# Patient Record
Sex: MALE | Race: WHITE | NOT HISPANIC OR LATINO | Employment: OTHER | URBAN - METROPOLITAN AREA
[De-identification: names, ages, dates, MRNs, and addresses within clinical notes are randomized per-mention and may not be internally consistent; named-entity substitution may affect disease eponyms.]

---

## 2017-10-05 ENCOUNTER — TELEPHONE (OUTPATIENT)
Dept: INTERNAL MEDICINE | Facility: CLINIC | Age: 82
End: 2017-10-05

## 2017-10-05 DIAGNOSIS — Z00.00 ENCOUNTER FOR PREVENTIVE HEALTH EXAMINATION: Primary | ICD-10-CM

## 2017-10-05 NOTE — TELEPHONE ENCOUNTER
----- Message from Selma Humphries sent at 10/5/2017  2:23 PM CDT -----  Doctor appointment and lab have been scheduled.  Please link lab orders to the lab appointment.  Date of doctor appointment:  1/3/18  Physical or EP:  Physical  Date of lab appointment:  12/27/17  Comments:

## 2017-10-09 ENCOUNTER — OFFICE VISIT (OUTPATIENT)
Dept: OPTOMETRY | Facility: CLINIC | Age: 82
End: 2017-10-09
Payer: MEDICARE

## 2017-10-09 DIAGNOSIS — H25.13 NUCLEAR SCLEROSIS, BILATERAL: Primary | ICD-10-CM

## 2017-10-09 DIAGNOSIS — H52.4 PRESBYOPIA: ICD-10-CM

## 2017-10-09 DIAGNOSIS — Z13.5 GLAUCOMA SCREENING: ICD-10-CM

## 2017-10-09 PROCEDURE — 92015 DETERMINE REFRACTIVE STATE: CPT | Mod: S$GLB,,, | Performed by: OPTOMETRIST

## 2017-10-09 PROCEDURE — 92004 COMPRE OPH EXAM NEW PT 1/>: CPT | Mod: S$GLB,,, | Performed by: OPTOMETRIST

## 2017-10-09 PROCEDURE — 99999 PR PBB SHADOW E&M-EST. PATIENT-LVL II: CPT | Mod: PBBFAC,,, | Performed by: OPTOMETRIST

## 2017-10-09 NOTE — PROGRESS NOTES
HPI     DLS: 9/19/2012  Pt states take longer to focus. Need bright lights to read.   Denies f/f    No gtts     Hx CAT OU --OS getting worse    Last edited by Matthew Rodney, OD on 10/9/2017  9:47 AM. (History)        ROS     Negative for: Constitutional, Gastrointestinal, Neurological, Skin,   Genitourinary, Musculoskeletal, HENT, Endocrine, Cardiovascular, Eyes,   Respiratory, Psychiatric, Allergic/Imm, Heme/Lymph    Last edited by Matthew Rodney, OD on 10/9/2017  8:27 AM. (History)        Assessment /Plan     For exam results, see Encounter Report.    Nuclear sclerosis, bilateral    Glaucoma screening    Presbyopia      Reduced VA 2 to Cat OS>OD.  Dr Dao ALVARENGA reports his son had some problems after cat surgery, so he is leery.  Long discussion about procedure/risks/benefits.  Discussed he is legally blind OS now, and not legal to drive, but if he wishes to wait on surgery it will not harm his eyes    PLAN:    rtc 1 yr, or will call sooner if wishes cat yenifer Lozano

## 2017-12-15 ENCOUNTER — TELEPHONE (OUTPATIENT)
Dept: INTERNAL MEDICINE | Facility: CLINIC | Age: 82
End: 2017-12-15

## 2017-12-15 NOTE — TELEPHONE ENCOUNTER
----- Message from Cari Gama sent at 12/15/2017  2:58 PM CST -----  Pt has lab work scheduled for 12/27 and need orders attached please

## 2018-01-03 ENCOUNTER — OFFICE VISIT (OUTPATIENT)
Dept: INTERNAL MEDICINE | Facility: CLINIC | Age: 83
End: 2018-01-03
Payer: MEDICARE

## 2018-01-03 ENCOUNTER — LAB VISIT (OUTPATIENT)
Dept: LAB | Facility: HOSPITAL | Age: 83
End: 2018-01-03
Attending: INTERNAL MEDICINE
Payer: MEDICARE

## 2018-01-03 VITALS
WEIGHT: 122.81 LBS | SYSTOLIC BLOOD PRESSURE: 130 MMHG | DIASTOLIC BLOOD PRESSURE: 82 MMHG | HEART RATE: 71 BPM | HEIGHT: 62 IN | TEMPERATURE: 98 F | RESPIRATION RATE: 16 BRPM | BODY MASS INDEX: 22.6 KG/M2

## 2018-01-03 DIAGNOSIS — G25.0 ESSENTIAL TREMOR: ICD-10-CM

## 2018-01-03 DIAGNOSIS — Z12.5 PROSTATE CANCER SCREENING: ICD-10-CM

## 2018-01-03 DIAGNOSIS — Z00.00 ENCOUNTER FOR PREVENTIVE HEALTH EXAMINATION: ICD-10-CM

## 2018-01-03 DIAGNOSIS — E78.5 DYSLIPIDEMIA: ICD-10-CM

## 2018-01-03 DIAGNOSIS — Z00.00 ENCOUNTER FOR PREVENTIVE HEALTH EXAMINATION: Primary | ICD-10-CM

## 2018-01-03 LAB
ALBUMIN SERPL BCP-MCNC: 3.9 G/DL
ALP SERPL-CCNC: 61 U/L
ALT SERPL W/O P-5'-P-CCNC: 15 U/L
ANION GAP SERPL CALC-SCNC: 11 MMOL/L
AST SERPL-CCNC: 28 U/L
BASOPHILS # BLD AUTO: 0.05 K/UL
BASOPHILS NFR BLD: 0.9 %
BILIRUB SERPL-MCNC: 0.8 MG/DL
BUN SERPL-MCNC: 13 MG/DL
CALCIUM SERPL-MCNC: 9.5 MG/DL
CHLORIDE SERPL-SCNC: 105 MMOL/L
CHOLEST SERPL-MCNC: 209 MG/DL
CHOLEST/HDLC SERPL: 3 {RATIO}
CO2 SERPL-SCNC: 27 MMOL/L
COMPLEXED PSA SERPL-MCNC: 2 NG/ML
CREAT SERPL-MCNC: 1 MG/DL
DIFFERENTIAL METHOD: ABNORMAL
EOSINOPHIL # BLD AUTO: 0.2 K/UL
EOSINOPHIL NFR BLD: 3.2 %
ERYTHROCYTE [DISTWIDTH] IN BLOOD BY AUTOMATED COUNT: 12 %
EST. GFR  (AFRICAN AMERICAN): >60 ML/MIN/1.73 M^2
EST. GFR  (NON AFRICAN AMERICAN): >60 ML/MIN/1.73 M^2
GLUCOSE SERPL-MCNC: 84 MG/DL
HCT VFR BLD AUTO: 43.4 %
HDLC SERPL-MCNC: 69 MG/DL
HDLC SERPL: 33 %
HGB BLD-MCNC: 14.6 G/DL
IMM GRANULOCYTES # BLD AUTO: 0.01 K/UL
IMM GRANULOCYTES NFR BLD AUTO: 0.2 %
LDLC SERPL CALC-MCNC: 118 MG/DL
LYMPHOCYTES # BLD AUTO: 1.2 K/UL
LYMPHOCYTES NFR BLD: 23 %
MCH RBC QN AUTO: 31.7 PG
MCHC RBC AUTO-ENTMCNC: 33.6 G/DL
MCV RBC AUTO: 94 FL
MONOCYTES # BLD AUTO: 0.4 K/UL
MONOCYTES NFR BLD: 8 %
NEUTROPHILS # BLD AUTO: 3.5 K/UL
NEUTROPHILS NFR BLD: 64.7 %
NONHDLC SERPL-MCNC: 140 MG/DL
NRBC BLD-RTO: 0 /100 WBC
PLATELET # BLD AUTO: 188 K/UL
PMV BLD AUTO: 9.1 FL
POTASSIUM SERPL-SCNC: 4 MMOL/L
PROT SERPL-MCNC: 7.3 G/DL
RBC # BLD AUTO: 4.6 M/UL
SODIUM SERPL-SCNC: 143 MMOL/L
TRIGL SERPL-MCNC: 110 MG/DL
TSH SERPL DL<=0.005 MIU/L-ACNC: 1.65 UIU/ML
WBC # BLD AUTO: 5.38 K/UL

## 2018-01-03 PROCEDURE — 90670 PCV13 VACCINE IM: CPT | Mod: S$GLB,,, | Performed by: INTERNAL MEDICINE

## 2018-01-03 PROCEDURE — 80053 COMPREHEN METABOLIC PANEL: CPT

## 2018-01-03 PROCEDURE — G0009 ADMIN PNEUMOCOCCAL VACCINE: HCPCS | Mod: S$GLB,,, | Performed by: INTERNAL MEDICINE

## 2018-01-03 PROCEDURE — 84443 ASSAY THYROID STIM HORMONE: CPT

## 2018-01-03 PROCEDURE — 85025 COMPLETE CBC W/AUTO DIFF WBC: CPT

## 2018-01-03 PROCEDURE — 99999 PR PBB SHADOW E&M-EST. PATIENT-LVL III: CPT | Mod: PBBFAC,,, | Performed by: INTERNAL MEDICINE

## 2018-01-03 PROCEDURE — 84153 ASSAY OF PSA TOTAL: CPT

## 2018-01-03 PROCEDURE — 99499 UNLISTED E&M SERVICE: CPT | Mod: S$GLB,,, | Performed by: INTERNAL MEDICINE

## 2018-01-03 PROCEDURE — 36415 COLL VENOUS BLD VENIPUNCTURE: CPT | Mod: PO

## 2018-01-03 PROCEDURE — 80061 LIPID PANEL: CPT

## 2018-01-03 PROCEDURE — 99397 PER PM REEVAL EST PAT 65+ YR: CPT | Mod: S$GLB,,, | Performed by: INTERNAL MEDICINE

## 2018-01-03 NOTE — PROGRESS NOTES
History of present illness:  85-year-old male in today for general health assessment.  Next    Current medications-none on a scheduled basis.    Review of systems:  General: no fever, chills, generalized body aches. No unexpected weight loss.  Eyes:  No visual disturbances.  HEENT:  No hoarseness, dysphagia, ear pain.  Respiratory:  No cough, no shortness of breath.  Cardiovascular: no chest pain, palpitations, cough, exertional limb pain. No edema.  GI: no nausea, vomiting.  No abdominal pain. No change in bowel habits.  No melena, no hematochezia.  : no dysuria. No change in the color or character of the urine. No urinary frequency.  Musculoskeletal: no joint pain or swelling.  Neurologic:  No focal neurological complaints.  No headaches.  The patient's tremor symptomatology is stable.  Skin:  No rashes or other concerns.  Psych:  No emotional issues\    Past medical history:  Essential tremor.  Mild dyslipidemia.    Past surgical history, family medical history, social histories are all noted reviewed in the electronic medical record history sections    Health screenings:  Eye exam October 2017  23 Valent pneumococcal vaccine last year.  He has not yet had had Prevnar 13.  Abdominal aortic aneurysm screening December 2016.    Physical examination:  GENERAL:  Alert, appropriately groomed, no acute distress.  VS: Blood pressure taken manually by this examiner is 130/82.  EYES: sclerae white ,nonicteric. PERRL.  HEENT:  Normocephalic. Ear canals and tympanic membranes normal. Mouth and pharynx normal. No thyromegaly. Trachea midline and freely mobile.  LUNGS:  Clear to ascultation and normal to percussion.  CARDIOVASCULAR:  Normal heart sounds.  No significant murmur. Carotids full bilaterally without bruit.  Pedal pulses intact .  No abdominal bruit.  No peripheral extremity edema.  GI: the abdomen is soft, no distension. No masses , tenderness, organomegaly.  Rectal examination normal.  : scrotum, testicles  and penis normal. Prostate without nodules or asymmetry.  LYMPHATIC:  No axillary, inguinal , cervical adenopathy.  MUSCULOSKELETAL:  Range of motion, stability and strength of the right and left upper and lower extremities normal. No swollen or tender joints  NEUROLOGIC:  DTR's normal. No gross motor or sensory deficits apparent, gait normal.  SKIN:  No rashes.   MS:  Alert, oriented , affect and mood all appropriate    Impression:  Generally healthy 85-year-old male with no significant underlying medical conditions living a healthy lifestyle.  Mild essential tremor stable.  Has had minimal dyslipidemia and past.    Plan:  Update health maintenance laboratory data to include CBC, chemistry profile, lipid profile, TSH.  Review of family history of prostate cancer we'll update PSA.  Prevnar 13 immunization update.  Return to clinic one year for annual health assessment.

## 2018-02-16 ENCOUNTER — PES CALL (OUTPATIENT)
Dept: ADMINISTRATIVE | Facility: CLINIC | Age: 83
End: 2018-02-16

## 2018-09-06 ENCOUNTER — TELEPHONE (OUTPATIENT)
Dept: INTERNAL MEDICINE | Facility: CLINIC | Age: 83
End: 2018-09-06

## 2018-09-06 DIAGNOSIS — E78.5 DYSLIPIDEMIA: Primary | ICD-10-CM

## 2018-09-06 NOTE — TELEPHONE ENCOUNTER
----- Message from Yulissa Riley sent at 9/6/2018  9:44 AM CDT -----  Doctor appointment and lab have been scheduled.  Please link lab orders to the lab appointment.  Date of doctor appointment:  1/10  Physical or EP:  EPP  Date of lab appointment:  10/7  Comments:

## 2018-09-18 ENCOUNTER — PES CALL (OUTPATIENT)
Dept: ADMINISTRATIVE | Facility: CLINIC | Age: 83
End: 2018-09-18

## 2018-10-10 ENCOUNTER — OFFICE VISIT (OUTPATIENT)
Dept: OPTOMETRY | Facility: CLINIC | Age: 83
End: 2018-10-10
Payer: COMMERCIAL

## 2018-10-10 DIAGNOSIS — Z13.5 GLAUCOMA SCREENING: ICD-10-CM

## 2018-10-10 DIAGNOSIS — H52.4 PRESBYOPIA: ICD-10-CM

## 2018-10-10 DIAGNOSIS — H25.13 NUCLEAR SCLEROSIS, BILATERAL: Primary | ICD-10-CM

## 2018-10-10 PROCEDURE — 92015 DETERMINE REFRACTIVE STATE: CPT | Mod: S$GLB,,, | Performed by: OPTOMETRIST

## 2018-10-10 PROCEDURE — 92014 COMPRE OPH EXAM EST PT 1/>: CPT | Mod: S$GLB,,, | Performed by: OPTOMETRIST

## 2018-10-10 PROCEDURE — 99999 PR PBB SHADOW E&M-EST. PATIENT-LVL II: CPT | Mod: PBBFAC,,, | Performed by: OPTOMETRIST

## 2018-10-10 NOTE — PROGRESS NOTES
HPI     DLS: 10/9/17  Pt  States he doesn't see a change in VA, but Pt would like a full Rx with   pupil dist. and all so he can order another pair of glasses so he can have   a back up. Does not want cat surgery  No f/f   No gtts     Last edited by Matthew Rodney, OD on 10/10/2018 11:32 AM. (History)        ROS     Negative for: Constitutional, Gastrointestinal, Neurological, Skin,   Genitourinary, Musculoskeletal, HENT, Endocrine, Cardiovascular, Eyes,   Respiratory, Psychiatric, Allergic/Imm, Heme/Lymph    Last edited by Matthew Rodney, OD on 10/10/2018 11:32 AM. (History)        Assessment /Plan     For exam results, see Encounter Report.    Nuclear sclerosis, bilateral    Glaucoma screening    Presbyopia      Reduced VA 2 to Cat OS>OD.  Dr Dao ALVARENGA reports his son had some problems after cat surgery, so he is leery.  Wishes new Rx.  Realizes will not pass next drivers test    PLAN:    rtc 1 yr, or will call sooner if wishes cat yenifer w Dr Lozano

## 2018-10-11 ENCOUNTER — PES CALL (OUTPATIENT)
Dept: ADMINISTRATIVE | Facility: CLINIC | Age: 83
End: 2018-10-11

## 2019-01-07 ENCOUNTER — LAB VISIT (OUTPATIENT)
Dept: LAB | Facility: HOSPITAL | Age: 84
End: 2019-01-07
Attending: INTERNAL MEDICINE
Payer: MEDICARE

## 2019-01-07 DIAGNOSIS — E78.5 DYSLIPIDEMIA: ICD-10-CM

## 2019-01-07 LAB
ALBUMIN SERPL BCP-MCNC: 3.8 G/DL
ALP SERPL-CCNC: 65 U/L
ALT SERPL W/O P-5'-P-CCNC: 16 U/L
ANION GAP SERPL CALC-SCNC: 8 MMOL/L
AST SERPL-CCNC: 25 U/L
BASOPHILS # BLD AUTO: 0.04 K/UL
BASOPHILS NFR BLD: 0.8 %
BILIRUB SERPL-MCNC: 0.7 MG/DL
BUN SERPL-MCNC: 15 MG/DL
CALCIUM SERPL-MCNC: 9.5 MG/DL
CHLORIDE SERPL-SCNC: 107 MMOL/L
CHOLEST SERPL-MCNC: 217 MG/DL
CHOLEST/HDLC SERPL: 3.6 {RATIO}
CO2 SERPL-SCNC: 27 MMOL/L
CREAT SERPL-MCNC: 1.1 MG/DL
DIFFERENTIAL METHOD: ABNORMAL
EOSINOPHIL # BLD AUTO: 0.2 K/UL
EOSINOPHIL NFR BLD: 3.9 %
ERYTHROCYTE [DISTWIDTH] IN BLOOD BY AUTOMATED COUNT: 12.5 %
EST. GFR  (AFRICAN AMERICAN): >60 ML/MIN/1.73 M^2
EST. GFR  (NON AFRICAN AMERICAN): >60 ML/MIN/1.73 M^2
GLUCOSE SERPL-MCNC: 104 MG/DL
HCT VFR BLD AUTO: 44 %
HDLC SERPL-MCNC: 61 MG/DL
HDLC SERPL: 28.1 %
HGB BLD-MCNC: 14.3 G/DL
IMM GRANULOCYTES # BLD AUTO: 0.02 K/UL
IMM GRANULOCYTES NFR BLD AUTO: 0.4 %
LDLC SERPL CALC-MCNC: 130.6 MG/DL
LYMPHOCYTES # BLD AUTO: 1.2 K/UL
LYMPHOCYTES NFR BLD: 23.5 %
MCH RBC QN AUTO: 31.3 PG
MCHC RBC AUTO-ENTMCNC: 32.5 G/DL
MCV RBC AUTO: 96 FL
MONOCYTES # BLD AUTO: 0.4 K/UL
MONOCYTES NFR BLD: 8.3 %
NEUTROPHILS # BLD AUTO: 3.3 K/UL
NEUTROPHILS NFR BLD: 63.1 %
NONHDLC SERPL-MCNC: 156 MG/DL
NRBC BLD-RTO: 0 /100 WBC
PLATELET # BLD AUTO: 175 K/UL
PMV BLD AUTO: 9.1 FL
POTASSIUM SERPL-SCNC: 4 MMOL/L
PROT SERPL-MCNC: 7 G/DL
RBC # BLD AUTO: 4.57 M/UL
SODIUM SERPL-SCNC: 142 MMOL/L
TRIGL SERPL-MCNC: 127 MG/DL
TSH SERPL DL<=0.005 MIU/L-ACNC: 2 UIU/ML
WBC # BLD AUTO: 5.19 K/UL

## 2019-01-07 PROCEDURE — 36415 COLL VENOUS BLD VENIPUNCTURE: CPT | Mod: HCNC,PO

## 2019-01-07 PROCEDURE — 85025 COMPLETE CBC W/AUTO DIFF WBC: CPT | Mod: HCNC

## 2019-01-07 PROCEDURE — 80061 LIPID PANEL: CPT | Mod: HCNC

## 2019-01-07 PROCEDURE — 80053 COMPREHEN METABOLIC PANEL: CPT | Mod: HCNC

## 2019-01-07 PROCEDURE — 84443 ASSAY THYROID STIM HORMONE: CPT | Mod: HCNC

## 2019-01-10 ENCOUNTER — OFFICE VISIT (OUTPATIENT)
Dept: INTERNAL MEDICINE | Facility: CLINIC | Age: 84
End: 2019-01-10
Payer: MEDICARE

## 2019-01-10 VITALS
BODY MASS INDEX: 21.02 KG/M2 | SYSTOLIC BLOOD PRESSURE: 104 MMHG | TEMPERATURE: 98 F | WEIGHT: 118.63 LBS | HEART RATE: 76 BPM | RESPIRATION RATE: 14 BRPM | DIASTOLIC BLOOD PRESSURE: 66 MMHG | HEIGHT: 63 IN

## 2019-01-10 DIAGNOSIS — Z00.00 ENCOUNTER FOR PREVENTIVE HEALTH EXAMINATION: Primary | ICD-10-CM

## 2019-01-10 DIAGNOSIS — G25.0 ESSENTIAL TREMOR: ICD-10-CM

## 2019-01-10 DIAGNOSIS — R82.90 ABNORMAL URINALYSIS: ICD-10-CM

## 2019-01-10 PROCEDURE — 99999 PR PBB SHADOW E&M-EST. PATIENT-LVL III: ICD-10-PCS | Mod: PBBFAC,HCNC,, | Performed by: INTERNAL MEDICINE

## 2019-01-10 PROCEDURE — 99397 PR PREVENTIVE VISIT,EST,65 & OVER: ICD-10-PCS | Mod: S$GLB,,, | Performed by: INTERNAL MEDICINE

## 2019-01-10 PROCEDURE — 99999 PR PBB SHADOW E&M-EST. PATIENT-LVL III: CPT | Mod: PBBFAC,HCNC,, | Performed by: INTERNAL MEDICINE

## 2019-01-10 PROCEDURE — 99397 PER PM REEVAL EST PAT 65+ YR: CPT | Mod: S$GLB,,, | Performed by: INTERNAL MEDICINE

## 2019-01-10 NOTE — PROGRESS NOTES
History of present illness:  86-year-old gentleman in today for general health assessment.    Current medications:  None.    Review of systems:  General: no fever, chills, generalized body aches. No unexpected weight loss.  Eyes:  No visual disturbances.  HEENT:  No hoarseness, dysphagia, ear pain.  Respiratory:  No cough, no shortness of breath.  Cardiovascular: no chest pain, palpitations, cough, exertional limb pain. No edema.  GI: no nausea, vomiting.  No abdominal pain. No change in bowel habits.  No melena, no hematochezia.  : no dysuria. No change in the color or character of the urine. No urinary frequency.  Musculoskeletal: no joint pain or swelling. Dupuytren's contracture left 10 with left 4th trigger finger stable  Neurologic:  No focal neurological complaints.  No headaches.  Known history of essential tremor has not worsened.  Skin:  No rashes or other concerns.  Psych:  No emotional issues    Past medical history:  Essential tremor    Past surgical history, family medical history and social history is are all noted and reviewed in the electronic medical record history sections.    Health screenings:  He has had both pneumococcal vaccines.  Declines influenza vaccine.  Eye exam October 2018    Physical examination:  GENERAL:  Alert, appropriately groomed, no acute distress.  VS:  Blood pressure taken manually is 124/66  EYES: sclerae white ,nonicteric. PERRL.  HEENT:  Normocephalic. Ear canals and tympanic membranes normal. Mouth and pharynx normal. No thyromegaly. Trachea midline and freely mobile.  LUNGS:  Clear to ascultation and normal to percussion.  CARDIOVASCULAR:  Normal heart sounds.  No significant murmur. Carotids full bilaterally without bruit.  Pedal pulses intact .  No abdominal bruit.  No peripheral extremity edema.  GI: the abdomen is soft, no distension. No masses , tenderness, organomegaly.    : scrotum, testicles and penis normal.  Declines DORYS  LYMPHATIC:  No axillary, inguinal  , cervical adenopathy.  MUSCULOSKELETAL:  Range of motion, stability and strength of the right and left upper and lower extremities normal. No swollen or tender joints.  Dupuytren's contracture left hand with triggering of left 4 th finger  NEUROLOGIC:  DTR's normal. No gross motor or sensory deficits apparent, gait normal.  SKIN:  No rashes.   MS:  Alert, oriented , affect and mood all appropriate    Data:  Lab data noted reviewed from January 7, 2019-all reasonable.  Urine does shows 2+ occult blood.  No RBCs.    Impression:  Generally healthy 86-year-old male with no significant underlying medical conditions.  Essential tremor stable.  Noted occult blood in urine.    Plan:  Repeat urinalysis in about 6 weeks.  Recommended influenza vaccine but he declines.

## 2019-02-25 ENCOUNTER — LAB VISIT (OUTPATIENT)
Dept: LAB | Facility: HOSPITAL | Age: 84
End: 2019-02-25
Attending: INTERNAL MEDICINE
Payer: MEDICARE

## 2019-02-25 DIAGNOSIS — R82.90 ABNORMAL URINALYSIS: ICD-10-CM

## 2019-02-25 LAB
BILIRUB UR QL STRIP: NEGATIVE
CLARITY UR REFRACT.AUTO: CLEAR
COLOR UR AUTO: YELLOW
GLUCOSE UR QL STRIP: NEGATIVE
HGB UR QL STRIP: ABNORMAL
KETONES UR QL STRIP: ABNORMAL
LEUKOCYTE ESTERASE UR QL STRIP: NEGATIVE
MICROSCOPIC COMMENT: NORMAL
NITRITE UR QL STRIP: NEGATIVE
PH UR STRIP: 5 [PH] (ref 5–8)
PROT UR QL STRIP: NEGATIVE
RBC #/AREA URNS AUTO: 3 /HPF (ref 0–4)
SP GR UR STRIP: 1.01 (ref 1–1.03)
URN SPEC COLLECT METH UR: ABNORMAL

## 2019-02-25 PROCEDURE — 81001 URINALYSIS AUTO W/SCOPE: CPT | Mod: HCNC

## 2019-06-26 ENCOUNTER — PES CALL (OUTPATIENT)
Dept: ADMINISTRATIVE | Facility: CLINIC | Age: 84
End: 2019-06-26

## 2019-09-24 ENCOUNTER — TELEPHONE (OUTPATIENT)
Dept: INTERNAL MEDICINE | Facility: CLINIC | Age: 84
End: 2019-09-24

## 2019-09-24 DIAGNOSIS — Z12.5 PROSTATE CANCER SCREENING: Primary | ICD-10-CM

## 2019-09-24 DIAGNOSIS — E78.5 DYSLIPIDEMIA: ICD-10-CM

## 2019-09-24 NOTE — TELEPHONE ENCOUNTER
----- Message from Raissa Hernandez sent at 9/24/2019 10:52 AM CDT -----  Contact: wife/Brisa/[phone]  Pt wife called in regard to scheduling an epp appointment. I tried to schedule but its not giving me any time slots.      Please advise

## 2019-10-10 ENCOUNTER — PATIENT OUTREACH (OUTPATIENT)
Dept: ADMINISTRATIVE | Facility: OTHER | Age: 84
End: 2019-10-10

## 2019-10-10 ENCOUNTER — TELEPHONE (OUTPATIENT)
Dept: OPTOMETRY | Facility: CLINIC | Age: 84
End: 2019-10-10

## 2019-10-14 ENCOUNTER — OFFICE VISIT (OUTPATIENT)
Dept: OPTOMETRY | Facility: CLINIC | Age: 84
End: 2019-10-14
Payer: COMMERCIAL

## 2019-10-14 DIAGNOSIS — Z13.5 GLAUCOMA SCREENING: ICD-10-CM

## 2019-10-14 DIAGNOSIS — H25.13 NUCLEAR SCLEROSIS, BILATERAL: Primary | ICD-10-CM

## 2019-10-14 DIAGNOSIS — H52.4 PRESBYOPIA: ICD-10-CM

## 2019-10-14 PROCEDURE — 92014 COMPRE OPH EXAM EST PT 1/>: CPT | Mod: S$GLB,,, | Performed by: OPTOMETRIST

## 2019-10-14 PROCEDURE — 92015 PR REFRACTION: ICD-10-PCS | Mod: S$GLB,,, | Performed by: OPTOMETRIST

## 2019-10-14 PROCEDURE — 99999 PR PBB SHADOW E&M-EST. PATIENT-LVL II: ICD-10-PCS | Mod: PBBFAC,,, | Performed by: OPTOMETRIST

## 2019-10-14 PROCEDURE — 92015 DETERMINE REFRACTIVE STATE: CPT | Mod: S$GLB,,, | Performed by: OPTOMETRIST

## 2019-10-14 PROCEDURE — 99999 PR PBB SHADOW E&M-EST. PATIENT-LVL II: CPT | Mod: PBBFAC,,, | Performed by: OPTOMETRIST

## 2019-10-14 PROCEDURE — 92014 PR EYE EXAM, EST PATIENT,COMPREHESV: ICD-10-PCS | Mod: S$GLB,,, | Performed by: OPTOMETRIST

## 2019-10-14 NOTE — PROGRESS NOTES
HPI     DLS; 10/10/18  Pt states no VA problems. Hx Cats OU  No f/f  No gtts      Last edited by Matthew Rodney, OD on 10/14/2019 10:59 AM. (History)        ROS     Negative for: Constitutional, Gastrointestinal, Neurological, Skin,   Genitourinary, Musculoskeletal, HENT, Endocrine, Cardiovascular, Eyes,   Respiratory, Psychiatric, Allergic/Imm, Heme/Lymph    Last edited by Matthew Rodney, OD on 10/14/2019 10:59 AM. (History)        Assessment /Plan     For exam results, see Encounter Report.    Nuclear sclerosis, bilateral    Glaucoma screening    Presbyopia        Reduced VA 2 to Cat OS>OD.  Dr Dao ALVARENGA reports his son had some problems after cat surgery, so he is leery.  Wishes new Rx.  Realizes may not pass next drivers test    PLAN:    rtc 1 yr, or will call sooner if wishes cat eval w Dr Lozano

## 2020-01-03 ENCOUNTER — LAB VISIT (OUTPATIENT)
Dept: LAB | Facility: HOSPITAL | Age: 85
End: 2020-01-03
Attending: INTERNAL MEDICINE
Payer: MEDICARE

## 2020-01-03 DIAGNOSIS — Z12.5 PROSTATE CANCER SCREENING: ICD-10-CM

## 2020-01-03 DIAGNOSIS — E78.5 DYSLIPIDEMIA: ICD-10-CM

## 2020-01-03 LAB
ALBUMIN SERPL BCP-MCNC: 3.7 G/DL (ref 3.5–5.2)
ALP SERPL-CCNC: 53 U/L (ref 55–135)
ALT SERPL W/O P-5'-P-CCNC: 19 U/L (ref 10–44)
ANION GAP SERPL CALC-SCNC: 9 MMOL/L (ref 8–16)
AST SERPL-CCNC: 24 U/L (ref 10–40)
BASOPHILS # BLD AUTO: 0.07 K/UL (ref 0–0.2)
BASOPHILS NFR BLD: 1.4 % (ref 0–1.9)
BILIRUB SERPL-MCNC: 0.4 MG/DL (ref 0.1–1)
BUN SERPL-MCNC: 12 MG/DL (ref 8–23)
CALCIUM SERPL-MCNC: 9.2 MG/DL (ref 8.7–10.5)
CHLORIDE SERPL-SCNC: 108 MMOL/L (ref 95–110)
CHOLEST SERPL-MCNC: 181 MG/DL (ref 120–199)
CHOLEST/HDLC SERPL: 3 {RATIO} (ref 2–5)
CO2 SERPL-SCNC: 27 MMOL/L (ref 23–29)
COMPLEXED PSA SERPL-MCNC: 1.8 NG/ML (ref 0–4)
CREAT SERPL-MCNC: 1 MG/DL (ref 0.5–1.4)
DIFFERENTIAL METHOD: ABNORMAL
EOSINOPHIL # BLD AUTO: 0.2 K/UL (ref 0–0.5)
EOSINOPHIL NFR BLD: 3.1 % (ref 0–8)
ERYTHROCYTE [DISTWIDTH] IN BLOOD BY AUTOMATED COUNT: 12.9 % (ref 11.5–14.5)
EST. GFR  (AFRICAN AMERICAN): >60 ML/MIN/1.73 M^2
EST. GFR  (NON AFRICAN AMERICAN): >60 ML/MIN/1.73 M^2
GLUCOSE SERPL-MCNC: 94 MG/DL (ref 70–110)
HCT VFR BLD AUTO: 44.7 % (ref 40–54)
HDLC SERPL-MCNC: 60 MG/DL (ref 40–75)
HDLC SERPL: 33.1 % (ref 20–50)
HGB BLD-MCNC: 14 G/DL (ref 14–18)
IMM GRANULOCYTES # BLD AUTO: 0.01 K/UL (ref 0–0.04)
IMM GRANULOCYTES NFR BLD AUTO: 0.2 % (ref 0–0.5)
LDLC SERPL CALC-MCNC: 103.8 MG/DL (ref 63–159)
LYMPHOCYTES # BLD AUTO: 1.2 K/UL (ref 1–4.8)
LYMPHOCYTES NFR BLD: 22.8 % (ref 18–48)
MCH RBC QN AUTO: 30.3 PG (ref 27–31)
MCHC RBC AUTO-ENTMCNC: 31.3 G/DL (ref 32–36)
MCV RBC AUTO: 97 FL (ref 82–98)
MONOCYTES # BLD AUTO: 0.5 K/UL (ref 0.3–1)
MONOCYTES NFR BLD: 8.9 % (ref 4–15)
NEUTROPHILS # BLD AUTO: 3.2 K/UL (ref 1.8–7.7)
NEUTROPHILS NFR BLD: 63.6 % (ref 38–73)
NONHDLC SERPL-MCNC: 121 MG/DL
NRBC BLD-RTO: 0 /100 WBC
PLATELET # BLD AUTO: 180 K/UL (ref 150–350)
PMV BLD AUTO: 9.1 FL (ref 9.2–12.9)
POTASSIUM SERPL-SCNC: 4.7 MMOL/L (ref 3.5–5.1)
PROT SERPL-MCNC: 6.5 G/DL (ref 6–8.4)
RBC # BLD AUTO: 4.62 M/UL (ref 4.6–6.2)
SODIUM SERPL-SCNC: 144 MMOL/L (ref 136–145)
TRIGL SERPL-MCNC: 86 MG/DL (ref 30–150)
TSH SERPL DL<=0.005 MIU/L-ACNC: 2.13 UIU/ML (ref 0.4–4)
WBC # BLD AUTO: 5.08 K/UL (ref 3.9–12.7)

## 2020-01-03 PROCEDURE — 36415 COLL VENOUS BLD VENIPUNCTURE: CPT | Mod: HCNC,PO

## 2020-01-03 PROCEDURE — 80061 LIPID PANEL: CPT | Mod: HCNC

## 2020-01-03 PROCEDURE — 84153 ASSAY OF PSA TOTAL: CPT | Mod: HCNC

## 2020-01-03 PROCEDURE — 80053 COMPREHEN METABOLIC PANEL: CPT | Mod: HCNC

## 2020-01-03 PROCEDURE — 85025 COMPLETE CBC W/AUTO DIFF WBC: CPT | Mod: HCNC

## 2020-01-03 PROCEDURE — 84443 ASSAY THYROID STIM HORMONE: CPT | Mod: HCNC

## 2020-01-14 ENCOUNTER — OFFICE VISIT (OUTPATIENT)
Dept: INTERNAL MEDICINE | Facility: CLINIC | Age: 85
End: 2020-01-14
Payer: MEDICARE

## 2020-01-14 DIAGNOSIS — G25.0 ESSENTIAL TREMOR: ICD-10-CM

## 2020-01-14 DIAGNOSIS — Z00.00 ENCOUNTER FOR PREVENTIVE HEALTH EXAMINATION: Primary | ICD-10-CM

## 2020-01-14 DIAGNOSIS — H61.92 LESION OF EXTERNAL EAR, LEFT: ICD-10-CM

## 2020-01-14 PROCEDURE — 99999 PR PBB SHADOW E&M-EST. PATIENT-LVL III: CPT | Mod: PBBFAC,HCNC,, | Performed by: INTERNAL MEDICINE

## 2020-01-14 PROCEDURE — 99999 PR PBB SHADOW E&M-EST. PATIENT-LVL III: ICD-10-PCS | Mod: PBBFAC,HCNC,, | Performed by: INTERNAL MEDICINE

## 2020-01-14 PROCEDURE — 99397 PER PM REEVAL EST PAT 65+ YR: CPT | Mod: HCNC,S$GLB,, | Performed by: INTERNAL MEDICINE

## 2020-01-14 PROCEDURE — 99397 PR PREVENTIVE VISIT,EST,65 & OVER: ICD-10-PCS | Mod: HCNC,S$GLB,, | Performed by: INTERNAL MEDICINE

## 2020-01-14 RX ORDER — MUPIROCIN 20 MG/G
OINTMENT TOPICAL 3 TIMES DAILY
Qty: 15 G | Refills: 2 | Status: SHIPPED | OUTPATIENT
Start: 2020-01-14 | End: 2021-01-14 | Stop reason: SDUPTHER

## 2020-01-16 VITALS
HEART RATE: 71 BPM | SYSTOLIC BLOOD PRESSURE: 146 MMHG | RESPIRATION RATE: 14 BRPM | BODY MASS INDEX: 21.48 KG/M2 | DIASTOLIC BLOOD PRESSURE: 66 MMHG | WEIGHT: 121.25 LBS | HEIGHT: 63 IN | TEMPERATURE: 99 F

## 2020-01-16 NOTE — PROGRESS NOTES
History of present illness:  87-year-old gentleman in today for general health assessment.    Current medications:  No scheduled prescription medication.    Review of systems:  General: no fever, chills, generalized body aches. No unexpected weight loss.  Eyes:  No visual disturbances.  HEENT:  No hoarseness, dysphagia, ear pain.  Respiratory:  No cough, no shortness of breath.  Cardiovascular: no chest pain, palpitations, cough, exertional limb pain. No edema.  GI: no nausea, vomiting.  No abdominal pain. No change in bowel habits.  No melena, no hematochezia.  : no dysuria. No change in the color or character of the urine. No urinary frequency.  Musculoskeletal: no joint pain or swelling.  Neurologic:  No focal neurological complaints.  No headaches.  Skin:  In recent months he has had a persistent irritation over the tragus of the left ear.  Psych:  No emotional issues      Past medical history, past surgical history, family medical history i and social history all noted and reviewed in the electronic medical record history sections.    Health screenings:  He has had both pneumococcal vaccines.  Declines influenza vaccine.    Physical examination:  GENERAL:  Alert, appropriately groomed, no acute distress.  VS:  Blood pressure taken manually by this examiner 146/66.  EYES: sclerae white ,nonicteric. PERRL.  HEENT:  Normocephalic. Ear canals and tympanic membranes normal. Mouth and pharynx normal. No thyromegaly. Trachea midline and freely mobile.  LUNGS:  Clear to ascultation and normal to percussion.  CARDIOVASCULAR:  Normal heart sounds.  No significant murmur. Carotids full bilaterally without bruit.  Pedal pulses intact .  No abdominal bruit.  No peripheral extremity edema.  GI: the abdomen is soft, no distension. No masses , tenderness, organomegaly.   : scrotum, testicles and penis normal.   LYMPHATIC:  No axillary, inguinal , cervical adenopathy.  MUSCULOSKELETAL:   Dupuytrens contracture left hand.   Otherwise a generally full range of motion stability and strength of the right and left upper extremities.  NEUROLOGIC:  DTR's normal. No gross motor or sensory deficits apparent, gait normal.  SKIN:  No rashes.  There is a nonspecific minimally excoriated area on the tragus of the left external ear.  MS:  Alert, oriented , affect and mood all appropriate    Data:  Health maintenance laboratory data all noted reviewed from January 3, 2020.  All reasonable.      Impression:  A generally healthy 87-year-old gentleman.  Essential tremor stable.  Persistent skin lesion on the tragus of the left ear.  History of mildly elevated blood pressure readings in the office setting only.    Plan:  Referral to Dermatology regarding the left ear lesion.  Return 1 year health assessment.  to clinic

## 2020-09-03 ENCOUNTER — TELEPHONE (OUTPATIENT)
Dept: INTERNAL MEDICINE | Facility: CLINIC | Age: 85
End: 2020-09-03

## 2020-09-03 DIAGNOSIS — E78.5 DYSLIPIDEMIA: ICD-10-CM

## 2020-09-03 DIAGNOSIS — Z12.5 PROSTATE CANCER SCREENING: Primary | ICD-10-CM

## 2020-09-03 NOTE — TELEPHONE ENCOUNTER
----- Message from Ashlyn Clay sent at 9/3/2020  9:55 AM CDT -----  Contact: Pt spouse Brisa [phone]--  Patient Requesting Order     Order Needed:--Annual labs--     Communication Preference:--Brisa--Wife--[phone]--     Additional Information:Please place annual labs in sytem for pt annual labs on 1/07/21 and please link to appointment at 9:00 am.

## 2020-10-13 ENCOUNTER — PATIENT OUTREACH (OUTPATIENT)
Dept: ADMINISTRATIVE | Facility: OTHER | Age: 85
End: 2020-10-13

## 2020-10-14 ENCOUNTER — OFFICE VISIT (OUTPATIENT)
Dept: OPTOMETRY | Facility: CLINIC | Age: 85
End: 2020-10-14
Payer: COMMERCIAL

## 2020-10-14 DIAGNOSIS — H52.4 PRESBYOPIA: ICD-10-CM

## 2020-10-14 DIAGNOSIS — H25.13 NUCLEAR SCLEROSIS, BILATERAL: Primary | ICD-10-CM

## 2020-10-14 DIAGNOSIS — Z13.5 GLAUCOMA SCREENING: ICD-10-CM

## 2020-10-14 PROCEDURE — 92014 PR EYE EXAM, EST PATIENT,COMPREHESV: ICD-10-PCS | Mod: S$GLB,,, | Performed by: OPTOMETRIST

## 2020-10-14 PROCEDURE — 92015 PR REFRACTION: ICD-10-PCS | Mod: S$GLB,,, | Performed by: OPTOMETRIST

## 2020-10-14 PROCEDURE — 99999 PR PBB SHADOW E&M-EST. PATIENT-LVL II: CPT | Mod: PBBFAC,,, | Performed by: OPTOMETRIST

## 2020-10-14 PROCEDURE — 99999 PR PBB SHADOW E&M-EST. PATIENT-LVL II: ICD-10-PCS | Mod: PBBFAC,,, | Performed by: OPTOMETRIST

## 2020-10-14 PROCEDURE — 92014 COMPRE OPH EXAM EST PT 1/>: CPT | Mod: S$GLB,,, | Performed by: OPTOMETRIST

## 2020-10-14 PROCEDURE — 92015 DETERMINE REFRACTIVE STATE: CPT | Mod: S$GLB,,, | Performed by: OPTOMETRIST

## 2020-10-14 NOTE — PROGRESS NOTES
HPI     DLS; 10/14/2019    Patient doesn't have any vision changes but possible may need a new   glasses rx. Pt c/o he having more crust in the eye then normal.   No flashes   No floaters   No pain   No headaches   OTC meds     Last edited by Boo Hurt MA on 10/14/2020  9:49 AM. (History)        ROS     Negative for: Constitutional, Gastrointestinal, Neurological, Skin,   Genitourinary, Musculoskeletal, HENT, Endocrine, Cardiovascular, Eyes,   Respiratory, Psychiatric, Allergic/Imm, Heme/Lymph    Last edited by Matthew Rodney, OD on 10/14/2020 10:54 AM. (History)        Assessment /Plan     For exam results, see Encounter Report.    Nuclear sclerosis, bilateral    Glaucoma screening    Presbyopia      Reduced VA 2 to Cat OS>OD.  Dr Dao ALVARENGA reports his son had some problems after cat surgery, so he is leery.  Long discussion that due to cats new spex will NOT help his vision.  Realizes may not pass next drivers test    PLAN:    rtc 1 yr, or will call sooner if wishes cat yenifer Lozano

## 2021-01-07 ENCOUNTER — LAB VISIT (OUTPATIENT)
Dept: LAB | Facility: HOSPITAL | Age: 86
End: 2021-01-07
Attending: INTERNAL MEDICINE
Payer: MEDICARE

## 2021-01-07 DIAGNOSIS — Z12.5 PROSTATE CANCER SCREENING: ICD-10-CM

## 2021-01-07 DIAGNOSIS — E78.5 DYSLIPIDEMIA: ICD-10-CM

## 2021-01-07 LAB
ALBUMIN SERPL BCP-MCNC: 4 G/DL (ref 3.5–5.2)
ALP SERPL-CCNC: 55 U/L (ref 55–135)
ALT SERPL W/O P-5'-P-CCNC: 16 U/L (ref 10–44)
ANION GAP SERPL CALC-SCNC: 9 MMOL/L (ref 8–16)
AST SERPL-CCNC: 23 U/L (ref 10–40)
BASOPHILS # BLD AUTO: 0.04 K/UL (ref 0–0.2)
BASOPHILS NFR BLD: 0.7 % (ref 0–1.9)
BILIRUB SERPL-MCNC: 0.7 MG/DL (ref 0.1–1)
BUN SERPL-MCNC: 18 MG/DL (ref 8–23)
CALCIUM SERPL-MCNC: 9.2 MG/DL (ref 8.7–10.5)
CHLORIDE SERPL-SCNC: 106 MMOL/L (ref 95–110)
CHOLEST SERPL-MCNC: 190 MG/DL (ref 120–199)
CHOLEST/HDLC SERPL: 2.8 {RATIO} (ref 2–5)
CO2 SERPL-SCNC: 28 MMOL/L (ref 23–29)
COMPLEXED PSA SERPL-MCNC: 2.1 NG/ML (ref 0–4)
CREAT SERPL-MCNC: 1.1 MG/DL (ref 0.5–1.4)
DIFFERENTIAL METHOD: ABNORMAL
EOSINOPHIL # BLD AUTO: 0.2 K/UL (ref 0–0.5)
EOSINOPHIL NFR BLD: 3.2 % (ref 0–8)
ERYTHROCYTE [DISTWIDTH] IN BLOOD BY AUTOMATED COUNT: 12.3 % (ref 11.5–14.5)
EST. GFR  (AFRICAN AMERICAN): >60 ML/MIN/1.73 M^2
EST. GFR  (NON AFRICAN AMERICAN): 59.6 ML/MIN/1.73 M^2
GLUCOSE SERPL-MCNC: 103 MG/DL (ref 70–110)
HCT VFR BLD AUTO: 45.1 % (ref 40–54)
HDLC SERPL-MCNC: 67 MG/DL (ref 40–75)
HDLC SERPL: 35.3 % (ref 20–50)
HGB BLD-MCNC: 14.3 G/DL (ref 14–18)
IMM GRANULOCYTES # BLD AUTO: 0.02 K/UL (ref 0–0.04)
IMM GRANULOCYTES NFR BLD AUTO: 0.3 % (ref 0–0.5)
LDLC SERPL CALC-MCNC: 98.6 MG/DL (ref 63–159)
LYMPHOCYTES # BLD AUTO: 1.2 K/UL (ref 1–4.8)
LYMPHOCYTES NFR BLD: 19.8 % (ref 18–48)
MCH RBC QN AUTO: 31.1 PG (ref 27–31)
MCHC RBC AUTO-ENTMCNC: 31.7 G/DL (ref 32–36)
MCV RBC AUTO: 98 FL (ref 82–98)
MONOCYTES # BLD AUTO: 0.5 K/UL (ref 0.3–1)
MONOCYTES NFR BLD: 7.8 % (ref 4–15)
NEUTROPHILS # BLD AUTO: 4.1 K/UL (ref 1.8–7.7)
NEUTROPHILS NFR BLD: 68.2 % (ref 38–73)
NONHDLC SERPL-MCNC: 123 MG/DL
NRBC BLD-RTO: 0 /100 WBC
PLATELET # BLD AUTO: 196 K/UL (ref 150–350)
PMV BLD AUTO: 9.1 FL (ref 9.2–12.9)
POTASSIUM SERPL-SCNC: 4.5 MMOL/L (ref 3.5–5.1)
PROT SERPL-MCNC: 7.1 G/DL (ref 6–8.4)
RBC # BLD AUTO: 4.6 M/UL (ref 4.6–6.2)
SODIUM SERPL-SCNC: 143 MMOL/L (ref 136–145)
TRIGL SERPL-MCNC: 122 MG/DL (ref 30–150)
TSH SERPL DL<=0.005 MIU/L-ACNC: 1.82 UIU/ML (ref 0.4–4)
WBC # BLD AUTO: 6.02 K/UL (ref 3.9–12.7)

## 2021-01-07 PROCEDURE — 84153 ASSAY OF PSA TOTAL: CPT | Mod: HCNC

## 2021-01-07 PROCEDURE — 84443 ASSAY THYROID STIM HORMONE: CPT | Mod: HCNC

## 2021-01-07 PROCEDURE — 36415 COLL VENOUS BLD VENIPUNCTURE: CPT | Mod: HCNC,PO

## 2021-01-07 PROCEDURE — 80061 LIPID PANEL: CPT | Mod: HCNC

## 2021-01-07 PROCEDURE — 85025 COMPLETE CBC W/AUTO DIFF WBC: CPT | Mod: HCNC

## 2021-01-07 PROCEDURE — 80053 COMPREHEN METABOLIC PANEL: CPT | Mod: HCNC

## 2021-01-14 ENCOUNTER — OFFICE VISIT (OUTPATIENT)
Dept: INTERNAL MEDICINE | Facility: CLINIC | Age: 86
End: 2021-01-14
Payer: MEDICARE

## 2021-01-14 VITALS
DIASTOLIC BLOOD PRESSURE: 70 MMHG | SYSTOLIC BLOOD PRESSURE: 122 MMHG | TEMPERATURE: 98 F | BODY MASS INDEX: 20.82 KG/M2 | WEIGHT: 117.5 LBS | HEIGHT: 63 IN | HEART RATE: 80 BPM

## 2021-01-14 DIAGNOSIS — R01.1 CARDIAC MURMUR: ICD-10-CM

## 2021-01-14 DIAGNOSIS — Z00.00 ENCOUNTER FOR PREVENTIVE HEALTH EXAMINATION: Primary | ICD-10-CM

## 2021-01-14 DIAGNOSIS — G25.0 ESSENTIAL TREMOR: ICD-10-CM

## 2021-01-14 PROCEDURE — 99999 PR PBB SHADOW E&M-EST. PATIENT-LVL III: ICD-10-PCS | Mod: PBBFAC,HCNC,, | Performed by: INTERNAL MEDICINE

## 2021-01-14 PROCEDURE — 99397 PR PREVENTIVE VISIT,EST,65 & OVER: ICD-10-PCS | Mod: HCNC,S$GLB,, | Performed by: INTERNAL MEDICINE

## 2021-01-14 PROCEDURE — 99397 PER PM REEVAL EST PAT 65+ YR: CPT | Mod: HCNC,S$GLB,, | Performed by: INTERNAL MEDICINE

## 2021-01-14 PROCEDURE — 1101F PR PT FALLS ASSESS DOC 0-1 FALLS W/OUT INJ PAST YR: ICD-10-PCS | Mod: HCNC,CPTII,S$GLB, | Performed by: INTERNAL MEDICINE

## 2021-01-14 PROCEDURE — 1126F AMNT PAIN NOTED NONE PRSNT: CPT | Mod: HCNC,S$GLB,, | Performed by: INTERNAL MEDICINE

## 2021-01-14 PROCEDURE — 1126F PR PAIN SEVERITY QUANTIFIED, NO PAIN PRESENT: ICD-10-PCS | Mod: HCNC,S$GLB,, | Performed by: INTERNAL MEDICINE

## 2021-01-14 PROCEDURE — 1101F PT FALLS ASSESS-DOCD LE1/YR: CPT | Mod: HCNC,CPTII,S$GLB, | Performed by: INTERNAL MEDICINE

## 2021-01-14 PROCEDURE — 99999 PR PBB SHADOW E&M-EST. PATIENT-LVL III: CPT | Mod: PBBFAC,HCNC,, | Performed by: INTERNAL MEDICINE

## 2021-01-14 PROCEDURE — 3288F PR FALLS RISK ASSESSMENT DOCUMENTED: ICD-10-PCS | Mod: HCNC,CPTII,S$GLB, | Performed by: INTERNAL MEDICINE

## 2021-01-14 PROCEDURE — 3288F FALL RISK ASSESSMENT DOCD: CPT | Mod: HCNC,CPTII,S$GLB, | Performed by: INTERNAL MEDICINE

## 2021-01-14 RX ORDER — MUPIROCIN 20 MG/G
OINTMENT TOPICAL 2 TIMES DAILY
Qty: 15 G | Refills: 2 | Status: SHIPPED | OUTPATIENT
Start: 2021-01-14 | End: 2023-06-13

## 2021-01-22 ENCOUNTER — HOSPITAL ENCOUNTER (OUTPATIENT)
Dept: CARDIOLOGY | Facility: HOSPITAL | Age: 86
Discharge: HOME | End: 2021-01-22
Attending: INTERNAL MEDICINE
Payer: MEDICARE

## 2021-01-22 VITALS
BODY MASS INDEX: 20.73 KG/M2 | HEIGHT: 63 IN | DIASTOLIC BLOOD PRESSURE: 80 MMHG | WEIGHT: 117 LBS | SYSTOLIC BLOOD PRESSURE: 140 MMHG | HEART RATE: 72 BPM

## 2021-01-22 DIAGNOSIS — R01.1 CARDIAC MURMUR: ICD-10-CM

## 2021-01-22 LAB
ASCENDING AORTA: 3.1 CM
AV INDEX (PROSTH): 0.54
AV MEAN GRADIENT: 11 MMHG
AV PEAK GRADIENT: 19 MMHG
AV VALVE AREA: 1.86 CM2
AV VELOCITY RATIO: 0.52
BSA FOR ECHO PROCEDURE: 1.54 M2
CV ECHO LV RWT: 0.34 CM
DOP CALC AO PEAK VEL: 2.18 M/S
DOP CALC AO VTI: 56.22 CM
DOP CALC LVOT AREA: 3.5 CM2
DOP CALC LVOT DIAMETER: 2.1 CM
DOP CALC LVOT PEAK VEL: 1.14 M/S
DOP CALC LVOT STROKE VOLUME: 104.58 CM3
DOP CALC RVOT PEAK VEL: 0.57 M/S
DOP CALC RVOT VTI: 12.19 CM
DOP CALCLVOT PEAK VEL VTI: 30.21 CM
E WAVE DECELERATION TIME: 121.75 MSEC
E/A RATIO: 0.48
E/E' RATIO: 17.6 M/S
ECHO LV POSTERIOR WALL: 0.73 CM (ref 0.6–1.1)
FRACTIONAL SHORTENING: 33 % (ref 28–44)
INTERVENTRICULAR SEPTUM: 0.77 CM (ref 0.6–1.1)
IVRT: 117.03 MSEC
LA MAJOR: 3.96 CM
LA MINOR: 4.13 CM
LA WIDTH: 2.4 CM
LEFT ATRIUM SIZE: 3.31 CM
LEFT ATRIUM VOLUME INDEX MOD: 12.1 ML/M2
LEFT ATRIUM VOLUME INDEX: 17.7 ML/M2
LEFT ATRIUM VOLUME MOD: 18.57 CM3
LEFT ATRIUM VOLUME: 27.3 CM3
LEFT INTERNAL DIMENSION IN SYSTOLE: 2.88 CM (ref 2.1–4)
LEFT VENTRICLE DIASTOLIC VOLUME INDEX: 53.2 ML/M2
LEFT VENTRICLE DIASTOLIC VOLUME: 81.92 ML
LEFT VENTRICLE MASS INDEX: 62 G/M2
LEFT VENTRICLE SYSTOLIC VOLUME INDEX: 20.6 ML/M2
LEFT VENTRICLE SYSTOLIC VOLUME: 31.75 ML
LEFT VENTRICULAR INTERNAL DIMENSION IN DIASTOLE: 4.27 CM (ref 3.5–6)
LEFT VENTRICULAR MASS: 95.65 G
LV LATERAL E/E' RATIO: 14.67 M/S
LV SEPTAL E/E' RATIO: 22 M/S
MV A" WAVE DURATION": 12.56 MSEC
MV PEAK A VEL: 0.92 M/S
MV PEAK E VEL: 0.44 M/S
PULM VEIN S/D RATIO: 1.5
PV MEAN GRADIENT: 1 MMHG
PV PEAK D VEL: 0.28 M/S
PV PEAK S VEL: 0.42 M/S
PV PEAK VELOCITY: 1.04 CM/S
RA PRESSURE: 8 MMHG
RETIRED EF AND QEF - SEE NOTES: 62 %
SINUS: 2.85 CM
STJ: 2.55 CM
TDI LATERAL: 0.03 M/S
TDI SEPTAL: 0.02 M/S
TDI: 0.03 M/S
TRICUSPID ANNULAR PLANE SYSTOLIC EXCURSION: 1.65 CM

## 2021-01-22 PROCEDURE — 93306 ECHO (CUPID ONLY): ICD-10-PCS | Mod: 26,HCNC,, | Performed by: INTERNAL MEDICINE

## 2021-01-22 PROCEDURE — 93306 TTE W/DOPPLER COMPLETE: CPT | Mod: 26,HCNC,, | Performed by: INTERNAL MEDICINE

## 2021-01-22 PROCEDURE — 93306 TTE W/DOPPLER COMPLETE: CPT | Mod: HCNC

## 2021-06-30 ENCOUNTER — PES CALL (OUTPATIENT)
Dept: ADMINISTRATIVE | Facility: CLINIC | Age: 86
End: 2021-06-30

## 2021-10-04 ENCOUNTER — PATIENT MESSAGE (OUTPATIENT)
Dept: OPTOMETRY | Facility: CLINIC | Age: 86
End: 2021-10-04

## 2021-10-11 ENCOUNTER — TELEPHONE (OUTPATIENT)
Dept: OPTOMETRY | Facility: CLINIC | Age: 86
End: 2021-10-11

## 2021-10-19 ENCOUNTER — TELEPHONE (OUTPATIENT)
Dept: INTERNAL MEDICINE | Facility: CLINIC | Age: 86
End: 2021-10-19

## 2021-10-19 DIAGNOSIS — E78.5 DYSLIPIDEMIA: Primary | ICD-10-CM

## 2021-11-18 ENCOUNTER — PATIENT MESSAGE (OUTPATIENT)
Dept: OPTOMETRY | Facility: CLINIC | Age: 86
End: 2021-11-18
Payer: MEDICARE

## 2022-02-21 ENCOUNTER — LAB VISIT (OUTPATIENT)
Dept: LAB | Facility: HOSPITAL | Age: 87
End: 2022-02-21
Attending: INTERNAL MEDICINE
Payer: MEDICARE

## 2022-02-21 DIAGNOSIS — E78.5 DYSLIPIDEMIA: ICD-10-CM

## 2022-02-21 LAB
ALBUMIN SERPL BCP-MCNC: 3.9 G/DL (ref 3.5–5.2)
ALP SERPL-CCNC: 52 U/L (ref 55–135)
ALT SERPL W/O P-5'-P-CCNC: 16 U/L (ref 10–44)
ANION GAP SERPL CALC-SCNC: 14 MMOL/L (ref 8–16)
AST SERPL-CCNC: 25 U/L (ref 10–40)
BASOPHILS # BLD AUTO: 0.04 K/UL (ref 0–0.2)
BASOPHILS NFR BLD: 0.7 % (ref 0–1.9)
BILIRUB SERPL-MCNC: 0.8 MG/DL (ref 0.1–1)
BUN SERPL-MCNC: 12 MG/DL (ref 8–23)
CALCIUM SERPL-MCNC: 9.4 MG/DL (ref 8.7–10.5)
CHLORIDE SERPL-SCNC: 105 MMOL/L (ref 95–110)
CHOLEST SERPL-MCNC: 185 MG/DL (ref 120–199)
CHOLEST/HDLC SERPL: 2.9 {RATIO} (ref 2–5)
CO2 SERPL-SCNC: 26 MMOL/L (ref 23–29)
CREAT SERPL-MCNC: 1 MG/DL (ref 0.5–1.4)
DIFFERENTIAL METHOD: ABNORMAL
EOSINOPHIL # BLD AUTO: 0.2 K/UL (ref 0–0.5)
EOSINOPHIL NFR BLD: 2.8 % (ref 0–8)
ERYTHROCYTE [DISTWIDTH] IN BLOOD BY AUTOMATED COUNT: 12.7 % (ref 11.5–14.5)
EST. GFR  (AFRICAN AMERICAN): >60 ML/MIN/1.73 M^2
EST. GFR  (NON AFRICAN AMERICAN): >60 ML/MIN/1.73 M^2
GLUCOSE SERPL-MCNC: 87 MG/DL (ref 70–110)
HCT VFR BLD AUTO: 44.8 % (ref 40–54)
HDLC SERPL-MCNC: 63 MG/DL (ref 40–75)
HDLC SERPL: 34.1 % (ref 20–50)
HGB BLD-MCNC: 14.2 G/DL (ref 14–18)
IMM GRANULOCYTES # BLD AUTO: 0.01 K/UL (ref 0–0.04)
IMM GRANULOCYTES NFR BLD AUTO: 0.2 % (ref 0–0.5)
LDLC SERPL CALC-MCNC: 106.6 MG/DL (ref 63–159)
LYMPHOCYTES # BLD AUTO: 1.5 K/UL (ref 1–4.8)
LYMPHOCYTES NFR BLD: 25.9 % (ref 18–48)
MCH RBC QN AUTO: 31.8 PG (ref 27–31)
MCHC RBC AUTO-ENTMCNC: 31.7 G/DL (ref 32–36)
MCV RBC AUTO: 100 FL (ref 82–98)
MONOCYTES # BLD AUTO: 0.5 K/UL (ref 0.3–1)
MONOCYTES NFR BLD: 8.7 % (ref 4–15)
NEUTROPHILS # BLD AUTO: 3.5 K/UL (ref 1.8–7.7)
NEUTROPHILS NFR BLD: 61.7 % (ref 38–73)
NONHDLC SERPL-MCNC: 122 MG/DL
NRBC BLD-RTO: 0 /100 WBC
PLATELET # BLD AUTO: 189 K/UL (ref 150–450)
PMV BLD AUTO: 9.3 FL (ref 9.2–12.9)
POTASSIUM SERPL-SCNC: 4 MMOL/L (ref 3.5–5.1)
PROT SERPL-MCNC: 6.9 G/DL (ref 6–8.4)
RBC # BLD AUTO: 4.47 M/UL (ref 4.6–6.2)
SODIUM SERPL-SCNC: 145 MMOL/L (ref 136–145)
TRIGL SERPL-MCNC: 77 MG/DL (ref 30–150)
TSH SERPL DL<=0.005 MIU/L-ACNC: 2.19 UIU/ML (ref 0.4–4)
WBC # BLD AUTO: 5.63 K/UL (ref 3.9–12.7)

## 2022-02-21 PROCEDURE — 84443 ASSAY THYROID STIM HORMONE: CPT | Mod: HCNC | Performed by: INTERNAL MEDICINE

## 2022-02-21 PROCEDURE — 85025 COMPLETE CBC W/AUTO DIFF WBC: CPT | Mod: HCNC | Performed by: INTERNAL MEDICINE

## 2022-02-21 PROCEDURE — 36415 COLL VENOUS BLD VENIPUNCTURE: CPT | Mod: HCNC,PO | Performed by: INTERNAL MEDICINE

## 2022-02-21 PROCEDURE — 80053 COMPREHEN METABOLIC PANEL: CPT | Mod: HCNC | Performed by: INTERNAL MEDICINE

## 2022-02-21 PROCEDURE — 80061 LIPID PANEL: CPT | Mod: HCNC | Performed by: INTERNAL MEDICINE

## 2022-03-02 ENCOUNTER — OFFICE VISIT (OUTPATIENT)
Dept: INTERNAL MEDICINE | Facility: CLINIC | Age: 87
End: 2022-03-02
Payer: MEDICARE

## 2022-03-02 VITALS
BODY MASS INDEX: 22.07 KG/M2 | TEMPERATURE: 97 F | WEIGHT: 119.94 LBS | DIASTOLIC BLOOD PRESSURE: 82 MMHG | HEART RATE: 79 BPM | SYSTOLIC BLOOD PRESSURE: 150 MMHG | HEIGHT: 62 IN | OXYGEN SATURATION: 96 %

## 2022-03-02 DIAGNOSIS — Z00.00 ENCOUNTER FOR PREVENTIVE HEALTH EXAMINATION: Primary | ICD-10-CM

## 2022-03-02 DIAGNOSIS — I35.0 AORTIC VALVE STENOSIS, ETIOLOGY OF CARDIAC VALVE DISEASE UNSPECIFIED: ICD-10-CM

## 2022-03-02 DIAGNOSIS — R41.3 MEMORY DIFFICULTY: ICD-10-CM

## 2022-03-02 DIAGNOSIS — R25.1 TREMOR: ICD-10-CM

## 2022-03-02 PROCEDURE — 1126F PR PAIN SEVERITY QUANTIFIED, NO PAIN PRESENT: ICD-10-PCS | Mod: HCNC,CPTII,S$GLB, | Performed by: INTERNAL MEDICINE

## 2022-03-02 PROCEDURE — 99397 PR PREVENTIVE VISIT,EST,65 & OVER: ICD-10-PCS | Mod: HCNC,S$GLB,, | Performed by: INTERNAL MEDICINE

## 2022-03-02 PROCEDURE — 1126F AMNT PAIN NOTED NONE PRSNT: CPT | Mod: HCNC,CPTII,S$GLB, | Performed by: INTERNAL MEDICINE

## 2022-03-02 PROCEDURE — 1159F PR MEDICATION LIST DOCUMENTED IN MEDICAL RECORD: ICD-10-PCS | Mod: HCNC,CPTII,S$GLB, | Performed by: INTERNAL MEDICINE

## 2022-03-02 PROCEDURE — 1101F PR PT FALLS ASSESS DOC 0-1 FALLS W/OUT INJ PAST YR: ICD-10-PCS | Mod: HCNC,CPTII,S$GLB, | Performed by: INTERNAL MEDICINE

## 2022-03-02 PROCEDURE — 3288F PR FALLS RISK ASSESSMENT DOCUMENTED: ICD-10-PCS | Mod: HCNC,CPTII,S$GLB, | Performed by: INTERNAL MEDICINE

## 2022-03-02 PROCEDURE — 99999 PR PBB SHADOW E&M-EST. PATIENT-LVL IV: CPT | Mod: PBBFAC,HCNC,, | Performed by: INTERNAL MEDICINE

## 2022-03-02 PROCEDURE — 1159F MED LIST DOCD IN RCRD: CPT | Mod: HCNC,CPTII,S$GLB, | Performed by: INTERNAL MEDICINE

## 2022-03-02 PROCEDURE — 99397 PER PM REEVAL EST PAT 65+ YR: CPT | Mod: HCNC,S$GLB,, | Performed by: INTERNAL MEDICINE

## 2022-03-02 PROCEDURE — 99999 PR PBB SHADOW E&M-EST. PATIENT-LVL IV: ICD-10-PCS | Mod: PBBFAC,HCNC,, | Performed by: INTERNAL MEDICINE

## 2022-03-02 PROCEDURE — 1101F PT FALLS ASSESS-DOCD LE1/YR: CPT | Mod: HCNC,CPTII,S$GLB, | Performed by: INTERNAL MEDICINE

## 2022-03-02 PROCEDURE — 3288F FALL RISK ASSESSMENT DOCD: CPT | Mod: HCNC,CPTII,S$GLB, | Performed by: INTERNAL MEDICINE

## 2022-03-02 NOTE — PROGRESS NOTES
History of present illness:  89-year-old gentleman in today for general health assessment.    Current medications:  None    Review of systems:  General: no fever, chills, generalized body aches. No unexpected weight loss.  Eyes:  No visual disturbances.  HEENT:  No hoarseness, dysphagia, ear pain.  Respiratory:  No cough, no shortness of breath.  Cardiovascular: no chest pain, palpitations, exertional limb pain. No edema.  GI: no nausea, vomiting.  No abdominal pain. No change in bowel habits.  No melena, no hematochezia.  : no dysuria. No change in the color or character of the urine. No urinary frequency.  Musculoskeletal: no joint pain or swelling.  Neurologic:  His intention tremor has progressed symptomatically.  Also describes some decrease efficiency of movement in general.  Skin:  No rashes or other concerns.  Psych:  No emotional issues.  Will he is describing more issues with short-term memory..      Health screenings:  Declines any vaccination updates        Physical examination:  GENERAL:  Alert, appropriately groomed, no acute distress.  VS:  Blood pressure 140/82  EYES: sclerae white ,nonicteric. PERRL.  HEENT:  Normocephalic. Ear canals and tympanic membranes normal. Mouth and pharynx normal. No thyromegaly. Trachea midline and freely mobile.  LUNGS:  Clear to ascultation and normal to percussion.  CARDIOVASCULAR:  Normal heart sounds.  2/6 systolic murmur.. Carotids full bilaterally without bruit.  Pedal pulses intact .  No abdominal bruit.  No peripheral extremity edema.  GI: the abdomen is soft, no distension. No masses , tenderness, organomegaly.   : scrotum, testicles and penis normal.  LYMPHATIC:  No axillary, inguinal , cervical adenopathy.  MUSCULOSKELETAL:  Range of motion, stability and strength of the right and left upper and lower extremities normal. No swollen or tender joints  NEUROLOGIC:  He does exhibit tremor which worsens with intention.  There does appear to see some generalized  akinesia.   SKIN:  No rashes.   MS:  Alert, oriented , affect and mood all appropriate      Data:  Recent health maintenance laboratory data all noted and reviewed all reasonable.        Impression:  Generally healthy 89-year-old gentleman with no significant known underlying medical conditions other than his movement issues.  Tremor essential verses parkinsonian.    Mild-to-moderate aortic stenosis clinically stable.    Elevated blood pressure reading the office setting though he reports consistently normal readings in the home setting        Plan:  Discussed re-evaluation by Neurology and he is referred for such.  He is not interested in any health maintenance updates regarding vaccinations.  Follow-up six months

## 2022-06-09 ENCOUNTER — LAB VISIT (OUTPATIENT)
Dept: LAB | Facility: HOSPITAL | Age: 87
End: 2022-06-09
Attending: PSYCHIATRY & NEUROLOGY
Payer: MEDICARE

## 2022-06-09 ENCOUNTER — OFFICE VISIT (OUTPATIENT)
Dept: NEUROLOGY | Facility: CLINIC | Age: 87
End: 2022-06-09
Payer: MEDICARE

## 2022-06-09 VITALS
HEART RATE: 75 BPM | DIASTOLIC BLOOD PRESSURE: 79 MMHG | BODY MASS INDEX: 20.99 KG/M2 | WEIGHT: 114.75 LBS | SYSTOLIC BLOOD PRESSURE: 170 MMHG

## 2022-06-09 DIAGNOSIS — R41.3 MEMORY CHANGE: Primary | ICD-10-CM

## 2022-06-09 DIAGNOSIS — R41.3 MEMORY CHANGE: ICD-10-CM

## 2022-06-09 DIAGNOSIS — R25.1 TREMOR: ICD-10-CM

## 2022-06-09 LAB — TSH SERPL DL<=0.005 MIU/L-ACNC: 0.97 UIU/ML (ref 0.4–4)

## 2022-06-09 PROCEDURE — 99205 PR OFFICE/OUTPT VISIT, NEW, LEVL V, 60-74 MIN: ICD-10-PCS | Mod: S$GLB,,, | Performed by: PSYCHIATRY & NEUROLOGY

## 2022-06-09 PROCEDURE — 36415 COLL VENOUS BLD VENIPUNCTURE: CPT | Performed by: PSYCHIATRY & NEUROLOGY

## 2022-06-09 PROCEDURE — 1126F AMNT PAIN NOTED NONE PRSNT: CPT | Mod: CPTII,S$GLB,, | Performed by: PSYCHIATRY & NEUROLOGY

## 2022-06-09 PROCEDURE — 1159F MED LIST DOCD IN RCRD: CPT | Mod: CPTII,S$GLB,, | Performed by: PSYCHIATRY & NEUROLOGY

## 2022-06-09 PROCEDURE — 84443 ASSAY THYROID STIM HORMONE: CPT | Performed by: PSYCHIATRY & NEUROLOGY

## 2022-06-09 PROCEDURE — 3288F PR FALLS RISK ASSESSMENT DOCUMENTED: ICD-10-PCS | Mod: CPTII,S$GLB,, | Performed by: PSYCHIATRY & NEUROLOGY

## 2022-06-09 PROCEDURE — 1100F PTFALLS ASSESS-DOCD GE2>/YR: CPT | Mod: CPTII,S$GLB,, | Performed by: PSYCHIATRY & NEUROLOGY

## 2022-06-09 PROCEDURE — 1126F PR PAIN SEVERITY QUANTIFIED, NO PAIN PRESENT: ICD-10-PCS | Mod: CPTII,S$GLB,, | Performed by: PSYCHIATRY & NEUROLOGY

## 2022-06-09 PROCEDURE — 99205 OFFICE O/P NEW HI 60 MIN: CPT | Mod: S$GLB,,, | Performed by: PSYCHIATRY & NEUROLOGY

## 2022-06-09 PROCEDURE — 1100F PR PT FALLS ASSESS DOC 2+ FALLS/FALL W/INJURY/YR: ICD-10-PCS | Mod: CPTII,S$GLB,, | Performed by: PSYCHIATRY & NEUROLOGY

## 2022-06-09 PROCEDURE — 99999 PR PBB SHADOW E&M-EST. PATIENT-LVL III: ICD-10-PCS | Mod: PBBFAC,,, | Performed by: PSYCHIATRY & NEUROLOGY

## 2022-06-09 PROCEDURE — 84425 ASSAY OF VITAMIN B-1: CPT | Performed by: PSYCHIATRY & NEUROLOGY

## 2022-06-09 PROCEDURE — 1159F PR MEDICATION LIST DOCUMENTED IN MEDICAL RECORD: ICD-10-PCS | Mod: CPTII,S$GLB,, | Performed by: PSYCHIATRY & NEUROLOGY

## 2022-06-09 PROCEDURE — 82607 VITAMIN B-12: CPT | Performed by: PSYCHIATRY & NEUROLOGY

## 2022-06-09 PROCEDURE — 3288F FALL RISK ASSESSMENT DOCD: CPT | Mod: CPTII,S$GLB,, | Performed by: PSYCHIATRY & NEUROLOGY

## 2022-06-09 PROCEDURE — 99999 PR PBB SHADOW E&M-EST. PATIENT-LVL III: CPT | Mod: PBBFAC,,, | Performed by: PSYCHIATRY & NEUROLOGY

## 2022-06-09 RX ORDER — CARBIDOPA AND LEVODOPA 25; 100 MG/1; MG/1
1 TABLET ORAL 3 TIMES DAILY
Qty: 90 TABLET | Refills: 11 | Status: SHIPPED | OUTPATIENT
Start: 2022-06-09 | End: 2022-08-08

## 2022-06-09 NOTE — PROGRESS NOTES
MOVEMENT DISORDERS CLINIC NEW CONSULT NOTE    PCP/Referring Provider: RENEE Briggs MD  [address] [facility]  [place] [id number]  Date of Service: 6/9/2022    Chief Complaint: tremor, memory    HPI: James Dc is a L HANDED 89 y.o. male without significant medical issues significant for tremors, memory, and gait decline coming for eval. Patient as noted tremors since 2013. Charted at essential tremor. Noted L>R postural hand tremors. Worst if stressed. He struggles to write. Struggles to sauder or use a screwdriver. Trouble with a letter opening. Uses a soon to eat.   Gait has slowed in the last 2 months.     He seems to have had a decline in memory since 5 years. Lately he forgets when he has completed tasks.    He's a - retired  Noted 59 yo son has tremors    Medication history:  No medications currently    Neuroleptic exposure:  None    PD Review of Symptoms:  Anosmia: none  Dysarthria/Hypophonia: mild hypophonia  Cognitive slowing: yes  Hallucinations: seeing items move in corners of eyes  Constipation:  Falls: yes  Micrographia: yes  Sleep issues:  -RBD: yes    Review of Systems:   Review of Systems   Constitutional: Negative for fever.   HENT: Negative for congestion.    Eyes: Negative for double vision.   Respiratory: Negative for cough and shortness of breath.    Cardiovascular: Negative for chest pain and leg swelling.   Gastrointestinal: Negative for nausea.   Genitourinary: Negative for dysuria.   Musculoskeletal: Negative for falls.   Skin: Negative for rash.   Neurological: Negative for tremors, speech change and headaches.   Psychiatric/Behavioral: Negative for depression.         Current Medications:  Outpatient Encounter Medications as of 6/9/2022   Medication Sig Dispense Refill    MULTIVITAMIN ORAL Take by mouth.      carbidopa-levodopa  mg (SINEMET)  mg per tablet Take 1 tablet by mouth 3 (three) times daily. 90 tablet 11    diphenhydrAMINE (BENADRYL)  25 mg capsule Take 25 mg by mouth every 6 (six) hours as needed.      mupirocin (BACTROBAN) 2 % ointment Apply topically 2 (two) times daily. (Patient not taking: Reported on 6/9/2022) 15 g 2     No facility-administered encounter medications on file as of 6/9/2022.       Past Medical History:  Patient Active Problem List   Diagnosis    Nuclear sclerosis - Both Eyes    Dupuytren's contracture of left hand    Aortic valve stenosis    Memory change    Tremor       Past Surgical History:  Past Surgical History:   Procedure Laterality Date    TONSILLECTOMY         Social:  Social History     Socioeconomic History    Marital status:    Tobacco Use    Smoking status: Former Smoker    Smokeless tobacco: Former User   Substance and Sexual Activity    Alcohol use: Yes    Drug use: No    Sexual activity: Yes     Partners: Female       Family History:  Family History   Problem Relation Age of Onset    Prostate cancer Father     Cataracts Son     Psoriasis Neg Hx     Eczema Neg Hx     Glaucoma Neg Hx     Amblyopia Neg Hx     Blindness Neg Hx     Macular degeneration Neg Hx     Strabismus Neg Hx     Retinal detachment Neg Hx        PHYSICAL:  BP (!) 170/79   Pulse 75   Wt 52 kg (114 lb 12 oz)   BMI 20.99 kg/m²     General Medical Examination:  General: Good hygiene, appropriate appearance.  HEENT: Normocephalic, atraumatic.   Neck: Supple.   Chest: Unlabored breathing.   CV: Symmetric pulses.   Ext: No clubbing, cyanosis, or edema.     Mental Status:  Mood/Affect: Appropriate/congruent.  Level of consciousness: Awake, alert.  Orientation: Oriented to person, place, time and situation.  Language: No Dysarthria    Cranial nerves:  I: Not tested  II: PERRL, VFF to counting  III, IV, VI: EOMI with conjugate gaze and no nystagmus on end gaze  V: Facial sensation intact and symmetric over the bilateral V1-V3  VII: Facial muscle activation intact and symmetric over the bilateral upper and lower  face  VIII: Hearing intact in the b/l ears and symmetrical to finger rub  IX, X, XII: TUP midline - no atrophy or fasiculations  X: SCMs and shoulder shrug full strength b/l and symmetric    Motor:   -UE: 5/5 deltoids; 5/5 biceps, triceps; 5/5 wrist flexors, extensors; 5/5 interosseous; 5/5   -LEs: 5/5 hip flexion, extension; 5/5 knee flexion, extension; 5/5 ankle flexion, extension    duputren's contracture R hand  Mild hypominia    DTRs:  ? Biceps Triceps Brachioradialis Knee Ankle   Left 2+ 2+ 2+ 2+    Right 2+ 2+ 2+ 2+        ? Finger taps Finger flicks SUZY Heel taps   Left 1+ - - -   Right 1+ - - -     Neck tone: nl  ? Arm Leg   Left 1+ 0   Right 1+ 0     Sensation:   -Light touch: Intact and symmetric in the bilateral upper and lower extremities.    Coordination:   -Finger to nose: nl    Tremor Exam   Arms extended Arms in wing position, fingers almost touching Re-emergent Arms extended wrists extended Intention Resting Kinetic   Left ? ? ? ? ? ? ?   Right ? ? ? ? ? ? ?        Archimedes Spirals   Left ?+   Right ?+         Gait:  -Arises from chair with use of hands.  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

## 2022-06-09 NOTE — ASSESSMENT & PLAN NOTE
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

## 2022-06-11 LAB — VIT B12 SERPL-MCNC: 502 NG/L (ref 180–914)

## 2022-06-14 LAB — VIT B1 BLD-MCNC: 81 UG/L (ref 38–122)

## 2022-08-08 ENCOUNTER — OFFICE VISIT (OUTPATIENT)
Dept: NEUROLOGY | Facility: CLINIC | Age: 87
End: 2022-08-08
Payer: MEDICARE

## 2022-08-08 DIAGNOSIS — R41.3 MEMORY CHANGE: Primary | ICD-10-CM

## 2022-08-08 DIAGNOSIS — R25.1 TREMOR: ICD-10-CM

## 2022-08-08 PROCEDURE — 99215 OFFICE O/P EST HI 40 MIN: CPT | Mod: 95,,, | Performed by: PSYCHIATRY & NEUROLOGY

## 2022-08-08 PROCEDURE — 99215 PR OFFICE/OUTPT VISIT, EST, LEVL V, 40-54 MIN: ICD-10-PCS | Mod: 95,,, | Performed by: PSYCHIATRY & NEUROLOGY

## 2022-08-08 RX ORDER — PROPRANOLOL HYDROCHLORIDE 10 MG/1
10 TABLET ORAL 3 TIMES DAILY
Qty: 90 TABLET | Refills: 11 | Status: SHIPPED | OUTPATIENT
Start: 2022-08-08 | End: 2022-08-25

## 2022-08-08 NOTE — ASSESSMENT & PLAN NOTE
ET-like tremor on exam    Did not respond to LDOPA  No asthma/bradycaria - suggested try Propranolol 10mg TOD

## 2022-08-08 NOTE — PROGRESS NOTES
"The patient location is: home  The chief complaint leading to consultation is: memory, tremor    Visit type: audiovisual    Face to Face time with patient: 20mins  20 minutes of total time spent on the encounter, which includes face to face time and non-face to face time preparing to see the patient (eg, review of tests), Obtaining and/or reviewing separately obtained history, Documenting clinical information in the electronic or other health record, Independently interpreting results (not separately reported) and communicating results to the patient/family/caregiver, or Care coordination (not separately reported).         Each patient to whom he or she provides medical services by telemedicine is:  (1) informed of the relationship between the physician and patient and the respective role of any other health care provider with respect to management of the patient; and (2) notified that he or she may decline to receive medical services by telemedicine and may withdraw from such care at any time.    Notes:       [facility] DIS[facility]     PCP/Referring Provider: No referring provider defined for this encounter.  Date of Service: 8/8/2022    Chief Complaint: tremor, memory    Interval Hx    Since last visit,     Memory issues continue  Forgets what he's saying mid-sentense    Tried carbidopa/levodopa 25/100mg 1 tab PO TID   No help to tremors    Writing is very poor still    Insomnia at nights    B1, B12, TSH WNL      "priorHPI: James Dc is a L HANDED 89 y.o. male without significant medical issues significant for tremors, memory, and gait decline coming for eval. Patient as noted tremors since 2013. Charted at essential tremor. Noted L>R postural hand tremors. Worst if stressed. He struggles to write. Struggles to sauder or use a screwdriver. Trouble with a letter opening. Uses a spoon to eat.   Gait has slowed in the last 2 months.     He seems to have had a decline in memory since 5 years. Lately he forgets " "when he has completed tasks.    He's a - retired  Noted 61 yo son has tremors"    Medication history:  No medications currently    Neuroleptic exposure:  None    PD Review of Symptoms:  Anosmia: none  Dysarthria/Hypophonia: mild hypophonia  Cognitive slowing: yes  Hallucinations: seeing items move in corners of eyes  Constipation:  Falls: yes  Micrographia: yes  Sleep issues:  -RBD: yes    Review of Systems:   Review of Systems   Constitutional: Negative for fever.   HENT: Negative for congestion.    Eyes: Negative for double vision.   Respiratory: Negative for cough and shortness of breath.    Cardiovascular: Negative for chest pain and leg swelling.   Gastrointestinal: Negative for nausea.   Genitourinary: Negative for dysuria.   Musculoskeletal: Negative for falls.   Skin: Negative for rash.   Neurological: Negative for tremors, speech change and headaches.   Psychiatric/Behavioral: Negative for depression.         Current Medications:  Outpatient Encounter Medications as of 8/8/2022   Medication Sig Dispense Refill    diphenhydrAMINE (BENADRYL) 25 mg capsule Take 25 mg by mouth every 6 (six) hours as needed.      MULTIVITAMIN ORAL Take by mouth.      mupirocin (BACTROBAN) 2 % ointment Apply topically 2 (two) times daily. (Patient not taking: Reported on 6/9/2022) 15 g 2    propranoloL (INDERAL) 10 MG tablet Take 1 tablet (10 mg total) by mouth 3 (three) times daily. 90 tablet 11    [DISCONTINUED] carbidopa-levodopa  mg (SINEMET)  mg per tablet Take 1 tablet by mouth 3 (three) times daily. 90 tablet 11     No facility-administered encounter medications on file as of 8/8/2022.       Past Medical History:  Patient Active Problem List   Diagnosis    Nuclear sclerosis - Both Eyes    Dupuytren's contracture of left hand    Aortic valve stenosis    Memory change    Tremor       Past Surgical History:  Past Surgical History:   Procedure Laterality Date    TONSILLECTOMY   "       Social:  Social History     Socioeconomic History    Marital status:    Tobacco Use    Smoking status: Former Smoker    Smokeless tobacco: Former User   Substance and Sexual Activity    Alcohol use: Yes    Drug use: No    Sexual activity: Yes     Partners: Female       Family History:  Family History   Problem Relation Age of Onset    Prostate cancer Father     Cataracts Son     Psoriasis Neg Hx     Eczema Neg Hx     Glaucoma Neg Hx     Amblyopia Neg Hx     Blindness Neg Hx     Macular degeneration Neg Hx     Strabismus Neg Hx     Retinal detachment Neg Hx        PHYSICAL:  There were no vitals taken for this visit.    Physical Exam  Constitutional: Well-developed, well-nourished, appears stated age  Eyes: No scleral icterus  ENT: Moist oral mucosa  Cardiovascular: No lower extremity edema   Respiratory: No labored breathing   Skin: No rash   Hematologic: No bruising    Other: GI/ deferred   · Mental status: Alert and oriented to person, place, time, and situation;   · follows commands  · Speech: normal (not dysarthric), no aphasia  · Cranial nerves:            · CN II: Pupils mid-position and equal, not tested light or accommodation  · CN III, IV, VI: Extraocular movements full, no nystagmus visualized  · CN V: Not tested   · CN VII: Face strong and symmetric bilaterally   · CN VIII: Hearing intact to voice and conversation   · CN IX, X: Palate raises midline and symmetric   · CN XI: Strong shoulder shrug B/L  · CN XII: Tongue appears midline   · Motor: Normal bulk by appearance, no drift   · Sensory: Not tested    · Gait: Not tested  · Deep tendon reflexes: Not tested  · Movement/Coordination                    No hypophonic speech.                     No facial masking.  No bradykinesia.                   Tremor Exam   Arms extended Arms in wing position, fingers almost touching Re-emergent Arms extended wrists extended Intention Resting Kinetic   Left ? ? ? ? ? ? ?   Right ? ? ? ?  "? ? ?        Archimedes Spirals   Left ?+   Right ?+           "General Medical Examination:  General: Good hygiene, appropriate appearance.  HEENT: Normocephalic, atraumatic.   Neck: Supple.   Chest: Unlabored breathing.   CV: Symmetric pulses.   Ext: No clubbing, cyanosis, or edema.     Mental Status:  Mood/Affect: Appropriate/congruent.  Level of consciousness: Awake, alert.  Orientation: Oriented to person, place, time and situation.  Language: No Dysarthria    Cranial nerves:  I: Not tested  II: PERRL, VFF to counting  III, IV, VI: EOMI with conjugate gaze and no nystagmus on end gaze  V: Facial sensation intact and symmetric over the bilateral V1-V3  VII: Facial muscle activation intact and symmetric over the bilateral upper and lower face  VIII: Hearing intact in the b/l ears and symmetrical to finger rub  IX, X, XII: TUP midline - no atrophy or fasiculations  X: SCMs and shoulder shrug full strength b/l and symmetric    Motor:   -UE: 5/5 deltoids; 5/5 biceps, triceps; 5/5 wrist flexors, extensors; 5/5 interosseous; 5/5   -LEs: 5/5 hip flexion, extension; 5/5 knee flexion, extension; 5/5 ankle flexion, extension    duputren's contracture R hand  Mild hypominia    DTRs:  ? Biceps Triceps Brachioradialis Knee Ankle   Left 2+ 2+ 2+ 2+    Right 2+ 2+ 2+ 2+        ? Finger taps Finger flicks SUZY Heel taps   Left 1+ - - -   Right 1+ - - -     Neck tone: nl  ? Arm Leg   Left 1+ 0   Right 1+ 0     Sensation:   -Light touch: Intact and symmetric in the bilateral upper and lower extremities.    Coordination:   -Finger to nose: nl    Tremor Exam   Arms extended Arms in wing position, fingers almost touching Re-emergent Arms extended wrists extended Intention Resting Kinetic   Left ? ? ? ? ? ? ?   Right ? ? ? ? ? ? ?        Archimedes Spirals   Left ?+   Right ?+         Gait:  -Arises from chair with use of hands.  -Stoop is mod  -Casual gait is: nl based  -Stride length: nl  -Arm Swing: nl  -Turnin " "step  "    Laboratory Data:  B12, B1, TSH NL    Imaging:  NA    Assessment//Plan:   Problem List Items Addressed This Visit        Neuro    Memory change - Primary    Current Assessment & Plan     Persistent memory issues  Struggling to follow he conversation today  B1, TSH, B12 NL    Suggested Brain MRI  Neuropsych testing           Relevant Orders    Ambulatory consult to Neuropsychology    Ambulatory consult to Neuropsychology    Tremor    Current Assessment & Plan     ET-like tremor on exam    Did not respond to LDOPA  No asthma/bradycaria - suggested try Propranolol 10mg TOVERONICA Smith MD, MS  [facility]  Department of Neurology  Movement Disorders        "

## 2022-08-08 NOTE — ASSESSMENT & PLAN NOTE
Persistent memory issues  Struggling to follow he conversation today  B1, TSH, B12 NL    Suggested Brain MRI  Neuropsych testing

## 2022-08-24 ENCOUNTER — PATIENT MESSAGE (OUTPATIENT)
Dept: NEUROLOGY | Facility: CLINIC | Age: 87
End: 2022-08-24
Payer: MEDICARE

## 2022-08-25 RX ORDER — PROPRANOLOL HYDROCHLORIDE 10 MG/1
20 TABLET ORAL 3 TIMES DAILY
Qty: 180 TABLET | Refills: 11 | Status: SHIPPED | OUTPATIENT
Start: 2022-08-25 | End: 2023-06-13

## 2022-08-26 ENCOUNTER — TELEPHONE (OUTPATIENT)
Dept: NEUROLOGY | Facility: CLINIC | Age: 87
End: 2022-08-26
Payer: MEDICARE

## 2022-08-26 NOTE — TELEPHONE ENCOUNTER
Spoke to patient. Advised of new increase Propranolol to 20mg TID. Advised of potential side effects of dizziness. Verbalizes understanding.

## 2022-08-26 NOTE — TELEPHONE ENCOUNTER
----- Message from Ewelina Tyson MA sent at 8/26/2022 12:40 PM CDT -----  Contact: patient    ----- Message -----  From: Sukh Lang  Sent: 8/26/2022   9:23 AM CDT  To: Luis Zheng Staff     Patient calling in regards to an Rx:propranoloL (INDERAL) 10 MG tablet . Patient stated that he would like to know when he can increase the dose of the medication.Requesting call back.      Patient @[phone] (home)

## 2022-09-02 ENCOUNTER — OFFICE VISIT (OUTPATIENT)
Dept: INTERNAL MEDICINE | Facility: CLINIC | Age: 87
End: 2022-09-02
Payer: MEDICARE

## 2022-09-02 VITALS
HEIGHT: 62 IN | DIASTOLIC BLOOD PRESSURE: 84 MMHG | HEART RATE: 60 BPM | RESPIRATION RATE: 16 BRPM | TEMPERATURE: 97 F | BODY MASS INDEX: 21.75 KG/M2 | SYSTOLIC BLOOD PRESSURE: 160 MMHG | WEIGHT: 118.19 LBS

## 2022-09-02 DIAGNOSIS — I35.0 AORTIC VALVE STENOSIS, ETIOLOGY OF CARDIAC VALVE DISEASE UNSPECIFIED: Primary | ICD-10-CM

## 2022-09-02 DIAGNOSIS — R25.1 TREMOR: ICD-10-CM

## 2022-09-02 PROCEDURE — 1101F PT FALLS ASSESS-DOCD LE1/YR: CPT | Mod: CPTII,S$GLB,, | Performed by: INTERNAL MEDICINE

## 2022-09-02 PROCEDURE — 99999 PR PBB SHADOW E&M-EST. PATIENT-LVL III: ICD-10-PCS | Mod: PBBFAC,,, | Performed by: INTERNAL MEDICINE

## 2022-09-02 PROCEDURE — 3288F FALL RISK ASSESSMENT DOCD: CPT | Mod: CPTII,S$GLB,, | Performed by: INTERNAL MEDICINE

## 2022-09-02 PROCEDURE — 1159F MED LIST DOCD IN RCRD: CPT | Mod: CPTII,S$GLB,, | Performed by: INTERNAL MEDICINE

## 2022-09-02 PROCEDURE — 99213 OFFICE O/P EST LOW 20 MIN: CPT | Mod: S$GLB,,, | Performed by: INTERNAL MEDICINE

## 2022-09-02 PROCEDURE — 99213 PR OFFICE/OUTPT VISIT, EST, LEVL III, 20-29 MIN: ICD-10-PCS | Mod: S$GLB,,, | Performed by: INTERNAL MEDICINE

## 2022-09-02 PROCEDURE — 3288F PR FALLS RISK ASSESSMENT DOCUMENTED: ICD-10-PCS | Mod: CPTII,S$GLB,, | Performed by: INTERNAL MEDICINE

## 2022-09-02 PROCEDURE — 1159F PR MEDICATION LIST DOCUMENTED IN MEDICAL RECORD: ICD-10-PCS | Mod: CPTII,S$GLB,, | Performed by: INTERNAL MEDICINE

## 2022-09-02 PROCEDURE — 99999 PR PBB SHADOW E&M-EST. PATIENT-LVL III: CPT | Mod: PBBFAC,,, | Performed by: INTERNAL MEDICINE

## 2022-09-02 PROCEDURE — 1101F PR PT FALLS ASSESS DOC 0-1 FALLS W/OUT INJ PAST YR: ICD-10-PCS | Mod: CPTII,S$GLB,, | Performed by: INTERNAL MEDICINE

## 2022-09-02 NOTE — PROGRESS NOTES
History of present illness:   89-year-old gentleman in good general health with a history essential tremor, aortic stenosis in for a six-month follow-up.  Patient states that generally all is doing well.  He is followed by Neurology for his tremor.  He does monitor blood pressure readings at home and reports that readings at home are consistently within normal limits.  Denies any chest pain palpitations syncope presyncope.    Current medications:  Medications are noted and reviewed    Review of systems:  Constitutional:  No fever no chills.    HEENT:  No hoarseness no dysphagia.    Respiratory:  No cough shortness of breath.  Cardiovascular:  No chest pain or palpitations no syncope no presyncope, no edema and no claudication.    Examination:  General:  Pleasant alert appropriately groomed gentleman acute distress.    Vital signs:  Blood pressure in the office setting is 150/84.  Pulse 60 and regular  HEENT: Normocephalic.  Neck supple no masses no thyromegaly.  Lungs:  Clear to auscultation.    Cardiovascular:  Regular rate rhythm.  2/6 systolic murmur heard best at the aortic area.  No JVD.  No peripheral extremity edema.  Mental status:  Alert oriented affect appear to be generally normal.      Impression:   Stable mild-to-moderate aortic stenosis.    Sinus bradycardia it appears to be asymptomatic.    Tremor being followed by Neurology.      Plan:   No change in medical regimen at this time.    Return to clinic March 2023 for general health assessment.    Would not recommend escalating beta-blocker therapy any further in view of his age and resting heart rate.  I will send his neurologist Dr Smith a message in that regard

## 2022-09-07 ENCOUNTER — HOSPITAL ENCOUNTER (OUTPATIENT)
Dept: RADIOLOGY | Facility: HOSPITAL | Age: 87
Discharge: HOME | End: 2022-09-07
Attending: PSYCHIATRY & NEUROLOGY
Payer: MEDICARE

## 2022-09-07 DIAGNOSIS — R41.3 MEMORY CHANGE: ICD-10-CM

## 2022-09-07 PROCEDURE — 70551 MRI BRAIN WITHOUT CONTRAST: ICD-10-PCS | Mod: 26,,, | Performed by: RADIOLOGY

## 2022-09-07 PROCEDURE — 70551 MRI BRAIN STEM W/O DYE: CPT | Mod: TC

## 2022-09-07 PROCEDURE — 70551 MRI BRAIN STEM W/O DYE: CPT | Mod: 26,,, | Performed by: RADIOLOGY

## 2022-09-21 NOTE — PROGRESS NOTES
NEUROPSYCHOLOGY   Memory Clinic  Intake    Referring Provider: Marce Smith MD   Medical Necessity: Mr. James Dc is an 89 y.o. male followed in Memory Clinic for cognitive evaluation, supportive therapy, treatment planning, and treatment management in the setting of cognitive changes.  Billing: See billing table at the end of this note  Consent: The patient expressed an understanding of the purpose of the evaluation and consented to all procedures.      ASSESSMENT:   Mr. 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 He describes increasing forgetfulness, difficulty with math, difficulty with executive functioning. He recently got lost driving near his home. He also reports crying more easily, and describes increasing apathy. He has occasional visual misperceptions but no vivid hallucinations. No cognitive fluctuations. He falls but infrequently. He also reports mild orthostasis, increased salivation, dry mouth, muscle stiffness, and generally slow movements. MRI brain found mild chronic ischemic changes with old infarcts and mild volume loss, with slight parietal predominance. He is starting to have mild trouble with IADLs, but is generally functionally independent.     Cognitive testing suggests mild declines across several domains. Most of these scores are around the low end of normal with few martha impairments, however these are all likely declines from his estimated high to above average baseline. Specifically, memory recall and recognition are trending downward despite excellent attention and intact initial encoding. Naming is intact, but semantic fluency is about 1SD lower than letter fluency. Processing speed scores are also trending downward, but I suspect this is related to the graphomotor component rather than cognitive speed. Executive functioning is also trending downward. He was  unable to complete a medication organization task requiring him to fill a pillbox with pretend medications, and made 34 errors. Attention, visuoconstruction, and visual memory are very strong. He does not report clinically significant mood symptoms on self-report screeners.     His cognitive profile is most concerning for early stages of Alzheimer's dementia. Testing is less consistent with a synucleinopathy, although given his movement symptoms we can keep monitoring him, as it is possible to have both. Given recent navigational difficulties and overall slowed movements, I recommend minimizing driving whenever possible, and limiting when he does drive to stay close to home in familiar areas and avoid highways and highly congested areas. He meets criteria for mild neurocognitive disorder today, but given that he is beginning to have trouble with IADLs, I suspect he is on the cusp of a mild dementia. Can consider memory memory medication if pt is agreeable. Will discuss cognitive hygiene, healthy aging, compensatory strategies. I suspect some of his mood lability and apathy are neurologic rather than primarily psychiatric, but we can still discuss strategies to help manage, and can consider pharmacological treatment if indicated.     1. 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 Memory change  Ambulatory consult to Neuropsychology          PLAN:     Pt is scheduled for feedback. Will discuss brain health, compensatory strategies, etc  Limit driving to short distances, familiar areas, avoid highways and congestion  Recommend family continue to provide oversight of finances and medications  Consider memory medication  Consider treatment for mood symptoms - either pharmacological, or can discuss psychotherapy referral, behavioral activation strategies, etc.  Continue to follow with Neurology  Repeat testing in 1 year, sooner if symptoms worsen       Thank you for allowing me to participate in .  Dao's care.  If you have any questions, please contact me.    Becky Armstrong PsyD  Licensed Clinical Neuropsychologist  [facility] - Department of Neurology          HISTORY:     HPI: Mr. James Dc is an 89 y.o. left handed male with a history of ET referred by Dr. Smith for evaluation of cognitive changes. Pt is accompanied today by his wife and daughter. He is concerned about the beta blockers he is prescribed for his tremor, is nervous about asking to come off of them. He isn't sure that it is helping, and is concerned about the side effects.  No family history of dementia. Maternal uncle also had tremor, so do his two sons.   Resides: at home with wife  Caregiver: wife is primary, daughter also checks on them frequently   Medications for cognition: None    Current Cognitive Symptoms:  Symptoms: Crying easily these days Gets choked up easily. Having word finding issues, losing his train of thought. Feels more distractible, has a harder time if he is interrupted. Speech/thinking is a little slow. Has never had a good sense of direction, but feels this has gotten worse. Went to [employer] yesterday and ended up in the wrong place. Has noticed a change to depth perception. Forgetting recent events and conversations, misplacing things more frequently. Multitasking, planning, organizing are more difficult. Having a lot more difficulty with math, which used to be a strength. Used to fix a lot of things around the house, but cannot do this anymore. Has a lot of difficulty writing due to tremor. Has some micrographia. Tremor started maybe 5 to 7 years ago, cognitive changes maybe a year later. He retired due to cognitive changes and tremor at age 82.  Orientation: Fully oriented   Course: Gradual onset about 5 or 6 years ago, steady gradual decline since then. No fluctuations.     Current Neurobehavioral Symptoms:  Personality Change: No  Paranoia/Delusions: No  Hallucinations: Yes - Occasionally sees  "things moving int he corner of his eyes. Also sometimes has misperceptions, seeing shadows that he thinks are animals. Sometimes thinks specks on counter are moving. This happened in the shower too.   Agitation: No  Depression: Yes - feels sad "at times" - this is new.  Describes more apathy than sadness, though he does get frustrated when he cannot do things.   Anxiety: "at times, but not to a serious degree." Family has not noticed this.   Apathy/Indifference: Pt reports that he feels lethargic, uninterested in things.   Disinhibition/Impulsivity: No  Motor/repetitive behaviors: No  Nighttime behaviors: Sometimes it takes him longer to fall asleep. He had some violent movements at night about 10 years, wife moved to a different room because he was worried about hurting her. Wife reports the violent movements have improved, but once in a while he talks in his sleep. Gets enough sleep. 10 to 6  Appetite change: Had a little weight loss because he wasn't hungry. Wife hasn't noticed big changes. She reports his appetite seems normal.    Substance Use: Yes - drinks beer, 1 to 2 a night, a few days per week. Quit smoking in the 1960's      Current Physical Symptoms: Longstanding ET, not responsive to CD/LD. Mild hypophonia. Feels like his "legs are like rubber." Family reports he is much slower physically. Denies orthostasis, but reports he normally has a very low blood pressure. Wakes up with dry mouth, but otherwise no trouble swallowing. Does feel like he is salivating more than he used to. Sense of smell is less acute. Some muscle soreness, not clear if stiffness.     ADL:  Ambulation: Independent  Bathing/Grooming: Independent  Feeding: Independent, but this is harder due to tremor. Eats with a spoon more.   Dressing: Independent  Toileting: Independent    iADL:  Financial Management: Does not participate in this activity, wife has always done this.   Medication Management: Independent, misses doses occasionally. " Only takes propranolol daily. Has a system for remembering to take the multiple doses per day. Wife does have to remind him.   Driving: Independent but drives very little, close to home. Went to [employer], but got turned around. This is only a few blocks away. Admits he is not a relaxed , doesn't like to drive. He was ultimately able to find his way home without help.   Household Management: Wife handles most of the cooking, but he still cooks game. He sometimes forgets to do the yardwork and housework, but otherwise is pretty ok.        Level of supervision: N/A  Sitter/HHA: No  Current PT/OT/ST: No  POA: No    Safety Concerns:  Hygiene: No  Falls: Yes - tends to be mechanical falls. Not often.   Wandering: No  Financial scams: No  Medication management: No  Driving: Yes - has gotten turned around   Cooking: No  Medical decision making: No  Physical aggression:No  Caregiver stress: No    Neurologic History:  TBI: None  Seizures: None  Stroke: None  Movement Sx: Yes, ET   Repeat UTI/Delirium: Denied  Hearing/vision loss: Yes - a little hearing loss, no hearing aids. Wears glasses. Has cataracts.    Recent Hospitalizations/surgeries: Denied    Results for orders placed or performed during the hospital encounter of 09/07/22   MRI Brain Without Contrast    Narrative    EXAMINATION:  MRI BRAIN WITHOUT CONTRAST    CLINICAL HISTORY:  memory decline and PDism;Other amnesia    TECHNIQUE:  Multiplanar multisequence MR imaging of the brain was performed without intravenous contrast.    COMPARISON:  None    FINDINGS:  Intracranial Compartment:    Prominence of the ventricles and sulci compatible with  cerebral volume loss.  Slight parietal predominance.  Configuration not suggestive of hydrocephalus.    Mild patchy T2/FLAIR hyperintensity in the supratentorial white matter, nonspecific but most likely reflecting chronic microvascular ischemic change.  Small remote right parietal infarct.  Remote lacunar type infarct left  caudate.  Small remote bilateral cerebellar hemispheres infarcts.  No diffusion restriction to indicate an acute infarct.  No recent or remote hemorrhage.  No intracranial mass effect or midline shift.    No extra-axial blood or fluid collections.    Normal vascular flow voids are preserved.    Skull/Extracranial Contents (limited evaluation):    Bone marrow signal intensity is normal.      Impression    1. Mild chronic small vessel ischemic change with few superimposed remote infarcts above.  2. Mild cerebral volume loss with slight parietal predominance.  3. No evidence of acute intracranial pathology.    Electronically signed by resident: Dami Carranza  Date:    09/08/2022  Time:    08:16    Electronically signed by: Sonny Mg MD  Date:    09/08/2022  Time:    11:00         Current Outpatient Medications:     diphenhydrAMINE (BENADRYL) 25 mg capsule, Take 25 mg by mouth every 6 (six) hours as needed., Disp: , Rfl:     MULTIVITAMIN ORAL, Take by mouth., Disp: , Rfl:     mupirocin (BACTROBAN) 2 % ointment, Apply topically 2 (two) times daily., Disp: 15 g, Rfl: 2    propranoloL (INDERAL) 10 MG tablet, Take 2 tablets (20 mg total) by mouth 3 (three) times daily., Disp: 180 tablet, Rfl: 11    PMH:   Mr. 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    Social History:  Family Status: , four adult children   Current Living Situation: at home with wife   Primary Source of Support: wife, daughter   Daily Activities: Works in the yard. Works on the computer. Spends time with family.   Social isolation: No  Educational Level: 20  Occupational Status and History: retired        OBJECTIVE:     MENTAL STATUS AND BEHAVIORAL OBSERVATIONS:  Appearance:  Casually dressed and Well groomed  Behavior:   alert, calm, cooperative, rapport easily established, and Appropriate interpersonal skills. Good interpretation of nonverbal cues.   Orientation:    Fully oriented  Sensory:   Pt is hard of hearing.  Gait:   Pt ambulates independently. , slow, and shuffling  Psychomotor:  Slow movements.   Speech:  Fluent, spontaneous, normal tone, normal rate, normal prosody. Speech is a little slow and a little soft.   Language:  Receptive and expressive language appear intact. Comprehends conversational speech., No evidence of word-finding difficulties in conversational speech.  Mood:   euthymic  Affect:   mood-congruent  Thought Process: scattered and tangential  Thought Content: Denied current SI/HI. and No evidence of psychotic symptoms.  Memory:  recent and remote appear grossly intact  Attn/Concentration:  Grossly intact  Judgment/Insight: Grossly intact  Validity:   Performance on embedded performance validity measures all suggested adequate engagement. As a result, scores are likely a valid reflection of the pt's functioning.  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 He wore glasses but was unsure what kind they were. He reported 'slight impairment' with hearing. He wrote and karen slowly. He expressed that he was surprised with the Mobile CompleteANS figure drawing as his hands were not shaking. He reported that he was unable to concetrate on Pillbox, and appeared confused on how the pillbox was set up. He was mildly disinhibited during testing.     PROCEDURES/TESTS ADMINISTERED:  In addition to performing a review of pertinent medical records, reviewing limits to confidentiality, conducting a clinical interview, and explaining procedures, the following measures were administered:   [place] Cognitive Assessment (MOCA), Neuropsychological Assessment Battery (NAB) Naming subtest, Controlled Oral Word Association Test (FAS/Joann et al., 2004), Animal Naming (Joann et al., 2004), Trail Making Test (Joann et al., 2004), Isreal Complex Figure Test (Copy Trial) , Repeatable Battery for the Assessment of  Neuropsychological Status (RBANS: A, Update) with story and figure recognition, Generalized Anxiety Disorder-7 Item Scale (VIET-7), Geriatric Depression Scale (GDS), and Pillbox Test . Manual norms were used unless otherwise indicated.           NEUROPSYCHOLOGICAL ASSESSMENT RESULTS:  The following should not be interpreted in isolation from the neuropsychological evaluation report.  Scores on  embedded performance validity measures were WNL.    COGNITIVE SCREENING Raw Score Type of Standardized Score Standardized Score Percentile/CP Descriptor   MoCA 21 - - - Impaired   Orientation - Place 1/2 - - - -   Orientation - Date 3/4 - - - -   RBANS         Immediate Memory - SS 87 19 Low Average   VS/Construction - SS 96 39 Average   Language - SS 95 37 Average   Attention -  58 Average   Delayed Memory - SS 94 34 Average   Total Scale - SS 92 30 Average   LANGUAGE FUNCTIONING Raw Score Type of Standardized Score Standardized Score Percentile/CP Descriptor   RBANS Naming 10 ss - >75 High Average   RBANS Semantic Fluency 14 ss 7 16 Low Average   NAB Naming 31 Tscore 63 90 High Average   FAS 37 Tscore 44 27 Average   Animal Naming 13 Tscore 35 7 Below Average   VISUOSPATIAL FUNCTIONING Raw Score Type of Standardized Score Standardized Score Percentile/CP Descriptor   RBANS Line Orientation  14 ss - 17-25 Low Average   RBANS Figure Copy 19 ss 12 75 High Average   RCFT Copy 35 - - >16 WNL   RCFT Time to Copy 317 - - >16 WNL   LEARNING & MEMORY Raw Score Type of Standardized Score Standardized Score Percentile/CP Descriptor   RBANS         Immediate Memory - SS 87 19 Low Average   Delayed Memory - SS 94 34 Average   List Learning 23 ss 10 50 Average   List Recall 2 ss - 17-25 Low Average   List Recognition 18 ss - 17-25 Low Average   Story Memory 11 ss 6 9 Low Average   Story Recall 3 ss 5 5 Below Average   Figure Recall 20 ss 17 99 Exceptionally High   ATTENTION/WORKING MEMORY Raw Score Type of Standardized Score  Standardized Score Percentile/CP Descriptor   RBANS Digit Span  16 ss 16 98 Exceptionally High   MENTAL PROCESSING SPEED Raw Score Type of Standardized Score Standardized Score Percentile/CP Descriptor   RBANS Coding 19 ss 4 2 Below Average   TMT A  50 Tscore 41 18 Low Average   TMT A errors 0 - - - -   EXECUTIVE FUNCTIONING Raw Score Type of Standardized Score Standardized Score Percentile/CP Descriptor   TMT B 126 Tscore 41 18 Low Average   TMT B errors 0 - - - -   FUNCTIONAL  Raw Score Type of Standardized Score Standardized Score Percentile/CP Descriptor   Pillbox Test Errors 34 - - - Below Expectation   MOOD & PERSONALITY Raw Score Type of Standardized Score Standardized Score Percentile/CP Descriptor   GDS-30 7 - - - WNL   VIET-7 1 - - - WNL   ss = scaled score (mean = 10, SD = 3); SS = standard score (mean = 100, SD = 15); Tscore mean = 50, SD = 10; zscore (mean = 0.00, SD = 1)       No flowsheet data found.          Billing/Services Summary          Neurobehavioral Status Exam Base Code (22404)  Total Units: 1    Face-to-face Total Time: 50 min.         Professional Neuropsychological Testing Evaluation Services Base Code (15622)   Total Units: 1 Add-on (36352)  Total Units: 3   Referral review/initial test selection 15 min.    Intra-session Clinical Decision-Making          Tech consult/test review/modification 0 min.         Patient behavior management 0 min.    Face-to-face Interpretive Feedback 60 min.    Record Review/Integration/Report Generation 180 min.     Total Time: 255 min.         Test Administration by Psychologist Base Code (65144)   Total Units: 0 Add-on (67829)  Total Units: /   Testing / min.    Scoring 0 min.     Total Time: 0 min.         Test Administration by Technician  Technician Name: Kenia Dubois Base Code (84066)   Total Units: 1 Add-on (90756)\  Total Units: 3   Face-to-Face Testin min.    Scoring 52 min.     Total Time: 127 min.    DOS is the date of the evaluation unless  specified

## 2022-09-22 ENCOUNTER — OFFICE VISIT (OUTPATIENT)
Dept: NEUROLOGY | Facility: CLINIC | Age: 87
End: 2022-09-22
Payer: MEDICARE

## 2022-09-22 ENCOUNTER — PATIENT MESSAGE (OUTPATIENT)
Dept: NEUROLOGY | Facility: CLINIC | Age: 87
End: 2022-09-22
Payer: MEDICARE

## 2022-09-22 DIAGNOSIS — G30.9 MILD NEUROCOGNITIVE DISORDER DUE TO ALZHEIMER'S DISEASE: ICD-10-CM

## 2022-09-22 DIAGNOSIS — F06.70 MILD NEUROCOGNITIVE DISORDER DUE TO ALZHEIMER'S DISEASE: ICD-10-CM

## 2022-09-22 DIAGNOSIS — R41.3 MEMORY CHANGE: ICD-10-CM

## 2022-09-22 PROCEDURE — 96138 PSYCL/NRPSYC TECH 1ST: CPT | Mod: S$GLB,,, | Performed by: PSYCHIATRY & NEUROLOGY

## 2022-09-22 PROCEDURE — 96139 PR PSYCH/NEUROPSYCH TEST ADMIN/SCORING, BY TECH, 2+ TESTS, EA ADDTL 30 MIN: ICD-10-PCS | Mod: S$GLB,,, | Performed by: PSYCHIATRY & NEUROLOGY

## 2022-09-22 PROCEDURE — 99999 PR PBB SHADOW E&M-EST. PATIENT-LVL I: CPT | Mod: PBBFAC,,, | Performed by: PSYCHIATRY & NEUROLOGY

## 2022-09-22 PROCEDURE — 96132 PR NEUROPSYCHOLOGIC TEST EVAL SVCS, 1ST HR: ICD-10-PCS | Mod: S$GLB,,, | Performed by: PSYCHIATRY & NEUROLOGY

## 2022-09-22 PROCEDURE — 96139 PSYCL/NRPSYC TST TECH EA: CPT | Mod: S$GLB,,, | Performed by: PSYCHIATRY & NEUROLOGY

## 2022-09-22 PROCEDURE — 96116 NUBHVL XM PHYS/QHP 1ST HR: CPT | Mod: S$GLB,,, | Performed by: PSYCHIATRY & NEUROLOGY

## 2022-09-22 PROCEDURE — 96133 PR NEUROPSYCHOLOGIC TEST EVAL SVCS, EA ADDTL HR: ICD-10-PCS | Mod: S$GLB,,, | Performed by: PSYCHIATRY & NEUROLOGY

## 2022-09-22 PROCEDURE — 96138 PR PSYCH/NEUROPSYCH TEST ADMIN/SCORING, BY TECH, 2+ TESTS, 1ST 30 MIN: ICD-10-PCS | Mod: S$GLB,,, | Performed by: PSYCHIATRY & NEUROLOGY

## 2022-09-22 PROCEDURE — 96132 NRPSYC TST EVAL PHYS/QHP 1ST: CPT | Mod: S$GLB,,, | Performed by: PSYCHIATRY & NEUROLOGY

## 2022-09-22 PROCEDURE — 96133 NRPSYC TST EVAL PHYS/QHP EA: CPT | Mod: S$GLB,,, | Performed by: PSYCHIATRY & NEUROLOGY

## 2022-09-22 PROCEDURE — 99999 PR PBB SHADOW E&M-EST. PATIENT-LVL I: ICD-10-PCS | Mod: PBBFAC,,, | Performed by: PSYCHIATRY & NEUROLOGY

## 2022-09-22 PROCEDURE — 99499 UNLISTED E&M SERVICE: CPT | Mod: S$GLB,,, | Performed by: PSYCHIATRY & NEUROLOGY

## 2022-09-22 PROCEDURE — 99499 NO LOS: ICD-10-PCS | Mod: S$GLB,,, | Performed by: PSYCHIATRY & NEUROLOGY

## 2022-09-22 PROCEDURE — 96116 PR NEUROBEHAVIORAL STATUS EXAM BY PSYCH/PHYS: ICD-10-PCS | Mod: S$GLB,,, | Performed by: PSYCHIATRY & NEUROLOGY

## 2022-09-30 PROBLEM — F06.70 MILD NEUROCOGNITIVE DISORDER DUE TO ALZHEIMER'S DISEASE: Status: ACTIVE | Noted: 2022-09-30

## 2022-09-30 PROBLEM — G30.9 MILD NEUROCOGNITIVE DISORDER DUE TO ALZHEIMER'S DISEASE: Status: ACTIVE | Noted: 2022-09-30

## 2022-10-05 ENCOUNTER — OUTPATIENT CASE MANAGEMENT (OUTPATIENT)
Dept: NEUROLOGY | Facility: CLINIC | Age: 87
End: 2022-10-05
Payer: MEDICARE

## 2022-10-05 NOTE — PROGRESS NOTES
"  CARE SOCIAL WORK PSYCHOSOCIAL ASSESSMENT - DEMENTIA CARE MANAGEMENT    REASON FOR VISIT; INFORMANT; RELATIONSHIP   Phone consultation with wife to assess care management needs.      PSYCHOSOCIAL ASSESSMENT      Location (own home, family, facility)    Own home   Resides with (name, relationship) Wife   Primary Caregiver (if different from "resides with") name, relationship wife   Is a caregiver present during day? Overnight? Yes  yes   Marital/relationship status    Financial Status     Status Yes, peacetime after [place]   ACP Documents "We have paperowrk for PoA"   Additional information    Caregiver's Concerns None yet; wife awaiting further discussion with Dr. Armstrong.       SUPPORTS:    Additional Caregivers/relationship    Patient engagement (activities, hobbies) Hobbies - tinkerer, sports. Keeps busy. Gets outside, cuts grass.   Caregiver's natural supports/self-care Has children , 2 local, will help.   Resources currently utilized NA     MOOD/BEHAVIOR  Independent  Still driving  Problems writing, frustratng due to tremor. Losing memory and makes notes, but hard to write due to tremor.          PROBLEM AREAS IDENTIFIED     PLAN  Pending                                                                                                                                 RESOURCES/REFERRALS PROVIDED TODAY:  General info and support    MANAGEMENT FOLLOW-UP PLAN:   Follow up after FB with Dr. Armstrong on 10/26/22.    "

## 2022-10-26 ENCOUNTER — OFFICE VISIT (OUTPATIENT)
Dept: NEUROLOGY | Facility: CLINIC | Age: 87
End: 2022-10-26
Payer: MEDICARE

## 2022-10-26 DIAGNOSIS — G30.9 MILD NEUROCOGNITIVE DISORDER DUE TO ALZHEIMER'S DISEASE: Primary | ICD-10-CM

## 2022-10-26 DIAGNOSIS — F06.70 MILD NEUROCOGNITIVE DISORDER DUE TO ALZHEIMER'S DISEASE: Primary | ICD-10-CM

## 2022-10-26 PROCEDURE — 99499 NO LOS: ICD-10-PCS | Mod: 95,,, | Performed by: PSYCHIATRY & NEUROLOGY

## 2022-10-26 PROCEDURE — 99499 UNLISTED E&M SERVICE: CPT | Mod: 95,,, | Performed by: PSYCHIATRY & NEUROLOGY

## 2022-10-26 NOTE — PROGRESS NOTES
NEUROPSYCHOLOGICAL EVALUATION FEEDBACK    James Dc attended a feedback session today accompanied by his wife.  We discussed the results of the neuropsychological evaluation (35 minutes).  Handouts provided in AVS. All of his questions were answered.    1. 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            PLAN:   Pt to limit driving  Repeat testing in 1 year  Consider addition of memory medication - pt will discuss with prescribing physicians     Becky Armstrong PsyD  Licensed Clinical Neuropsychologist  [facility] - Department of Neurology          Established Patient - Audio Only Telehealth Visit     The patient location is: [place]   The chief complaint leading to consultation is: feedback  Visit type: Virtual visit with audio only (telephone)  Total time spent with patient: 35 min       The reason for the audio only service rather than synchronous audio and video virtual visit was related to technical difficulties or patient preference/necessity.     Each patient to whom I provide medical services by telemedicine is:  (1) informed of the relationship between the physician and patient and the respective role of any other health care provider with respect to management of the patient; and (2) notified that they may decline to receive medical services by telemedicine and may withdraw from such care at any time. Patient verbally consented to receive this service via voice-only telephone call.       This service was not originating from a related E/M service provided within the previous 7 days nor will  to an E/M service or procedure within the next 24 hours or my soonest available appointment.  Prevailing standard of care was able to be met in this audio-only visit.

## 2022-10-28 ENCOUNTER — OUTPATIENT CASE MANAGEMENT (OUTPATIENT)
Dept: NEUROLOGY | Facility: CLINIC | Age: 87
End: 2022-10-28
Payer: MEDICARE

## 2022-10-28 NOTE — PROGRESS NOTES
Social Work - Dementia Care Management:    Phoned caregiver, wife Brisa, to follow up after the FB appt with Dr. Armstrong. Left VM encouraging wife to contact me with any further questions/concerns.

## 2022-11-11 NOTE — PATIENT INSTRUCTIONS
Summary of the evidence on modifiable risk factors for cognitive decline and dementia             From: Radha, Zacarias Rodriguez, Anna, Nohelia & Isacc (2015). Summary of the evidence on modifiable risk factors for cognitive decline and dementia: A population-based perspective. Alzheimer's & Dementia, 11, 091-621.    *Studies suggest small or moderate alcohol consumption (no more than 1 drink per day) by older individuals may decrease the risk of cognitive decline and dementia. The evidence is not strong enough, however, to suggest those who do not drink should start drinking, especially when weighed against the potential negative effects of excessive alcohol consumption, such as an increased risk of falls among older adults. Research also generally suggests that red wine may be the best form of alcohol for cognitive functioning, in part, due to its antioxidant effects. All alcohol use is cautioned, though, as alcohol could cause harmful effects and interfere with medications. A physician and/or pharmacist should be consulted before alcohol is consumed.          Behaviors to Promote Brain Health       Exercise regularly - Exercise has many known benefits, and it appears that regular physical activity benefits the brain. Multiple research studies show that people who are physically active are less likely to experience a decline in their mental function and have a lower risk of developing Alzheimer's disease. We believe these benefits are a result of increased blood flow to your brain during exercise. It also tends to counter some of the natural reduction in brain connections that occur during aging, in effect reversing some of the problems. Aim to exercise several times per week for 30-60 minutes. You can walk, swim, play tennis or any other moderate aerobic activity that increases your heart rate.    Eat a Mediterranean diet - Your diet plays a large role in your brain health. Consider following a Mediterranean  diet, which emphasizes plant-based foods, whole grains, fish and healthy fats, such as olive oil. It incorporates much less red meat and salt than a typical American diet. Studies show people who closely follow a Mediterranean diet are less likely to have Alzheimer's disease than people who don't follow the diet. Omega fatty acids found in extra-virgin olive oil and other healthy fats are vital for your cells to function correctly, appear to decrease your risk of coronary artery disease, and increase mental focus and slow cognitive decline in older adults.       Stay mentally active - Your brain is similar to a muscle -- you need to use it or you lose it. There are many things that you can do to keep your brain in shape, such as doing crossword puzzles or Sudoku, reading, playing cards or putting together a jigsaw puzzle. Consider it cross-training your brain. So incorporate different activities to increase the effectiveness.     Stay socially involved - Social interaction helps romo off depression and stress, both of which can contribute to memory loss. Look for opportunities to connect with loved ones, friends and others, especially if you live alone. There is research that links solitary confinement to brain atrophy, so remaining socially active may have the opposite effect and strengthen the health of your brain.    Get plenty of sleep - Sleep plays an important role in your brain health. There are some theories that sleep helps clear abnormal proteins in your brain and consolidates memories, which boosts your overall memory and brain health. It is important that you try to get seven to eight consecutive hours of sleep per night, not fragmented sleep of two- or three-hour increments. Consecutive sleep gives your brain the time to consolidate and store your memories effectively.       Your Healthy Brain - Strategies to help with learning and memory      Type of Memory Issue      Cognitive Strategy    Attention-based trouble Remember that inattention and lack of focus are major culprits to forgetting information so be sure and practice paying attention for adequate learning of information. Patients will rely on passive attention to remember something (e.g., payton, kari-huh approach) and find they cannot recall it later. We recommend the following to improve attention, which may aid in later recall:   Look at the person as they are speaking to you, Paraphrase as they are speaking  Write down important pieces of information   Ask them to repeat if you zone out.   Simplify and reduce information that you need to focus on during conversation.   Have visual cues to remind you if you need to do something later.   Speed-based trouble Using multiple modalities (e.g., listening, writing notes, asking questions, recording, follow-up meetings with faculty/bosses, discussion boards online) to learn new information also can be helpful and is likely to allow additional time for processing, thus improving memory for the material.    Learning new information Simplify - Use easier words and shorter sentences. Break down into steps.  Restate - Put information into your own words.  Does it make sense? This allows you and others to test for understanding.  Link - If possible, associate new information with something you already know.  Organize - Group items into meaningful categories.  You can organize by time, location, color, shape, size, function, and even age.  Be creative.   Break it up - Don't try to take in too much at one time. Concentrate for a few minutes, then move on to something else. You may learn more in short sessions than one long one.  Mnemonics (pronounced noo-mon-ics) are strategies whereby you form acronyms ( = Cereal, Oranges, Pizza) that stand for something. This may be useful at times when you need a cue or prompt to help you remember something. Word associations can also be helpful (Precious works in the  [facility]).     Storing information for later recall Rehearse - Immediately after seeing/hearing something, try to recall it.  Wait a few minutes, then check again.  Gradually lengthen the time between rehearsals. Initially, you might rehearse the same thing every minute, then 15-minutes, then every few hours.  Repetition of learned material is critical to ensure storage of information to be learned.   Self-test at home to ensure learning. Just because something was remembered once doesn't mean it will be remembered after it was first learned.   Have friends or family periodically re-quiz you multiple times in a study session.     Recalling Information Jog your memory - Lose something?  Think back to when you last had it.  What did you do next?  And after that?  Mentally walk yourself through each activity that followed.  Prodding your memory this way may enable you to recall the location of the missing item.  Pair something you always remember (keys, purse, phone) with something you may forget (grocery list, bill you need to pay).  Get organized - Have fixed locations for all important papers, key phone numbers, medications, keys, wallet, glasses, tools, etc.  Develop routines - Routines can anchor memories so they do not drift away.      External Strategies Have memos, timers, calendar notes, etc.  in visible, appropriate places so you can use them for recalling information.  Invest in a smart phone and learn to use its reminder functions. This can be a way to interact with your children or grandchildren in a fun way.  Meet with a counselor to analyze and revise personal organization strategies and develop more effective memory cues and planning strategies. This recommendation is designed to help you develop a new skill set for personal organization based.   Maintaining a regular daily schedule may be beneficial. Keeping a calendar and notepad or alarms via a smart phone can alert you to the day's  activities.   School-based learning Read the information and underline, then go back over and talk through what you just read. Break it up in paragraph bits so you keep what you have to learn to a minimum. Don't just rely on passively reading something.  Take notes in the form of an outline; Use note cards over and over  Create mnemonics (example: Please Excuse My Dear Aunlynn Burleson to remember mathematics order or operations)  Create acronyms (example: PEMDAS to remember math order of operations)  Utilize self-talk as you read through the material  Create broad headings of material you need to know and then talk or write down what you have learned from memory  Teach the material to a friend, parent, or sibling without notes.  Summarize facts in a table or flow charts for visual encoding   Memory Hygiene Engage in regular exercise, which increases alertness and arousal, which can improve attention and focus.   Get a good night's sleep, as sleep is necessary for memory consolidation. Caffeine intake in the afternoon/evening, stuffing oneself at supper, and using technology close to bedtime can decrease the quality of restful sleep throughout the night. Additionally, bedtime and wake-up times should be consistent every night and morning so the body becomes used to a single sleep/awake routine.  Eat healthy foods and balanced meals. It is notable that research indicates certain nutrients may aid in brain function, such as B vitamins (especially B6, B12, and folic acid), antioxidants (such as vitamins C and E, and beta carotene), and Omega-3 fatty acids. Talk with your physician or nutritionist about what's best.   Prospective Memory (remembering to remember to do something) We recommend having visual cues (e.g., pill boxes) and alarms (e.g., phone alarm) set to remind you to do something in advance. It's important that loved ones and caregivers understand this difficulty for you. Daily lists of what you need to do  can also be helpful.       Your Healthy Brain - Strategies to help with attention and focus    Type of Attention Problem Cognitive Strategy   Auditory Attention Establish eye contact and attention to the speaker to better remember instructions/directions.  Repeat back what the speaker has said or have a notepad and write down.   If you zone out, then politely say so and have them repeat what they said.   Classes often move fast and rarely will professors adjust their style to accommodate all the different preferences and needs of students. We encourage investing in a recording device that allows you to go back and re-listen to a lecture if you have missed something when taking notes.   Reduce noise that you find distracting. It may also be helpful to have some ambient noise (e.g., white noise, calm music, fan) if you can't reduce or eliminate noise.   Reduce auditory distractions. This might include turning off your phone so you aren't tempted to look at text messages when studying or listening.      Visual Attention Reduce visual distractions. For instance, stick to one page at a time and don't have multiple pages or screens up. Make a list of common distracters and have those in mind so you can prepare ahead of time.   Use your  to your advantage by enlarging fonts, josefa, italicizing, and coloring, material.   Use visual cues to help you zero in on the material. Highlight and underline.   Be mindful that electronic devices (e.g., computers, tablets) are very prone to distractibility when attempting to read because you can be tempted to surf online, click a hyperlink, and so forth. Think about whether you prefer printed or electronic material and use that.   Our eyes can become exhausted for a while when engaging in visually based tasks. Take breaks to rest your eyes. Search online for eye relaxation exercises or talk with an optometrist.      Orienting Yourself/Monitoring/Planning Set up a  plan for the day, week, and semester/month. This includes the types of classes scheduled (e.g., matching difficult and easy classes), times of day you schedule them (e.g., attempt to schedule classes at a time when most attentive, try to have some breaks between classes), which teacher/professor (e.g., some are harder than others), and types of classes (e.g., try to schedule classes with three, 50-minute lectures rather than one 3-hour class).   plan meals, exercise, and relaxation time into the schedule. This is called pacing whereby you have some strategies to manage fatigue, hunger, and restlessness that impact attention.   Use active mental strategies to avoid attentional lapses. For instance, frequently ask: (1) What I am currently doing? (2) What was I doing before this? (3) What do I need to do next? You'll likely need an external cue for some time (e.g., alarm on phone, visual reminder) to integrate this into your day.       Sustained Attention Have realistic expectations and plan out what you can do in a given hour, day, and weeklong period. This planning involves writing down what you need to do and chunking it accordingly. For instance, you would make a list of all the steps involved in a given task, think about the time involved in each step, and plan what you can do in a given time period before your attention wanes.   Check off tasks as you do them. This feels good and helps keep you on track.   Avoid procrastination as this requires more sustained attention as you know the deadline is looming.   Don't push yourself so hard that you get frustrated and give up. It is also important to be practical about what you can and cannot do in a given time period. Don't be hard on yourself if you need more time or more breaks. The point is find what works for you and do that so you optimize performance.   Pay attention to your own vocational and personal interests in a task. It may be that you have more  sustained attention for some tasks and not for others.   Set up structure so you aren't tempted to immediately distract yourself. This may mean unplugging the television, turning off the computer, and/or going somewhere (e.g., coffee shop, library) to limit distractions.      Divided Attention/Multi-tasking Most people have limited divided attention and research has shown that we are less effective when we are doing several things at once (e.g., checking email, reading online, & watching television). Try to methodically engage in one task at a time to limit problems with divided attention.   Set up some organizers so you can move from one separate task to another.   Have visual cues to keep you on track when you have a lot to do. This will re-orient you and remind you. For instance you may be doing something, get distracted, but then see your notepad that has your next task.    External Strategies Modifying the physical space to improve attention. You may want to think about how your house, office, or the classroom impact your attention. If you can't modify something, then think about how other means. For instance, you may not be able to make your house quiet if you have children, but getting some ear plugs or ambient noise may improve this. Or, post a do not disturb sign when you need some space for studying or work.   Get organized to reduce distractibility! Develop files at home, on your computer, and even with your email.   Social support is important and many people have or have had attention problems. Talk with your family, coworkers, classmates about what they can do to help you. They may have even developed their own strategies that prove useful to you.   If you can afford to, then think about electronic devices. They have alarms to remind you along with voice recorders. Keep a planner with you or use your phone planner       Your Healthy Brain - Strategies to help with executive functions      Type of  Executive Dysfunction      Cognitive Strategy   Initiation and Drive   (getting started on things) This can be exhibited behaviorally, such that they cannot get started on physical activities such as getting up or working out, socially such that they have difficulty calling friends or going out to be with friends, academically/occupationally, such that they have trouble getting started on assignments, or cognitively, such that they have difficulty coming up with ideas or generating plans.    Basic tenets of intervention include providing additional external structure, prompting and cueing, and helping organize and plan.    Increased structure in the environment or in an activity can help with initiation difficulties.  Building in routines for everyday activities is often important, since routine tasks become more automatic, reducing the need for independently starting something.  For example, routines can be broken down into a sequence of steps, and these steps can be written down on index cards or a simple list.  You can then follow the list of steps each day as needed until the routine becomes automatic.    External prompting may be necessary to get started.  Someone might stop by the desk at the outset of each task and prompt them to start work, or perhaps demonstrate the first problem of a worksheet.  At home, parents might need to gently prompt to get started on homework, chores, or to go out with friends.   Some people benefit from having time limits set for completing a task or setting specific times of day when they will work on a task.  Use of a timer may facilitate increased initiation and speed of task completion.  Problems with initiating may be exacerbated by feeling overwhelmed with a given task.  Breaking tasks into smaller, more structured steps may reduce a sense of overwhelm and increase initiation.  Find tasks that are inherently motivating to build self-initiation   Impulsivity  (doing  something without thinking it through) Have explicit, extensive and/or clear set of rules and expectations, and reviewed frequently or have posted. Collaborate when possible to have buy-in with rules.  Response delay techniques can be helpful such that you count to 5 or 10 before responding verbally or physically.    Several stop and think methods are available that teach individuals to inhibit their initial response, to consider the potential consequences of their behaviors, and to further develop a plan of approach to a situation.  Consultation with a psychologist would be helpful.   If you have an impulsive approach to tasks, then verbalize a plan of approach before starting work.  This places a short time period between impulse and action and can allow for better planning and a more strategic approach.  You could explain how you will approach a task, including goals for accuracy and time. Also, we encourage spending some time thinking about different kinds of plans to help focus attention on possible consequences or alternate strategies.   Having unstructured (impulse time) activities are also important given the significant cognitive demands placed on individuals to inhibit impulsive behavior. This can include athletic pursuits, art, nabor, or whatever is enjoyable.     Task Persistence/Vigilance:  (sticking to it) Having a written checklist of steps required to complete a task can serve as an external guide to stick to something until all steps are completed.  Pair enjoyable tasks with something that is not well liked. For instance, play enjoyable music when you are cleaning the house. Or, provide strong incentives once an entire tasks is completed.   Work with someone who has good task persistence (and, who you like) as they can be a good motivator.    Planning/Organization Frequently, individuals with executive dysfunction have organizational difficulties and, consequently, their disorganization  only serves to worsen their executive dysfunction. They will need to work with someone to not only initiate organization, but also to maintain organization over time. Once they become organized, they'll likely need some sort of visual reminder at home or in the office to continue staying organized along with a set time to organize.   Information should be presented in an organized manner (e.g., outline, power point). If this is not possible, then you will need to organize that information before you get started on a task. It will be tempting to just jump and start, but take a moment and get organized first. Also, if someone is presenting you with information (e.g., do this, then that, then this), then you'll need to write that down in an organized fashion.   Have an organization system at home and stick to a routine of maintaining it. Online tools have amazing ideas for doing this. You can have a planner, electronic tool like an iPhone, files, color coordinated pen, wall calendar, files on your computer for certain courses or projects. Brainstorm what works best for you.  Given the particular difficulty managing complex, long-term assignments, students/employees with organizational difficulties often benefit from working on only one task, or one step of a larger task, at a time.    Tasks (big or small) may need to be broken down into smaller steps in order to facilitate organization and planning.    Seek out professional help if the above strategies are not proving effective.   Divided Attention/Multi-tasking Most people have limited divided attention and research has shown that we are less effective when we are doing several things at once (e.g., checking email, reading online, & watching television). Try to methodically engage in one task at a time to limit problems with divided attention.   Set up some organizers so you can move from one separate task to another.   Have visual cues to keep you on track when you  have a lot to do. This will re-orient you and remind you. For instance you may be doing something, get distracted, but then see your notepad that has your next task.

## 2022-11-14 ENCOUNTER — PATIENT MESSAGE (OUTPATIENT)
Dept: NEUROLOGY | Facility: CLINIC | Age: 87
End: 2022-11-14

## 2022-11-14 ENCOUNTER — OFFICE VISIT (OUTPATIENT)
Dept: NEUROLOGY | Facility: CLINIC | Age: 87
End: 2022-11-14
Payer: MEDICARE

## 2022-11-14 DIAGNOSIS — F06.70 MILD NEUROCOGNITIVE DISORDER DUE TO ALZHEIMER'S DISEASE: ICD-10-CM

## 2022-11-14 DIAGNOSIS — Z51.89 THERAPEUTIC: Primary | ICD-10-CM

## 2022-11-14 DIAGNOSIS — R25.1 TREMOR: ICD-10-CM

## 2022-11-14 DIAGNOSIS — G30.9 MILD NEUROCOGNITIVE DISORDER DUE TO ALZHEIMER'S DISEASE: ICD-10-CM

## 2022-11-14 PROCEDURE — 99215 OFFICE O/P EST HI 40 MIN: CPT | Mod: 95,,, | Performed by: PSYCHIATRY & NEUROLOGY

## 2022-11-14 PROCEDURE — 99215 PR OFFICE/OUTPT VISIT, EST, LEVL V, 40-54 MIN: ICD-10-PCS | Mod: 95,,, | Performed by: PSYCHIATRY & NEUROLOGY

## 2022-11-14 NOTE — PROGRESS NOTES
"The patient location is: home  The chief complaint leading to consultation is: memory, tremor    Visit type: audiovisual    Face to Face time with patient: 20mins  20 minutes of total time spent on the encounter, which includes face to face time and non-face to face time preparing to see the patient (eg, review of tests), Obtaining and/or reviewing separately obtained history, Documenting clinical information in the electronic or other health record, Independently interpreting results (not separately reported) and communicating results to the patient/family/caregiver, or Care coordination (not separately reported).       Each patient to whom he or she provides medical services by telemedicine is:  (1) informed of the relationship between the physician and patient and the respective role of any other health care provider with respect to management of the patient; and (2) notified that he or she may decline to receive medical services by telemedicine and may withdraw from such care at any time.    Notes:       [facility] DISORD[facility]     PCP/Referring Provider: No referring provider defined for this encounter.  Date of Service: 11/14/2022    Chief Complaint: tremor, memory    Interval Hx  L handed  Since last visit,     Memory issues continue  Forgets what he's saying mid-sentense  Tried propranolol which sedated him - did not help tremors    Tremor continues but he does not wish to try other medications for fear of sedation    MRI shows mild WM dz, some parietal atrophy  Pt dx with alzheimers per neuropsych  No EKG in system  No aricept or memantine started yet    Med Hx  Tried carbidopa/levodopa 25/100mg 1 tab PO TID   No help to tremors    Writing is very poor still  Insomnia at nights  B1, B12, TSH WNL      "priorHPI: James Dc is a L HANDED 89 y.o. male without significant medical issues significant for tremors, memory, and gait decline coming for eval. Patient as noted tremors since 2013. Charted at " "essential tremor. Noted L>R postural hand tremors. Worst if stressed. He struggles to write. Struggles to sauder or use a screwdriver. Trouble with a letter opening. Uses a spoon to eat.   Gait has slowed in the last 2 months.     He seems to have had a decline in memory since 5 years. Lately he forgets when he has completed tasks.    He's a - retired  Noted 59 yo son has tremors"    Medication history:  No medications currently    Neuroleptic exposure:  None    PD Review of Symptoms:  Anosmia: none  Dysarthria/Hypophonia: mild hypophonia  Cognitive slowing: yes  Hallucinations: seeing items move in corners of eyes  Constipation:  Falls: yes  Micrographia: yes  Sleep issues:  -RBD: yes    Review of Systems:   Review of Systems   Constitutional:  Negative for fever.   HENT:  Negative for congestion.    Eyes:  Negative for double vision.   Respiratory:  Negative for cough and shortness of breath.    Cardiovascular:  Negative for chest pain and leg swelling.   Gastrointestinal:  Negative for nausea.   Genitourinary:  Negative for dysuria.   Musculoskeletal:  Negative for falls.   Skin:  Negative for rash.   Neurological:  Negative for tremors, speech change and headaches.   Psychiatric/Behavioral:  Negative for depression.        Current Medications:  Outpatient Encounter Medications as of 11/14/2022   Medication Sig Dispense Refill    MULTIVITAMIN ORAL Take by mouth.      mupirocin (BACTROBAN) 2 % ointment Apply topically 2 (two) times daily. 15 g 2    propranoloL (INDERAL) 10 MG tablet Take 2 tablets (20 mg total) by mouth 3 (three) times daily. 180 tablet 11    [DISCONTINUED] diphenhydrAMINE (BENADRYL) 25 mg capsule Take 25 mg by mouth every 6 (six) hours as needed.       No facility-administered encounter medications on file as of 11/14/2022.       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       Past Surgical History:  Past Surgical History:   Procedure Laterality Date    TONSILLECTOMY         Social:  Social History     Socioeconomic History    Marital status:    Tobacco Use    Smoking status: Former    Smokeless tobacco: Former   Substance and Sexual Activity    Alcohol use: Yes    Drug use: No    Sexual activity: Yes     Partners: Female       Family History:  Family History   Problem Relation Age of Onset    Prostate cancer Father     Cataracts Son     Psoriasis Neg Hx     Eczema Neg Hx     Glaucoma Neg Hx     Amblyopia Neg Hx     Blindness Neg Hx     Macular degeneration Neg Hx     Strabismus Neg Hx     Retinal detachment Neg Hx        PHYSICAL:  There were no vitals taken for this visit.    Physical Exam  Constitutional: Well-developed, well-nourished, appears stated age  Eyes: No scleral icterus  ENT: Moist oral mucosa  Cardiovascular: No lower extremity edema   Respiratory: No labored breathing   Skin: No rash   Hematologic: No bruising    Other: GI/ deferred   Mental status: Alert and oriented to person, place, time, and situation;   follows commands  Speech: normal (not dysarthric), no aphasia  Cranial nerves:            CN II: Pupils mid-position and equal, not tested light or accommodation  CN III, IV, VI: Extraocular movements full, no nystagmus visualized  CN V: Not tested   CN VII: Face strong and symmetric bilaterally   CN VIII: Hearing intact to voice and conversation   CN IX, X: Palate raises midline and symmetric   CN XI: Strong shoulder shrug B/L  CN XII: Tongue appears midline   Motor: Normal bulk by appearance, no drift   Sensory: Not tested    Gait: Not tested  Deep tendon reflexes: Not tested  Movement/Coordination                    No hypophonic speech.                     No facial masking.  No bradykinesia.                   Tremor Exam   Arms extended Arms in wing  "position, fingers almost touching Re-emergent Arms extended wrists extended Intention Resting Kinetic   Left ? ? ? ? ? ? ?   Right ? ? ? ? ? ? ?        Archimedes Spirals   Left ?+   Right ?+           "General Medical Examination:  General: Good hygiene, appropriate appearance.  HEENT: Normocephalic, atraumatic.   Neck: Supple.   Chest: Unlabored breathing.   CV: Symmetric pulses.   Ext: No clubbing, cyanosis, or edema.     Mental Status:  Mood/Affect: Appropriate/congruent.  Level of consciousness: Awake, alert.  Orientation: Oriented to person, place, time and situation.  Language: No Dysarthria    Cranial nerves:  I: Not tested  II: PERRL, VFF to counting  III, IV, VI: EOMI with conjugate gaze and no nystagmus on end gaze  V: Facial sensation intact and symmetric over the bilateral V1-V3  VII: Facial muscle activation intact and symmetric over the bilateral upper and lower face  VIII: Hearing intact in the b/l ears and symmetrical to finger rub  IX, X, XII: TUP midline - no atrophy or fasiculations  X: SCMs and shoulder shrug full strength b/l and symmetric    Motor:   -UE: 5/5 deltoids; 5/5 biceps, triceps; 5/5 wrist flexors, extensors; 5/5 interosseous; 5/5   -LEs: 5/5 hip flexion, extension; 5/5 knee flexion, extension; 5/5 ankle flexion, extension    duputren's contracture R hand  Mild hypominia    DTRs:  ? Biceps Triceps Brachioradialis Knee Ankle   Left 2+ 2+ 2+ 2+    Right 2+ 2+ 2+ 2+        ? Finger taps Finger flicks SUZY Heel taps   Left 1+ - - -   Right 1+ - - -     Neck tone: nl  ? Arm Leg   Left 1+ 0   Right 1+ 0     Sensation:   -Light touch: Intact and symmetric in the bilateral upper and lower extremities.    Coordination:   -Finger to nose: nl    Tremor Exam   Arms extended Arms in wing position, fingers almost touching Re-emergent Arms extended wrists extended Intention Resting Kinetic   Left ? ? ? ? ? ? ?   Right ? ? ? ? ? ? ?        Archimedes Spirals   Left ?+   Right ?+ " "        Gait:  -Arises from chair with use of hands.  -Stoop is mod  -Casual gait is: nl based  -Stride length: nl  -Arm Swing: nl  -Turnin step  "    Laboratory Data:  B12, B1, TSH NL    Neuropsych   Mr. 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 He describes increasing forgetfulness, difficulty with math, difficulty with executive functioning. He recently got lost driving near his home. He also reports crying more easily, and describes increasing apathy. He has occasional visual misperceptions but no vivid hallucinations. No cognitive fluctuations. He falls but infrequently. He also reports mild orthostasis, increased salivation, dry mouth, muscle stiffness, and generally slow movements. MRI brain found mild chronic ischemic changes with old infarcts and mild volume loss, with slight parietal predominance. He is starting to have mild trouble with IADLs, but is generally functionally independent.      Cognitive testing suggests mild declines across several domains. Most of these scores are around the low end of normal with few martha impairments, however these are all likely declines from his estimated high to above average baseline. Specifically, memory recall and recognition are trending downward despite excellent attention and intact initial encoding. Naming is intact, but semantic fluency is about 1SD lower than letter fluency. Processing speed scores are also trending downward, but I suspect this is related to the graphomotor component rather than cognitive speed. Executive functioning is also trending downward. He was unable to complete a medication organization task requiring him to fill a pillbox with pretend medications, and made 34 errors. Attention, visuoconstruction, and visual memory are very strong. He does not report clinically significant mood symptoms on self-report " screeners.      His cognitive profile is most concerning for early stages of Alzheimer's dementia. Testing is less consistent with a synucleinopathy, although given his movement symptoms we can keep monitoring him, as it is possible to have both. Given recent navigational difficulties and overall slowed movements, I recommend minimizing driving whenever possible, and limiting when he does drive to stay close to home in familiar areas and avoid highways and highly congested areas. He meets criteria for mild neurocognitive disorder today, but given that he is beginning to have trouble with IADLs, I suspect he is on the cusp of a mild dementia. Can consider memory memory medication if pt is agreeable. Will discuss cognitive hygiene, healthy aging, compensatory strategies. I suspect some of his mood lability and apathy are neurologic rather than primarily psychiatric, but we can still discuss strategies to help manage, and can consider pharmacological treatment if indicated.      1. 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 Memory change  Ambulatory consult to Neuropsychology       Imaging:  MRI brain Impression:     1. Mild chronic small vessel ischemic change with few superimposed remote infarcts above.  2. Mild cerebral volume loss with slight parietal predominance.  3. No evidence of acute intracranial pathology.       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 Possible there is a comorbid parkinsonian process as well, given movement symptoms.   Will continue to monitor.          Current Assessment & Plan     Neuropsych  assessment suggestive of Alzheimers. Parietal atrophy mild on MRI brain. Suggested consider aricept 5mg QHS. He is hesitant. Suggested EKG to see Qtc.  Family would like a memory specialist involved.          Other Visit Diagnoses       Therapeutic    -  Primary    Relevant Orders    EKG 12-lead                  Marce Smith MD, MS [facility]  Department of Neurology  Movement Disorders

## 2022-11-14 NOTE — ASSESSMENT & PLAN NOTE
Neuropsych assessment suggestive of Alzheimers. Parietal atrophy mild on MRI brain. Suggested consider aricept 5mg QHS. He is hesitant. Suggested EKG to see Qtc.  Family would like a memory specialist involved.

## 2022-11-14 NOTE — ASSESSMENT & PLAN NOTE
ET-like tremor on exam    Did not respond to LDOPA  Failed Propranolol 10mg TID due to sedation  At this point he;d like to try nonmedical options such as weighted silverware

## 2022-11-14 NOTE — Clinical Note
Please book new pt appt [facility] for alzheimers care Please book with me 3 mos VV Please call him to schedule EKG

## 2022-11-18 ENCOUNTER — TELEPHONE (OUTPATIENT)
Dept: NEUROLOGY | Facility: CLINIC | Age: 87
End: 2022-11-18
Payer: MEDICARE

## 2022-11-21 ENCOUNTER — TELEPHONE (OUTPATIENT)
Dept: NEUROLOGY | Facility: CLINIC | Age: 87
End: 2022-11-21
Payer: MEDICARE

## 2022-11-21 NOTE — TELEPHONE ENCOUNTER
----- Message from Ban Briggs sent at 11/21/2022  2:01 PM CST -----  Regarding: Appt  Contact: Not Needed  Pt's wife is calling to say per the pt that a follow up is not needed. Call back not needed. Wife stated that patient has accepted his diagnosis and does not want to take further, and that he appreciates all Dr. Smith's help.

## 2023-02-07 DIAGNOSIS — Z00.00 ENCOUNTER FOR MEDICARE ANNUAL WELLNESS EXAM: ICD-10-CM

## 2023-02-09 DIAGNOSIS — Z00.00 ENCOUNTER FOR MEDICARE ANNUAL WELLNESS EXAM: ICD-10-CM

## 2023-03-09 ENCOUNTER — LAB VISIT (OUTPATIENT)
Dept: LAB | Facility: HOSPITAL | Age: 88
End: 2023-03-09
Attending: INTERNAL MEDICINE
Payer: MEDICARE

## 2023-03-09 ENCOUNTER — OFFICE VISIT (OUTPATIENT)
Dept: INTERNAL MEDICINE | Facility: CLINIC | Age: 88
End: 2023-03-09
Payer: MEDICARE

## 2023-03-09 VITALS
SYSTOLIC BLOOD PRESSURE: 118 MMHG | BODY MASS INDEX: 21.02 KG/M2 | OXYGEN SATURATION: 91 % | DIASTOLIC BLOOD PRESSURE: 62 MMHG | TEMPERATURE: 99 F | RESPIRATION RATE: 12 BRPM | HEART RATE: 83 BPM | WEIGHT: 114.19 LBS | HEIGHT: 62 IN

## 2023-03-09 DIAGNOSIS — I35.0 AORTIC VALVE STENOSIS, ETIOLOGY OF CARDIAC VALVE DISEASE UNSPECIFIED: ICD-10-CM

## 2023-03-09 DIAGNOSIS — R25.1 TREMOR: ICD-10-CM

## 2023-03-09 DIAGNOSIS — F06.70 MILD NEUROCOGNITIVE DISORDER DUE TO ALZHEIMER'S DISEASE: ICD-10-CM

## 2023-03-09 DIAGNOSIS — G30.9 MILD NEUROCOGNITIVE DISORDER DUE TO ALZHEIMER'S DISEASE: ICD-10-CM

## 2023-03-09 DIAGNOSIS — R29.818 PARKINSONIAN FEATURES: ICD-10-CM

## 2023-03-09 DIAGNOSIS — R53.83 OTHER FATIGUE: ICD-10-CM

## 2023-03-09 DIAGNOSIS — Z00.00 ENCOUNTER FOR PREVENTIVE HEALTH EXAMINATION: Primary | ICD-10-CM

## 2023-03-09 LAB
ALBUMIN SERPL BCP-MCNC: 4 G/DL (ref 3.5–5.2)
ALP SERPL-CCNC: 60 U/L (ref 55–135)
ALT SERPL W/O P-5'-P-CCNC: 15 U/L (ref 10–44)
ANION GAP SERPL CALC-SCNC: 12 MMOL/L (ref 8–16)
AST SERPL-CCNC: 24 U/L (ref 10–40)
BASOPHILS # BLD AUTO: 0.06 K/UL (ref 0–0.2)
BASOPHILS NFR BLD: 0.9 % (ref 0–1.9)
BILIRUB SERPL-MCNC: 0.5 MG/DL (ref 0.1–1)
BUN SERPL-MCNC: 17 MG/DL (ref 8–23)
CALCIUM SERPL-MCNC: 9.3 MG/DL (ref 8.7–10.5)
CHLORIDE SERPL-SCNC: 107 MMOL/L (ref 95–110)
CO2 SERPL-SCNC: 26 MMOL/L (ref 23–29)
CREAT SERPL-MCNC: 1 MG/DL (ref 0.5–1.4)
DIFFERENTIAL METHOD: ABNORMAL
EOSINOPHIL # BLD AUTO: 0.1 K/UL (ref 0–0.5)
EOSINOPHIL NFR BLD: 1.2 % (ref 0–8)
ERYTHROCYTE [DISTWIDTH] IN BLOOD BY AUTOMATED COUNT: 13.2 % (ref 11.5–14.5)
EST. GFR  (NO RACE VARIABLE): >60 ML/MIN/1.73 M^2
GLUCOSE SERPL-MCNC: 82 MG/DL (ref 70–110)
HCT VFR BLD AUTO: 43.1 % (ref 40–54)
HGB BLD-MCNC: 13.7 G/DL (ref 14–18)
IMM GRANULOCYTES # BLD AUTO: 0.01 K/UL (ref 0–0.04)
IMM GRANULOCYTES NFR BLD AUTO: 0.1 % (ref 0–0.5)
LYMPHOCYTES # BLD AUTO: 1.2 K/UL (ref 1–4.8)
LYMPHOCYTES NFR BLD: 18.1 % (ref 18–48)
MCH RBC QN AUTO: 31 PG (ref 27–31)
MCHC RBC AUTO-ENTMCNC: 31.8 G/DL (ref 32–36)
MCV RBC AUTO: 98 FL (ref 82–98)
MONOCYTES # BLD AUTO: 0.5 K/UL (ref 0.3–1)
MONOCYTES NFR BLD: 7.7 % (ref 4–15)
NEUTROPHILS # BLD AUTO: 4.8 K/UL (ref 1.8–7.7)
NEUTROPHILS NFR BLD: 72 % (ref 38–73)
NRBC BLD-RTO: 0 /100 WBC
PLATELET # BLD AUTO: 207 K/UL (ref 150–450)
PMV BLD AUTO: 9 FL (ref 9.2–12.9)
POTASSIUM SERPL-SCNC: 4.2 MMOL/L (ref 3.5–5.1)
PROT SERPL-MCNC: 7.1 G/DL (ref 6–8.4)
RBC # BLD AUTO: 4.42 M/UL (ref 4.6–6.2)
SODIUM SERPL-SCNC: 145 MMOL/L (ref 136–145)
WBC # BLD AUTO: 6.73 K/UL (ref 3.9–12.7)

## 2023-03-09 PROCEDURE — 85025 COMPLETE CBC W/AUTO DIFF WBC: CPT | Mod: HCNC | Performed by: INTERNAL MEDICINE

## 2023-03-09 PROCEDURE — 99397 PR PREVENTIVE VISIT,EST,65 & OVER: ICD-10-PCS | Mod: HCNC,S$GLB,, | Performed by: INTERNAL MEDICINE

## 2023-03-09 PROCEDURE — 99397 PER PM REEVAL EST PAT 65+ YR: CPT | Mod: HCNC,S$GLB,, | Performed by: INTERNAL MEDICINE

## 2023-03-09 PROCEDURE — 99999 PR PBB SHADOW E&M-EST. PATIENT-LVL III: CPT | Mod: PBBFAC,HCNC,, | Performed by: INTERNAL MEDICINE

## 2023-03-09 PROCEDURE — 80053 COMPREHEN METABOLIC PANEL: CPT | Mod: HCNC | Performed by: INTERNAL MEDICINE

## 2023-03-09 PROCEDURE — 36415 COLL VENOUS BLD VENIPUNCTURE: CPT | Mod: HCNC,PO | Performed by: INTERNAL MEDICINE

## 2023-03-09 PROCEDURE — 99999 PR PBB SHADOW E&M-EST. PATIENT-LVL III: ICD-10-PCS | Mod: PBBFAC,HCNC,, | Performed by: INTERNAL MEDICINE

## 2023-03-09 NOTE — PROGRESS NOTES
History of present illness:   89-year-old gentleman in today for general health assessment.      Current medications:   No currently scheduled prescription medication.      Review of systems:   General: no fever, chills, generalized body aches. No unexpected weight loss.  Persistent fatigue and lack of energy which has been chronic now.  Eyes:  No visual disturbances.  HEENT:  No hoarseness, dysphagia, ear pain.  He does complain of dry mouth.  At times some minimal difficulty swallowing related to such.  Respiratory:  No cough, no shortness of breath.  Cardiovascular: no chest pain, palpitations, cough, exertional limb pain. No edema.  GI: no nausea, vomiting.  No abdominal pain. No change in bowel habits.  No melena, no hematochezia.  : no dysuria. No change in the color or character of the urine. No urinary frequency.  Musculoskeletal: no joint pain or swelling.  He does describe general stiffness with transitions and walking.  Neurologic:  As above notes general stiffness.  Chronic tremor.   Skin:  No rashes or other concerns.  Psych:  His does describe some depression and frustration with current state of health and not being able do things he wants to do.      Health screenings:  Declines any vaccination updates.    Physical examination:  GENERAL:  Alert, appropriately groomed, no acute distress.  VS:  Blood pressure 118/62.  Other vital signs normal.  EYES: sclerae white ,nonicteric. PERRL.  HEENT:  Normocephalic. Ear canals and tympanic membranes normal. Mouth and pharynx normal. No thyromegaly. Trachea midline and freely mobile.  LUNGS:  Clear to ascultation and normal to percussion.  CARDIOVASCULAR:  Normal heart sounds.  1/6 systolic murmur.  Carotids full bilaterally without bruit.  Pedal pulses intact .  No abdominal bruit.  No peripheral extremity edema.  GI: the abdomen is soft, no distension. No masses , tenderness, organomegaly.    : scrotum, testicles and penis normal.   LYMPHATIC:  No  axillary, inguinal , cervical adenopathy.  MUSCULOSKELETAL:  No swollen tender joints.  Dupuytren's contracture left hand.  See neurological.  NEUROLOGIC:  Does exhibit some degree of masked facies.  Noted some bradykinesia.  Some cogwheeling.  Gait slow and deliberate  SKIN:  No rashes.   MS:  Alert, oriented .  No suicidal or homicidal ideation.  No psychotic thinking.  Positive depression.    Data:  He has had some lab data in the last few months but is due for additional lab data updates.        Impression:   89-year-old gentleman with several medical issues.      Mild neurocognitive disorder felt related to early Alzheimer's.      Parkinsonian features possibly related to his Alzheimer's, possibly Parkinson's.  He is being evaluated by Neurology.      Mild aortic stenosis clinically stable.      Chronic fatigue multifactorial    Depression          Plan:   Update CBC, chemistry profile urinalysis.    Continue follow-up with Neurology.    Recommended physical therapy for gait and leg strengthening.  He will consider and advise but is not interested at this time.  We discussed treating his depression and he declines consideration for such.    Return clinic in six months.

## 2023-05-23 ENCOUNTER — PATIENT MESSAGE (OUTPATIENT)
Dept: RESEARCH | Facility: HOSPITAL | Age: 88
End: 2023-05-23
Payer: MEDICARE

## 2023-06-01 DIAGNOSIS — I35.0 AORTIC VALVE STENOSIS, ETIOLOGY OF CARDIAC VALVE DISEASE UNSPECIFIED: ICD-10-CM

## 2023-06-01 DIAGNOSIS — R29.818 PARKINSONIAN FEATURES: Primary | ICD-10-CM

## 2023-06-01 DIAGNOSIS — E78.5 DYSLIPIDEMIA: ICD-10-CM

## 2023-06-01 DIAGNOSIS — Z12.5 ENCOUNTER FOR SCREENING FOR MALIGNANT NEOPLASM OF PROSTATE: ICD-10-CM

## 2023-06-01 DIAGNOSIS — R73.01 IMPAIRED FASTING GLUCOSE: ICD-10-CM

## 2023-06-12 ENCOUNTER — TELEPHONE (OUTPATIENT)
Dept: INTERNAL MEDICINE | Facility: CLINIC | Age: 88
End: 2023-06-12
Payer: MEDICARE

## 2023-06-12 NOTE — TELEPHONE ENCOUNTER
----- Message from Ayla Cervantes sent at 6/12/2023 10:04 AM CDT -----  Contact: [phone]  Caller is requesting an earlier appointment then we can schedule.  Caller is requesting a message be sent to the provider.  If this is for urgent care symptoms, did you offer other providers at this location, providers at other locations, or [facility]? (yes, no, n/a):  n/a  If this is for the patients physical, did you offer to schedule next available and put on wait list, or to see NP or PA for their physical?  (yes, no, n/a):  n/a  When is the next available appointment with their provider:  12/04/23  Reason for the appointment:   er f/u  Patient preference of timeframe to be scheduled:  ASAP  Would the patient like a call back, or a response through their [facility] portal?:   phone  Comments:  offered an appointment with np but patient refused, would like to be seen by pcp. Thank you

## 2023-06-12 NOTE — TELEPHONE ENCOUNTER
Spoke to pt and his wife and offered appt with  on 06/16 this Friday. They declined said it was to far out. They asked to see anyone that's a doctor check 3 locations was able to get pt on for tomorrow 06/13/2023 at 10:20 am with .

## 2023-06-13 ENCOUNTER — OFFICE VISIT (OUTPATIENT)
Dept: INTERNAL MEDICINE | Facility: CLINIC | Age: 88
End: 2023-06-13
Payer: MEDICARE

## 2023-06-13 ENCOUNTER — HOSPITAL ENCOUNTER (OUTPATIENT)
Facility: HOSPITAL | Age: 88
Discharge: HOME | End: 2023-06-16
Attending: EMERGENCY MEDICINE | Admitting: STUDENT IN AN ORGANIZED HEALTH CARE EDUCATION/TRAINING PROGRAM
Payer: MEDICARE

## 2023-06-13 VITALS
HEART RATE: 73 BPM | HEIGHT: 62 IN | RESPIRATION RATE: 18 BRPM | WEIGHT: 112.44 LBS | BODY MASS INDEX: 20.69 KG/M2 | OXYGEN SATURATION: 96 % | DIASTOLIC BLOOD PRESSURE: 68 MMHG | SYSTOLIC BLOOD PRESSURE: 122 MMHG

## 2023-06-13 DIAGNOSIS — G47.34 HYPOXIA, SLEEP RELATED: ICD-10-CM

## 2023-06-13 DIAGNOSIS — I63.9 CVA (CEREBRAL VASCULAR ACCIDENT): ICD-10-CM

## 2023-06-13 DIAGNOSIS — R26.89 IMBALANCE: ICD-10-CM

## 2023-06-13 DIAGNOSIS — R56.9 OBSERVED SEIZURE-LIKE ACTIVITY: ICD-10-CM

## 2023-06-13 DIAGNOSIS — R55 SYNCOPE: ICD-10-CM

## 2023-06-13 DIAGNOSIS — R07.9 CHEST PAIN: ICD-10-CM

## 2023-06-13 DIAGNOSIS — I63.512 CEREBROVASCULAR ACCIDENT (CVA) DUE TO STENOSIS OF LEFT MIDDLE CEREBRAL ARTERY: ICD-10-CM

## 2023-06-13 DIAGNOSIS — R40.20 LOC (LOSS OF CONSCIOUSNESS): ICD-10-CM

## 2023-06-13 DIAGNOSIS — R40.4 TRANSIENT ALTERATION OF AWARENESS: Primary | ICD-10-CM

## 2023-06-13 DIAGNOSIS — I35.0 AORTIC STENOSIS: ICD-10-CM

## 2023-06-13 DIAGNOSIS — R06.09 OTHER FORMS OF DYSPNEA: ICD-10-CM

## 2023-06-13 DIAGNOSIS — R55 SYNCOPE, UNSPECIFIED SYNCOPE TYPE: Primary | ICD-10-CM

## 2023-06-13 DIAGNOSIS — S06.5XAA SUBDURAL HEMATOMA: ICD-10-CM

## 2023-06-13 DIAGNOSIS — R40.20 LOSS OF CONSCIOUSNESS: ICD-10-CM

## 2023-06-13 DIAGNOSIS — I65.22 ASYMPTOMATIC STENOSIS OF LEFT CAROTID ARTERY: ICD-10-CM

## 2023-06-13 DIAGNOSIS — R68.89 MULTIPLE COMPLAINTS: ICD-10-CM

## 2023-06-13 DIAGNOSIS — I70.0 AORTIC ATHEROSCLEROSIS: ICD-10-CM

## 2023-06-13 PROBLEM — R53.81 PHYSICAL DECONDITIONING: Status: ACTIVE | Noted: 2023-06-13

## 2023-06-13 PROBLEM — R29.6 RECURRENT FALLS: Status: ACTIVE | Noted: 2023-06-13

## 2023-06-13 PROBLEM — I16.0 HYPERTENSIVE URGENCY: Status: ACTIVE | Noted: 2023-06-13

## 2023-06-13 LAB
ALBUMIN SERPL BCP-MCNC: 3.7 G/DL (ref 3.5–5.2)
ALP SERPL-CCNC: 59 U/L (ref 55–135)
ALT SERPL W/O P-5'-P-CCNC: 15 U/L (ref 10–44)
AMPHET+METHAMPHET UR QL: NEGATIVE
ANION GAP SERPL CALC-SCNC: 11 MMOL/L (ref 8–16)
AST SERPL-CCNC: 23 U/L (ref 10–40)
BARBITURATES UR QL SCN>200 NG/ML: NEGATIVE
BENZODIAZ UR QL SCN>200 NG/ML: NEGATIVE
BILIRUB SERPL-MCNC: 0.5 MG/DL (ref 0.1–1)
BILIRUB UR QL STRIP: NEGATIVE
BNP SERPL-MCNC: 134 PG/ML (ref 0–99)
BUN SERPL-MCNC: 17 MG/DL (ref 8–23)
BZE UR QL SCN: NEGATIVE
CALCIUM SERPL-MCNC: 9.1 MG/DL (ref 8.7–10.5)
CANNABINOIDS UR QL SCN: NEGATIVE
CHLORIDE SERPL-SCNC: 106 MMOL/L (ref 95–110)
CLARITY UR REFRACT.AUTO: CLEAR
CO2 SERPL-SCNC: 25 MMOL/L (ref 23–29)
COLOR UR AUTO: COLORLESS
CREAT SERPL-MCNC: 0.9 MG/DL (ref 0.5–1.4)
CREAT UR-MCNC: 20 MG/DL (ref 23–375)
ERYTHROCYTE [DISTWIDTH] IN BLOOD BY AUTOMATED COUNT: 12.7 % (ref 11.5–14.5)
EST. GFR  (NO RACE VARIABLE): >60 ML/MIN/1.73 M^2
ETHANOL UR-MCNC: <10 MG/DL
GLUCOSE SERPL-MCNC: 96 MG/DL (ref 70–110)
GLUCOSE UR QL STRIP: NEGATIVE
HCT VFR BLD AUTO: 39.5 % (ref 40–54)
HCV AB SERPL QL IA: NORMAL
HGB BLD-MCNC: 13.3 G/DL (ref 14–18)
HGB UR QL STRIP: NEGATIVE
HIV 1+2 AB+HIV1 P24 AG SERPL QL IA: NORMAL
KETONES UR QL STRIP: NEGATIVE
LEUKOCYTE ESTERASE UR QL STRIP: NEGATIVE
MCH RBC QN AUTO: 31.7 PG (ref 27–31)
MCHC RBC AUTO-ENTMCNC: 33.7 G/DL (ref 32–36)
MCV RBC AUTO: 94 FL (ref 82–98)
METHADONE UR QL SCN>300 NG/ML: NEGATIVE
NITRITE UR QL STRIP: NEGATIVE
OPIATES UR QL SCN: NEGATIVE
PCP UR QL SCN>25 NG/ML: NEGATIVE
PH UR STRIP: 7 [PH] (ref 5–8)
PLATELET # BLD AUTO: 179 K/UL (ref 150–450)
PMV BLD AUTO: 8.7 FL (ref 9.2–12.9)
POTASSIUM SERPL-SCNC: 4 MMOL/L (ref 3.5–5.1)
PROT SERPL-MCNC: 6.6 G/DL (ref 6–8.4)
PROT UR QL STRIP: NEGATIVE
RBC # BLD AUTO: 4.2 M/UL (ref 4.6–6.2)
SODIUM SERPL-SCNC: 142 MMOL/L (ref 136–145)
SP GR UR STRIP: 1 (ref 1–1.03)
TOXICOLOGY INFORMATION: ABNORMAL
TROPONIN I SERPL DL<=0.01 NG/ML-MCNC: 0.01 NG/ML (ref 0–0.03)
TSH SERPL DL<=0.005 MIU/L-ACNC: 1.33 UIU/ML (ref 0.4–4)
URN SPEC COLLECT METH UR: ABNORMAL
WBC # BLD AUTO: 6.41 K/UL (ref 3.9–12.7)

## 2023-06-13 PROCEDURE — G0378 HOSPITAL OBSERVATION PER HR: HCPCS

## 2023-06-13 PROCEDURE — 86803 HEPATITIS C AB TEST: CPT | Performed by: PHYSICIAN ASSISTANT

## 2023-06-13 PROCEDURE — 84443 ASSAY THYROID STIM HORMONE: CPT | Performed by: EMERGENCY MEDICINE

## 2023-06-13 PROCEDURE — 63600175 PHARM REV CODE 636 W HCPCS

## 2023-06-13 PROCEDURE — 87389 HIV-1 AG W/HIV-1&-2 AB AG IA: CPT | Performed by: PHYSICIAN ASSISTANT

## 2023-06-13 PROCEDURE — 1126F AMNT PAIN NOTED NONE PRSNT: CPT | Mod: CPTII,S$GLB,, | Performed by: INTERNAL MEDICINE

## 2023-06-13 PROCEDURE — 81003 URINALYSIS AUTO W/O SCOPE: CPT | Performed by: EMERGENCY MEDICINE

## 2023-06-13 PROCEDURE — 93010 EKG 12-LEAD: ICD-10-PCS | Mod: ,,, | Performed by: INTERNAL MEDICINE

## 2023-06-13 PROCEDURE — 99999 PR PBB SHADOW E&M-EST. PATIENT-LVL IV: CPT | Mod: PBBFAC,,, | Performed by: INTERNAL MEDICINE

## 2023-06-13 PROCEDURE — 3288F PR FALLS RISK ASSESSMENT DOCUMENTED: ICD-10-PCS | Mod: CPTII,S$GLB,, | Performed by: INTERNAL MEDICINE

## 2023-06-13 PROCEDURE — 99214 OFFICE O/P EST MOD 30 MIN: CPT | Mod: S$GLB,,, | Performed by: INTERNAL MEDICINE

## 2023-06-13 PROCEDURE — 83880 ASSAY OF NATRIURETIC PEPTIDE: CPT | Performed by: EMERGENCY MEDICINE

## 2023-06-13 PROCEDURE — 99223 PR INITIAL HOSPITAL CARE,LEVL III: ICD-10-PCS | Mod: AI,GC,, | Performed by: STUDENT IN AN ORGANIZED HEALTH CARE EDUCATION/TRAINING PROGRAM

## 2023-06-13 PROCEDURE — 99285 EMERGENCY DEPT VISIT HI MDM: CPT | Mod: ,,, | Performed by: EMERGENCY MEDICINE

## 2023-06-13 PROCEDURE — 80053 COMPREHEN METABOLIC PANEL: CPT | Performed by: EMERGENCY MEDICINE

## 2023-06-13 PROCEDURE — 93010 ELECTROCARDIOGRAM REPORT: CPT | Mod: ,,, | Performed by: INTERNAL MEDICINE

## 2023-06-13 PROCEDURE — 95718 PR EEG, W/VIDEO, CONT RECORD, I&R, 2-12 HRS: ICD-10-PCS | Mod: ,,, | Performed by: PSYCHIATRY & NEUROLOGY

## 2023-06-13 PROCEDURE — 93005 ELECTROCARDIOGRAM TRACING: CPT

## 2023-06-13 PROCEDURE — 1126F PR PAIN SEVERITY QUANTIFIED, NO PAIN PRESENT: ICD-10-PCS | Mod: CPTII,S$GLB,, | Performed by: INTERNAL MEDICINE

## 2023-06-13 PROCEDURE — 96372 THER/PROPH/DIAG INJ SC/IM: CPT | Mod: 59

## 2023-06-13 PROCEDURE — 99285 PR EMERGENCY DEPT VISIT,LEVEL V: ICD-10-PCS | Mod: ,,, | Performed by: EMERGENCY MEDICINE

## 2023-06-13 PROCEDURE — 84484 ASSAY OF TROPONIN QUANT: CPT | Performed by: EMERGENCY MEDICINE

## 2023-06-13 PROCEDURE — 99999 PR PBB SHADOW E&M-EST. PATIENT-LVL IV: ICD-10-PCS | Mod: PBBFAC,,, | Performed by: INTERNAL MEDICINE

## 2023-06-13 PROCEDURE — 25500020 PHARM REV CODE 255: Performed by: STUDENT IN AN ORGANIZED HEALTH CARE EDUCATION/TRAINING PROGRAM

## 2023-06-13 PROCEDURE — 99214 PR OFFICE/OUTPT VISIT, EST, LEVL IV, 30-39 MIN: ICD-10-PCS | Mod: S$GLB,,, | Performed by: INTERNAL MEDICINE

## 2023-06-13 PROCEDURE — 1101F PT FALLS ASSESS-DOCD LE1/YR: CPT | Mod: CPTII,S$GLB,, | Performed by: INTERNAL MEDICINE

## 2023-06-13 PROCEDURE — 95718 EEG PHYS/QHP 2-12 HR W/VEEG: CPT | Mod: ,,, | Performed by: PSYCHIATRY & NEUROLOGY

## 2023-06-13 PROCEDURE — 85027 COMPLETE CBC AUTOMATED: CPT | Performed by: EMERGENCY MEDICINE

## 2023-06-13 PROCEDURE — 99285 EMERGENCY DEPT VISIT HI MDM: CPT | Mod: 25

## 2023-06-13 PROCEDURE — A9585 GADOBUTROL INJECTION: HCPCS | Performed by: STUDENT IN AN ORGANIZED HEALTH CARE EDUCATION/TRAINING PROGRAM

## 2023-06-13 PROCEDURE — 80307 DRUG TEST PRSMV CHEM ANLYZR: CPT | Performed by: EMERGENCY MEDICINE

## 2023-06-13 PROCEDURE — 99223 1ST HOSP IP/OBS HIGH 75: CPT | Mod: AI,GC,, | Performed by: STUDENT IN AN ORGANIZED HEALTH CARE EDUCATION/TRAINING PROGRAM

## 2023-06-13 PROCEDURE — 1101F PR PT FALLS ASSESS DOC 0-1 FALLS W/OUT INJ PAST YR: ICD-10-PCS | Mod: CPTII,S$GLB,, | Performed by: INTERNAL MEDICINE

## 2023-06-13 PROCEDURE — 3288F FALL RISK ASSESSMENT DOCD: CPT | Mod: CPTII,S$GLB,, | Performed by: INTERNAL MEDICINE

## 2023-06-13 RX ORDER — DEXTROSE 40 %
16 GEL (GRAM) ORAL
Status: DISCONTINUED | OUTPATIENT
Start: 2023-06-13 | End: 2023-06-16 | Stop reason: HOSPADM

## 2023-06-13 RX ORDER — ACETAMINOPHEN 325 MG/1
650 TABLET ORAL EVERY 4 HOURS PRN
Status: DISCONTINUED | OUTPATIENT
Start: 2023-06-13 | End: 2023-06-16 | Stop reason: HOSPADM

## 2023-06-13 RX ORDER — SODIUM CHLORIDE 0.9 % (FLUSH) 0.9 %
10 SYRINGE (ML) INJECTION EVERY 12 HOURS PRN
Status: DISCONTINUED | OUTPATIENT
Start: 2023-06-13 | End: 2023-06-16 | Stop reason: HOSPADM

## 2023-06-13 RX ORDER — HYDRALAZINE HYDROCHLORIDE 25 MG/1
25 TABLET, FILM COATED ORAL EVERY 8 HOURS PRN
Status: DISCONTINUED | OUTPATIENT
Start: 2023-06-13 | End: 2023-06-14

## 2023-06-13 RX ORDER — GLUCAGON 1 MG
1 KIT INJECTION
Status: DISCONTINUED | OUTPATIENT
Start: 2023-06-13 | End: 2023-06-16 | Stop reason: HOSPADM

## 2023-06-13 RX ORDER — GADOBUTROL 604.72 MG/ML
5 INJECTION INTRAVENOUS
Status: COMPLETED | OUTPATIENT
Start: 2023-06-13 | End: 2023-06-13

## 2023-06-13 RX ORDER — NALOXONE HCL 0.4 MG/ML
0.02 VIAL (ML) INJECTION
Status: DISCONTINUED | OUTPATIENT
Start: 2023-06-13 | End: 2023-06-16 | Stop reason: HOSPADM

## 2023-06-13 RX ORDER — ENOXAPARIN SODIUM 100 MG/ML
40 INJECTION SUBCUTANEOUS EVERY 24 HOURS
Status: DISCONTINUED | OUTPATIENT
Start: 2023-06-13 | End: 2023-06-14

## 2023-06-13 RX ORDER — TALC
6 POWDER (GRAM) TOPICAL NIGHTLY PRN
Status: DISCONTINUED | OUTPATIENT
Start: 2023-06-13 | End: 2023-06-16 | Stop reason: HOSPADM

## 2023-06-13 RX ORDER — DEXTROSE 40 %
24 GEL (GRAM) ORAL
Status: DISCONTINUED | OUTPATIENT
Start: 2023-06-13 | End: 2023-06-16 | Stop reason: HOSPADM

## 2023-06-13 RX ADMIN — GADOBUTROL 5 ML: 604.72 INJECTION INTRAVENOUS at 09:06

## 2023-06-13 RX ADMIN — ENOXAPARIN SODIUM 40 MG: 40 INJECTION SUBCUTANEOUS at 08:06

## 2023-06-13 NOTE — HPI
James Dc is a 89 year old man with history of Alzheimer's dementia and essential tremor presents with recurrent episodes of transient loss of consciousness and decreased responsiveness. Patient brought to the ED by family, who provided majority of history. Per family, the patient has been having episodes of catatonia, where his body becomes completely still and he becomes unresponsive. On 6/11, patient had a fall and family was concerned he had another catatonic episode that caused the fall. He was seen at [facility] ED and workup including labs, CT head, C-spine, and maxillary face were obtained. Workup was ultimately unremarkable and the patient was discharged with follow-up with his PCP today on 6/13. During his PCP visit, he was told to present to the ED if he had any recurrent episodes. Later today, family states that the patient was found unresponsive in his recliner at home. He was blankly staring and unresponsive for about 30 minutes, and was found to be confused afterwards. Patient states he can hear and register what is saying to him, but he is unable to move or speak during the episodes. Patient denies any headache, vision changes, focal weakness/numbness, or chest pain.    On arrival to the ER, patient was afebrile with stable vital signs. Labs were largely unremarkable. CT head showed no acute intracranial findings. EKG without acute ischemic changes. Urinalysis without signs of infection. Patient admitted to hospital medicine for further observation of catatonic episodes with decreased responsiveness.

## 2023-06-13 NOTE — PROGRESS NOTES
Subjective:       Patient ID: James Dc is a 89 y.o. male.    Chief Complaint: Follow-up    Here for urgent care --  Has had episodes of extreme fatigue and blackouts. 2 days was eating sandwich and was then found down on floor. He has no recall passing out. Similar episode 1 month prior. No chest pains, not new SOB/hernandez.  Review of Systems   Constitutional:  Negative for activity change.   Respiratory:  Negative for chest tightness.    Cardiovascular:  Negative for chest pain, palpitations and leg swelling.   Gastrointestinal:  Negative for blood in stool.   Skin:  Negative for rash and wound.   Neurological:  Positive for syncope. Negative for dizziness.   Psychiatric/Behavioral:  Positive for confusion. Negative for agitation.          Objective:      Physical Exam  Vitals reviewed.   Constitutional:       General: He is not in acute distress.     Appearance: Normal appearance. He is well-developed. He is not ill-appearing, toxic-appearing or diaphoretic.      Comments: Frail appearing   HENT:      Head: Normocephalic and atraumatic.   Eyes:      General: No scleral icterus.  Neck:      Thyroid: No thyromegaly.   Cardiovascular:      Rate and Rhythm: Normal rate and regular rhythm.      Heart sounds: Murmur heard.     No friction rub. No gallop.      Comments: Systolic murmur  Pulmonary:      Effort: Pulmonary effort is normal. No respiratory distress.      Breath sounds: Normal breath sounds. No wheezing or rales.   Abdominal:      General: Bowel sounds are normal. There is no distension.      Palpations: Abdomen is soft. There is no mass.      Tenderness: There is no abdominal tenderness. There is no guarding or rebound.   Musculoskeletal:         General: Normal range of motion.      Cervical back: Normal range of motion.   Lymphadenopathy:      Cervical: No cervical adenopathy.   Skin:     Findings: No lesion.   Neurological:      Mental Status: He is alert and oriented to person, place, and time.    Psychiatric:         Mood and Affect: Mood normal.         Behavior: Behavior normal.         Thought Content: Thought content normal.       Assessment:       1. Syncope, unspecified syncope type    2. Imbalance        Plan:       James was seen today for follow-up.    Diagnoses and all orders for this visit:    Syncope, unspecified syncope type  -     Ambulatory referral/consult to Cardiology; Future  Likely syncope is the cause  To ER if any more episodes  Ddx included arrhythmia vs AS    Imbalance  -     Ambulatory referral/consult to Physical/Occupational Therapy; Future    Ascvd -- has BP controlled today. Hold on statin due to his age, see back Dr JEET Briggs MD        No follow-ups on file.    Future Appointments   Date Time Provider Department Center   6/16/2023 10:00 AM VASCULAR, CARDIOLOGY [facility]CR[facility]   6/16/2023 11:00 AM NEURODIAGNOSTICS, APPT [facility] NEURODS [facility]   8/21/2023  9:00 AM Matthew Rodney, [facility]   12/1/2023  8:00 AM SPECIMEN, [facility] [place]   12/1/2023  8:15 AM LAB, [facility] [place]   12/7/2023 10:00 AM RENEE Briggs MD [facility] [place]

## 2023-06-13 NOTE — ED PROVIDER NOTES
Emergency Department Encounter  Provider Note    James Dc  [id number]  6/13/2023    Evaluation:    History:     Chief Complaint   Patient presents with    Multiple complaints     Seen by pcp today, had bad fall on Sunday due to catatonic attacks, and had another on pta pt states he can here but not move       History of Present Illness:  James Dc is a 89 y.o. 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    The patient presents to the ED due to fall and transient AMS.  Patient presents with family, who provides majority of history.    Family states patient has history of dementia.  He has been having episodes of catatonia, where his body gets completely still and he becomes unresponsive. Over the weekend, patient had a fall, and family is concerned he had an episode that caused the fall. He was seen in [facility] ED and workup including labs, CT head, C-spine, and max face were obtained.  Patient was discharged and followed up with his PCP today.  During his PCP visit, he was told to come to the ED if he has any recurrent episodes.    Earlier today, family states they went to the store and on their return the patient was unresponsive in the recliner.  He was blankly staring and unresponsive for about 30 minutes.  He was confused afterward.    On arrival to the ER, patient has no acute complaints.  He denies any headache, vision changes, focal weakness/numbness, or any other concerns.  Patient states he can hear and register what is saying to him, but he is unable to move or speak during the episodes.    No history of seizures.  No other complaints or concerns.      Past Medical History:   Diagnosis Date    Essential tremor     Nuclear sclerosis - Both Eyes 9/19/2012     Past Surgical History:   Procedure Laterality Date    TONSILLECTOMY       Family History   Problem Relation Age of Onset    Prostate cancer Father     Cataracts Son     Psoriasis Neg Hx      Eczema Neg Hx     Glaucoma Neg Hx     Amblyopia Neg Hx     Blindness Neg Hx     Macular degeneration Neg Hx     Strabismus Neg Hx     Retinal detachment Neg Hx      Social History     Socioeconomic History    Marital status:    Tobacco Use    Smoking status: Former    Smokeless tobacco: Former   Substance and Sexual Activity    Alcohol use: Yes    Drug use: No    Sexual activity: Yes     Partners: Female     Review of patient's allergies indicates:   Allergen Reactions    Tetracyclines Nausea Only       Review of Systems   Neurological:  Positive for syncope. Negative for seizures.     Physical Exam:     Initial Vitals [06/13/23 1535]   BP Pulse Resp Temp SpO2   (!) 168/77 78 18 98.4 °F (36.9 °C) 98 %      MAP       --         Physical Exam    Nursing note and vitals reviewed.  Constitutional: He appears well-developed and well-nourished. He is not diaphoretic. No distress.   HENT:   Head: Normocephalic. Head is with abrasion, with contusion and with laceration.       Mouth/Throat: Oropharynx is clear and moist.   Healing laceration to R eyebrow.    Eyes: EOM are normal. Pupils are equal, round, and reactive to light.   Neck: No tracheal deviation present.   Cardiovascular:  Normal rate, regular rhythm, normal heart sounds and intact distal pulses.           Pulmonary/Chest: Breath sounds normal. No stridor. No respiratory distress.   Abdominal: Abdomen is soft. He exhibits no distension and no mass. There is no abdominal tenderness.   Musculoskeletal:         General: No edema. Normal range of motion.     Neurological: He is alert and oriented to person, place, and time. No cranial nerve deficit or sensory deficit.   Skin: Skin is warm and dry. Capillary refill takes less than 2 seconds. No rash noted.   Psychiatric: He has a normal mood and affect. His behavior is normal. Thought content normal.       ED Course:   Procedures    Medical Decision Making:    History Acquisition:   Additional historians  utilized:  Family at bedside, see HPI    Prior medical records were reviewed:   IM visit today for recurrent syncope, no further documentation  IM visit 3/9 for f/u  Neuro visit 11/2022 for f/u memory issues and tremor     The patient's list of active medical problems, social history, medications, and allergies as documented has been reviewed.     Differential Diagnoses:   Based on available information and initial assessment, Differential Diagnosis includes, but is not limited to:  Arrhythmia, aortic dissection, MI/unstable angina, PE, cardiogenic shock, CHF, CVA/TIA, intracranial lesion/mass, seizure, perforated viscous, ruptured AAA, orthostatic hypotension, vasovagal episode, anemia, dehydration, medication reaction, intentional overdose        EKG:   EKG interpretation by ED attending physician:  NSR, rate 75, no ST changes, no ischemia, normal intervals.  Compared with prior EKG dated 02/2011, rate has increased, otherwise grossly stable without significant change.      Labs:     Labs Reviewed   CBC WITHOUT DIFFERENTIAL - Abnormal; Notable for the following components:       Result Value    RBC 4.20 (*)     Hemoglobin 13.3 (*)     Hematocrit 39.5 (*)     MCH 31.7 (*)     MPV 8.7 (*)     All other components within normal limits   B-TYPE NATRIURETIC PEPTIDE - Abnormal; Notable for the following components:     (*)     All other components within normal limits   URINALYSIS, REFLEX TO URINE CULTURE - Abnormal; Notable for the following components:    Color, UA Colorless (*)     All other components within normal limits    Narrative:     Specimen Source->Urine   HIV 1 / 2 ANTIBODY    Narrative:     Release to patient->Immediate   HEPATITIS C ANTIBODY    Narrative:     Release to patient->Immediate   COMPREHENSIVE METABOLIC PANEL   TROPONIN I     Independent review of the labs ordered include:   See ED course    Imaging:     Imaging Results              X-Ray Chest AP Portable (In process)                       CT Head Without Contrast (Final result)  Result time 06/13/23 17:42:58      Final result by Matthew Dejesus MD (06/13/23 17:42:58)                   Impression:      Right frontal scalp localized soft tissue swelling/contusion without displaced skull fracture, acute large vascular territory infarct or intracranial hemorrhage identified.    Lower left pontine subtle hypoattenuating focus which may reflect artifact related to streak versus age-indeterminate lacunar type infarct.  Clinical correlation advised.    Senescent change and suspected sequela of chronic microvascular ischemic change, with grossly similar multifocal remote lacunar type infarcts.      Electronically signed by: Matthew Dejesus MD  Date:    06/13/2023  Time:    17:42               Narrative:    EXAMINATION:  CT HEAD WITHOUT CONTRAST    CLINICAL HISTORY:  Head trauma, minor (Age >= 65y);    TECHNIQUE:  Low dose axial CT images obtained throughout the head without intravenous contrast. Sagittal and coronal reconstructions were performed.    COMPARISON:  Report only from head and maxillofacial CT 06/11/2023; MRI brain 09/07/2022    FINDINGS:  Intracranial compartment:    Generalized cerebral volume loss.  The ventricles are midline and stable in size and configuration without distortion by mass effect or acute hydrocephalus, noting cavum septum pellucidum.  No extra-axial blood or fluid collections.    Patchy hypoattenuation of the subcortical and periventricular white matter consistent with chronic microvascular ischemic change.  Suspected remote lacunar type infarcts at the left caudate, left basal ganglia and left cerebellar hemisphere grossly similar to previous MRI.  Subtle area of hypoattenuation within the lower left aspect of the michael in a region of streak artifact.  Skull base atherosclerotic vascular calcifications noted.  No parenchymal mass, hemorrhage, edema or major vascular distribution infarct.    Skull/extracranial contents  (limited evaluation): Localized soft tissue swelling/contusion overlying the right frontal calvarium.  No fracture. Mastoid air cells and paranasal sinuses are essentially clear.  Imaged portions of the orbits are within normal limits.                                         Additional Consideration:   Additional testing considered during clinical course: none    Social determinants of health considered during development of treatment plan include: none    Current co-morbidities considered which impacted clinical decision making: dementia, tremor    Case discussed with additional provider:  service contacted for admission and further management of recurrent syncope and transient catatonia    Medications - No data to display     ED Course as of 06/13/23 1849 Tue Jun 13, 2023 1811 SpO2: 98 % [SS]   1811 Resp: 18 [SS]   1811 Pulse: 78 [SS]   1811 Temp: 98.4 °F (36.9 °C) [SS]   1811 BP(!): 168/77  Vitals reassuring  [SS]   1812 CBC Without Differential(!)  Stable from prior  [SS]   1812 Comprehensive metabolic panel  Unremarkable  [SS]   1812 Troponin I  WNL [SS]   1812 Brain natriuretic peptide(!)  Mildly elevated [SS]   1812 HIV 1/2 Ag/Ab (4th Gen)  Negative  [SS]   1812 Hepatitis C Antibody  Negative  [SS]   1812 Urinalysis, Reflex to Urine Culture Urine, Clean Catch(!)  Negative  [SS]   1831 CT Head Without Contrast  CT head independently interpreted: no intracranial hemorrhage, mass effect, or midline shift.  Agree with radiologist interpretation.    [SS]   1831 X-Ray Chest AP Portable  CXR independently interpreted: no focal infiltrate, effusion, edema, free air, or other acute process  Agree with radiologist interpretation.    [SS]      ED Course User Index  [SS] Dao Meyer MD       Medical Decision Making:   Initial Assessment:   89-year-old male with history of dementia presents to ED for evaluation of transient episodes of unresponsiveness.  Vitals unremarkable, exam benign, no focal neuro deficits.   Will obtain CT head, labs, UA, and anticipate admission for further evaluation of these recurrent episodes.  Independently Interpreted Test(s):   I have ordered and independently interpreted X-rays - see prior notes.  I have ordered and independently interpreted EKG Reading(s) - see prior notes  Clinical Tests:   Lab Tests: Ordered and Reviewed  Radiological Study: Reviewed and Ordered  Medical Tests: Ordered and Reviewed  ED Management:  Workup reassuring. No additional episodes in ED.  Will place in observation for further evaluation and management. May require Neuro consult and possible EEG.     On re-evaluation, the patient's status has remained stable.  At this time, I believe the patient should be admitted to the hospital for further evaluation and management of transient episodes of altered mental status.   service was contacted and the case was discussed.   The consulting physician/team agrees with plan and will admit under their service.   The patient and family were updated with test results, overall impression, and further plan of care. All questions were answered. The patient expressed understanding and agrees with the current plan.                 Clinical Impression:       ICD-10-CM ICD-9-CM   1. Transient alteration of awareness  R40.4 780.02   2. Multiple complaints  R68.89 780.99   3. Syncope  R55 780.2         Follow-up Information    None          ED Disposition Condition    Observation                Dao Meyer MD  06/13/23 0403

## 2023-06-13 NOTE — ED TRIAGE NOTES
Pt. Is a 89 yr old male presenting to the ED after blackout episodes since last Sunday.  PCP advised to come b/c of a blackout unwitnessed episode today.  Pt. C/o of tightness around his head like a crown, and not being able to speak what he is thinking.

## 2023-06-14 ENCOUNTER — DOCUMENTATION ONLY (OUTPATIENT)
Dept: NEUROLOGY | Facility: CLINIC | Age: 88
End: 2023-06-14

## 2023-06-14 PROBLEM — I50.32 CHRONIC DIASTOLIC HEART FAILURE: Status: ACTIVE | Noted: 2023-06-14

## 2023-06-14 PROBLEM — S06.5XAA SUBDURAL HEMATOMA: Status: ACTIVE | Noted: 2023-06-14

## 2023-06-14 PROBLEM — R56.9 OBSERVED SEIZURE-LIKE ACTIVITY: Status: ACTIVE | Noted: 2023-06-13

## 2023-06-14 PROBLEM — R40.20 LOC (LOSS OF CONSCIOUSNESS): Status: ACTIVE | Noted: 2023-06-14

## 2023-06-14 LAB
ALBUMIN SERPL BCP-MCNC: 3.3 G/DL (ref 3.5–5.2)
ALP SERPL-CCNC: 54 U/L (ref 55–135)
ALT SERPL W/O P-5'-P-CCNC: 13 U/L (ref 10–44)
ANION GAP SERPL CALC-SCNC: 8 MMOL/L (ref 8–16)
APTT PPP: 28.5 SEC (ref 21–32)
ASCENDING AORTA: 3.39 CM
AST SERPL-CCNC: 19 U/L (ref 10–40)
AV INDEX (PROSTH): 0.38
AV MEAN GRADIENT: 13 MMHG
AV PEAK GRADIENT: 23 MMHG
AV VALVE AREA: 1.16 CM2
AV VELOCITY RATIO: 0.31
BASOPHILS # BLD AUTO: 0.04 K/UL (ref 0–0.2)
BASOPHILS NFR BLD: 0.7 % (ref 0–1.9)
BILIRUB SERPL-MCNC: 0.8 MG/DL (ref 0.1–1)
BSA FOR ECHO PROCEDURE: 1.49 M2
BUN SERPL-MCNC: 13 MG/DL (ref 8–23)
CALCIUM SERPL-MCNC: 8.8 MG/DL (ref 8.7–10.5)
CHLORIDE SERPL-SCNC: 108 MMOL/L (ref 95–110)
CO2 SERPL-SCNC: 25 MMOL/L (ref 23–29)
CREAT SERPL-MCNC: 0.8 MG/DL (ref 0.5–1.4)
CV ECHO LV RWT: 0.33 CM
DIFFERENTIAL METHOD: ABNORMAL
DOP CALC AO PEAK VEL: 2.41 M/S
DOP CALC AO VTI: 52.73 CM
DOP CALC LVOT AREA: 3.1 CM2
DOP CALC LVOT DIAMETER: 1.98 CM
DOP CALC LVOT PEAK VEL: 0.75 M/S
DOP CALC LVOT STROKE VOLUME: 61.33 CM3
DOP CALCLVOT PEAK VEL VTI: 19.93 CM
E WAVE DECELERATION TIME: 307.74 MSEC
E/A RATIO: 0.45
E/E' RATIO: 7.6 M/S
ECHO LV POSTERIOR WALL: 0.79 CM (ref 0.6–1.1)
EJECTION FRACTION: 60 %
EOSINOPHIL # BLD AUTO: 0.1 K/UL (ref 0–0.5)
EOSINOPHIL NFR BLD: 2.1 % (ref 0–8)
ERYTHROCYTE [DISTWIDTH] IN BLOOD BY AUTOMATED COUNT: 12.4 % (ref 11.5–14.5)
EST. GFR  (NO RACE VARIABLE): >60 ML/MIN/1.73 M^2
FRACTIONAL SHORTENING: 29 % (ref 28–44)
GLUCOSE SERPL-MCNC: 89 MG/DL (ref 70–110)
HCT VFR BLD AUTO: 39.9 % (ref 40–54)
HGB BLD-MCNC: 13.4 G/DL (ref 14–18)
IMM GRANULOCYTES # BLD AUTO: 0.03 K/UL (ref 0–0.04)
IMM GRANULOCYTES NFR BLD AUTO: 0.5 % (ref 0–0.5)
INR PPP: 1 (ref 0.8–1.2)
INTERVENTRICULAR SEPTUM: 0.73 CM (ref 0.6–1.1)
LA MAJOR: 5.38 CM
LA MINOR: 4.34 CM
LA WIDTH: 3.39 CM
LEFT ATRIUM SIZE: 3.02 CM
LEFT ATRIUM VOLUME INDEX MOD: 28.2 ML/M2
LEFT ATRIUM VOLUME INDEX: 28.1 ML/M2
LEFT ATRIUM VOLUME MOD: 42.04 CM3
LEFT ATRIUM VOLUME: 41.81 CM3
LEFT INTERNAL DIMENSION IN SYSTOLE: 3.45 CM (ref 2.1–4)
LEFT VENTRICLE DIASTOLIC VOLUME INDEX: 73.87 ML/M2
LEFT VENTRICLE DIASTOLIC VOLUME: 110.06 ML
LEFT VENTRICLE MASS INDEX: 81 G/M2
LEFT VENTRICLE SYSTOLIC VOLUME INDEX: 32.9 ML/M2
LEFT VENTRICLE SYSTOLIC VOLUME: 49.02 ML
LEFT VENTRICULAR INTERNAL DIMENSION IN DIASTOLE: 4.85 CM (ref 3.5–6)
LEFT VENTRICULAR MASS: 120.73 G
LV LATERAL E/E' RATIO: 6.33 M/S
LV SEPTAL E/E' RATIO: 9.5 M/S
LYMPHOCYTES # BLD AUTO: 1.3 K/UL (ref 1–4.8)
LYMPHOCYTES NFR BLD: 20.4 % (ref 18–48)
MAGNESIUM SERPL-MCNC: 2 MG/DL (ref 1.6–2.6)
MCH RBC QN AUTO: 31.1 PG (ref 27–31)
MCHC RBC AUTO-ENTMCNC: 33.6 G/DL (ref 32–36)
MCV RBC AUTO: 93 FL (ref 82–98)
MONOCYTES # BLD AUTO: 0.5 K/UL (ref 0.3–1)
MONOCYTES NFR BLD: 7.8 % (ref 4–15)
MV PEAK A VEL: 0.85 M/S
MV PEAK E VEL: 0.38 M/S
MV STENOSIS PRESSURE HALF TIME: 89.25 MS
MV VALVE AREA P 1/2 METHOD: 2.46 CM2
NEUTROPHILS # BLD AUTO: 4.2 K/UL (ref 1.8–7.7)
NEUTROPHILS NFR BLD: 68.5 % (ref 38–73)
NRBC BLD-RTO: 0 /100 WBC
PHOSPHATE SERPL-MCNC: 3.1 MG/DL (ref 2.7–4.5)
PLATELET # BLD AUTO: 180 K/UL (ref 150–450)
PMV BLD AUTO: 9.2 FL (ref 9.2–12.9)
POTASSIUM SERPL-SCNC: 3.7 MMOL/L (ref 3.5–5.1)
PROT SERPL-MCNC: 5.9 G/DL (ref 6–8.4)
PROTHROMBIN TIME: 11.1 SEC (ref 9–12.5)
RA PRESSURE: 3 MMHG
RBC # BLD AUTO: 4.31 M/UL (ref 4.6–6.2)
SINUS: 3.65 CM
SODIUM SERPL-SCNC: 141 MMOL/L (ref 136–145)
STJ: 2.68 CM
TDI LATERAL: 0.06 M/S
TDI SEPTAL: 0.04 M/S
TDI: 0.05 M/S
TRICUSPID ANNULAR PLANE SYSTOLIC EXCURSION: 1.21 CM
WBC # BLD AUTO: 6.13 K/UL (ref 3.9–12.7)

## 2023-06-14 PROCEDURE — G0378 HOSPITAL OBSERVATION PER HR: HCPCS

## 2023-06-14 PROCEDURE — 85610 PROTHROMBIN TIME: CPT | Performed by: STUDENT IN AN ORGANIZED HEALTH CARE EDUCATION/TRAINING PROGRAM

## 2023-06-14 PROCEDURE — 36415 COLL VENOUS BLD VENIPUNCTURE: CPT | Performed by: STUDENT IN AN ORGANIZED HEALTH CARE EDUCATION/TRAINING PROGRAM

## 2023-06-14 PROCEDURE — 99233 PR SUBSEQUENT HOSPITAL CARE,LEVL III: ICD-10-PCS | Mod: GC,,, | Performed by: STUDENT IN AN ORGANIZED HEALTH CARE EDUCATION/TRAINING PROGRAM

## 2023-06-14 PROCEDURE — 99215 OFFICE O/P EST HI 40 MIN: CPT | Mod: ,,, | Performed by: PHYSICIAN ASSISTANT

## 2023-06-14 PROCEDURE — 92610 EVALUATE SWALLOWING FUNCTION: CPT

## 2023-06-14 PROCEDURE — 84100 ASSAY OF PHOSPHORUS: CPT

## 2023-06-14 PROCEDURE — 85025 COMPLETE CBC W/AUTO DIFF WBC: CPT

## 2023-06-14 PROCEDURE — 97535 SELF CARE MNGMENT TRAINING: CPT

## 2023-06-14 PROCEDURE — 85730 THROMBOPLASTIN TIME PARTIAL: CPT | Performed by: STUDENT IN AN ORGANIZED HEALTH CARE EDUCATION/TRAINING PROGRAM

## 2023-06-14 PROCEDURE — 25500020 PHARM REV CODE 255: Performed by: STUDENT IN AN ORGANIZED HEALTH CARE EDUCATION/TRAINING PROGRAM

## 2023-06-14 PROCEDURE — 99233 SBSQ HOSP IP/OBS HIGH 50: CPT | Mod: GC,,, | Performed by: STUDENT IN AN ORGANIZED HEALTH CARE EDUCATION/TRAINING PROGRAM

## 2023-06-14 PROCEDURE — 99215 PR OFFICE/OUTPT VISIT, EST, LEVL V, 40-54 MIN: ICD-10-PCS | Mod: ,,, | Performed by: PHYSICIAN ASSISTANT

## 2023-06-14 PROCEDURE — 97165 OT EVAL LOW COMPLEX 30 MIN: CPT

## 2023-06-14 PROCEDURE — 97161 PT EVAL LOW COMPLEX 20 MIN: CPT

## 2023-06-14 PROCEDURE — 99215 OFFICE O/P EST HI 40 MIN: CPT | Mod: GC,,, | Performed by: PSYCHIATRY & NEUROLOGY

## 2023-06-14 PROCEDURE — 83735 ASSAY OF MAGNESIUM: CPT

## 2023-06-14 PROCEDURE — 99215 PR OFFICE/OUTPT VISIT, EST, LEVL V, 40-54 MIN: ICD-10-PCS | Mod: GC,,, | Performed by: PSYCHIATRY & NEUROLOGY

## 2023-06-14 PROCEDURE — 25000003 PHARM REV CODE 250

## 2023-06-14 PROCEDURE — 80053 COMPREHEN METABOLIC PANEL: CPT

## 2023-06-14 RX ORDER — ATORVASTATIN CALCIUM 40 MG/1
40 TABLET, FILM COATED ORAL NIGHTLY
Status: DISCONTINUED | OUTPATIENT
Start: 2023-06-14 | End: 2023-06-16 | Stop reason: HOSPADM

## 2023-06-14 RX ORDER — HYDRALAZINE HYDROCHLORIDE 10 MG/1
10 TABLET, FILM COATED ORAL EVERY 8 HOURS PRN
Status: DISCONTINUED | OUTPATIENT
Start: 2023-06-14 | End: 2023-06-16 | Stop reason: HOSPADM

## 2023-06-14 RX ORDER — LEVETIRACETAM 500 MG/1
500 TABLET ORAL 2 TIMES DAILY
Status: DISCONTINUED | OUTPATIENT
Start: 2023-06-14 | End: 2023-06-16 | Stop reason: HOSPADM

## 2023-06-14 RX ORDER — ASPIRIN 81 MG/1
81 TABLET ORAL DAILY
Status: DISCONTINUED | OUTPATIENT
Start: 2023-06-15 | End: 2023-06-16 | Stop reason: HOSPADM

## 2023-06-14 RX ADMIN — ATORVASTATIN CALCIUM 40 MG: 40 TABLET, FILM COATED ORAL at 09:06

## 2023-06-14 RX ADMIN — LEVETIRACETAM 500 MG: 500 TABLET, FILM COATED ORAL at 09:06

## 2023-06-14 RX ADMIN — IOHEXOL 100 ML: 350 INJECTION, SOLUTION INTRAVENOUS at 02:06

## 2023-06-14 NOTE — PLAN OF CARE
Problem: Adult Inpatient Plan of Care  Goal: Plan of Care Review  Outcome: Ongoing, Progressing  Goal: Patient-Specific Goal (Individualized)  Outcome: Ongoing, Progressing  Goal: Absence of Hospital-Acquired Illness or Injury  Outcome: Ongoing, Progressing  Goal: Optimal Comfort and Wellbeing  Outcome: Ongoing, Progressing  Goal: Readiness for Transition of Care  Outcome: Ongoing, Progressing     Problem: Fall Injury Risk  Goal: Absence of Fall and Fall-Related Injury  Outcome: Ongoing, Progressing     Problem: Bariatric Environmental Safety  Goal: Safety Maintained with Care  Outcome: Ongoing, Progressing     Problem: Impaired Wound Healing  Goal: Optimal Wound Healing  Outcome: Ongoing, Progressing

## 2023-06-14 NOTE — H&P
[facility] - Emergency Dept  [facility]  History & Physical    Patient Name: James Dc  MRN: [id number]  Patient Class: OP- Observation  Admission Date: 6/13/2023  Attending Physician: Sonny Sky MD   Primary Care Provider: JEET Briggs MD         Patient information was obtained from patient, spouse/SO, relative(s) and ER records.     Subjective:     Principal Problem:Loss of consciousness    Chief Complaint:   Chief Complaint   Patient presents with    Multiple complaints     Seen by pcp today, had bad fall on Sunday due to catatonic attacks, and had another on pta pt states he can here but not move        HPI: James Dc is a 89 year old man with history of Alzheimer's dementia and essential tremor presents with recurrent episodes of transient loss of consciousness and decreased responsiveness. Patient brought to the ED by family, who provided majority of history. Per family, the patient has been having episodes of catatonia, where his body becomes completely still and he becomes unresponsive. On 6/11, patient had a fall and family was concerned he had another catatonic episode that caused the fall. He was seen at [facility] ED and workup including labs, CT head, C-spine, and maxillary face were obtained. Workup was ultimately unremarkable and the patient was discharged with follow-up with his PCP today on 6/13. During his PCP visit, he was told to present to the ED if he had any recurrent episodes. Later today, family states that the patient was found unresponsive in his recliner at home. He was blankly staring and unresponsive for about 30 minutes, and was found to be confused afterwards. Patient states he can hear and register what is saying to him, but he is unable to move or speak during the episodes. Patient denies any headache, vision changes, focal weakness/numbness, or chest pain.    On arrival to the ER, patient was afebrile with stable vital signs. Labs were largely unremarkable. CT  head showed no acute intracranial findings. EKG without acute ischemic changes. Urinalysis without signs of infection. Patient admitted to hospital medicine for further observation of catatonic episodes with decreased responsiveness.      Past Medical History:   Diagnosis Date    Essential tremor     Nuclear sclerosis - Both Eyes 9/19/2012       Past Surgical History:   Procedure Laterality Date    TONSILLECTOMY         Review of patient's allergies indicates:   Allergen Reactions    Tetracyclines Nausea Only       No current facility-administered medications on file prior to encounter.     Current Outpatient Medications on File Prior to Encounter   Medication Sig    MULTIVITAMIN ORAL Take by mouth.    mupirocin (BACTROBAN) 2 % ointment Apply topically 2 (two) times daily. (Patient not taking: Reported on 6/13/2023)    [DISCONTINUED] propranoloL (INDERAL) 10 MG tablet Take 2 tablets (20 mg total) by mouth 3 (three) times daily. (Patient not taking: Reported on 6/13/2023)     Family History       Problem Relation (Age of Onset)    Cataracts Son    Prostate cancer Father          Tobacco Use    Smoking status: Former    Smokeless tobacco: Former   Substance and Sexual Activity    Alcohol use: Yes    Drug use: No    Sexual activity: Yes     Partners: Female     Review of Systems   Constitutional:  Negative for chills and fever.   HENT:  Negative for congestion and sore throat.    Eyes:  Negative for photophobia and visual disturbance.   Respiratory:  Negative for cough and shortness of breath.    Cardiovascular:  Negative for chest pain and palpitations.   Gastrointestinal:  Negative for abdominal pain.   Genitourinary:  Negative for dysuria and hematuria.   Musculoskeletal:  Negative for arthralgias and back pain.   Skin:  Negative for rash and wound.   Neurological:  Negative for dizziness and syncope.   Psychiatric/Behavioral:  Negative for agitation and behavioral problems.    Objective:     Vital Signs  (Most Recent):  Temp: 98.4 °F (36.9 °C) (06/13/23 1700)  Pulse: 80 (06/13/23 1700)  Resp: 20 (06/13/23 1700)  BP: (!) 190/88 (06/13/23 1700)  SpO2: (!) 94 % (06/13/23 1700) Vital Signs (24h Range):  Temp:  [98.4 °F (36.9 °C)] 98.4 °F (36.9 °C)  Pulse:  [73-80] 80  Resp:  [18-20] 20  SpO2:  [94 %-98 %] 94 %  BP: (122-190)/(68-88) 190/88     Weight: 50.8 kg (112 lb)  Body mass index is 20.49 kg/m².     Physical Exam  Vitals reviewed.   Constitutional:       General: He is not in acute distress.     Appearance: Normal appearance.   HENT:      Head: Normocephalic and atraumatic.      Nose: No congestion or rhinorrhea.      Mouth/Throat:      Mouth: Mucous membranes are moist.      Pharynx: Oropharynx is clear.   Cardiovascular:      Rate and Rhythm: Normal rate and regular rhythm.      Pulses: Normal pulses.      Heart sounds: Normal heart sounds.   Pulmonary:      Effort: Pulmonary effort is normal. No respiratory distress.      Breath sounds: Normal breath sounds.   Abdominal:      General: Bowel sounds are normal.      Palpations: Abdomen is soft.      Tenderness: There is no abdominal tenderness.   Musculoskeletal:         General: No swelling or tenderness.      Cervical back: Full passive range of motion without pain and normal range of motion.   Skin:     General: Skin is warm and dry.   Neurological:      General: No focal deficit present.      Mental Status: He is alert and oriented to person, place, and time.   Psychiatric:         Mood and Affect: Mood normal.         Behavior: Behavior normal.              Significant Labs: All pertinent labs within the past 24 hours have been reviewed.    Significant Imaging: I have reviewed all pertinent imaging results/findings within the past 24 hours.    Assessment/Plan:     * Loss of consciousness  89M w/Alzheimer's dementia and mild AS who presents with recurrent episodes of loss of consciousness with decreased responsiveness. These episodes have been unwitnessed,  "however pt's family reports that they have seen him after the events and the pt has been altered briefly. Two days ago, pt's wife heard a loud bang and saw the patient with his head on the table and was initially unresponsive, and when regaining consciousness, was confused. On today's episode pt was found staring up at the ceiling and was unresponsive and displaying rigid, stiff muscles; when pt finally came to, they report that he was confused and had speech difficulties afterwards which lasted for several minutes. Patient does not recall any of the events thus far and cannot remember any preceding aura or event before the episodes. Pt reports some recent progressively worsening physical function and memory difficulties. No history of seizures reported in the past.    Workup thus far largely unremarkable. Labs grossly normal and CT head imaging without acute findings intracranially. Neuro exam is unrevealing. Pt with elevated blood pressures in the ED.    Plans:  - 24 hr EEG ordered  - Neuro consult in the morning for further guidance  - PT/OT consulted for physical debility  - F/u UDS and TSH for other possible causes for AMS  - Neurochecks q4h  - Continue to monitor overnight    Hypertensive urgency  Patient with normal blood pressures in the clinic and on arrival, however soon developed significant hypertension while in the ED. Not on any antihypertensives at home, and has not had blood pressure issues. Maybe contributing to current neurological symptoms via PRES vs HTN emergency.     - Hydralazine 25 PRN for SBP > 180   - Consider MRI if pressures continue to remain elevated    Physical deconditioning  Pt with recently worsening physical function reported along with some history concerning for aspiration. Likely 2/2 to progressive neurocognitive decline and sarcopenia.    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 May be contributory to pt's presentation. Not on any medications. Follows with neurology outpatient.    Aortic valve stenosis  Chronic, longstanding condition. Was noted as mild by previous PCP. Soft systolic murmur appreciated on exam. May be contributory and causing syncopal episodes.    - TTE to evaluate degree of AS    VTE Risk Mitigation (From admission, onward)         Ordered     enoxaparin injection 40 mg  Daily         06/13/23 1912     IP VTE HIGH RISK PATIENT  Once         06/13/23 1912                   On 06/13/2023, patient should be placed in hospital observation services under my care in collaboration with Dr. Sky.    Mikhail Still MD  Department of Hospital Medicine  [facility] - Emergency Dept  "

## 2023-06-14 NOTE — ASSESSMENT & PLAN NOTE
"See "loss of consciousness". May be contributory to pt's presentation. Not on any medications. Follows with neurology outpatient.  "

## 2023-06-14 NOTE — SUBJECTIVE & OBJECTIVE
Past Medical History:   Diagnosis Date    Essential tremor     Nuclear sclerosis - Both Eyes 9/19/2012       Past Surgical History:   Procedure Laterality Date    TONSILLECTOMY         Review of patient's allergies indicates:   Allergen Reactions    Tetracyclines Nausea Only       No current facility-administered medications on file prior to encounter.     Current Outpatient Medications on File Prior to Encounter   Medication Sig    MULTIVITAMIN ORAL Take by mouth.    mupirocin (BACTROBAN) 2 % ointment Apply topically 2 (two) times daily. (Patient not taking: Reported on 6/13/2023)    [DISCONTINUED] propranoloL (INDERAL) 10 MG tablet Take 2 tablets (20 mg total) by mouth 3 (three) times daily. (Patient not taking: Reported on 6/13/2023)     Family History       Problem Relation (Age of Onset)    Cataracts Son    Prostate cancer Father          Tobacco Use    Smoking status: Former    Smokeless tobacco: Former   Substance and Sexual Activity    Alcohol use: Yes    Drug use: No    Sexual activity: Yes     Partners: Female     Review of Systems   Constitutional:  Negative for chills and fever.   HENT:  Negative for congestion and sore throat.    Eyes:  Negative for photophobia and visual disturbance.   Respiratory:  Negative for cough and shortness of breath.    Cardiovascular:  Negative for chest pain and palpitations.   Gastrointestinal:  Negative for abdominal pain.   Genitourinary:  Negative for dysuria and hematuria.   Musculoskeletal:  Negative for arthralgias and back pain.   Skin:  Negative for rash and wound.   Neurological:  Negative for dizziness and syncope.   Psychiatric/Behavioral:  Negative for agitation and behavioral problems.    Objective:     Vital Signs (Most Recent):  Temp: 98.4 °F (36.9 °C) (06/13/23 1700)  Pulse: 80 (06/13/23 1700)  Resp: 20 (06/13/23 1700)  BP: (!) 190/88 (06/13/23 1700)  SpO2: (!) 94 % (06/13/23 1700) Vital Signs (24h Range):  Temp:  [98.4 °F (36.9 °C)] 98.4 °F (36.9  °C)  Pulse:  [73-80] 80  Resp:  [18-20] 20  SpO2:  [94 %-98 %] 94 %  BP: (122-190)/(68-88) 190/88     Weight: 50.8 kg (112 lb)  Body mass index is 20.49 kg/m².     Physical Exam  Vitals reviewed.   Constitutional:       General: He is not in acute distress.     Appearance: Normal appearance.   HENT:      Head: Normocephalic and atraumatic.      Nose: No congestion or rhinorrhea.      Mouth/Throat:      Mouth: Mucous membranes are moist.      Pharynx: Oropharynx is clear.   Cardiovascular:      Rate and Rhythm: Normal rate and regular rhythm.      Pulses: Normal pulses.      Heart sounds: Normal heart sounds.   Pulmonary:      Effort: Pulmonary effort is normal. No respiratory distress.      Breath sounds: Normal breath sounds.   Abdominal:      General: Bowel sounds are normal.      Palpations: Abdomen is soft.      Tenderness: There is no abdominal tenderness.   Musculoskeletal:         General: No swelling or tenderness.      Cervical back: Full passive range of motion without pain and normal range of motion.   Skin:     General: Skin is warm and dry.   Neurological:      General: No focal deficit present.      Mental Status: He is alert and oriented to person, place, and time.   Psychiatric:         Mood and Affect: Mood normal.         Behavior: Behavior normal.              Significant Labs: All pertinent labs within the past 24 hours have been reviewed.    Significant Imaging: I have reviewed all pertinent imaging results/findings within the past 24 hours.

## 2023-06-14 NOTE — ASSESSMENT & PLAN NOTE
Seen on MRI with no interval change on CT head 6/14. Secondary to recent unwitnessed fall on 6/11 with head trauma.    - Neurochecks q4h to monitor for any acute neuro deficits  - NSGY consulted, no acute intervention recommended

## 2023-06-14 NOTE — ASSESSMENT & PLAN NOTE
Patient with normal blood pressures in the clinic and on arrival, however soon developed significant hypertension while in the ED. Not on any antihypertensives at home, and has not had blood pressure issues. Maybe contributing to current neurological symptoms via PRES vs HTN emergency.     - Hydralazine 25 PRN for SBP > 180   - Consider MRI if pressures continue to remain elevated

## 2023-06-14 NOTE — NURSING
Nurses Note -- 4 Eyes      6/14/2023   1:49 AM      Skin assessed during: Admit      [] No Altered Skin Integrity Present    []Prevention Measures Documented      [x] Yes- Altered Skin Integrity Present or Discovered   [] LDA Added if Not in Epic (Describe Wound)   [x] New Altered Skin Integrity was Present on Admit and Documented in LDA   [] Wound Image Taken    Wound Care Consulted? Yes    Attending Nurse:  Paulette Vega RN     Second RN/Staff Member:  Mark Allen RN

## 2023-06-14 NOTE — HPI
Family states patient has history of dementia.  He has been having episodes of catatonia, where his body gets completely still and he becomes unresponsive. Over the weekend, patient had a fall, and family is concerned he had an episode that caused the fall. He was seen in [facility] ED and workup including labs, CT head, C-spine, and max face were obtained.  Patient was discharged and followed up with his PCP today.  During his PCP visit, he was told to come to the ED if he has any recurrent episodes.     Earlier today, family states they went to the store and on their return the patient was unresponsive in the recliner.  He was blankly staring and unresponsive for about 30 minutes.  He was confused afterward.     On arrival to the ER, patient has no acute complaints.  He denies any headache, vision changes, focal weakness/numbness, or any other concerns.  Patient states he can hear and register what is saying to him, but he is unable to move or speak during the episodes.    W/U in ED unremarkable. Patient admit to Hospital Medicine.    MRI brain w & w/o contrast complete and reveals multiple very small punctate infarcts in left cerebral hemisphere and new L SDH.     Patient with no focal neuro deficit.     Mri findings are not causing any symptoms but an incidental finding  Agree with 24 hr EEG

## 2023-06-14 NOTE — PHARMACY MED REC
"Admission Medication History     The home medication history was taken by Jovita Stevens.    You may go to "Admission" then "Reconcile Home Medications" tabs to review and/or act upon these items.     The home medication list has been updated by the Pharmacy department.   Please read ALL comments highlighted in yellow.   Please address this information as you see fit.    Feel free to contact us if you have any questions or require assistance.      The medications listed below were removed from the home medication list. Please reorder if appropriate:  Patient reports no longer taking the following medication(s):  MUPIROCIN 2 % OINTMENT  PROPRANOLOL 10 MG TABLET    Current Outpatient Medications on File Prior to Encounter   Medication Sig    MULTIVITAMIN ORAL Take 1 tablet by mouth once daily.       Jovita Stevens  EXT [id number]                  .        "

## 2023-06-14 NOTE — PLAN OF CARE
Client is alert and oriented to person place and time. EEG running for 24 hour test. Vitals stable. Patient denies any pain. Patient is going to CT scan at 6am. No other significant changes noted.    Problem: Adult Inpatient Plan of Care  Goal: Plan of Care Review  Outcome: Ongoing, Progressing  Goal: Patient-Specific Goal (Individualized)  Outcome: Ongoing, Progressing  Goal: Absence of Hospital-Acquired Illness or Injury  Outcome: Ongoing, Progressing  Goal: Optimal Comfort and Wellbeing  Outcome: Ongoing, Progressing  Goal: Readiness for Transition of Care  Outcome: Ongoing, Progressing     Problem: Fall Injury Risk  Goal: Absence of Fall and Fall-Related Injury  Outcome: Ongoing, Progressing     Problem: Bariatric Environmental Safety  Goal: Safety Maintained with Care  Outcome: Ongoing, Progressing     Problem: Impaired Wound Healing  Goal: Optimal Wound Healing  Outcome: Ongoing, Progressing

## 2023-06-14 NOTE — PLAN OF CARE
"SW met with patient at bedside. Patient lives with his wife in  their home. Patient does have a need for any medications before hospitalization. Patient stated that he had a fall on Sunday and he thinks he passed out.     FRANKY Kendall, MSW-[facility] [place]  Medical Social Worker/  ER Department       [facility] ([facility]  Initial Discharge Assessment       Primary Care Provider: JEET Briggs MD    Admission Diagnosis: Transient alteration of awareness [R40.4]  Aortic stenosis [I35.0]  Syncope [R55]  Multiple complaints [R68.89]  Chest pain [R07.9]    Admission Date: 6/13/2023  Expected Discharge Date:     Transition of Care Barriers: (P) None    Payor: [employer] Protective MEDICARE / Plan: [employer] MEDICARE HMO / Product Type: Capitation /     Extended Emergency Contact Information  Primary Emergency Contact: Brisa Dc  Address: [address] [place] [address] [place]           [place] [id number] [facility]  Home Phone: [phone]  Relation: Spouse    Discharge Plan A: (P) Home with family, Home         [id number] - [place] - [address]  [address]  [place] [address]  Phone: [phone] Fax: [phone]      Initial Assessment (most recent)       Adult Discharge Assessment - 06/14/23 0332          Discharge Assessment    Assessment Type Discharge Planning Assessment (P)      Confirmed/corrected address, phone number and insurance Yes (P)      Confirmed Demographics Correct on Facesheet (P)      Source of Information patient (P)      When was your last doctors appointment? -- (P)    "A few Months ago"    Does patient/caregiver understand observation status Yes (P)      Communicated BECKY with patient/caregiver Yes (P)      Reason For Admission "I think I passed out when I fell" (P)      People in Home spouse (P)      Facility Arrived From: Home (P)      Do you expect to return to your current living situation? Yes (P)      Do you have help at home or someone to help " you manage your care at home? Yes (P)      Who are your caregiver(s) and their phone number(s)? Wife (P)      Prior to hospitilization cognitive status: Alert/Oriented (P)      Current cognitive status: Alert/Oriented (P)      Home Accessibility wheelchair accessible (P)      Home Layout Able to live on 1st floor (P)      Equipment Currently Used at Home none (P)      Readmission within 30 days? No (P)      Patient currently being followed by outpatient case management? No (P)      Do you currently have service(s) that help you manage your care at home? No (P)      Do you take prescription medications? No (P)      Do you have prescription coverage? Yes (P)      Coverage [employer] Managed Medicare (P)      Do you have any problems affording any of your prescribed medications? No (P)      Is the patient taking medications as prescribed? yes (P)      Who is going to help you get home at discharge? Family (P)      How do you get to doctors appointments? car, drives self;family or friend will provide (P)      Are you on dialysis? No (P)      Do you take coumadin? No (P)      Discharge Plan A Home with family;Home (P)      DME Needed Upon Discharge  none (P)      Discharge Plan discussed with: Spouse/sig other;Patient (P)      Name(s) and Number(s) Brisa (P)      Transition of Care Barriers None (P)         Physical Activity    On average, how many days per week do you engage in moderate to strenuous exercise (like a brisk walk)? 3 days (P)      On average, how many minutes do you engage in exercise at this level? 30 min (P)         Financial Resource Strain    How hard is it for you to pay for the very basics like food, housing, medical care, and heating? Not hard at all (P)         Housing Stability    In the last 12 months, was there a time when you were not able to pay the mortgage or rent on time? No (P)      In the last 12 months, how many places have you lived? 1 (P)      In the last 12 months, was there a time when  you did not have a steady place to sleep or slept in a shelter (including now)? No (P)         Transportation Needs    In the past 12 months, has lack of transportation kept you from medical appointments or from getting medications? No (P)      In the past 12 months, has lack of transportation kept you from meetings, work, or from getting things needed for daily living? No (P)         Food Insecurity    Within the past 12 months, you worried that your food would run out before you got the money to buy more. Never true (P)      Within the past 12 months, the food you bought just didn't last and you didn't have money to get more. Never true (P)         Stress    Do you feel stress - tense, restless, nervous, or anxious, or unable to sleep at night because your mind is troubled all the time - these days? Not at all (P)         Social Connections    In a typical week, how many times do you talk on the phone with family, friends, or neighbors? More than three times a week (P)      How often do you get together with friends or relatives? Once a week (P)      How often do you attend Samaritan or Judaism services? Never (P)      Do you belong to any clubs or organizations such as [employer]aritan groups, unions, fraternal or athletic groups, or school groups? No (P)      How often do you attend meetings of the clubs or organizations you belong to? Never (P)      Are you , , , , never , or living with a partner?  (P)         Alcohol Use    Q1: How often do you have a drink containing alcohol? 2-3 times a week (P)      Q2: How many drinks containing alcohol do you have on a typical day when you are drinking? 1 or 2 (P)      Q3: How often do you have six or more drinks on one occasion? Never (P)         OTHER    Name(s) of People in Home Wife Brisa (P)

## 2023-06-14 NOTE — ASSESSMENT & PLAN NOTE
89M w/ PMH HTN, multiple CVAs & Alzheimer's dementia who presents to [facility] for recurrent episodes of LOC/AMS found to have incidental L temporal SDH:    --Patient admitted to  floor on telemetry      -q4h neurochecks on floor  --All labs and diagnostics reviewed      -CTH 6/11 (OSH): No acute path read      -CT Csp 6/13 (OSH): No acute fractures read      -MRICTH 6/13: Thin L temporal SDH, mulitple small territory CVAs  --Follow-up CTH and 6h scan for stability  --Hold anti-plt/coag medications  --SBP <160 (cardene ggt; hydralazine & labetalol PRN; transition to home meds when appropriate)  --Na >135  --AEDs per primary team  --HOB >30  --Recommend neurology & vascular neurology work-up & eval per primary team  --Continued syncopal work-up per primary team  --Follow-up coags (ordered)  --Patient does not require operative intervention at this time   -Anticipate outpatient clinic follow-up  --Continue to monitor clinically, notify NSGY immediately with any changes in neuro status    Dispo: Per  team

## 2023-06-14 NOTE — ASSESSMENT & PLAN NOTE
89M w/Alzheimer's dementia and mild AS who presents with recurrent episodes of loss of consciousness with decreased responsiveness. These episodes have been unwitnessed, however pt's family reports that they have seen him after the events and the pt has been altered briefly. Two days ago, pt's wife heard a loud bang and saw the patient with his head on the table and was initially unresponsive, and when regaining consciousness, was confused. On today's episode pt was found staring up at the ceiling and was unresponsive and displaying rigid, stiff muscles; when pt finally came to, they report that he was confused and had speech difficulties afterwards which lasted for several minutes. Patient does not recall any of the events thus far and cannot remember any preceding aura or event before the episodes. Pt reports some recent progressively worsening physical function and memory difficulties. No history of seizures reported in the past.    Workup thus far largely unremarkable. Labs grossly normal and CT head imaging without acute findings intracranially. Neuro exam is unrevealing. Pt with elevated blood pressures in the ED.    Plans:  - 24 hr EEG ordered  - Neuro consult in the morning for further guidance  - PT/OT consulted for physical debility  - F/u UDS and TSH for other possible causes for AMS  - Neurochecks q4h  - Continue to monitor overnight

## 2023-06-14 NOTE — ASSESSMENT & PLAN NOTE
Chronic, longstanding condition. Was noted as mild by previous PCP. Soft systolic murmur appreciated on exam. May be contributory and causing syncopal episodes.    - TTE to evaluate degree of AS

## 2023-06-14 NOTE — ASSESSMENT & PLAN NOTE
89 y.o. male with dementia, tremors presents after fall with head trauma 6/11 and subsequent discrete episodes (x3) of unresponsiveness of prolonged duration since 6/11. Some increase in bp over the past few weeks with episodes of babbling/nonsensical speech and sensation of tight baseball cap, but no previous LOC episodes or seizures. Events described as sudden unresponsiveness where patient can hear, but unable to verbalize or follow commands. Eyes open with glazed look, but no gaze preference, convulsions, incontinence or tongue biting. Confusion afterwards. Of note, bp was elevated on admit (198/86), found to have punctate infarcts and small L temporal lobe SDH, felt to be traumatic. Episodes not clearly epileptic given description and prolonged duration, but concerning they began following head trauma. May be provoked in setting of hypertensive emergency. Cap EEG overnight unremarkable besides slowing.     Recommendations:  --start Keppra 500 mg BID  Seizure precautions with prn ativan episodes >5 min  --consider EEG 6/15, alternatively can obtain as outpatient if episodes reoccur despite Keppra and blood pressure control   --Vascular Neuro/Vasc surgery consulted given imaging findings   --continue blood pressure control per primary team

## 2023-06-14 NOTE — HPI
89M w/ PMH HTN, multiple CVAs & Alzheimer's dementia who presents to [facility] for recurrent episodes of LOC/AMS found to have incidental L temporal SDH. Patient is poor historian and unable to provide HPI/ROS. Per records, the patient has had a several day history of multiple episodes of LOC/AMS with a fall on 6/11 where he hit his head for which he was taken to the ED. At OSH ED, CTH and CT Csp were unremarkable, however further work-up with MRI showed small L temporal SDH without mass effect or MLS. ROS unable to be completed. Patient is not on anti-plt/coag medications per medical records.

## 2023-06-14 NOTE — ASSESSMENT & PLAN NOTE
Chronic, longstanding condition. Was noted as mild by previous PCP. Soft systolic murmur appreciated on exam. May be contributory and causing syncopal episodes.    - TTE to evaluate degree of AS, pending

## 2023-06-14 NOTE — CONSULTS
[facility] - Emergency Dept  Vascular Neurology  [facility]  Consult Note    Inpatient consult to Vascular (Stroke) Neurology  Consult performed by: Maria E Maloney NP  Consult ordered by: Tim Rodney DO        Assessment/Plan:     Patient is a 89 y.o. year old male with:    * Loss of consciousness  Could be related to possible seizures  EEG  Consult to Gen neurology    Cerebrovascular accident (CVA) due to stenosis of left middle cerebral artery  89 y/o male with multiple small punctate infarcts left cerebral hemisphere not causing any neuro deficits.     Antithrombotics: ASA 81 mg daily    Statins: Lipitor 40 mg daily    Aggressive risk factor modification: HTN     Rehab efforts: The patient has been evaluated by a stroke team provider and the therapy needs have been fully considered based off the presenting complaints and exam findings. The following therapy evaluations are needed: PT evaluate and treat, OT evaluate and treat    Diagnostics ordered/pending: Carotid ultrasound to assess vasculature, Other: EEG    VTE prophylaxis: Heparin 5000 units SQ every 8 hours  Mechanical prophylaxis: Place SCDs    BP parameters: Infarct: Normotensive        Recurrent falls  Probable cause of small SDH  Therapy        STROKE DOCUMENTATION          NIH Scale:  1a. Level of Consciousness: 0-->Alert, keenly responsive  1b. LOC Questions: 0-->Answers both questions correctly  1c. LOC Commands: 0-->Performs both tasks correctly  2. Best Gaze: 0-->Normal  3. Visual: 0-->No visual loss  4. Facial Palsy: 0-->Normal symmetrical movements  5a. Motor Arm, Left: 0-->No drift, limb holds 90 (or 45) degrees for full 10 secs  5b. Motor Arm, Right: 0-->No drift, limb holds 90 (or 45) degrees for full 10 secs  6a. Motor Leg, Left: 0-->No drift, leg holds 30 degree position for full 5 secs  6b. Motor Leg, Right: 0-->No drift, leg holds 30 degree position for full 5 secs  7. Limb Ataxia: 0-->Absent  8. Sensory: 0-->Normal, no  sensory loss  9. Best Language: 0-->No aphasia, normal  10. Dysarthria: 0-->Normal  11. Extinction and Inattention (formerly Neglect): 0-->No abnormality  Total (NIH Stroke Scale): 0    Modified Burleson Score: 0  Prabhjot Coma Scale:15   ABCD2 Score:    [id number] Score:   HAS -BLED Score:   ICH Score:   Hunt & Fierro Classification:       Thrombolysis Candidate? No, Patient back to neurological baseline     Delays to Thrombolysis?  Not Applicable    Interventional Revascularization Candidate?   Is the patient eligible for mechanical endovascular reperfusion (JASON)?  No; no significant neurologic deficit (NIHSS <6)  and No; at this time symptoms not suggestive of large vessel occlusion    Delays to Thrombectomy? Not Applicable    Hemorrhagic change of an Ischemic Stroke: Does this patient have an ischemic stroke with hemorrhagic changes? No     Subjective:     History of Present Illness:  Family states patient has history of dementia.  He has been having episodes of catatonia, where his body gets completely still and he becomes unresponsive. Over the weekend, patient had a fall, and family is concerned he had an episode that caused the fall. He was seen in [facility] ED and workup including labs, CT head, C-spine, and max face were obtained.  Patient was discharged and followed up with his PCP today.  During his PCP visit, he was told to come to the ED if he has any recurrent episodes.     Earlier today, family states they went to the store and on their return the patient was unresponsive in the recliner.  He was blankly staring and unresponsive for about 30 minutes.  He was confused afterward.     On arrival to the ER, patient has no acute complaints.  He denies any headache, vision changes, focal weakness/numbness, or any other concerns.  Patient states he can hear and register what is saying to him, but he is unable to move or speak during the episodes.    W/U in ED unremarkable. Patient admit to Hospital Medicine.    MRI  brain w & w/o contrast complete and reveals multiple very small punctate infarcts in left cerebral hemisphere and new L SDH.     Patient with no focal neuro deficit.     Mri findings are not causing any symptoms but an incidental finding  Agree with 24 hr EEG          Past Medical History:   Diagnosis Date    Essential tremor     Hypertensive urgency 6/13/2023    Nuclear sclerosis - Both Eyes 9/19/2012     Past Surgical History:   Procedure Laterality Date    TONSILLECTOMY       Family History   Problem Relation Age of Onset    Prostate cancer Father     Cataracts Son     Psoriasis Neg Hx     Eczema Neg Hx     Glaucoma Neg Hx     Amblyopia Neg Hx     Blindness Neg Hx     Macular degeneration Neg Hx     Strabismus Neg Hx     Retinal detachment Neg Hx      Social History     Tobacco Use    Smoking status: Former    Smokeless tobacco: Former   Substance Use Topics    Alcohol use: Yes    Drug use: No     Review of patient's allergies indicates:   Allergen Reactions    Tetracyclines Nausea Only       Medications: I have reviewed the current medication administration record.    (Not in a hospital admission)      Review of Systems   Constitutional:  Negative for chills and fever.   HENT:  Negative for ear discharge and ear pain.    Eyes:  Negative for pain and itching.   Respiratory:  Negative for cough and shortness of breath.    Cardiovascular:  Negative for chest pain and leg swelling.   Gastrointestinal:  Negative for abdominal distention and abdominal pain.   Endocrine: Positive for cold intolerance and heat intolerance.   Genitourinary:  Negative for dysuria and enuresis.   Musculoskeletal:  Positive for arthralgias. Negative for back pain.   Skin:  Negative for rash and wound.   Allergic/Immunologic: Negative for environmental allergies and food allergies.   Neurological:         Starring, catatonia   Psychiatric/Behavioral:  Negative for agitation and confusion.    Objective:     Vital Signs  (Most Recent):  Temp: 98.3 °F (36.8 °C) (06/13/23 2218)  Pulse: 67 (06/13/23 2318)  Resp: 15 (06/13/23 2318)  BP: (!) 158/57 (06/13/23 2317)  SpO2: 97 % (06/13/23 2318)    Vital Signs Range (Last 24H):  Temp:  [98.3 °F (36.8 °C)-98.4 °F (36.9 °C)]   Pulse:  [65-80]   Resp:  [15-20]   BP: (122-198)/(57-88)   SpO2:  [94 %-98 %]        Physical Exam  Vitals and nursing note reviewed.   Constitutional:       Appearance: Normal appearance. He is normal weight.   HENT:      Head: Normocephalic and atraumatic.   Eyes:      Extraocular Movements: Extraocular movements intact.      Conjunctiva/sclera: Conjunctivae normal.      Pupils: Pupils are equal, round, and reactive to light.   Cardiovascular:      Rate and Rhythm: Normal rate and regular rhythm.   Pulmonary:      Effort: Pulmonary effort is normal.      Breath sounds: Normal breath sounds.   Abdominal:      General: Abdomen is flat. Bowel sounds are normal.      Palpations: Abdomen is soft.   Musculoskeletal:         General: Normal range of motion.      Cervical back: Normal range of motion.   Skin:     General: Skin is warm and dry.   Neurological:      General: No focal deficit present.      Mental Status: He is alert and oriented to person, place, and time. Mental status is at baseline.            Neurological Exam:   LOC: alert  Attention Span: Good   Language: No aphasia  Articulation: No dysarthria  Orientation: Person, Place, Time   Visual Fields: Full  EOM (CN III, IV, VI): Full/intact  Pupils (CN II, III): PERRL  Facial Sensation (CN V): Normal  Facial Movement (CN VII): Symmetric facial expression    Gag Reflex: present  Reflexes: 2+ throughout  Motor: Arm left  Normal 5/5  Leg left  Normal 5/5  Arm right  Normal 5/5  Leg right Normal 5/5  Cerebellum: No evidence of appendicular or axial ataxia  Sensation: Intact to light touch, temperature and vibration  Tone: Normal tone throughout      Laboratory:  CMP:   Recent Labs   Lab 06/13/23  1629   CALCIUM 9.1    ALBUMIN 3.7   PROT 6.6      K 4.0   CO2 25      BUN 17   CREATININE 0.9   ALKPHOS 59   ALT 15   AST 23   BILITOT 0.5     CBC:   Recent Labs   Lab 06/13/23  1629   WBC 6.41   RBC 4.20*   HGB 13.3*   HCT 39.5*      MCV 94   MCH 31.7*   MCHC 33.7     Lipid Panel: No results for input(s): CHOL, LDLCALC, HDL, TRIG in the last 168 hours.  Coagulation: No results for input(s): PT, INR, APTT in the last 168 hours.  Hgb A1C: No results for input(s): HGBA1C in the last 168 hours.  TSH:   Recent Labs   Lab 06/13/23  1629   TSH 1.331       Diagnostic Results:      Brain imaging:  CT head w/o contrast 6-13-23 results:  Right frontal scalp localized soft tissue swelling/contusion without displaced skull fracture, acute large vascular territory infarct or intracranial hemorrhage identified.     Lower left pontine subtle hypoattenuating focus which may reflect artifact related to streak versus age-indeterminate lacunar type infarct.  Clinical correlation advised.     Senescent change and suspected sequela of chronic microvascular ischemic change, with grossly similar multifocal remote lacunar type infarcts.      MRI Brain w w/o contrast 6-13-23 results:    New multifocal punctate infarcts throughout the left cerebral hemisphere.     New small left subdural hemorrhage along the left temporal lobe.    Vessel Imaging:      Cardiac Evaluation:   EKG 6-13-23 results:  Normal sinus rhythm Left axis deviation Anterior infarct (cited on or before 02-FEB-2011) Abnormal ECG When compared with ECG of 02-FEB-2011 12:39, No significant change was found        Maria E Maloney NP  [facility]  Department of Vascular Neurology   [facility] Dept

## 2023-06-14 NOTE — PROGRESS NOTES
[facility] - Int[facility] Care ([place] [address])  [facility]  Progress Note    Patient Name: James Dc  MRN: [id number]  Patient Class: OP- Observation   Admission Date: 6/13/2023  Length of Stay: 0 days  Attending Physician: Sonny Sky MD  Primary Care Provider: JEET Briggs MD        Subjective:     Principal Problem:Observed seizure-like activity        HPI:  James Dc is a 89 year old man with history of Alzheimer's dementia and essential tremor presents with recurrent episodes of transient loss of consciousness and decreased responsiveness. Patient brought to the ED by family, who provided majority of history. Per family, the patient has been having episodes of catatonia, where his body becomes completely still and he becomes unresponsive. On 6/11, patient had a fall and family was concerned he had another catatonic episode that caused the fall. He was seen at [facility] ED and workup including labs, CT head, C-spine, and maxillary face were obtained. Workup was ultimately unremarkable and the patient was discharged with follow-up with his PCP today on 6/13. During his PCP visit, he was told to present to the ED if he had any recurrent episodes. Later today, family states that the patient was found unresponsive in his recliner at home. He was blankly staring and unresponsive for about 30 minutes, and was found to be confused afterwards. Patient states he can hear and register what is saying to him, but he is unable to move or speak during the episodes. Patient denies any headache, vision changes, focal weakness/numbness, or chest pain.    On arrival to the ER, patient was afebrile with stable vital signs. Labs were largely unremarkable. CT head showed no acute intracranial findings. EKG without acute ischemic changes. Urinalysis without signs of infection. Patient admitted to hospital medicine for further observation of catatonic episodes with decreased responsiveness.      Overview/Hospital  Course:  While in the ED, patient developed significant hypertension, up to  and concerning for possible PRES. MRI brain was done which showed multiple new punctate infarcts throughout the left cerebral hemisphere. There is also a small subdural hematoma along the left temporal lobe that was not visualized on prior CT most likely from his recent fall. Per neurosurgery, no intervention for subdural hematoma given its small size, and repeat CT head on 6/14 showed no interval change in size or concern for active bleeding. EEG cap placed overnight on 6/13 showed no findings of seizure activity. Neurology was consulted given pt's MRI findings and his presentation, pending further recommendations. CTA head and neck ordered.      Interval History: MRI done last night showed new punctate infarcts of the left cerebrum, as well as small left SDH. NSGY consulted and recommended no acute intervention. Repeat CT head showed no increase in size of SDH. Cap EEG showed no seizures. Neurology consulted for further assistance and evaluation of pt's acute infarcts and his presentation history; recommended formal EEG. CTA head and neck ordered to evaluate for possible carotid stenosis.    Review of Systems   Constitutional:  Negative for chills and fever.   HENT:  Negative for congestion and sore throat.    Eyes:  Negative for photophobia and visual disturbance.   Respiratory:  Negative for cough and shortness of breath.    Cardiovascular:  Negative for chest pain and palpitations.   Gastrointestinal:  Negative for abdominal pain.   Genitourinary:  Negative for dysuria and hematuria.   Musculoskeletal:  Negative for arthralgias and back pain.   Skin:  Negative for rash and wound.   Neurological:  Negative for dizziness and syncope.   Psychiatric/Behavioral:  Negative for agitation and behavioral problems.    Objective:     Vital Signs (Most Recent):  Temp: 98.1 °F (36.7 °C) (06/14/23 1214)  Pulse: 87 (06/14/23 1214)  Resp: 16  (06/14/23 0030)  BP: (!) 141/72 (06/14/23 1214)  SpO2: (!) 94 % (06/14/23 1214) Vital Signs (24h Range):  Temp:  [97.1 °F (36.2 °C)-98.4 °F (36.9 °C)] 98.1 °F (36.7 °C)  Pulse:  [65-87] 87  Resp:  [15-20] 16  SpO2:  [94 %-98 %] 94 %  BP: (141-198)/(57-88) 141/72     Weight: 50.8 kg (112 lb)  Body mass index is 20.49 kg/m².    Intake/Output Summary (Last 24 hours) at 6/14/2023 1439  Last data filed at 6/13/2023 2219  Gross per 24 hour   Intake --   Output 700 ml   Net -700 ml         Physical Exam  Vitals reviewed.   Constitutional:       General: He is not in acute distress.     Appearance: Normal appearance.   HENT:      Head: Normocephalic.      Comments: Contusions noted over the left and right eyebrows and ecchymoses of the lower eyelids.     Nose: No congestion or rhinorrhea.      Mouth/Throat:      Mouth: Mucous membranes are moist.      Pharynx: Oropharynx is clear.   Cardiovascular:      Rate and Rhythm: Normal rate and regular rhythm.      Pulses: Normal pulses.      Heart sounds: Normal heart sounds.   Pulmonary:      Effort: Pulmonary effort is normal. No respiratory distress.      Breath sounds: Normal breath sounds.   Abdominal:      General: Bowel sounds are normal.      Palpations: Abdomen is soft.      Tenderness: There is no abdominal tenderness.   Musculoskeletal:         General: No swelling or tenderness.      Cervical back: Full passive range of motion without pain and normal range of motion.   Skin:     General: Skin is warm and dry.   Neurological:      General: No focal deficit present.      Mental Status: He is alert and oriented to person, place, and time.   Psychiatric:         Mood and Affect: Mood normal.         Behavior: Behavior normal.           Significant Labs: All pertinent labs within the past 24 hours have been reviewed.    Significant Imaging: I have reviewed all pertinent imaging results/findings within the past 24 hours.      Assessment/Plan:      * Observed seizure-like  activity  89M w/Alzheimer's dementia and mild AS who presents with recurrent episodes of loss of consciousness with decreased responsiveness. These episodes have been unwitnessed, however pt's family reports that they have seen him after the events and the pt has been altered briefly. Two days ago, pt's wife heard a loud bang and saw the patient with his head on the table and was initially unresponsive, and when regaining consciousness, was confused. On today's episode pt was found staring up at the ceiling and was unresponsive and displaying rigid, stiff muscles; when pt finally came to, they report that he was confused and had speech difficulties afterwards which lasted for several minutes. Patient does not recall any of the events thus far and cannot remember any preceding aura or event before the episodes. Pt reports some recent progressively worsening physical function and memory difficulties. No history of seizures reported in the past.    Workup thus far largely unremarkable. Labs grossly normal and CT head imaging without acute findings intracranially. Neuro exam is unrevealing. Pt with elevated blood pressures in the ED.  Differentials for his episodes of decreased responsiveness and LoC include: Acute stroke vs TIA vs encephalopathy 2/2 hypertensive emergency vs seizures.    Plans:  - MRI showing multiple new punctate infarcts throughout the left cerebral hemisphere. There is also a small subdural hematoma along the left temporal lobe. CT head repeat showed no interval change in SDH, likely not actively bleeding anymore.   - Allowing for permissive HTN in setting of acute punctate infarcts  - Neuro consulted, appreciate assistance  - 24 hr EEG ordered, had EEG cap but neurology recommending formal 24 hour EEG.  - PT/OT consulted for physical debility  - Neurochecks q4h  - Continue to monitor    Chronic diastolic heart failure  Grade II left ventricular diastolic dysfunction noted on Echo in 2021  - Repeat  "echo pending        Subdural hematoma  Seen on MRI with no interval change on CT head 6/14. Secondary to recent unwitnessed fall on 6/11 with head trauma.    - Neurochecks q4h to monitor for any acute neuro deficits  - NSGY consulted, no acute intervention recommended      Hypertensive urgency  Patient with normal blood pressures in the clinic and on arrival, however soon developed significant hypertension while in the ED. Not on any antihypertensives at home, and has not had blood pressure issues. Maybe contributing to current neurological symptoms via PRES vs HTN emergency induced encephalopathy.     - Hydralazine 25 PRN for SBP > 180   - MRI found left cerebral punctate infarcts   - Allow for some degree of permissive hypertension with SBP ~160.    Physical deconditioning  Pt with recently worsening physical function reported along with some history concerning for aspiration. Likely 2/2 to progressive neurocognitive decline and sarcopenia.    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 May be contributory to pt's presentation. Not on any medications. Follows with neurology outpatient.    - Continue to monitor mental status and evaluate for any acute worsening    Aortic valve stenosis  Chronic, longstanding condition. Was noted as mild by previous PCP. Soft systolic murmur appreciated on exam. May be contributory and causing syncopal episodes.    - TTE to evaluate degree of AS, pending      VTE Risk Mitigation (From admission, onward)           Ordered     IP VTE HIGH RISK PATIENT  Once         06/13/23 1912                    Discharge Planning   BECKY: 6/16/2023     Code Status: DNR   Is the patient medically ready for discharge?: No    Reason for patient still in hospital (select all that apply): Patient trending condition, Imaging, Consult " recommendations and PT / OT recommendations  Discharge Plan A: Home, Home with family   Discharge Delays: None known at this time      Mikhail Still MD  Department of Hospital Medicine   [facility] - Intensive Care ([place]-[facility])

## 2023-06-14 NOTE — ASSESSMENT & PLAN NOTE
Pt with recently worsening physical function reported along with some history concerning for aspiration. Likely 2/2 to progressive neurocognitive decline and sarcopenia.    - PT/OT consulted  - SLP consulted for swallow evaluation

## 2023-06-14 NOTE — SUBJECTIVE & OBJECTIVE
Interval History: MRI done last night showed new punctate infarcts of the left cerebrum, as well as small left SDH. NSGY consulted and recommended no acute intervention. Repeat CT head showed no increase in size of SDH. Cap EEG showed no seizures. Neurology consulted for further assistance and evaluation of pt's acute infarcts and his presentation history; recommended formal EEG. CTA head and neck ordered to evaluate for possible carotid stenosis.    Review of Systems   Constitutional:  Negative for chills and fever.   HENT:  Negative for congestion and sore throat.    Eyes:  Negative for photophobia and visual disturbance.   Respiratory:  Negative for cough and shortness of breath.    Cardiovascular:  Negative for chest pain and palpitations.   Gastrointestinal:  Negative for abdominal pain.   Genitourinary:  Negative for dysuria and hematuria.   Musculoskeletal:  Negative for arthralgias and back pain.   Skin:  Negative for rash and wound.   Neurological:  Negative for dizziness and syncope.   Psychiatric/Behavioral:  Negative for agitation and behavioral problems.    Objective:     Vital Signs (Most Recent):  Temp: 98.1 °F (36.7 °C) (06/14/23 1214)  Pulse: 87 (06/14/23 1214)  Resp: 16 (06/14/23 0030)  BP: (!) 141/72 (06/14/23 1214)  SpO2: (!) 94 % (06/14/23 1214) Vital Signs (24h Range):  Temp:  [97.1 °F (36.2 °C)-98.4 °F (36.9 °C)] 98.1 °F (36.7 °C)  Pulse:  [65-87] 87  Resp:  [15-20] 16  SpO2:  [94 %-98 %] 94 %  BP: (141-198)/(57-88) 141/72     Weight: 50.8 kg (112 lb)  Body mass index is 20.49 kg/m².    Intake/Output Summary (Last 24 hours) at 6/14/2023 1439  Last data filed at 6/13/2023 2219  Gross per 24 hour   Intake --   Output 700 ml   Net -700 ml         Physical Exam  Vitals reviewed.   Constitutional:       General: He is not in acute distress.     Appearance: Normal appearance.   HENT:      Head: Normocephalic.      Comments: Contusions noted over the left and right eyebrows and ecchymoses of the  lower eyelids.     Nose: No congestion or rhinorrhea.      Mouth/Throat:      Mouth: Mucous membranes are moist.      Pharynx: Oropharynx is clear.   Cardiovascular:      Rate and Rhythm: Normal rate and regular rhythm.      Pulses: Normal pulses.      Heart sounds: Normal heart sounds.   Pulmonary:      Effort: Pulmonary effort is normal. No respiratory distress.      Breath sounds: Normal breath sounds.   Abdominal:      General: Bowel sounds are normal.      Palpations: Abdomen is soft.      Tenderness: There is no abdominal tenderness.   Musculoskeletal:         General: No swelling or tenderness.      Cervical back: Full passive range of motion without pain and normal range of motion.   Skin:     General: Skin is warm and dry.   Neurological:      General: No focal deficit present.      Mental Status: He is alert and oriented to person, place, and time.   Psychiatric:         Mood and Affect: Mood normal.         Behavior: Behavior normal.           Significant Labs: All pertinent labs within the past 24 hours have been reviewed.    Significant Imaging: I have reviewed all pertinent imaging results/findings within the past 24 hours.

## 2023-06-14 NOTE — NURSING
Pt had uneventful shift. Vitals remained stable and at baseline. No complaints of pain or discomfort. Will continue to monitor pt remaining of shift.

## 2023-06-14 NOTE — CONSULTS
[facility] ([place] [id number])  Neurology  Consult Note    Patient Name: James Dc  MRN: [id number]  Admission Date: 6/13/2023  Hospital Length of Stay: 0 days  Code Status: DNR   Attending Provider: Sonny Sky MD   Consulting Provider: Key Leonard PA-C  Primary Care Physician: JEET Briggs MD  Principal Problem:Observed seizure-like activity    Inpatient consult to Neurology  Consult performed by: Key Leonard PA-C  Consult ordered by: Tim Rodney DO         Subjective:     Chief Complaint:  ?seizure     HPI:   89 y.o. male with essential tremor, dementia and nuclear sclerosis presents to ED after fall at home and transient episodes of unresponsiveness. Family reports episode a few weeks ago where patient was seated in the kitchen and fell backwards, but remembers the entire episode without confusion. On Sunday 6/11 around lunch time, pt had an unwitnessed fall with head trauma resulting in facial laceration with bleeding. Wife heard a thud and found him on the kitchen floor with bleeding. He was seen at [facility] ED and imaging obtained. He was discharged home with PCP follow-up. Upon returning home Sunday, he had 2 discrete episodes of unresponsiveness witnessed by wife and daughter. They describe sudden loss of movement, eyes open with glazed look. One occurred while eating dinner where he was dipping bread in butter and became frozen. Patient says he can hear them speaking to him, but he is unable to verbalize or follow commands. No shaking, incontinence or tongue biting. No preceding warning. Episodes of unresponsiveness are prolonged, lasting up to 1 hour. No episodes on Monday, but one on Tuesday where family found him unresponsive in recliner upon returning from grocery store. Prior to this, family denies hx of seizures or LOC episodes. Wife does say he had a few episodes of babbling/nonsensical speech and elevated blood pressure over the past few weeks. Pt reports sensation  "like he is wearing a tight baseball cap over the past few weeks. This admission, he was sent to ED from PCP office and CT Head obtained showing "Right frontal scalp localized soft tissue swelling/contusion without displaced skull fracture, acute large vascular territory infarct or intracranial hemorrhage identified. Lower left pontine subtle hypoattenuating focus which may reflect artifact related to streak versus age-indeterminate lacunar type infarct.  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 New small left subdural hemorrhage along the left temporal lobe." Vascular Neuro and NSGY consulted for findings. Neurology consulted 6/14 for seizure evaluation. Of note, patient significantly hypertensive on arrival (198/86). Some concern for hypertensive encephalopathy vs PRES with provoked seizures. Obtained cap EEG overnight (6/13-6/14) with slowing, but no electrographic seizures. No clinical events captured overnight.     Past Medical History:   Diagnosis Date    Essential tremor     Hypertensive urgency 6/13/2023    Nuclear sclerosis - Both Eyes 9/19/2012     Past Surgical History:   Procedure Laterality Date    TONSILLECTOMY       Review of patient's allergies indicates:   Allergen Reactions    Tetracyclines Nausea Only     No current facility-administered medications on file prior to encounter.     Current Outpatient Medications on File Prior to Encounter   Medication Sig    MULTIVITAMIN ORAL Take 1 tablet by mouth once daily.     Family History       Problem Relation (Age of Onset)    Cataracts Son    Prostate cancer Father          Tobacco Use    Smoking status: Former    Smokeless tobacco: Former   Substance and Sexual Activity    Alcohol use: Yes    Drug use: No    Sexual activity: Yes     " Partners: Female     Review of Systems   Constitutional:  Positive for activity change and fatigue. Negative for fever.   Respiratory:  Negative for cough and shortness of breath.    Cardiovascular:  Negative for chest pain and leg swelling.   Gastrointestinal:  Negative for vomiting.   Musculoskeletal:  Negative for gait problem and neck stiffness.   Neurological:  Positive for tremors, weakness and headaches.   Psychiatric/Behavioral:  Positive for decreased concentration and sleep disturbance.    Objective:     Vital Signs (Most Recent):  Temp: 98.1 °F (36.7 °C) (06/14/23 1214)  Pulse: 74 (06/14/23 1511)  Resp: 16 (06/14/23 0030)  BP: (!) 141/72 (06/14/23 1511)  SpO2: (!) 94 % (06/14/23 1214) Vital Signs (24h Range):  Temp:  [97.1 °F (36.2 °C)-98.4 °F (36.9 °C)] 98.1 °F (36.7 °C)  Pulse:  [65-87] 74  Resp:  [15-20] 16  SpO2:  [94 %-98 %] 94 %  BP: (141-198)/(57-86) 141/72     Weight: 50.8 kg (112 lb)  Body mass index is 20.49 kg/m².     Physical Exam  Eyes:      Extraocular Movements: EOM normal.      Pupils: Pupils are equal, round, and reactive to light.   Psychiatric:         Speech: Speech normal.        NEUROLOGICAL EXAMINATION:     MENTAL STATUS   Oriented to person.   Oriented to place.   Attention: normal. Concentration: normal.   Speech: speech is normal   Level of consciousness: alert       Oriented to family at bedside  Able to recall most details besides 3 discrete unresponsive episode events      CRANIAL NERVES     CN III, IV, VI   Pupils are equal, round, and reactive to light.  Extraocular motions are normal.   Nystagmus: none   Ophthalmoparesis: none    CN V   Facial sensation intact.     CN VIII   Hearing: intact    MOTOR EXAM   Muscle bulk: decreased  Overall muscle tone: normal    Strength   Right deltoid: 4/5  Left deltoid: 4/5  Right biceps: 5/5  Left biceps: 5/5  Right triceps: 4/5  Left triceps: 4/5  Right interossei: 5/5  Left interossei: 5/5  Right iliopsoas: 4/5  Left iliopsoas:  4/5  Right quadriceps: 4/5  Left quadriceps: 4/5  Right anterior tibial: 5/5  Left anterior tibial: 5/5  Right posterior tibial: 5/5  Left posterior tibial: 5/5    SENSORY EXAM   Light touch normal.     GAIT AND COORDINATION     Gait  Gait: (deferred)    Tremor   Resting tremor: present    Significant Labs: All pertinent lab results from the past 24 hours have been reviewed.    Significant Imaging: I have reviewed all pertinent imaging results/findings within the past 24 hours.         Assessment and Plan:     * Episodes of unresponsiveness   89 y.o. male with dementia, tremors presents after fall with head trauma 6/11 and subsequent discrete episodes (x3) of unresponsiveness of prolonged duration since 6/11. Some increase in bp over the past few weeks with episodes of babbling/nonsensical speech and sensation of tight baseball cap, but no previous LOC episodes or seizures. Events described as sudden unresponsiveness where patient can hear, but unable to verbalize or follow commands. Eyes open with glazed look, but no gaze preference, convulsions, incontinence or tongue biting. Confusion afterwards. Of note, bp was elevated on admit (198/86), found to have punctate infarcts and small L temporal lobe SDH, felt to be traumatic. Episodes not clearly epileptic given description and prolonged duration, but concerning they began following head trauma. May be provoked in setting of hypertensive emergency. Cap EEG overnight unremarkable besides slowing.     Recommendations:  --start Keppra 500 mg BID  Seizure precautions with prn ativan episodes >5 min  --consider EEG 6/15, alternatively can obtain as outpatient if episodes reoccur despite Keppra and blood pressure control   --Vascular Neuro/Vasc surgery consulted given imaging findings   --continue blood pressure control per primary team  --outpatient follow-up in Gen Neuro clinic    VTE Risk Mitigation (From admission, onward)         Ordered     IP VTE HIGH RISK PATIENT   Once         06/13/23 1912              Thank you for your consult. I will sign off. Please contact us if you have any additional questions.    Key Leonard PA-C  General Neurology Consult

## 2023-06-14 NOTE — SUBJECTIVE & OBJECTIVE
Past Medical History:   Diagnosis Date    Essential tremor     Hypertensive urgency 6/13/2023    Nuclear sclerosis - Both Eyes 9/19/2012     Past Surgical History:   Procedure Laterality Date    TONSILLECTOMY       Review of patient's allergies indicates:   Allergen Reactions    Tetracyclines Nausea Only     No current facility-administered medications on file prior to encounter.     Current Outpatient Medications on File Prior to Encounter   Medication Sig    MULTIVITAMIN ORAL Take 1 tablet by mouth once daily.     Family History       Problem Relation (Age of Onset)    Cataracts Son    Prostate cancer Father          Tobacco Use    Smoking status: Former    Smokeless tobacco: Former   Substance and Sexual Activity    Alcohol use: Yes    Drug use: No    Sexual activity: Yes     Partners: Female     Review of Systems   Constitutional:  Positive for activity change and fatigue. Negative for fever.   Respiratory:  Negative for cough and shortness of breath.    Cardiovascular:  Negative for chest pain and leg swelling.   Gastrointestinal:  Negative for vomiting.   Musculoskeletal:  Negative for gait problem and neck stiffness.   Neurological:  Positive for tremors, weakness and headaches.   Psychiatric/Behavioral:  Positive for decreased concentration and sleep disturbance.    Objective:     Vital Signs (Most Recent):  Temp: 98.1 °F (36.7 °C) (06/14/23 1214)  Pulse: 74 (06/14/23 1511)  Resp: 16 (06/14/23 0030)  BP: (!) 141/72 (06/14/23 1511)  SpO2: (!) 94 % (06/14/23 1214) Vital Signs (24h Range):  Temp:  [97.1 °F (36.2 °C)-98.4 °F (36.9 °C)] 98.1 °F (36.7 °C)  Pulse:  [65-87] 74  Resp:  [15-20] 16  SpO2:  [94 %-98 %] 94 %  BP: (141-198)/(57-86) 141/72     Weight: 50.8 kg (112 lb)  Body mass index is 20.49 kg/m².     Physical Exam  Eyes:      Extraocular Movements: EOM normal.      Pupils: Pupils are equal, round, and reactive to light.   Psychiatric:         Speech: Speech normal.        NEUROLOGICAL EXAMINATION:      MENTAL STATUS   Oriented to person.   Oriented to place.   Attention: normal. Concentration: normal.   Speech: speech is normal   Level of consciousness: alert       Oriented to family at bedside  Able to recall most details besides 3 discrete unresponsive episode events      CRANIAL NERVES     CN III, IV, VI   Pupils are equal, round, and reactive to light.  Extraocular motions are normal.   Nystagmus: none   Ophthalmoparesis: none    CN V   Facial sensation intact.     CN VIII   Hearing: intact    MOTOR EXAM   Muscle bulk: decreased  Overall muscle tone: normal    Strength   Right deltoid: 4/5  Left deltoid: 4/5  Right biceps: 5/5  Left biceps: 5/5  Right triceps: 4/5  Left triceps: 4/5  Right interossei: 5/5  Left interossei: 5/5  Right iliopsoas: 4/5  Left iliopsoas: 4/5  Right quadriceps: 4/5  Left quadriceps: 4/5  Right anterior tibial: 5/5  Left anterior tibial: 5/5  Right posterior tibial: 5/5  Left posterior tibial: 5/5    SENSORY EXAM   Light touch normal.     GAIT AND COORDINATION     Gait  Gait: (deferred)    Tremor   Resting tremor: present    Significant Labs: All pertinent lab results from the past 24 hours have been reviewed.    Significant Imaging: I have reviewed all pertinent imaging results/findings within the past 24 hours.

## 2023-06-14 NOTE — HPI
"89 y.o. male with essential tremor, dementia and nuclear sclerosis presents to ED after fall at home and transient episodes of unresponsiveness. Family reports episode a few weeks ago where patient was seated in the kitchen and fell backwards, but remembers the entire episode without confusion. On Sunday 6/11 around lunch time, pt had an unwitnessed fall with head trauma resulting in facial laceration with bleeding. Wife heard a thud and found him on the kitchen floor with bleeding. He was seen at [facility] ED and imaging obtained. He was discharged home with PCP follow-up. Upon returning home Sunday, he had 2 discrete episodes of unresponsiveness witnessed by wife and daughter. They describe sudden loss of movement, eyes open with glazed look. One occurred while eating dinner where he was dipping bread in butter and became frozen. Patient says he can hear them speaking to him, but he is unable to verbalize or follow commands. No shaking, incontinence or tongue biting. No preceding warning. Episodes of unresponsiveness are prolonged, lasting up to 1 hour. No episodes on Monday, but one on Tuesday where family found him unresponsive in recliner upon returning from grocery store. Prior to this, family denies hx of seizures or LOC episodes. Wife does say he had a few episodes of babbling/nonsensical speech and elevated blood pressure over the past few weeks. Pt reports sensation like he is wearing a tight baseball cap over the past few weeks. This admission, he was sent to ED from PCP office and CT Head obtained showing "Right frontal scalp localized soft tissue swelling/contusion without displaced skull fracture, acute large vascular territory infarct or intracranial hemorrhage identified. Lower left pontine subtle hypoattenuating focus which may reflect artifact related to streak versus age-indeterminate lacunar type infarct.  Senescent change and suspected sequela of chronic microvascular ischemic change, with grossly " "similar multifocal remote lacunar type infarcts." MRI Brain W WO contrast showed "New multifocal punctate infarcts throughout the left cerebral hemisphere. New small left subdural hemorrhage along the left temporal lobe." Vascular Neuro and NSGY consulted for findings. Neurology consulted 6/14 for seizure evaluation. Of note, patient significantly hypertensive on arrival (198/86). Some concern for hypertensive encephalopathy vs PRES with provoked seizures. Obtained cap EEG overnight (6/13-6/14) with slowing, but no electrographic seizures. No clinical events captured overnight.      "

## 2023-06-14 NOTE — PT/OT/SLP EVAL
Occupational Therapy   Evaluation/tx    Name: James Dc  MRN: [id number]  Admitting Diagnosis: Loss of consciousness  Recent Surgery: * No surgery found *      Recommendations:     Discharge Recommendations: other (see comments)  Discharge Equipment Recommendations:  walker, rolling  Barriers to discharge:  None    Assessment:     James Dc is a 89 y.o. male with a medical diagnosis of Loss of consciousness.  He presents with deficits in self-care tasks as well as mobility and endurance. Pt. Pleasant and cooperative on this date. Pt. EEG helmet noted to be causing some pain for pt. Pt. Receptive to therapy and required minimal assist for mobility on this date. Significant facial bruising noted from recent fall. Pt. Reported atleast 3 falls in past 3 months. Patient would benefit from continued OT services to maximize level of safety and independence with self-care tasks.   Performance deficits affecting function: weakness, impaired endurance, impaired self care skills, impaired functional mobility, gait instability.      Rehab Prognosis: Good; patient would benefit from acute skilled OT services to address these deficits and reach maximum level of function.       Plan:     Patient to be seen 3 x/week to address the above listed problems via self-care/home management, therapeutic activities, therapeutic exercises, neuromuscular re-education  Plan of Care Expires: 07/14/23  Plan of Care Reviewed with: patient    Subjective     Chief Complaint: Pain from EEG monitor  Patient/Family Comments/goals: To get back home    Occupational Profile:  Living Environment: Pt. Resides with spouse in 2 story house but can reside on 1st floor . Pt.'s primary living environment is on second floor. Pt. Has 2 steps to enter home and no HR. Pt. Has a tub shower and no seat  Previous level of function: Pt. Reported walking cautiously without an AD and performing own ADL tasks  Roles and Routines: caretaker of self, spouse, father,  retired veternarian  Equipment Used at Home: none  Assistance upon Discharge: spouse    Pain/Comfort:  Pain Rating 1:  (did not rate)  Location 1: head (from EEG leads)  Pain Addressed 1: Reposition, Distraction  Pain Rating Post-Intervention 1:  (EEG remained uncomfortable)    Patients cultural, spiritual, Temple conflicts given the current situation: no    Objective:     Communicated with: nurse prior to session.  Patient found supine with EEG, telemetry, pulse ox (continuous) upon OT entry to room.    General Precautions: Standard, fall, seizure  Orthopedic Precautions: N/A  Braces: N/A  Respiratory Status: Room air    Occupational Performance:    Bed Mobility:    Patient completed Supine to Sit with stand by assistance    Functional Mobility/Transfers:  Patient completed Sit <> Stand Transfer with stand by assistance  with  no assistive device   Functional Mobility: Pt. Performed 3 side steps along EOB with HHA    Activities of Daily Living:  Lower Body Dressing: minimum assistance to don socks     Cognitive/Visual Perceptual:  Cognitive/Psychosocial Skills:     -       Oriented to: Person, Place, and Situation   -       Follows Commands/attention:Follows multistep  commands  -       Communication: clear/fluent  -       Memory: fair at times reported not remembering what he wanted to say  -       Safety awareness/insight to disability: intact   -       Mood/Affect/Coping skills/emotional control: Appropriate to situation  Visual/Perceptual:      -wears glasses    Physical Exam:  Balance: -       sit: good; stand fair  Postural examination/scapula alignment:    -       Rounded shoulders  -       Posterior pelvic tilt  Skin integrity: Bruising of facial region especially around eyes  Edema:  Moderate right eyebrow with steri strips noted  Dominant hand: -       left  Upper Extremity Range of Motion:     -       Right Upper Extremity: WFL  -       Left Upper Extremity: WFL   Strength:    -       Right Upper  Extremity: WFL  -       Left Upper Extremity: WFL    AMPAC 6 Click ADL:  AMPAC Total Score: 20    Treatment & Education:  Pt. Educated on role of OT and pOC  Pt. Educated on safety with mobility and need for staff assist  OT notified nursing that EEG monitor not on  Patient left supine with all lines intact, call button in reach, nurse notified, and family present    GOALS:   Multidisciplinary Problems       Occupational Therapy Goals          Problem: Occupational Therapy    Goal Priority Disciplines Outcome Interventions   Occupational Therapy Goal     OT, PT/OT Ongoing, Progressing    Description: Goals to be met by: 06-24-23     Patient will increase functional independence with ADLs by performing:    UE Dressing with Set-up Assistance.  LE Dressing with Supervision.  Grooming while standing at sink with Supervision.  Toileting from toilet with Supervision for hygiene and clothing management.   Supine to sit with Kevin.  Stand pivot transfers with Supervision with RW  Toilet transfer to toilet with Supervision.                         History:     Past Medical History:   Diagnosis Date    Essential tremor     Hypertensive urgency 6/13/2023    Nuclear sclerosis - Both Eyes 9/19/2012         Past Surgical History:   Procedure Laterality Date    TONSILLECTOMY         Time Tracking:     OT Date of Treatment: 06/14/23  OT Start Time: 0758  OT Stop Time: 0830  OT Total Time (min): 32 min    Billable Minutes:Evaluation 15  Self Care/Home Management 17    6/14/2023

## 2023-06-14 NOTE — PROCEDURES
24 hr. Video EEG Monitoring    Date/Time: 6/13/2023 3:54 PM  Performed by: Jesus Molina MD  Authorized by: Misa Carpenter MD     EXTENDED  ELECTROENCEPHALOGRAM  REPORT    DATE OF SERVICE: 6/14/23  EEG NUMBER: FH   REQUESTED BY:  Jayden  [place]  [facility] [place]OLOGY   Electroencephalographic (EEG) recording is with electrodes placed according to the International 10-20 placement system.  Thirty two (32) channels of digital signal (sampling rate of 512/sec) including T1 and T2 was simultaneously recorded from the scalp and may include  EKG, EMG, and/or eye monitors.  Recording band pass was 0.1 to 512 hz.  Digital video recording of the patient is simultaneously recorded with the EEG.  The patient is instructed report clinical symptoms which may occur during the recording session.  EEG and video recording is stored and archived in digital format.  Activation procedures which include photic stimulation, hyperventilation and instructing patients to perform simple task are done in selected patients.   The EEG is displayed on a monitor screen and can be reviewed using different montages.  Computer assisted analysis is employed to detect spike and electrographic seizure activity.   The entire record is submitted for computer analysis.  The entire recording is visually reviewed and the times identified by computer analysis as being spikes or seizures are reviewed again.  Compresses spectral analysis (CSA) is also performed on the activity recorded from each individual channel.  This is displayed as a power display of frequencies from 0 to 30 Hz over time.   The CSA is reviewed looking for asymmetries in power between homologous areas of the scalp and then compared with the original EEG recording.     [employer] software was also utilized in the review of this study.  This software suite analyzes the EEG recording in multiple domains.  Coherence and rhythmicity is computed to identify EEG sections which may  contain organized seizures.  Each channel undergoes analysis to detect presence of spike and sharp waves which have special and morphological characteristic of epileptic activity.  The routine EEG recording is converted from spacial into frequency domain.  This is then displayed comparing homologous areas to identify areas of significant asymmetry.  Algorithm to identify non-cortically generated artifact is used to separate eye movement, EMG and other artifact from the EEG.      RECORDING TIMES  Start on 6/13/23 at 02:35:29  Stop on 6/14/23  at 07:00:02  Start on 6/14/23 at 07:00:15  Stop on 6/14/23 at 07:51:14  A total of  5  hrs of EEG recording was obtained.    EEG FINDINGS  The record shows a fair  organization at rest, consisting of a 7.5-8 Hz posterior dominant rhythm with fair  reactivity. There is mild bilateral beta activity.    Drowsiness is characterized by attenuation of the background, vertex waves, and bilateral theta slowing. Stage II sleep is characterized by slowing, vertex waves, and symmetric sleep spindles.    Provocative maneuvers including hyperventilation and photic stimulation were not performed.     EKG recording shows a regular rhythm.    There is no push button or clinical event.    IMPRESSION:  Abnormal study due to mild diffuse background slowing consistent with diffuse cerebral dysfunction and encephalopathy which may be on the basis of toxic, metabolic, or primary neuronal disorder. Further focal slowing over the left hemisphere is consistent with focal subcortical dysfunction in this region such as may be seen in the setting of structural defect such as mass or infarct.     Jesus Molina MD

## 2023-06-14 NOTE — PT/OT/SLP EVAL
"Speech Language Pathology Evaluation  Bedside Swallow  Discharge    Patient Name:  James Dc   MRN:  [id number]  [id number]    Admitting Diagnosis: Loss of consciousness    Recommendations:                 General Recommendations:  Follow-up not indicated  Diet recommendations:  Regular, Thin   Aspiration Precautions: Standard aspiration precautions   General Precautions: Standard, fall, seizure  Communication strategies:  none    Assessment:     James Dc is a 89 y.o. male with no overt s/s of aspiration or significant oral/pharyngeal dysphagia with all trials. No further skilled acute Speech Therapy services warranted at this time. Please re-consult as needed.     History:     Past Medical History:   Diagnosis Date    Essential tremor     Hypertensive urgency 6/13/2023    Nuclear sclerosis - Both Eyes 9/19/2012       Past Surgical History:   Procedure Laterality Date    TONSILLECTOMY       MD note: History of Present Illness: 89M w/ PMH HTN, multiple CVAs & Alzheimer's dementia who [place] [facility] [place] of [place]/AMS found to have incidental L temporal SDH. Patient is poor historian and unable to provide HPI/ROS. Per records, the patient has had a several day history of multiple episodes of LOC/AMS with a fall on 6/11 where he hit his head for which he was taken to the ED. At OSH ED, CTH and CT Csp were unremarkable, however further work-up with MRI showed small L temporal SDH without mass effect or MLS. ROS unable to be completed. Patient is not on anti-plt/coag medications per medical records.    CXR:  No acute abnormality.    Prior diet: reg/thin    Subjective     Patient awake and cooperative. Family members present at bedside.   Patient states, "Sometimes I just feel like a tickle and have a nonproductive cough."     Objective:     Oral Musculature Evaluation  Oral Musculature: WFL  Dentition: present and adequate  Mucosal Quality: good  Mandibular Strength and Mobility: WFL  Oral Labial " Strength and Mobility: WFL  Lingual Strength and Mobility: WFL  Volitional Cough: elicited  Volitional Swallow: timely  Voice Prior to PO Intake: clear    Bedside Swallow Eval:   Consistencies Assessed:  Thin liquid sips of water via straw  Solids bites of kevin cracker      Oral Phase:   WFL     Pharyngeal Phase:   no overt clinical signs/symptoms of aspiration  no overt clinical signs/symptoms of pharyngeal dysphagia     Compensatory Strategies  None     Treatment: Patient reports occasional nonproductive cough and small amount of anterior loss with thin liquids. No consistent or frequent coughing/choking with meals. SLP provided patient education on SLP role, s/s and risks of aspiraiton, safe swallow precautions, and POC. Patient v/u of all discussed and is in agreement with d/c from ST services.       Goals:   Multidisciplinary Problems       SLP Goals       Not on file                    Plan:       Plan of Care reviewed with:  patient, family   SLP Follow-Up:  No       Discharge recommendations:   (no ST needs)   Barriers to Discharge:  None    Time Tracking:     SLP Treatment Date:   06/14/23  Speech Start Time:  1152  Speech Stop Time:  1213     Speech Total Time (min):  21 min    Billable Minutes: Eval Swallow and Oral Function 10 and Self Care/Home Management Training 11    06/14/2023

## 2023-06-14 NOTE — ASSESSMENT & PLAN NOTE
89M w/Alzheimer's dementia and mild AS who presents with recurrent episodes of loss of consciousness with decreased responsiveness. These episodes have been unwitnessed, however pt's family reports that they have seen him after the events and the pt has been altered briefly. Two days ago, pt's wife heard a loud bang and saw the patient with his head on the table and was initially unresponsive, and when regaining consciousness, was confused. On today's episode pt was found staring up at the ceiling and was unresponsive and displaying rigid, stiff muscles; when pt finally came to, they report that he was confused and had speech difficulties afterwards which lasted for several minutes. Patient does not recall any of the events thus far and cannot remember any preceding aura or event before the episodes. Pt reports some recent progressively worsening physical function and memory difficulties. No history of seizures reported in the past.    Workup thus far largely unremarkable. Labs grossly normal and CT head imaging without acute findings intracranially. Neuro exam is unrevealing. Pt with elevated blood pressures in the ED.  Differentials for his episodes of decreased responsiveness and LoC include: Acute stroke vs TIA vs encephalopathy 2/2 hypertensive emergency vs seizures.    Plans:  - MRI showing multiple new punctate infarcts throughout the left cerebral hemisphere. There is also a small subdural hematoma along the left temporal lobe. CT head repeat showed no interval change in SDH, likely not actively bleeding anymore.   - Allowing for permissive HTN in setting of acute punctate infarcts  - Neuro consulted, appreciate assistance  - 24 hr EEG ordered, had EEG cap but neurology recommending formal 24 hour EEG.  - PT/OT consulted for physical debility  - Neurochecks q4h  - Continue to monitor

## 2023-06-14 NOTE — PLAN OF CARE
Problem: Occupational Therapy  Goal: Occupational Therapy Goal  Description: Goals to be met by: 06-24-23     Patient will increase functional independence with ADLs by performing:    UE Dressing with Set-up Assistance.  LE Dressing with Supervision.  Grooming while standing at sink with Supervision.  Toileting from toilet with Supervision for hygiene and clothing management.   Supine to sit with [place].  Stand pivot transfers with Supervision with RW  Toilet transfer to toilet with Supervision.    Outcome: Ongoing, Progressing

## 2023-06-14 NOTE — ASSESSMENT & PLAN NOTE
89M w/Alzheimer's dementia and mild AS who presents with recurrent falls with unclear neurological prelude event. Pt with an unwitnessed fall on 6/11

## 2023-06-14 NOTE — ED NOTES
Telemetry Verification   Patient placed on Telemetry Box  Verified with War Room  Box # [id number]   Monitor Tech    Rate 74   Rhythm NS

## 2023-06-14 NOTE — ASSESSMENT & PLAN NOTE
Patient with normal blood pressures in the clinic and on arrival, however soon developed significant hypertension while in the ED. Not on any antihypertensives at home, and has not had blood pressure issues. Maybe contributing to current neurological symptoms via PRES vs HTN emergency induced encephalopathy.     - Hydralazine 25 PRN for SBP > 180   - MRI found left cerebral punctate infarcts   - Allow for some degree of permissive hypertension with SBP ~160.

## 2023-06-14 NOTE — CARE UPDATE
MRI brain with multiple new punctate infarcts throughout the left cerebral hemisphere. There is also a small subdural hematoma along the left temporal lobe that was not visualized on prior CT. This is not surprising given recent fall. Examined patient, with family at bedside. He appears to be at baseline and without focal deficit. Informed family and patient of findings on MRI, and that I would discuss with vascular neurology and neurosurgery. Per neurosurgery, unlikely to require intervention for subdural hematoma given its small size. I discontinued his Lovenox in the meantime. Currently not on any antiplatelets. Given infarcts, would start statin today. NSGY recommending neurology consult given concern for PRES, which I have placed. Appreciate vascular and NSGY willingness to discuss and see patient overnight.     SARAH Rodney D.O., PGY-1

## 2023-06-14 NOTE — SUBJECTIVE & OBJECTIVE
Medications Prior to Admission   Medication Sig Dispense Refill Last Dose    MULTIVITAMIN ORAL Take 1 tablet by mouth once daily.          Review of patient's allergies indicates:   Allergen Reactions    Tetracyclines Nausea Only       Past Medical History:   Diagnosis Date    Essential tremor     Hypertensive urgency 6/13/2023    Nuclear sclerosis - Both Eyes 9/19/2012     Past Surgical History:   Procedure Laterality Date    TONSILLECTOMY       Family History       Problem Relation (Age of Onset)    Cataracts Son    Prostate cancer Father          Tobacco Use    Smoking status: Former    Smokeless tobacco: Former   Substance and Sexual Activity    Alcohol use: Yes    Drug use: No    Sexual activity: Yes     Partners: Female     Review of Systems   Unable to perform ROS: Dementia   Objective:     Weight: 112 kg (246 lb 14.6 oz)  Body mass index is 45.16 kg/m².  Vital Signs (Most Recent):  Temp: 98 °F (36.7 °C) (06/14/23 0030)  Pulse: 69 (06/14/23 0045)  Resp: 16 (06/14/23 0030)  BP: (!) 168/79 (06/14/23 0030)  SpO2: (!) 94 % (06/14/23 0030) Vital Signs (24h Range):  Temp:  [98 °F (36.7 °C)-98.4 °F (36.9 °C)] 98 °F (36.7 °C)  Pulse:  [65-80] 69  Resp:  [15-20] 16  SpO2:  [94 %-98 %] 94 %  BP: (122-198)/(57-88) 168/79          Physical Exam     Neurosurgery Physical Exam  E3V4M6, AOx2;  CNi, PERRL;  FCx4;  obvious periorbital trauma    Significant Labs:  Recent Labs   Lab 06/13/23  1629   GLU 96      K 4.0      CO2 25   BUN 17   CREATININE 0.9   CALCIUM 9.1     Recent Labs   Lab 06/13/23  1629   WBC 6.41   HGB 13.3*   HCT 39.5*        No results for input(s): LABPT, INR, APTT in the last 48 hours.  Microbiology Results (last 7 days)       ** No results found for the last 168 hours. **          ABGs: No results for input(s): PH, PCO2, PO2, HCO3, POCSATURATED, BE in the last 48 hours.  Cardiac markers:   Recent Labs   Lab 06/13/23  1629   TROPONINI 0.007     CMP:   Recent Labs   Lab 06/13/23  6007    GLU 96   CALCIUM 9.1   ALBUMIN 3.7   PROT 6.6      K 4.0   CO2 25      BUN 17   CREATININE 0.9   ALKPHOS 59   ALT 15   AST 23   BILITOT 0.5     CRP: No results for input(s): CRP in the last 48 hours.  ESR: No results for input(s): POCESR, ERYTHROCYTES in the last 48 hours.  LFTs:   Recent Labs   Lab 06/13/23  1629   ALT 15   AST 23   ALKPHOS 59   BILITOT 0.5   PROT 6.6   ALBUMIN 3.7     Procalcitonin: No results for input(s): PROCAL in the last 48 hours.    Significant Diagnostics:  I have reviewed all pertinent imaging results/findings within the past 24 hours.

## 2023-06-14 NOTE — SUBJECTIVE & OBJECTIVE
Past Medical History:   Diagnosis Date    Essential tremor     Hypertensive urgency 6/13/2023    Nuclear sclerosis - Both Eyes 9/19/2012     Past Surgical History:   Procedure Laterality Date    TONSILLECTOMY       Family History   Problem Relation Age of Onset    Prostate cancer Father     Cataracts Son     Psoriasis Neg Hx     Eczema Neg Hx     Glaucoma Neg Hx     Amblyopia Neg Hx     Blindness Neg Hx     Macular degeneration Neg Hx     Strabismus Neg Hx     Retinal detachment Neg Hx      Social History     Tobacco Use    Smoking status: Former    Smokeless tobacco: Former   Substance Use Topics    Alcohol use: Yes    Drug use: No     Review of patient's allergies indicates:   Allergen Reactions    Tetracyclines Nausea Only       Medications: I have reviewed the current medication administration record.    (Not in a hospital admission)      Review of Systems   Constitutional:  Negative for chills and fever.   HENT:  Negative for ear discharge and ear pain.    Eyes:  Negative for pain and itching.   Respiratory:  Negative for cough and shortness of breath.    Cardiovascular:  Negative for chest pain and leg swelling.   Gastrointestinal:  Negative for abdominal distention and abdominal pain.   Endocrine: Positive for cold intolerance and heat intolerance.   Genitourinary:  Negative for dysuria and enuresis.   Musculoskeletal:  Positive for arthralgias. Negative for back pain.   Skin:  Negative for rash and wound.   Allergic/Immunologic: Negative for environmental allergies and food allergies.   Neurological:         Starring, catatonia   Psychiatric/Behavioral:  Negative for agitation and confusion.    Objective:     Vital Signs (Most Recent):  Temp: 98.3 °F (36.8 °C) (06/13/23 2218)  Pulse: 67 (06/13/23 2318)  Resp: 15 (06/13/23 2318)  BP: (!) 158/57 (06/13/23 2317)  SpO2: 97 % (06/13/23 2318)    Vital Signs Range (Last 24H):  Temp:  [98.3 °F (36.8 °C)-98.4 °F (36.9 °C)]   Pulse:  [65-80]   Resp:  [15-20]   BP:  (122-198)/(57-88)   SpO2:  [94 %-98 %]        Physical Exam  Vitals and nursing note reviewed.   Constitutional:       Appearance: Normal appearance. He is normal weight.   HENT:      Head: Normocephalic and atraumatic.   Eyes:      Extraocular Movements: Extraocular movements intact.      Conjunctiva/sclera: Conjunctivae normal.      Pupils: Pupils are equal, round, and reactive to light.   Cardiovascular:      Rate and Rhythm: Normal rate and regular rhythm.   Pulmonary:      Effort: Pulmonary effort is normal.      Breath sounds: Normal breath sounds.   Abdominal:      General: Abdomen is flat. Bowel sounds are normal.      Palpations: Abdomen is soft.   Musculoskeletal:         General: Normal range of motion.      Cervical back: Normal range of motion.   Skin:     General: Skin is warm and dry.   Neurological:      General: No focal deficit present.      Mental Status: He is alert and oriented to person, place, and time. Mental status is at baseline.            Neurological Exam:   LOC: alert  Attention Span: Good   Language: No aphasia  Articulation: No dysarthria  Orientation: Person, Place, Time   Visual Fields: Full  EOM (CN III, IV, VI): Full/intact  Pupils (CN II, III): PERRL  Facial Sensation (CN V): Normal  Facial Movement (CN VII): Symmetric facial expression    Gag Reflex: present  Reflexes: 2+ throughout  Motor: Arm left  Normal 5/5  Leg left  Normal 5/5  Arm right  Normal 5/5  Leg right Normal 5/5  Cerebellum: No evidence of appendicular or axial ataxia  Sensation: Intact to light touch, temperature and vibration  Tone: Normal tone throughout      Laboratory:  CMP:   Recent Labs   Lab 06/13/23  1629   CALCIUM 9.1   ALBUMIN 3.7   PROT 6.6      K 4.0   CO2 25      BUN 17   CREATININE 0.9   ALKPHOS 59   ALT 15   AST 23   BILITOT 0.5     CBC:   Recent Labs   Lab 06/13/23  1629   WBC 6.41   RBC 4.20*   HGB 13.3*   HCT 39.5*      MCV 94   MCH 31.7*   MCHC 33.7     Lipid Panel: No  results for input(s): CHOL, LDLCALC, HDL, TRIG in the last 168 hours.  Coagulation: No results for input(s): PT, INR, APTT in the last 168 hours.  Hgb A1C: No results for input(s): HGBA1C in the last 168 hours.  TSH:   Recent Labs   Lab 06/13/23  1629   TSH 1.331       Diagnostic Results:      Brain imaging:  CT head w/o contrast 6-13-23 results:  Right frontal scalp localized soft tissue swelling/contusion without displaced skull fracture, acute large vascular territory infarct or intracranial hemorrhage identified.     Lower left pontine subtle hypoattenuating focus which may reflect artifact related to streak versus age-indeterminate lacunar type infarct.  Clinical correlation advised.     Senescent change and suspected sequela of chronic microvascular ischemic change, with grossly similar multifocal remote lacunar type infarcts.      MRI Brain w w/o contrast 6-13-23 results:    New multifocal punctate infarcts throughout the left cerebral hemisphere.     New small left subdural hemorrhage along the left temporal lobe.    Vessel Imaging:      Cardiac Evaluation:   EKG 6-13-23 results:  Normal sinus rhythm Left axis deviation Anterior infarct (cited on or before 02-FEB-2011) Abnormal ECG When compared with ECG of 02-FEB-2011 12:39, No significant change was found

## 2023-06-14 NOTE — NURSING
MD Tim Rodney notified at this time that patient is now placed on EEG monitor and is now being monitored for the next 24 hours.

## 2023-06-14 NOTE — CONSULTS
[facility] - Int[facility] ([place] [id number])  Neurosurgery  Consult Note    Consults  Subjective:     Chief Complaint/Reason for Admission: SDH    History of Present Illness: 89M w/ PMH HTN, multiple CVAs & Alzheimer's dementia who presents to [facility] [place] for recurrent episodes of LOC/AMS found to have incidental L temporal SDH. Patient is poor historian and unable to provide HPI/ROS. Per records, the patient has had a several day history of multiple episodes of LOC/AMS with a fall on 6/11 where he hit his head for which he was taken to the ED. At OSH ED, CTH and CT Csp were unremarkable, however further work-up with MRI showed small L temporal SDH without mass effect or MLS. ROS unable to be completed. Patient is not on anti-plt/coag medications per medical records.      Medications Prior to Admission   Medication Sig Dispense Refill Last Dose    MULTIVITAMIN ORAL Take 1 tablet by mouth once daily.          Review of patient's allergies indicates:   Allergen Reactions    Tetracyclines Nausea Only       Past Medical History:   Diagnosis Date    Essential tremor     Hypertensive urgency 6/13/2023    Nuclear sclerosis - Both Eyes 9/19/2012     Past Surgical History:   Procedure Laterality Date    TONSILLECTOMY       Family History       Problem Relation (Age of Onset)    Cataracts Son    Prostate cancer Father          Tobacco Use    Smoking status: Former    Smokeless tobacco: Former   Substance and Sexual Activity    Alcohol use: Yes    Drug use: No    Sexual activity: Yes     Partners: Female     Review of Systems   Unable to perform ROS: Dementia   Objective:     Weight: 112 kg (246 lb 14.6 oz)  Body mass index is 45.16 kg/m².  Vital Signs (Most Recent):  Temp: 98 °F (36.7 °C) (06/14/23 0030)  Pulse: 69 (06/14/23 0045)  Resp: 16 (06/14/23 0030)  BP: (!) 168/79 (06/14/23 0030)  SpO2: (!) 94 % (06/14/23 0030) Vital Signs (24h Range):  Temp:  [98 °F (36.7 °C)-98.4 °F (36.9 °C)] 98 °F (36.7 °C)  Pulse:  [65-80]  69  Resp:  [15-20] 16  SpO2:  [94 %-98 %] 94 %  BP: (122-198)/(57-88) 168/79          Physical Exam     Neurosurgery Physical Exam  E3V4M6, AOx2;  CNi, PERRL;  FCx4;  obvious periorbital trauma    Significant Labs:  Recent Labs   Lab 06/13/23  1629   GLU 96      K 4.0      CO2 25   BUN 17   CREATININE 0.9   CALCIUM 9.1     Recent Labs   Lab 06/13/23  1629   WBC 6.41   HGB 13.3*   HCT 39.5*        No results for input(s): LABPT, INR, APTT in the last 48 hours.  Microbiology Results (last 7 days)       ** No results found for the last 168 hours. **          ABGs: No results for input(s): PH, PCO2, PO2, HCO3, POCSATURATED, BE in the last 48 hours.  Cardiac markers:   Recent Labs   Lab 06/13/23  1629   TROPONINI 0.007     CMP:   Recent Labs   Lab 06/13/23  1629   GLU 96   CALCIUM 9.1   ALBUMIN 3.7   PROT 6.6      K 4.0   CO2 25      BUN 17   CREATININE 0.9   ALKPHOS 59   ALT 15   AST 23   BILITOT 0.5     CRP: No results for input(s): CRP in the last 48 hours.  ESR: No results for input(s): POCESR, ERYTHROCYTES in the last 48 hours.  LFTs:   Recent Labs   Lab 06/13/23  1629   ALT 15   AST 23   ALKPHOS 59   BILITOT 0.5   PROT 6.6   ALBUMIN 3.7     Procalcitonin: No results for input(s): PROCAL in the last 48 hours.    Significant Diagnostics:  I have reviewed all pertinent imaging results/findings within the past 24 hours.    Assessment/Plan:     Subdural hematoma  89M w/ PMH HTN, multiple CVAs & Alzheimer's dementia who presents to [facility] for recurrent episodes of LOC/AMS found to have incidental L temporal SDH:    --Patient admitted to  floor on telemetry      -q4h neurochecks on floor  --All labs and diagnostics reviewed      -CTH 6/11 (OSH): No acute path read      -CT Csp 6/13 (OSH): No acute fractures read      -MRICTH 6/13: Thin L temporal SDH, mulitple small territory CVAs  --Follow-up CTH and 6h scan for stability  --Hold anti-plt/coag medications  --SBP <160 (cardene ggt;  hydralazine & labetalol PRN; transition to home meds when appropriate)  --Na >135  --AEDs per primary team  --HOB >30  --Recommend neurology & vascular neurology work-up & eval per primary team  --Continued syncopal work-up per primary team  --Follow-up coags (ordered)  --Patient does not require operative intervention at this time   -Anticipate outpatient clinic follow-up  --Continue to monitor clinically, notify NSGY immediately with any changes in neuro status    Dispo: Per  team      Thank you for your consult. I will follow-up with patient. Please contact us if you have any additional questions.    Christofer Thornton MD  Neurosurgery  [facility] - Intensive Care ([place])

## 2023-06-14 NOTE — HOSPITAL COURSE
Patient admitted to  for evaluation of syncopal episodes and staring spells. Given significant HTN, there was concern for PRES. MRI brain was done which showed multiple new punctate infarcts throughout the left cerebral hemisphere. Additionally, a small subdural hematoma along the left temporal lobe that was not visualized on prior CT was seen, thought most likely secondary to his recent fall. Per neurosurgery, no intervention for subdural hematoma given its small size, and repeat CT head on 6/14 showed no interval change in size or concern for active bleeding. Neurology was consulted given pt's MRI findings and his presentation, recommended formal 24-hour EEG and initiation of ASA, statin, and keppra for seizure ppx. CTA head and neck showed atherosclerotic disease in the proximal ICA greater on the left with high-grade left proximal ICA stenosis measuring approximately 80%. Vascular surgery were consulted, reported no acute intervention needed and to follow up outpatient after carotid ultrasound obtained. TTE showed moderate aortic stenosis, but otherwise unremarkable. Patient was stable throughout admission and had no further syncopal events. His EEG was negative for any seizure activity. He exhibited sleep associated hypoxia so referral to sleep medicine was placed. He was stable on discharge with new meds ASA/lipitor/keppra. Referrals placed to Neurology (for follow up and to discuss continuation of Keppra), Vascular Surgery (for carotid artery stenosis) and PCP.     Physical Exam  Vitals reviewed.   Constitutional:       General: He is not in acute distress.     Appearance: Normal appearance.   HENT:      Head: Normocephalic.      Comments: Contusions noted over the left and right eyebrows and ecchymoses of the lower eyelids.     Nose: No congestion or rhinorrhea.      Mouth/Throat:      Mouth: Mucous membranes are moist.      Pharynx: Oropharynx is clear.   Cardiovascular:      Rate and Rhythm: Normal rate  and regular rhythm.      Pulses: Normal pulses.      Heart sounds: Murmur (Soft crescendo-decrescendo systolic murmur) heard.   Pulmonary:      Effort: Pulmonary effort is normal. No respiratory distress.      Breath sounds: Normal breath sounds.   Abdominal:      General: Bowel sounds are normal.      Palpations: Abdomen is soft.      Tenderness: There is no abdominal tenderness.   Musculoskeletal:         General: No swelling or tenderness.      Cervical back: Full passive range of motion without pain and normal range of motion.   Skin:     General: Skin is warm and dry.   Neurological:      General: No focal deficit present.      Mental Status: He is alert and oriented to person, place, and time.   Psychiatric:         Mood and Affect: Mood normal.         Behavior: Behavior normal.

## 2023-06-14 NOTE — PLAN OF CARE
06/14/23 0340   Post-Acute Status   Post-Acute Authorization Other   Coverage Managed Medicare [employer]   Other Status No Post-Acute Service Needs   Discharge Delays None known at this time   Discharge Plan   Discharge Plan A Home;Home with family     Patient t lives with his wife in his home. Patient has been independent and very active.     Patient and wife stated that patient is safe to return home on discharge.     Patient denies any post acute needs at this time.     JOSY Kendall, MSW-LMSW  Medical Social Worker/  ER Department

## 2023-06-14 NOTE — PLAN OF CARE
Problem: Physical Therapy  Goal: Physical Therapy Goal  Description: Goals to be met by: 23     Patient will increase functional independence with mobility by performin. Supine to sit with Contact Guard Assistance  2. Sit to supine with Contact Guard Assistance  3. Sit to stand transfer with Contact Guard Assistance  4. Gait  x 200 feet with Contact Guard Assistance using LRAD.   5. Ascend/descend 2 stair with bilateral Handrails Minimal Assistance using No Assistive Device.     Outcome: Ongoing, Progressing     Pt evaluated and goals established.

## 2023-06-14 NOTE — ASSESSMENT & PLAN NOTE
89 y/o male with multiple small punctate infarcts left cerebral hemisphere not causing any neuro deficits.     Antithrombotics: ASA 81 mg daily    Statins: Lipitor 40 mg daily    Aggressive risk factor modification: HTN     Rehab efforts: The patient has been evaluated by a stroke team provider and the therapy needs have been fully considered based off the presenting complaints and exam findings. The following therapy evaluations are needed: PT evaluate and treat, OT evaluate and treat    Diagnostics ordered/pending: Carotid ultrasound to assess vasculature, Other: EEG    VTE prophylaxis: Heparin 5000 units SQ every 8 hours  Mechanical prophylaxis: Place SCDs    BP parameters: Infarct: Normotensive

## 2023-06-14 NOTE — ASSESSMENT & PLAN NOTE
"See "loss of consciousness". May be contributory to pt's presentation. Not on any medications. Follows with neurology outpatient.    - Continue to monitor mental status and evaluate for any acute worsening  "

## 2023-06-14 NOTE — PT/OT/SLP EVAL
Physical Therapy Evaluation    Patient Name:  James Dc   MRN:  [id number]    Recommendations:     Discharge Recommendations: other  Discharge Equipment Recommendations: none   Barriers to discharge: None    Assessment:     James Dc is a 89 y.o. male admitted with a medical diagnosis of Loss of consciousness.  He presents with the following impairments/functional limitations: weakness, impaired endurance, impaired functional mobility, gait instability, impaired balance, decreased coordination, decreased lower extremity function. Pt completed supine to sit transfer with modA x 2. Pt required maxA x 2 for anterior scooting to achieve B foot contact with floor. Pt required Xavier x 2 and no AD for STS from EOB. Pt ambulated 12 ft to chair with no AD and Xavier x 2 due to increased unsteadiness and difficulty with step initiation. Pt is progressing towards goals and would benefit from continued skilled therapy to return to PLOF.    Rehab Prognosis: Fair; patient would benefit from acute skilled PT services to address these deficits and reach maximum level of function.    Recent Surgery: * No surgery found *      Plan:     During this hospitalization, patient to be seen 4 x/week to address the identified rehab impairments via gait training, therapeutic activities, therapeutic exercises, neuromuscular re-education and progress toward the following goals:    Plan of Care Expires:  07/14/23    Subjective     Chief Complaint: no complaints verbalized  Patient/Family Comments/goals: return to PLOF  Pain/Comfort:  Pain Rating 1: 0/10  Pain Rating Post-Intervention 1: 0/10    Patients cultural, spiritual, Religion conflicts given the current situation: no    Living Environment:  Pt lives in [address] home with spouse. Pt's bedroom is located on first floor. There are 2 STEs with B handrails to enter.  Prior to admission, patients level of function was independent with no AD.  Equipment used at home: none.  DME owned (not  currently used): rolling walker and single point cane.  Upon discharge, patient will have assistance from family.    Objective:     Communicated with RN prior to session.  Patient found supine with telemetry, pulse ox (continuous), blood pressure cuff  upon PT entry to room.    General Precautions: Standard, fall, seizure  Orthopedic Precautions:N/A   Braces: N/A  Respiratory Status: Room air    Exams:  Gross Motor Coordination:  Pt demonstrated difficulty with coordinating B LE during gait for turns and step initiation.  RLE ROM: WFL  RLE Strength: WFL  LLE ROM: WFL  LLE Strength: WFL    Functional Mobility:  Bed Mobility:     Scooting: maximal assistance and of 2 persons  Supine to Sit: moderate assistance and of 2 persons  Transfers:     Sit to Stand:  minimum assistance of 2 persons with no AD  Gait: Pt ambulated 12 ft with no AD and Xavier x 2 due to increased unsteadiness and difficulty with step initiation. Pt demonstrated shuffling gait pattern with minimal B foot clearance. Pt had difficulty turning B LE to sit down in chair and required verbal cues and minimal lateral weight-shift to initiate steps.      AM-PAC 6 CLICK MOBILITY  Total Score:15       Treatment & Education:  Pt found supine in bed with family present. Pt educated on role of PT and POC and agreed to session today. Pt completed supine to sit transfer with modA x 2 to facilitate lateral trunk flexion to upright sitting position. Pt required maxA x 2 for anterior scooting to achieve B foot contact with floor. Pt required Xavier x 2 and no AD for STS from EOB to facilitate hip extension to achieve upright standing posture. Pt ambulated 12 ft to chair with no AD and Xavier x 2 due to increased unsteadiness and difficulty with step initiation. Pt demonstrated shuffling gait pattern with minimal B foot clearance. Pt had difficulty turning B LE to sit down in chair and required verbal cues and minimal lateral weight-shift to initiate steps. Pt left up in  chair and encouraged to remain in position to decrease sleepiness.    Patient left up in chair with all lines intact, call button in reach, RN notified, and family present.    GOALS:   Multidisciplinary Problems       Physical Therapy Goals          Problem: Physical Therapy    Goal Priority Disciplines Outcome Goal Variances Interventions   Physical Therapy Goal     PT, PT/OT Ongoing, Progressing     Description: Goals to be met by: 23     Patient will increase functional independence with mobility by performin. Supine to sit with Contact Guard Assistance  2. Sit to supine with Contact Guard Assistance  3. Sit to stand transfer with Contact Guard Assistance  4. Gait  x 200 feet with Contact Guard Assistance using LRAD.   5. Ascend/descend 2 stair with bilateral Handrails Minimal Assistance using No Assistive Device.                          History:     Past Medical History:   Diagnosis Date    Essential tremor     Hypertensive urgency 2023    Nuclear sclerosis - Both Eyes 2012       Past Surgical History:   Procedure Laterality Date    TONSILLECTOMY         Time Tracking:     PT Received On: 23  PT Start Time: 1353     PT Stop Time: 1403  PT Total Time (min): 10 min     Billable Minutes: Evaluation 10      2023

## 2023-06-14 NOTE — ED NOTES
Patient daughter Judi would like to be called with updates on patient. States she does not mind being called at any time. Can be reached at [phone].

## 2023-06-15 PROBLEM — I65.29 CAROTID STENOSIS, ASYMPTOMATIC: Status: ACTIVE | Noted: 2023-06-15

## 2023-06-15 PROBLEM — I63.232 CEREBROVASCULAR ACCIDENT (CVA) DUE TO STENOSIS OF LEFT CAROTID ARTERY: Status: ACTIVE | Noted: 2023-06-13

## 2023-06-15 LAB
ALBUMIN SERPL BCP-MCNC: 3.5 G/DL (ref 3.5–5.2)
ALP SERPL-CCNC: 53 U/L (ref 55–135)
ALT SERPL W/O P-5'-P-CCNC: 11 U/L (ref 10–44)
ANION GAP SERPL CALC-SCNC: 9 MMOL/L (ref 8–16)
AST SERPL-CCNC: 16 U/L (ref 10–40)
BASOPHILS # BLD AUTO: 0.05 K/UL (ref 0–0.2)
BASOPHILS NFR BLD: 0.8 % (ref 0–1.9)
BILIRUB SERPL-MCNC: 0.7 MG/DL (ref 0.1–1)
BUN SERPL-MCNC: 21 MG/DL (ref 8–23)
CALCIUM SERPL-MCNC: 9.1 MG/DL (ref 8.7–10.5)
CHLORIDE SERPL-SCNC: 104 MMOL/L (ref 95–110)
CO2 SERPL-SCNC: 28 MMOL/L (ref 23–29)
CREAT SERPL-MCNC: 0.9 MG/DL (ref 0.5–1.4)
DIFFERENTIAL METHOD: ABNORMAL
EOSINOPHIL # BLD AUTO: 0.2 K/UL (ref 0–0.5)
EOSINOPHIL NFR BLD: 3 % (ref 0–8)
ERYTHROCYTE [DISTWIDTH] IN BLOOD BY AUTOMATED COUNT: 12.5 % (ref 11.5–14.5)
EST. GFR  (NO RACE VARIABLE): >60 ML/MIN/1.73 M^2
GLUCOSE SERPL-MCNC: 97 MG/DL (ref 70–110)
HCT VFR BLD AUTO: 42 % (ref 40–54)
HGB BLD-MCNC: 13.9 G/DL (ref 14–18)
IMM GRANULOCYTES # BLD AUTO: 0.02 K/UL (ref 0–0.04)
IMM GRANULOCYTES NFR BLD AUTO: 0.3 % (ref 0–0.5)
LYMPHOCYTES # BLD AUTO: 1.5 K/UL (ref 1–4.8)
LYMPHOCYTES NFR BLD: 24.2 % (ref 18–48)
MAGNESIUM SERPL-MCNC: 2.1 MG/DL (ref 1.6–2.6)
MCH RBC QN AUTO: 31.4 PG (ref 27–31)
MCHC RBC AUTO-ENTMCNC: 33.1 G/DL (ref 32–36)
MCV RBC AUTO: 95 FL (ref 82–98)
MONOCYTES # BLD AUTO: 0.6 K/UL (ref 0.3–1)
MONOCYTES NFR BLD: 8.7 % (ref 4–15)
NEUTROPHILS # BLD AUTO: 4 K/UL (ref 1.8–7.7)
NEUTROPHILS NFR BLD: 63 % (ref 38–73)
NRBC BLD-RTO: 0 /100 WBC
PHOSPHATE SERPL-MCNC: 3.2 MG/DL (ref 2.7–4.5)
PLATELET # BLD AUTO: 180 K/UL (ref 150–450)
PMV BLD AUTO: 8.8 FL (ref 9.2–12.9)
POTASSIUM SERPL-SCNC: 3.5 MMOL/L (ref 3.5–5.1)
PROT SERPL-MCNC: 6.2 G/DL (ref 6–8.4)
RBC # BLD AUTO: 4.43 M/UL (ref 4.6–6.2)
SODIUM SERPL-SCNC: 141 MMOL/L (ref 136–145)
WBC # BLD AUTO: 6.29 K/UL (ref 3.9–12.7)

## 2023-06-15 PROCEDURE — 36415 COLL VENOUS BLD VENIPUNCTURE: CPT

## 2023-06-15 PROCEDURE — 95714 VEEG EA 12-26 HR UNMNTR: CPT

## 2023-06-15 PROCEDURE — 95720 EEG PHY/QHP EA INCR W/VEEG: CPT | Mod: ,,, | Performed by: PSYCHIATRY & NEUROLOGY

## 2023-06-15 PROCEDURE — 99232 SBSQ HOSP IP/OBS MODERATE 35: CPT | Mod: GC,,, | Performed by: STUDENT IN AN ORGANIZED HEALTH CARE EDUCATION/TRAINING PROGRAM

## 2023-06-15 PROCEDURE — 99232 PR SUBSEQUENT HOSPITAL CARE,LEVL II: ICD-10-PCS | Mod: GC,,, | Performed by: STUDENT IN AN ORGANIZED HEALTH CARE EDUCATION/TRAINING PROGRAM

## 2023-06-15 PROCEDURE — 25000003 PHARM REV CODE 250

## 2023-06-15 PROCEDURE — 95720 PR EEG, W/VIDEO, CONT RECORD, I&R, >12<26 HRS: ICD-10-PCS | Mod: ,,, | Performed by: PSYCHIATRY & NEUROLOGY

## 2023-06-15 PROCEDURE — 83735 ASSAY OF MAGNESIUM: CPT

## 2023-06-15 PROCEDURE — 84100 ASSAY OF PHOSPHORUS: CPT

## 2023-06-15 PROCEDURE — 95700 EEG CONT REC W/VID EEG TECH: CPT

## 2023-06-15 PROCEDURE — G0378 HOSPITAL OBSERVATION PER HR: HCPCS

## 2023-06-15 PROCEDURE — 80053 COMPREHEN METABOLIC PANEL: CPT

## 2023-06-15 PROCEDURE — 85025 COMPLETE CBC W/AUTO DIFF WBC: CPT

## 2023-06-15 RX ORDER — ENOXAPARIN SODIUM 100 MG/ML
40 INJECTION SUBCUTANEOUS EVERY 24 HOURS
Status: DISCONTINUED | OUTPATIENT
Start: 2023-06-16 | End: 2023-06-15

## 2023-06-15 RX ORDER — ENOXAPARIN SODIUM 100 MG/ML
40 INJECTION SUBCUTANEOUS EVERY 24 HOURS
Status: DISCONTINUED | OUTPATIENT
Start: 2023-06-15 | End: 2023-06-15

## 2023-06-15 RX ADMIN — LEVETIRACETAM 500 MG: 500 TABLET, FILM COATED ORAL at 08:06

## 2023-06-15 RX ADMIN — LEVETIRACETAM 500 MG: 500 TABLET, FILM COATED ORAL at 09:06

## 2023-06-15 RX ADMIN — ATORVASTATIN CALCIUM 40 MG: 40 TABLET, FILM COATED ORAL at 09:06

## 2023-06-15 RX ADMIN — ASPIRIN 81 MG: 81 TABLET, COATED ORAL at 08:06

## 2023-06-15 NOTE — PLAN OF CARE
Patient's chart was reviewed by a stroke provider. Case discussed and reviewed with staff.    Briefly, patient is a 89 yo male with PMHx dementia who presented with staring episodes, LOC and suffered a fall with head trauma on 6/11. Small L SDH found post fall. MRI with punctate infarcts on the left hemisphere. Patient seen overnight by night team 6/14 and NIHSS was documented as 0 with infarcts unlikely to be contributing to patient's presentation. Echo w EF 60%, mild AR, no LAE. CTA performed with ~80% L ICA stenosis for which carotid ultrasound was recommended. Carotid US is pending. Continue ASA and atorvastatin 40. Pending US results, patient may benefit from vasc surgery consult. Will continue to follow.

## 2023-06-15 NOTE — SUBJECTIVE & OBJECTIVE
Interval History: No acute events overnight. Patient feeling fine and no further seizure-like activities with decreased consciousness. Patient now on ASA, statin, and keppra. Vascular Surgery recommended no acute surgical intervention as they believe that pt's L ICA 80% stenosis is asymptomatic. Will continue medical management. Pending carotid US and formal 24 hour EEG read.    Review of Systems   Constitutional:  Negative for chills and fever.   HENT:  Negative for congestion and sore throat.    Eyes:  Negative for photophobia and visual disturbance.   Respiratory:  Negative for cough and shortness of breath.    Cardiovascular:  Negative for chest pain and palpitations.   Gastrointestinal:  Negative for abdominal pain.   Genitourinary:  Negative for dysuria and hematuria.   Musculoskeletal:  Negative for arthralgias and back pain.   Skin:  Negative for rash and wound.   Neurological:  Positive for tremors and weakness. Negative for dizziness and syncope.   Psychiatric/Behavioral:  Positive for decreased concentration. Negative for agitation and behavioral problems.    Objective:     Vital Signs (Most Recent):  Temp: 97.9 °F (36.6 °C) (06/15/23 0900)  Pulse: 110 (06/15/23 1519)  Resp: 17 (06/15/23 0458)  BP: 127/75 (06/15/23 0458)  SpO2: 98 % (06/15/23 0458) Vital Signs (24h Range):  Temp:  [97.9 °F (36.6 °C)-98.1 °F (36.7 °C)] 97.9 °F (36.6 °C)  Pulse:  [] 110  Resp:  [16-18] 17  SpO2:  [97 %-98 %] 98 %  BP: (127-153)/(63-75) 127/75     Weight: 50.8 kg (112 lb)  Body mass index is 20.49 kg/m².  No intake or output data in the 24 hours ending 06/15/23 1632      Physical Exam  Vitals reviewed.   Constitutional:       General: He is not in acute distress.     Appearance: Normal appearance.   HENT:      Head: Normocephalic.      Comments: Contusions noted over the left and right eyebrows and ecchymoses of the lower eyelids.     Nose: No congestion or rhinorrhea.      Mouth/Throat:      Mouth: Mucous membranes  are moist.      Pharynx: Oropharynx is clear.   Cardiovascular:      Rate and Rhythm: Normal rate and regular rhythm.      Pulses: Normal pulses.      Heart sounds: Murmur (Soft crescendo-decrescendo systolic murmur) heard.   Pulmonary:      Effort: Pulmonary effort is normal. No respiratory distress.      Breath sounds: Normal breath sounds.   Abdominal:      General: Bowel sounds are normal.      Palpations: Abdomen is soft.      Tenderness: There is no abdominal tenderness.   Musculoskeletal:         General: No swelling or tenderness.      Cervical back: Full passive range of motion without pain and normal range of motion.   Skin:     General: Skin is warm and dry.   Neurological:      General: No focal deficit present.      Mental Status: He is alert and oriented to person, place, and time.   Psychiatric:         Mood and Affect: Mood normal.         Behavior: Behavior normal.           Significant Labs: All pertinent labs within the past 24 hours have been reviewed.    Significant Imaging: I have reviewed all pertinent imaging results/findings within the past 24 hours.

## 2023-06-15 NOTE — CONSULTS
"[facility] - [facility] ([facility]-)  Vascular Surgery  Consult Note    Inpatient consult to Vascular Surgery  Consult performed by: Girma Bermudez MD  Consult ordered by: Misa Carpenter MD        Subjective:     Chief Complaint/Reason for Admission: Carotid artery stenosis    History of Present Illness:   James Dc is our 89 yo M with mild dementia who presented to [facility] due to episodes of syncope. He had a fall over the weekend which his family felt was due to a syncopal event. He was seen at [facility] and was discharged with PCP follow up. After his PCP visit on 6/13/23, his family returned from the store and found his unresponsive in a recliner. When they were able to arouse him, he was non-communicative for 30 minutes. The patient reports that he recalls these episodes and was able to hear his family, but he was unable to talk.    He presented to the [facility] ED following this and was found to be in hypertensive urgency. An MRI was obtained to evaluate for PRES which revealed remote lacunar infarcts, a new left subdural hematoma, and "new punctate infarcts throughout the left cerebral hemisphere". A CTA was obtained which was interpreted as 80% L Carotid stenosis.    Today, he reports that he continues to have no deficits from these events. He denies amaurosis       Medications Prior to Admission   Medication Sig Dispense Refill Last Dose    MULTIVITAMIN ORAL Take 1 tablet by mouth once daily.          Review of patient's allergies indicates:   Allergen Reactions    Tetracyclines Nausea Only       Past Medical History:   Diagnosis Date    Essential tremor     Hypertensive urgency 6/13/2023    Nuclear sclerosis - Both Eyes 9/19/2012     Past Surgical History:   Procedure Laterality Date    TONSILLECTOMY       Family History       Problem Relation (Age of Onset)    Cataracts Son    Prostate cancer Father          Tobacco Use    Smoking status: Former    Smokeless tobacco: Former   Substance and Sexual " Activity    Alcohol use: Yes    Drug use: No    Sexual activity: Yes     Partners: Female     Review of Systems   Constitutional:  Negative for appetite change and unexpected weight change.   HENT:  Negative for sore throat and trouble swallowing.    Eyes:         Positive for trauma secondary to fall   Respiratory:  Negative for chest tightness and shortness of breath.    Cardiovascular:  Negative for chest pain and leg swelling.   Gastrointestinal:  Negative for abdominal distention, abdominal pain and blood in stool.   Skin:  Negative for pallor.   Neurological:  Positive for syncope. Negative for weakness and numbness.   Hematological:  Does not bruise/bleed easily.   All other systems reviewed and are negative.  Objective:     Vital Signs (Most Recent):  Temp: 97.9 °F (36.6 °C) (06/15/23 0900)  Pulse: 67 (06/15/23 1107)  Resp: 17 (06/15/23 0458)  BP: 127/75 (06/15/23 0458)  SpO2: 98 % (06/15/23 0458) Vital Signs (24h Range):  Temp:  [97.9 °F (36.6 °C)-98.1 °F (36.7 °C)] 97.9 °F (36.6 °C)  Pulse:  [58-87] 67  Resp:  [16-18] 17  SpO2:  [94 %-100 %] 98 %  BP: (127-164)/(63-76) 127/75     Weight: 50.8 kg (112 lb)  Body mass index is 20.49 kg/m².      Physical Exam  Vitals reviewed.   Constitutional:       General: He is not in acute distress.  HENT:      Nose: Nose normal.   Eyes:      Comments: Periorbital ecchymosis and swelling   Cardiovascular:      Rate and Rhythm: Normal rate and regular rhythm.      Pulses: Normal pulses.   Pulmonary:      Effort: Pulmonary effort is normal. No respiratory distress.   Musculoskeletal:      Cervical back: Normal range of motion and neck supple.      Right lower leg: No edema.      Left lower leg: No edema.   Neurological:      Mental Status: He is alert.      Comments: Alert and oriented x4  No focal deficits.  5/5 strength and intact sensation in both upper and lower extremities bilaterally        Significant Labs:  CBC:   Recent Labs   Lab 06/15/23  0508   WBC 6.29   RBC  "4.43*   HGB 13.9*   HCT 42.0      MCV 95   MCH 31.4*   MCHC 33.1     Coagulation:   Recent Labs   Lab 06/14/23  0331   LABPROT 11.1   INR 1.0   APTT 28.5       Significant Diagnostics:  I have reviewed all pertinent imaging results/findings within the past 24 hours.    Assessment/Plan:     Carotid stenosis, asymptomatic  89 yo M with mild dementia presenting due to recurrent falls and syncopal/catatonic events. His workup has revealed chronic punctate infarcts as well as a new subdural hematoma and new punctate infarcts "throughout the left cerebral hemisphere" without neurologic deficits on exam.  These diffuse infarcts throughout the left hemisphere are not consistent with his left carotid stenosis as an etiology. Given this, his L ICA stenosis is an asymptomatic lesion.    -No indication for urgent vascular surgery intervention at this time  -F/u Carotid US to better evaluate the degree of stenosis  -Recommend medical management at this time with ASA 81 and high intensity statin (Atorvastatin 40 mg or greater)  -Pending ultrasound results, he would be appropriate for outpatient follow up of his carotid stenosis.        Thank you for your consult. I will follow-up with patient. Please contact us if you have any additional questions.    Girma Bermudez MD  Vascular Surgery  [facility] - Intensive Care ([facility]-)  "

## 2023-06-15 NOTE — ASSESSMENT & PLAN NOTE
"89 yo M with mild dementia presenting due to recurrent falls and syncopal/catatonic events. His workup has revealed chronic punctate infarcts as well as a new subdural hematoma and new punctate infarcts "throughout the left cerebral hemisphere" without neurologic deficits on exam.  These diffuse infarcts throughout the left hemisphere are not consistent with his left carotid stenosis as an etiology. Given this, his L ICA stenosis is an asymptomatic lesion.    -No indication for urgent vascular surgery intervention at this time  -F/u Carotid US to better evaluate the degree of stenosis  -Recommend medical management at this time with ASA 81 and high intensity statin (Atorvastatin 40 mg or greater)  -Pending ultrasound results, he would be appropriate for outpatient follow up of his carotid stenosis.  "

## 2023-06-15 NOTE — PLAN OF CARE
"Client is alert and oriented to person place and time. Ambulated to bathroom with assistance x 1. No difficulty voiding. Patient stating "im ready to go home". Vitals are stable this shift. No significant changes noted throughout shift.    Problem: Adult Inpatient Plan of Care  Goal: Plan of Care Review  Outcome: Ongoing, Progressing  Goal: Patient-Specific Goal (Individualized)  Outcome: Ongoing, Progressing  Goal: Absence of Hospital-Acquired Illness or Injury  Outcome: Ongoing, Progressing  Goal: Optimal Comfort and Wellbeing  Outcome: Ongoing, Progressing  Goal: Readiness for Transition of Care  Outcome: Ongoing, Progressing     Problem: Fall Injury Risk  Goal: Absence of Fall and Fall-Related Injury  Outcome: Ongoing, Progressing     Problem: Bariatric Environmental Safety  Goal: Safety Maintained with Care  Outcome: Ongoing, Progressing     Problem: Impaired Wound Healing  Goal: Optimal Wound Healing  Outcome: Ongoing, Progressing     "

## 2023-06-15 NOTE — ASSESSMENT & PLAN NOTE
"See "observed seizure-like activity" for further details and management.    Antithrombotics for secondary stroke prevention: Antiplatelets: Aspirin: 81 mg daily  Statins for secondary stroke prevention and hyperlipidemia, if present:   Statins: Atorvastatin- 40 mg daily  Aggressive risk factor modification: HTN, age  Diagnostics ordered/pending: Carotid ultrasound to assess vasculature  VTE prophylaxis: Holding in setting of SDH, but will resume lovenox given stability  BP parameters: Try to keep BP < 180.  "

## 2023-06-15 NOTE — ASSESSMENT & PLAN NOTE
Chronic, longstanding condition. Was noted as mild by previous PCP. Soft systolic murmur appreciated on exam. May be contributory and causing syncopal episodes, however, episodes seem inconsistent with cardiac syncope.    - TTE showed moderate aortic valve stenosis  - Continue to monitor

## 2023-06-15 NOTE — PROGRESS NOTES
[facility] - [facility] ([place] [id number])  [facility] Medicine  Progress Note    Patient Name: James Dc  MRN: [id number]  Patient Class: OP- Observation   Admission Date: 6/13/2023  Length of Stay: 0 days  Attending Physician: Sonny Sky MD  Primary Care Provider: JEET Briggs MD        Subjective:     Principal Problem:Observed seizure-like activity        HPI:  James Dc is a 89 year old man with history of Alzheimer's dementia and essential tremor presents with recurrent episodes of transient loss of consciousness and decreased responsiveness. Patient brought to the ED by family, who provided majority of history. Per family, the patient has been having episodes of catatonia, where his body becomes completely still and he becomes unresponsive. On 6/11, patient had a fall and family was concerned he had another catatonic episode that caused the fall. He was seen at [facility] ED and workup including labs, CT head, C-spine, and maxillary face were obtained. Workup was ultimately unremarkable and the patient was discharged with follow-up with his PCP today on 6/13. During his PCP visit, he was told to present to the ED if he had any recurrent episodes. Later today, family states that the patient was found unresponsive in his recliner at home. He was blankly staring and unresponsive for about 30 minutes, and was found to be confused afterwards. Patient states he can hear and register what is saying to him, but he is unable to move or speak during the episodes. Patient denies any headache, vision changes, focal weakness/numbness, or chest pain.    On arrival to the ER, patient was afebrile with stable vital signs. Labs were largely unremarkable. CT head showed no acute intracranial findings. EKG without acute ischemic changes. Urinalysis without signs of infection. Patient admitted to hospital medicine for further observation of catatonic episodes with decreased responsiveness.      Overview/Hospital  Course:  While in the ED, patient developed significant hypertension, up to  and concerning for possible PRES. MRI brain was done which showed multiple new punctate infarcts throughout the left cerebral hemisphere. There is also a small subdural hematoma along the left temporal lobe that was not visualized on prior CT most likely from his recent fall. Per neurosurgery, no intervention for subdural hematoma given its small size, and repeat CT head on 6/14 showed no interval change in size or concern for active bleeding. EEG cap placed overnight on 6/13 showed no findings of seizure activity. Neurology was consulted given pt's MRI findings and his presentation, recommended formal 24-hour EEG and initiation of ASA, statin, and keppra for seizure ppx. CTA head and neck showed atherosclerotic disease in the proximal ICA greater on the left with high-grade left proximal ICA stenosis measuring approximately 80%. US carotid ordered and Vascular Surgery was consulted for further evaluation. Vascular Surgery recommended no acute intervention and continuing medical management. Pending results of formal EEG and carotid US.      Interval History: No acute events overnight. Patient feeling fine and no further seizure-like activities with decreased consciousness. Patient now on ASA, statin, and keppra. Vascular Surgery recommended no acute surgical intervention as they believe that pt's L ICA 80% stenosis is asymptomatic. Will continue medical management. Pending carotid US and formal 24 hour EEG read.    Review of Systems   Constitutional:  Negative for chills and fever.   HENT:  Negative for congestion and sore throat.    Eyes:  Negative for photophobia and visual disturbance.   Respiratory:  Negative for cough and shortness of breath.    Cardiovascular:  Negative for chest pain and palpitations.   Gastrointestinal:  Negative for abdominal pain.   Genitourinary:  Negative for dysuria and hematuria.   Musculoskeletal:   Negative for arthralgias and back pain.   Skin:  Negative for rash and wound.   Neurological:  Positive for tremors and weakness. Negative for dizziness and syncope.   Psychiatric/Behavioral:  Positive for decreased concentration. Negative for agitation and behavioral problems.    Objective:     Vital Signs (Most Recent):  Temp: 97.9 °F (36.6 °C) (06/15/23 0900)  Pulse: 110 (06/15/23 1519)  Resp: 17 (06/15/23 0458)  BP: 127/75 (06/15/23 0458)  SpO2: 98 % (06/15/23 0458) Vital Signs (24h Range):  Temp:  [97.9 °F (36.6 °C)-98.1 °F (36.7 °C)] 97.9 °F (36.6 °C)  Pulse:  [] 110  Resp:  [16-18] 17  SpO2:  [97 %-98 %] 98 %  BP: (127-153)/(63-75) 127/75     Weight: 50.8 kg (112 lb)  Body mass index is 20.49 kg/m².  No intake or output data in the 24 hours ending 06/15/23 1632      Physical Exam  Vitals reviewed.   Constitutional:       General: He is not in acute distress.     Appearance: Normal appearance.   HENT:      Head: Normocephalic.      Comments: Contusions noted over the left and right eyebrows and ecchymoses of the lower eyelids.     Nose: No congestion or rhinorrhea.      Mouth/Throat:      Mouth: Mucous membranes are moist.      Pharynx: Oropharynx is clear.   Cardiovascular:      Rate and Rhythm: Normal rate and regular rhythm.      Pulses: Normal pulses.      Heart sounds: Murmur (Soft crescendo-decrescendo systolic murmur) heard.   Pulmonary:      Effort: Pulmonary effort is normal. No respiratory distress.      Breath sounds: Normal breath sounds.   Abdominal:      General: Bowel sounds are normal.      Palpations: Abdomen is soft.      Tenderness: There is no abdominal tenderness.   Musculoskeletal:         General: No swelling or tenderness.      Cervical back: Full passive range of motion without pain and normal range of motion.   Skin:     General: Skin is warm and dry.   Neurological:      General: No focal deficit present.      Mental Status: He is alert and oriented to person, place, and time.    Psychiatric:         Mood and Affect: Mood normal.         Behavior: Behavior normal.           Significant Labs: All pertinent labs within the past 24 hours have been reviewed.    Significant Imaging: I have reviewed all pertinent imaging results/findings within the past 24 hours.      Assessment/Plan:      * Observed seizure-like activity  89M w/Alzheimer's dementia and mild AS who presents with recurrent episodes of loss of consciousness with decreased responsiveness. These episodes have been unwitnessed, however pt's family reports that they have seen him after the events and the pt has been altered briefly. Two days ago, pt's wife heard a loud bang and saw the patient with his head on the table and was initially unresponsive, and when regaining consciousness, was confused. On today's episode pt was found staring up at the ceiling and was unresponsive and displaying rigid, stiff muscles; when pt finally came to, they report that he was confused and had speech difficulties afterwards which lasted for several minutes. Patient does not recall any of the events thus far and cannot remember any preceding aura or event before the episodes. Pt reports some recent progressively worsening physical function and memory difficulties. No history of seizures reported in the past.    Workup thus far largely unremarkable. Labs grossly normal and CT head imaging without acute findings intracranially. Neuro exam is unrevealing. Pt with elevated blood pressures in the ED.  Differentials for his episodes of decreased responsiveness and LoC include: Acute stroke vs TIA vs encephalopathy 2/2 hypertensive emergency vs seizures.    Plans:  - MRI showed multiple new punctate infarcts throughout the left cerebral hemisphere. There is also a small subdural hematoma along the left temporal lobe. CT head repeat showed no interval change in SDH, likely not actively bleeding anymore.   - Allowing for permissive HTN in setting of acute  "punctate infarcts  - Neuro consulted, appreciate assistance   - 24 hr EEG ordered, will follow-up results  - PT/OT consulted for physical debility, final recs still pending   - Neurochecks q4h  - CTA head and neck showed high-grade left proximal ICA stenosis measuring approximately 80%. US carotid ordered and Vascular Surgery was consulted for further evaluation.    - Vascular Surgery recommended no acute intervention and continuing medical management.   - Pending results of formal EEG and carotid US    Chronic diastolic heart failure  Grade II left ventricular diastolic dysfunction noted on Echo in 2021  - Repeat echo pending        Subdural hematoma  Seen on MRI with no interval change on CT head 6/14. Secondary to recent unwitnessed fall on 6/11 with head trauma.    - Neurochecks q4h to monitor for any acute neuro deficits  - NSGY consulted, no acute intervention recommended      Cerebrovascular accident (CVA) due to stenosis of left carotid artery  See "observed seizure-like activity" for further details and management.    Antithrombotics for secondary stroke prevention: Antiplatelets: Aspirin: 81 mg daily  Statins for secondary stroke prevention and hyperlipidemia, if present:   Statins: Atorvastatin- 40 mg daily  Aggressive risk factor modification: HTN, age  Diagnostics ordered/pending: Carotid ultrasound to assess vasculature  VTE prophylaxis: Holding in setting of SDH, but will resume lovenox given stability  BP parameters: Try to keep BP < 180.    Hypertensive urgency  Patient with normal blood pressures in the clinic and on arrival, however soon developed significant hypertension while in the ED. Not on any antihypertensives at home, and has not had blood pressure issues. Maybe contributing to current neurological symptoms via PRES vs HTN emergency induced encephalopathy.     - PO Hydralazine 10mg PRN for SBP > 180   - MRI found left cerebral punctate infarcts   - Allow for some degree of permissive " "hypertension with SBP ~160.    Physical deconditioning  Pt with recently worsening physical function reported along with some history concerning for aspiration. Likely 2/2 to progressive neurocognitive decline and sarcopenia.    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 May be contributory to pt's presentation. Not on any medications. Follows with neurology outpatient.    - Continue to monitor mental status and evaluate for any acute worsening    Aortic valve stenosis  Chronic, longstanding condition. Was noted as mild by previous PCP. Soft systolic murmur appreciated on exam. May be contributory and causing syncopal episodes, however, episodes seem inconsistent with cardiac syncope.    - TTE showed moderate aortic valve stenosis  - Continue to monitor      VTE Risk Mitigation (From admission, onward)         Ordered     enoxaparin injection 40 mg  Every 24 hours         06/15/23 1643     IP VTE HIGH RISK PATIENT  Once         06/13/23 1912                Discharge Planning   BECKY: 6/16/2023     Code Status: DNR   Is the patient medically ready for discharge?: No    Reason for patient still in hospital (select all that apply): Patient trending condition, Treatment, Imaging and Pending disposition  Discharge Plan A: Home, Home with family   Discharge Delays: None known at this time        Mikhail Still MD  Department of Hospital Medicine   [facility] - Intensive Care ([place]-)    "

## 2023-06-15 NOTE — HPI
"James Dc is our 89 yo M with mild dementia who presented to [facility] due to episodes of syncope. He had a fall over the weekend which his family felt was due to a syncopal event. He was seen at [facility] and was discharged with PCP follow up. After his PCP visit on 6/13/23, his family returned from the store and found his unresponsive in a recliner. When they were able to arouse him, he was non-communicative for 30 minutes. The patient reports that he recalls these episodes and was able to hear his family, but he was unable to talk.    He presented to the [facility] ED following this and was found to be in hypertensive urgency. An MRI was obtained to evaluate for PRES which revealed remote lacunar infarcts, a new left subdural hematoma, and "new punctate infarcts throughout the left cerebral hemisphere". A CTA was obtained which was interpreted as 80% L Carotid stenosis.    Today, he reports that he continues to have no deficits from these events. He denies amaurosis   "

## 2023-06-15 NOTE — PROCEDURES
EEG prelim  12:39 p.m.-13:52 p.m.    Background:  Continuous, relatively symmetric, predominantly theta activity with a moderately well-formed 7.5 hz maximal posterior dominant rhythm.  There is intermittent generalized and multifocal polymorphic theta/delta slowing seen bilaterally.  There is cycling between wakefulness and sleep.    Impression:  Mild encephalopathy with some evidence of subcortical/deep midline dysfunction.  No purposeful pushbutton activations, no epileptiform discharges, no electrographic seizures on this portion of the recording session.    Please hit the EEG button with any clinical activity concerning for seizures and describe what you see.    Full report after the completion of the study.    Guillermina Ibarra MD PhD  Neurology-Epilepsy  [facility].

## 2023-06-15 NOTE — ASSESSMENT & PLAN NOTE
Patient with normal blood pressures in the clinic and on arrival, however soon developed significant hypertension while in the ED. Not on any antihypertensives at home, and has not had blood pressure issues. Maybe contributing to current neurological symptoms via PRES vs HTN emergency induced encephalopathy.     - PO Hydralazine 10mg PRN for SBP > 180   - MRI found left cerebral punctate infarcts   - Allow for some degree of permissive hypertension with SBP ~160.

## 2023-06-15 NOTE — SUBJECTIVE & OBJECTIVE
Medications Prior to Admission   Medication Sig Dispense Refill Last Dose    MULTIVITAMIN ORAL Take 1 tablet by mouth once daily.          Review of patient's allergies indicates:   Allergen Reactions    Tetracyclines Nausea Only       Past Medical History:   Diagnosis Date    Essential tremor     Hypertensive urgency 6/13/2023    Nuclear sclerosis - Both Eyes 9/19/2012     Past Surgical History:   Procedure Laterality Date    TONSILLECTOMY       Family History       Problem Relation (Age of Onset)    Cataracts Son    Prostate cancer Father          Tobacco Use    Smoking status: Former    Smokeless tobacco: Former   Substance and Sexual Activity    Alcohol use: Yes    Drug use: No    Sexual activity: Yes     Partners: Female     Review of Systems   Constitutional:  Negative for appetite change and unexpected weight change.   HENT:  Negative for sore throat and trouble swallowing.    Eyes:         Positive for trauma secondary to fall   Respiratory:  Negative for chest tightness and shortness of breath.    Cardiovascular:  Negative for chest pain and leg swelling.   Gastrointestinal:  Negative for abdominal distention, abdominal pain and blood in stool.   Skin:  Negative for pallor.   Neurological:  Positive for syncope. Negative for weakness and numbness.   Hematological:  Does not bruise/bleed easily.   All other systems reviewed and are negative.  Objective:     Vital Signs (Most Recent):  Temp: 97.9 °F (36.6 °C) (06/15/23 0900)  Pulse: 67 (06/15/23 1107)  Resp: 17 (06/15/23 0458)  BP: 127/75 (06/15/23 0458)  SpO2: 98 % (06/15/23 0458) Vital Signs (24h Range):  Temp:  [97.9 °F (36.6 °C)-98.1 °F (36.7 °C)] 97.9 °F (36.6 °C)  Pulse:  [58-87] 67  Resp:  [16-18] 17  SpO2:  [94 %-100 %] 98 %  BP: (127-164)/(63-76) 127/75     Weight: 50.8 kg (112 lb)  Body mass index is 20.49 kg/m².      Physical Exam  Vitals reviewed.   Constitutional:       General: He is not in acute distress.  HENT:      Nose: Nose normal.   Eyes:       Comments: Periorbital ecchymosis and swelling   Cardiovascular:      Rate and Rhythm: Normal rate and regular rhythm.      Pulses: Normal pulses.   Pulmonary:      Effort: Pulmonary effort is normal. No respiratory distress.   Musculoskeletal:      Cervical back: Normal range of motion and neck supple.      Right lower leg: No edema.      Left lower leg: No edema.   Neurological:      Mental Status: He is alert.      Comments: Alert and oriented x4  No focal deficits.  5/5 strength and intact sensation in both upper and lower extremities bilaterally        Significant Labs:  CBC:   Recent Labs   Lab 06/15/23  0508   WBC 6.29   RBC 4.43*   HGB 13.9*   HCT 42.0      MCV 95   MCH 31.4*   MCHC 33.1     Coagulation:   Recent Labs   Lab 06/14/23  0331   LABPROT 11.1   INR 1.0   APTT 28.5       Significant Diagnostics:  I have reviewed all pertinent imaging results/findings within the past 24 hours.

## 2023-06-15 NOTE — ASSESSMENT & PLAN NOTE
89M w/Alzheimer's dementia and mild AS who presents with recurrent episodes of loss of consciousness with decreased responsiveness. These episodes have been unwitnessed, however pt's family reports that they have seen him after the events and the pt has been altered briefly. Two days ago, pt's wife heard a loud bang and saw the patient with his head on the table and was initially unresponsive, and when regaining consciousness, was confused. On today's episode pt was found staring up at the ceiling and was unresponsive and displaying rigid, stiff muscles; when pt finally came to, they report that he was confused and had speech difficulties afterwards which lasted for several minutes. Patient does not recall any of the events thus far and cannot remember any preceding aura or event before the episodes. Pt reports some recent progressively worsening physical function and memory difficulties. No history of seizures reported in the past.    Workup thus far largely unremarkable. Labs grossly normal and CT head imaging without acute findings intracranially. Neuro exam is unrevealing. Pt with elevated blood pressures in the ED.  Differentials for his episodes of decreased responsiveness and LoC include: Acute stroke vs TIA vs encephalopathy 2/2 hypertensive emergency vs seizures.    Plans:  - MRI showed multiple new punctate infarcts throughout the left cerebral hemisphere. There is also a small subdural hematoma along the left temporal lobe. CT head repeat showed no interval change in SDH, likely not actively bleeding anymore.   - Allowing for permissive HTN in setting of acute punctate infarcts  - Neuro consulted, appreciate assistance   - 24 hr EEG ordered, will follow-up results  - PT/OT consulted for physical debility, final recs still pending   - Neurochecks q4h  - CTA head and neck showed high-grade left proximal ICA stenosis measuring approximately 80%. US carotid ordered and Vascular Surgery was consulted for  further evaluation.    - Vascular Surgery recommended no acute intervention and continuing medical management.   - Pending results of formal EEG and carotid US

## 2023-06-16 VITALS
DIASTOLIC BLOOD PRESSURE: 66 MMHG | HEIGHT: 62 IN | WEIGHT: 112 LBS | RESPIRATION RATE: 18 BRPM | OXYGEN SATURATION: 95 % | SYSTOLIC BLOOD PRESSURE: 142 MMHG | HEART RATE: 72 BPM | TEMPERATURE: 99 F | BODY MASS INDEX: 20.61 KG/M2

## 2023-06-16 PROBLEM — I70.0 AORTIC ATHEROSCLEROSIS: Status: ACTIVE | Noted: 2023-06-16

## 2023-06-16 PROBLEM — R40.4 TRANSIENT ALTERATION OF AWARENESS: Status: ACTIVE | Noted: 2023-06-16

## 2023-06-16 PROBLEM — G47.34 HYPOXIA, SLEEP RELATED: Status: ACTIVE | Noted: 2023-06-16

## 2023-06-16 LAB
ALBUMIN SERPL BCP-MCNC: 3.1 G/DL (ref 3.5–5.2)
ALP SERPL-CCNC: 48 U/L (ref 55–135)
ALT SERPL W/O P-5'-P-CCNC: 14 U/L (ref 10–44)
ANION GAP SERPL CALC-SCNC: 7 MMOL/L (ref 8–16)
AST SERPL-CCNC: 18 U/L (ref 10–40)
BASOPHILS # BLD AUTO: 0.04 K/UL (ref 0–0.2)
BASOPHILS NFR BLD: 0.6 % (ref 0–1.9)
BILIRUB SERPL-MCNC: 0.4 MG/DL (ref 0.1–1)
BUN SERPL-MCNC: 25 MG/DL (ref 8–23)
CALCIUM SERPL-MCNC: 8.8 MG/DL (ref 8.7–10.5)
CHLORIDE SERPL-SCNC: 109 MMOL/L (ref 95–110)
CO2 SERPL-SCNC: 27 MMOL/L (ref 23–29)
CREAT SERPL-MCNC: 1 MG/DL (ref 0.5–1.4)
DIFFERENTIAL METHOD: ABNORMAL
EOSINOPHIL # BLD AUTO: 0.2 K/UL (ref 0–0.5)
EOSINOPHIL NFR BLD: 2.7 % (ref 0–8)
ERYTHROCYTE [DISTWIDTH] IN BLOOD BY AUTOMATED COUNT: 12.7 % (ref 11.5–14.5)
EST. GFR  (NO RACE VARIABLE): >60 ML/MIN/1.73 M^2
GLUCOSE SERPL-MCNC: 96 MG/DL (ref 70–110)
HCT VFR BLD AUTO: 41.3 % (ref 40–54)
HGB BLD-MCNC: 13.5 G/DL (ref 14–18)
IMM GRANULOCYTES # BLD AUTO: 0.02 K/UL (ref 0–0.04)
IMM GRANULOCYTES NFR BLD AUTO: 0.3 % (ref 0–0.5)
LEFT CBA DIAS: 9 CM/S
LEFT CBA SYS: 54 CM/S
LEFT CCA DIST DIAS: 16 CM/S
LEFT CCA DIST SYS: 62 CM/S
LEFT CCA MID DIAS: 6 CM/S
LEFT CCA MID SYS: 48 CM/S
LEFT CCA PROX DIAS: 10 CM/S
LEFT CCA PROX SYS: 55 CM/S
LEFT ECA DIAS: 0 CM/S
LEFT ECA SYS: 125 CM/S
LEFT ICA DIST DIAS: 11 CM/S
LEFT ICA DIST SYS: 32 CM/S
LEFT ICA MID DIAS: 39 CM/S
LEFT ICA MID SYS: 171 CM/S
LEFT ICA PROX DIAS: 282 CM/S
LEFT ICA PROX SYS: 749 CM/S
LEFT VERTEBRAL DIAS: 11 CM/S
LEFT VERTEBRAL SYS: 58 CM/S
LYMPHOCYTES # BLD AUTO: 1.3 K/UL (ref 1–4.8)
LYMPHOCYTES NFR BLD: 19.9 % (ref 18–48)
MAGNESIUM SERPL-MCNC: 2.1 MG/DL (ref 1.6–2.6)
MCH RBC QN AUTO: 31.5 PG (ref 27–31)
MCHC RBC AUTO-ENTMCNC: 32.7 G/DL (ref 32–36)
MCV RBC AUTO: 96 FL (ref 82–98)
MONOCYTES # BLD AUTO: 0.6 K/UL (ref 0.3–1)
MONOCYTES NFR BLD: 9.1 % (ref 4–15)
NEUTROPHILS # BLD AUTO: 4.5 K/UL (ref 1.8–7.7)
NEUTROPHILS NFR BLD: 67.4 % (ref 38–73)
NRBC BLD-RTO: 0 /100 WBC
OHS CV CAROTID RIGHT ICA EDV HIGHEST: 12
OHS CV CAROTID ULTRASOUND LEFT ICA/CCA RATIO: 12.08
OHS CV CAROTID ULTRASOUND RIGHT ICA/CCA RATIO: 1.22
OHS CV PV CAROTID LEFT HIGHEST CCA: 62
OHS CV PV CAROTID LEFT HIGHEST ICA: 749
OHS CV PV CAROTID RIGHT HIGHEST CCA: 101
OHS CV PV CAROTID RIGHT HIGHEST ICA: 89
OHS CV US CAROTID LEFT HIGHEST EDV: 282
PHOSPHATE SERPL-MCNC: 3.6 MG/DL (ref 2.7–4.5)
PLATELET # BLD AUTO: 178 K/UL (ref 150–450)
PMV BLD AUTO: 9.2 FL (ref 9.2–12.9)
POTASSIUM SERPL-SCNC: 3.9 MMOL/L (ref 3.5–5.1)
PROT SERPL-MCNC: 5.9 G/DL (ref 6–8.4)
RBC # BLD AUTO: 4.29 M/UL (ref 4.6–6.2)
RIGHT CBA DIAS: 12 CM/S
RIGHT CBA SYS: 74 CM/S
RIGHT CCA DIST DIAS: 12 CM/S
RIGHT CCA DIST SYS: 73 CM/S
RIGHT CCA MID DIAS: 11 CM/S
RIGHT CCA MID SYS: 97 CM/S
RIGHT CCA PROX DIAS: 11 CM/S
RIGHT CCA PROX SYS: 101 CM/S
RIGHT ECA DIAS: 0 CM/S
RIGHT ECA SYS: 114 CM/S
RIGHT ICA DIST DIAS: 11 CM/S
RIGHT ICA DIST SYS: 59 CM/S
RIGHT ICA MID DIAS: 12 CM/S
RIGHT ICA MID SYS: 62 CM/S
RIGHT ICA PROX DIAS: 11 CM/S
RIGHT ICA PROX SYS: 89 CM/S
RIGHT VERTEBRAL DIAS: 8 CM/S
RIGHT VERTEBRAL SYS: 58 CM/S
SODIUM SERPL-SCNC: 143 MMOL/L (ref 136–145)
WBC # BLD AUTO: 6.7 K/UL (ref 3.9–12.7)

## 2023-06-16 PROCEDURE — 97116 GAIT TRAINING THERAPY: CPT | Mod: CQ

## 2023-06-16 PROCEDURE — 84100 ASSAY OF PHOSPHORUS: CPT

## 2023-06-16 PROCEDURE — 36415 COLL VENOUS BLD VENIPUNCTURE: CPT

## 2023-06-16 PROCEDURE — G0378 HOSPITAL OBSERVATION PER HR: HCPCS

## 2023-06-16 PROCEDURE — 95718 PR EEG, W/VIDEO, CONT RECORD, I&R, 2-12 HRS: ICD-10-PCS | Mod: ,,, | Performed by: PSYCHIATRY & NEUROLOGY

## 2023-06-16 PROCEDURE — 99239 HOSP IP/OBS DSCHRG MGMT >30: CPT | Mod: GC,,, | Performed by: STUDENT IN AN ORGANIZED HEALTH CARE EDUCATION/TRAINING PROGRAM

## 2023-06-16 PROCEDURE — 99239 PR HOSPITAL DISCHARGE DAY,>30 MIN: ICD-10-PCS | Mod: GC,,, | Performed by: STUDENT IN AN ORGANIZED HEALTH CARE EDUCATION/TRAINING PROGRAM

## 2023-06-16 PROCEDURE — 25000003 PHARM REV CODE 250

## 2023-06-16 PROCEDURE — 99215 PR OFFICE/OUTPT VISIT, EST, LEVL V, 40-54 MIN: ICD-10-PCS | Mod: ,,, | Performed by: PSYCHIATRY & NEUROLOGY

## 2023-06-16 PROCEDURE — 95718 EEG PHYS/QHP 2-12 HR W/VEEG: CPT | Mod: ,,, | Performed by: PSYCHIATRY & NEUROLOGY

## 2023-06-16 PROCEDURE — 83735 ASSAY OF MAGNESIUM: CPT

## 2023-06-16 PROCEDURE — 80053 COMPREHEN METABOLIC PANEL: CPT

## 2023-06-16 PROCEDURE — 85025 COMPLETE CBC W/AUTO DIFF WBC: CPT

## 2023-06-16 PROCEDURE — 99215 OFFICE O/P EST HI 40 MIN: CPT | Mod: ,,, | Performed by: PSYCHIATRY & NEUROLOGY

## 2023-06-16 RX ORDER — ATORVASTATIN CALCIUM 40 MG/1
40 TABLET, FILM COATED ORAL NIGHTLY
Qty: 90 TABLET | Refills: 3 | Status: SHIPPED | OUTPATIENT
Start: 2023-06-16 | End: 2023-10-26

## 2023-06-16 RX ORDER — ASPIRIN 81 MG/1
81 TABLET ORAL DAILY
Qty: 30 TABLET | Refills: 3 | Status: SHIPPED | OUTPATIENT
Start: 2023-06-17 | End: 2024-06-16

## 2023-06-16 RX ORDER — LEVETIRACETAM 500 MG/1
500 TABLET ORAL 2 TIMES DAILY
Qty: 60 TABLET | Refills: 1 | Status: SHIPPED | OUTPATIENT
Start: 2023-06-16 | End: 2023-08-10 | Stop reason: SDUPTHER

## 2023-06-16 RX ADMIN — ASPIRIN 81 MG: 81 TABLET, COATED ORAL at 09:06

## 2023-06-16 RX ADMIN — LEVETIRACETAM 500 MG: 500 TABLET, FILM COATED ORAL at 09:06

## 2023-06-16 NOTE — DISCHARGE SUMMARY
[facility] - Intensive Care ([place] [address])  [facility] Medicine  Discharge Summary      Patient Name: James Dc  MRN: [id number]  ABELINO: [id number]  Patient Class: OP- Observation  Admission Date: 6/13/2023  Hospital Length of Stay: 0 days  Discharge Date and Time:  06/16/2023 6:34 PM  Attending Physician: No att. providers found   Discharging Provider: Misa Carpenter MD  Primary Care Provider: JEET Briggs MD  [facility] Medicine Team: [facility] 5 Misa Carpenter MD  Primary Care Team: [facility] 5    HPI:   James Dc is a 89 year old man with history of Alzheimer's dementia and essential tremor presents with recurrent episodes of transient loss of consciousness and decreased responsiveness. Patient brought to the ED by family, who provided majority of history. Per family, the patient has been having episodes of catatonia, where his body becomes completely still and he becomes unresponsive. On 6/11, patient had a fall and family was concerned he had another catatonic episode that caused the fall. He was seen at [facility] ED and workup including labs, CT head, C-spine, and maxillary face were obtained. Workup was ultimately unremarkable and the patient was discharged with follow-up with his PCP today on 6/13. During his PCP visit, he was told to present to the ED if he had any recurrent episodes. Later today, family states that the patient was found unresponsive in his recliner at home. He was blankly staring and unresponsive for about 30 minutes, and was found to be confused afterwards. Patient states he can hear and register what is saying to him, but he is unable to move or speak during the episodes. Patient denies any headache, vision changes, focal weakness/numbness, or chest pain.    On arrival to the ER, patient was afebrile with stable vital signs. Labs were largely unremarkable. CT head showed no acute intracranial findings. EKG without acute ischemic changes. Urinalysis without signs of infection. Patient  admitted to hospital medicine for further observation of catatonic episodes with decreased responsiveness.      * No surgery found *      Hospital Course:   Patient admitted to  for evaluation of syncopal episodes and staring spells. Given significant HTN, there was concern for PRES. MRI brain was done which showed multiple new punctate infarcts throughout the left cerebral hemisphere. Additionally, a small subdural hematoma along the left temporal lobe that was not visualized on prior CT was seen, thought most likely secondary to his recent fall. Per neurosurgery, no intervention for subdural hematoma given its small size, and repeat CT head on 6/14 showed no interval change in size or concern for active bleeding. Neurology was consulted given pt's MRI findings and his presentation, recommended formal 24-hour EEG and initiation of ASA, statin, and keppra for seizure ppx. CTA head and neck showed atherosclerotic disease in the proximal ICA greater on the left with high-grade left proximal ICA stenosis measuring approximately 80%. Vascular surgery were consulted, reported no acute intervention needed and to follow up outpatient after carotid ultrasound obtained. TTE showed moderate aortic stenosis, but otherwise unremarkable. Patient was stable throughout admission and had no further syncopal events. His EEG was negative for any seizure activity. He exhibited sleep associated hypoxia so referral to sleep medicine was placed. He was stable on discharge with new meds ASA/lipitor/keppra. Referrals placed to Neurology (for follow up and to discuss continuation of Keppra), Vascular Surgery (for carotid artery stenosis) and PCP.     Physical Exam  Vitals reviewed.   Constitutional:       General: He is not in acute distress.     Appearance: Normal appearance.   HENT:      Head: Normocephalic.      Comments: Contusions noted over the left and right eyebrows and ecchymoses of the lower eyelids.     Nose: No congestion or  rhinorrhea.      Mouth/Throat:      Mouth: Mucous membranes are moist.      Pharynx: Oropharynx is clear.   Cardiovascular:      Rate and Rhythm: Normal rate and regular rhythm.      Pulses: Normal pulses.      Heart sounds: Murmur (Soft crescendo-decrescendo systolic murmur) heard.   Pulmonary:      Effort: Pulmonary effort is normal. No respiratory distress.      Breath sounds: Normal breath sounds.   Abdominal:      General: Bowel sounds are normal.      Palpations: Abdomen is soft.      Tenderness: There is no abdominal tenderness.   Musculoskeletal:         General: No swelling or tenderness.      Cervical back: Full passive range of motion without pain and normal range of motion.   Skin:     General: Skin is warm and dry.   Neurological:      General: No focal deficit present.      Mental Status: He is alert and oriented to person, place, and time.   Psychiatric:         Mood and Affect: Mood normal.         Behavior: Behavior normal.        Goals of Care Treatment Preferences:  Code Status: DNR    Living Will: Yes              Consults:   Consults (From admission, onward)        Status Ordering Provider     Inpatient consult to Vascular Surgery  Once        Provider:  (Not yet assigned)    Completed POLO GOMEZ     Inpatient consult to Neurology  Once        Provider:  (Not yet assigned)    Completed MARISOL SAMUEL     Inpatient consult to Vascular (Stroke) Neurology  Once        Provider:  (Not yet assigned)    Completed MARISOL SAMUEL          No new Assessment & Plan notes have been filed under this hospital service since the last note was generated.  Service: Hospital Medicine    Final Active Diagnoses:    Diagnosis Date Noted POA    PRINCIPAL PROBLEM:  Observed seizure-like activity [R56.9] 06/13/2023 Yes    Hypoxia, sleep related [G47.34] 06/16/2023 Yes    Transient alteration of awareness [R40.4] 06/16/2023 Unknown    Carotid stenosis, asymptomatic [I65.29] 06/15/2023 Yes    Subdural hematoma  [S06.5XAA] 06/14/2023 Yes    Chronic diastolic heart failure [I50.32] 06/14/2023 Yes    LOC (loss of consciousness) [R40.20] 06/14/2023 Yes    Recurrent falls [R29.6] 06/13/2023 Not Applicable    Physical deconditioning [R53.81] 06/13/2023 Yes    Hypertensive urgency [I16.0] 06/13/2023 Yes    Cerebrovascular accident (CVA) due to stenosis of left carotid artery [I63.232] 06/13/2023 Yes    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    Specialty: Vascular Neurology  Contact information:  [address] [place] [id number]  [phone]                     Patient Instructions:      Ambulatory referral/consult to Vascular Surgery   Standing Status: Future   Referral Priority: Routine Referral Type: Consultation   Referral Reason: Specialty Services Required   Requested Specialty: Vascular Surgery   Number of Visits Requested: 1     Ambulatory referral/consult to Sleep Disorders   Standing Status: Future   Referral Priority: Routine Referral Type: Consultation   Requested Specialty: Sleep Medicine   Number of Visits Requested: 1     Ambulatory referral/consult to Neurology   Standing Status: Future   Referral Priority: Routine Referral Type: Consultation   Referral Reason: Specialty Services Required   Requested Specialty: Neurology   Number of Visits Requested: 1     Ambulatory referral/consult to Vascular Neurology   Standing Status: Future   Referral Priority: Routine Referral Type: Consultation   Referral Reason: Specialty Services Required   Requested Specialty: Vascular Neurology   Number of Visits Requested: 1     Complete PFT w/ bronchodilator   Standing Status: Future Standing Exp. Date:  06/16/24     Order Specific Question Answer Comments   Release to patient Immediate        Significant Diagnostic Studies: Labs:   CMP   Recent Labs   Lab 06/15/23  0508 06/16/23  0519    143   K 3.5 3.9    109   CO2 28 27   GLU 97 96   BUN 21 25*   CREATININE 0.9 1.0   CALCIUM 9.1 8.8   PROT 6.2 5.9*   ALBUMIN 3.5 3.1*   BILITOT 0.7 0.4   ALKPHOS 53* 48*   AST 16 18   ALT 11 14   ANIONGAP 9 7*    and CBC   Recent Labs   Lab 06/15/23  0508 06/16/23  0519   WBC 6.29 6.70   HGB 13.9* 13.5*   HCT 42.0 41.3    178       Pending Diagnostic Studies:     Procedure Component Value Units Date/Time    VAS US Carotid Bilateral [[id number]]     Order Status: Sent Lab Status: No result          Medications:  Reconciled Home Medications:      Medication List      START taking these medications    aspirin 81 MG EC tablet  Commonly known as: ECOTRIN  Take 1 tablet (81 mg total) by mouth once daily.  Start taking on: June 17, 2023     atorvastatin 40 MG tablet  Commonly known as: LIPITOR  Take 1 tablet (40 mg total) by mouth every evening.     levETIRAcetam 500 MG Tab  Commonly known as: KEPPRA  Take 1 tablet (500 mg total) by mouth 2 (two) times daily.        CONTINUE taking these medications    MULTIVITAMIN ORAL  Take 1 tablet by mouth once daily.            Indwelling Lines/Drains at time of discharge:   Lines/Drains/Airways     None                 Time spent on the discharge of patient: 35 minutes         Misa Carpenter MD  Department of Hospital Medicine  [facility] - Intensive Care ([place]-[facility])

## 2023-06-16 NOTE — PLAN OF CARE
06/16/23 1656   Final Note   Assessment Type Final Discharge Note   Anticipated Discharge Disposition Home       Patient discharging home today no post acute needs.      Future Appointments   Date Time Provider Department Center   8/21/2023  9:00 AM Matthew Rodney OD [facility] [place]   12/1/2023  8:00 AM [facility] [place]   12/1/2023  8:15 AM [facility] [place]   12/7/2023 10:00 AM RENEE Briggs MD [facility]

## 2023-06-16 NOTE — NURSING
Home Oxygen Evaluation    Date Performed: 2023    1) Patient's Home O2 Sat on room air, while at rest: 93%        If O2 sats on room air at rest are 88% or below, patient qualifies. No additional testing needed. Document N/A in steps 2 and 3. If 89% or above, complete steps 2.      2) Patient's O2 Sat on room air while exercisin%          If O2 sats on room air while exercising remain 89% or above patient does not qualify, no further testing needed Document N/A in step 3. If O2 sats on room air while exercising are 88% or below, continue to step 3.      3) Patient's O2 Sat while exercising on O2: N/A at N/A LPM         (Must show improvement from #2 for patients to qualify)    If O2 sats improve on oxygen, patient qualifies for portable oxygen. If not, the patient does not qualify.        Patient walked for 6 minutes with RN standy-by assistance. No shortness of breath, dizziness or lightheadedness reported during walk.

## 2023-06-16 NOTE — PLAN OF CARE
Problem: Adult Inpatient Plan of Care  Goal: Plan of Care Review  Outcome: Ongoing, Progressing  Goal: Patient-Specific Goal (Individualized)  Outcome: Ongoing, Progressing  Goal: Absence of Hospital-Acquired Illness or Injury  Outcome: Ongoing, Progressing  Goal: Optimal Comfort and Wellbeing  Outcome: Ongoing, Progressing  Goal: Readiness for Transition of Care  Outcome: Ongoing, Progressing     Problem: Fall Injury Risk  Goal: Absence of Fall and Fall-Related Injury  Outcome: Ongoing, Progressing     Problem: Impaired Wound Healing  Goal: Optimal Wound Healing  Outcome: Ongoing, Progressing     Problem: Skin Injury Risk Increased  Goal: Skin Health and Integrity  Outcome: Ongoing, Progressing

## 2023-06-16 NOTE — PLAN OF CARE
Problem: Adult Inpatient Plan of Care  Goal: Plan of Care Review  Outcome: Met  Goal: Patient-Specific Goal (Individualized)  Outcome: Met  Goal: Absence of Hospital-Acquired Illness or Injury  Outcome: Met  Goal: Optimal Comfort and Wellbeing  Outcome: Met  Goal: Readiness for Transition of Care  Outcome: Met     Problem: Fall Injury Risk  Goal: Absence of Fall and Fall-Related Injury  Outcome: Met     Problem: Impaired Wound Healing  Goal: Optimal Wound Healing  Outcome: Met     Problem: Skin Injury Risk Increased  Goal: Skin Health and Integrity  Outcome: Met

## 2023-06-16 NOTE — NURSING
AVS reviewed with patient and spouse, demonstrated understanding. Medications picked up from pharmacy per spouse. Belongings gathered. Awaiting transport for discharge.

## 2023-06-16 NOTE — PT/OT/SLP PROGRESS
"Physical Therapy Treatment    Patient Name:  James Dc   MRN:  [id number]    Recommendations:     Discharge Recommendations: other (see comments)  Discharge Equipment Recommendations: none  Barriers to discharge: None    Assessment:     James Dc is a 89 y.o. male admitted with a medical diagnosis of Observed seizure-like activity.  He presents with the following impairments/functional limitations: weakness, impaired endurance, gait instability. Focused on ambulation this session. Pt increased ambulation distance to 200' with HHA and CGA. Pt would benefit from continued skilled PT intervention to address deficits and goals.      Rehab Prognosis: Good; patient would benefit from acute skilled PT services to address these deficits and reach maximum level of function.    Recent Surgery: * No surgery found *      Plan:     During this hospitalization, patient to be seen 4 x/week to address the identified rehab impairments via gait training, therapeutic activities, therapeutic exercises, neuromuscular re-education and progress toward the following goals:    Plan of Care Expires:  07/14/23    Subjective     Chief Complaint: "I'm ready to get out of here."  Patient/Family Comments/goals: to go home  Pain/Comfort:  Pain Rating 1: 0/10      Objective:     Communicated with RN prior to session.  Patient found HOB elevated with telemetry, pulse ox (continuous) upon PT entry to room.     General Precautions: Standard, fall  Orthopedic Precautions: N/A  Braces: N/A  Respiratory Status: Room air     Functional Mobility:  Bed Mobility:     Supine to Sit: independence  Sit to Supine: independence  Transfers:     Sit to Stand:  contact guard assistance with no AD  Gait: Pt ambulated 200' x 2 with HHA and CGA from therapist      AM-PAC 6 CLICK MOBILITY  Turning over in bed (including adjusting bedclothes, sheets and blankets)?: 3  Sitting down on and standing up from a chair with arms (e.g., wheelchair, bedside commode, etc.): " 3  Moving from lying on back to sitting on the side of the bed?: 3  Moving to and from a bed to a chair (including a wheelchair)?: 3  Need to walk in hospital room?: 3  Climbing 3-5 steps with a railing?: 2  Basic Mobility Total Score: 17       Treatment & Education:  Pt educated on:  - Role of PT and POC/goals for therapy   - Safety with mobility and fall risk   - Instructed to call nursing staff for assistance with mobility as needed   -Tips to reduce fall risk        Patient left HOB elevated with all lines intact, call button in reach, RN notified, and spouse present..    GOALS:   Multidisciplinary Problems       Physical Therapy Goals          Problem: Physical Therapy    Goal Priority Disciplines Outcome Goal Variances Interventions   Physical Therapy Goal     PT, PT/OT Ongoing, Progressing     Description: Goals to be met by: 23     Patient will increase functional independence with mobility by performin. Supine to sit with Contact Guard Assistance  2. Sit to supine with Contact Guard Assistance  3. Sit to stand transfer with Contact Guard Assistance  4. Gait  x 200 feet with Contact Guard Assistance using LRAD.   5. Ascend/descend 2 stair with bilateral Handrails Minimal Assistance using No Assistive Device.                          Time Tracking:     PT Received On: 23  PT Start Time: 1446     PT Stop Time: 1502  PT Total Time (min): 16 min     Billable Minutes: Gait Training 16 mins    Treatment Type: Treatment  PT/PTA: PTA     Number of PTA visits since last PT visit: 2023

## 2023-06-16 NOTE — PROCEDURES
EEG REPORT      James Dc  [id number]  12/26/1932    DATE OF SERVICE: 6/15/2023     23-    METHODOLOGY      Extended electroencephalographic recording is made while the patient is ambulatory and continuing normal daily activities.  Electrodes are placed according to the International 10-20 placement system and included T1 and T2 electrode placement.  Twenty four (24) channels of digital signal (sampling rate of 512/sec) was simultaneously recorded from the scalp including EKG and eye monitors.  Recording band pass was 0.1 to 100 hz and all data was stored digitally on the recorder.  The patient is instructed to press an event button when clinical symptoms occur and write the symptoms into a diary. Activation procedures which include photic stimulation, hyperventilation and instructing patients to perform simple task are done in selected patients.        The EEG is displayed on a monitor screen and can be reformatted into different montages for evaluation.  The entire recoding is submitted for computer assisted analysis to detect spike and electrographic seizure activity.  The entire recording is visually reviewed and the times identified by computer analysis as being spikes or seizures are reviewed again.  Compresses spectral analysis (CSA) is also performed on the activity recorded from each individual channel.  This is displayed as a power display of frequencies from 0 to 30 Hz over time.   The CSA analysis is done and displayed continuously.  This is reviewed for asymmetries in power between homologous areas of the scalp and for presence of changes in power which canbe seen when seizures occur.  Sections of suspected abnormalities on the CSA is then compared with the original EEG recording.  .     [employer] software was also utilized in the review of this study.  This software suite analyzes the EEG recording in multiple domains.  Coherence and rhythmicity is computed to identify EEG sections which may contain  organized seizures.  Each channel undergoes analysis to detect presence of spike and sharp waves which have special and morphological characteristic of epileptic activity.  The routine EEG recording is converted from spacial into frequency domain.  This is then displayed comparing homologous areas to identify areas of significant asymmetry.  Algorithm to identify non-cortically generated artifact is used to separate eye movement, EMG and other artifact from the EEG     Recording Times  Start on 6/15/2023  Stop on 6/16/2023    A total of 18:02:25 hours of EEG was recorded.      EEG FINDINGS:  Background activity:   The background rhythm was characterized by alpha and anterior dominant beta activity with a 8-9Hz posterior dominant alpha rhythm at 30-70 microvolts.   Symmetry and continuity: the background was continuous and symmetric     Sleep:   Normal sleep transients including sleep spindles, K complexes, vertex waves were seen.    Activation procedures:   NA    Abnormal activity:   No epileptiform discharges, periodic discharges, lateralized rhythmic delta activity or electrographic seizures were seen.    IMPRESSION:   Normal one day EEG      Sukumar Ford MD  Neurology-Epilepsy.  [facility].

## 2023-06-17 NOTE — ASSESSMENT & PLAN NOTE
James Dc is a 89 y.o. male with PMHx of Alzheimer's and essential tremor who was admitted to  for transient episodes of LOC and decreased responsiveness. MRI revealed multiple L hemispheric infarcts. Stroke team consulted. Patient without focal neurologic deficits. Low suspicion that infarcts are related to patient's transient symptoms. CTA revealed high grade L ICA stenosis. CV US confirmed >70% L ICA stenosis. 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    Patient was evaluated by vascular surgery who recommended medical management with asa 81 and atorvastatin 40 with outpatient follow up. Typically, DAPT would be recommended by vascular neurology for a patient with this particular stroke etiology. However, given patient's age and not previously on antiplatelet therapy, it is not unreasonable to use asa 81 mg only for stroke prevention. Stroke prevention can be further discussed outpatient in stroke clinic. Ambulatory referral ordered for stroke clinic. Discussed POC with patient. Educated patient on stroke warning signs.      Antithrombotics: ASA 81 mg daily    Statins: Lipitor 40 mg daily    Aggressive risk factor modification: HTN     Rehab efforts: none    Diagnostics ordered/pending: None     VTE prophylaxis: Heparin 5000 units SQ every 8 hours  Mechanical prophylaxis: Place SCDs    BP parameters: Infarct: SBP <160, long term BP goal of <130/80

## 2023-06-17 NOTE — ASSESSMENT & PLAN NOTE
Possible cause of patient's symptoms  Small L temporal SDH found on imaging, likely 2/2 fall  No neurosurgical intervention, stable on repeat CT  EEG negative  Patient discharged on kera

## 2023-06-17 NOTE — ASSESSMENT & PLAN NOTE
Noted on CTA  Carotid US with < 50% R ICA stenosis and > 70% L ICA stenosis  Vascular surgery consulted, medical management recommended

## 2023-06-17 NOTE — SUBJECTIVE & OBJECTIVE
Neurologic Chief Complaint: fall, episodes of catatonia/unresponsiveness    Subjective:     Interval History: Patient is seen for follow-up neurological assessment and treatment recommendations: patient seen by vascular surgery who recommended medical management of carotid stenosis     HPI, Past Medical, Family, and Social History remains the same as documented in the initial encounter.     Review of Systems   Constitutional:  Negative for fever.   Eyes:  Negative for visual disturbance.   Respiratory:  Negative for cough.    Cardiovascular:  Negative for chest pain.   Gastrointestinal:  Negative for nausea and vomiting.   Neurological:  Negative for facial asymmetry, speech difficulty, weakness and numbness.   Psychiatric/Behavioral:  Negative for agitation.    Scheduled Meds:  Continuous Infusions:  PRN Meds:    Objective:     Vital Signs (Most Recent):  Temp: 98.6 °F (37 °C) (06/16/23 1537)  Pulse: 72 (06/16/23 1537)  Resp: 18 (06/16/23 1537)  BP: (!) 142/66 (06/16/23 1537)  SpO2: 95 % (06/16/23 1537)  BP Location: Left arm    Vital Signs Range (Last 24H):  Temp:  [97.9 °F (36.6 °C)-98.8 °F (37.1 °C)]   Pulse:  [70-77]   Resp:  [16-20]   BP: (142-182)/(66-84)   SpO2:  [93 %-100 %]   BP Location: Left arm       Physical Exam  Vitals reviewed.   Constitutional:       General: He is not in acute distress.     Appearance: He is well-developed.   HENT:      Head: Normocephalic and atraumatic.      Right Ear: External ear normal.      Left Ear: External ear normal.      Nose: Nose normal.   Eyes:      General: No scleral icterus.  Cardiovascular:      Rate and Rhythm: Normal rate.   Pulmonary:      Effort: Pulmonary effort is normal. No respiratory distress.   Abdominal:      General: There is no distension.   Musculoskeletal:      Cervical back: Normal range of motion.      Right lower leg: No edema.      Left lower leg: No edema.   Skin:     General: Skin is warm and dry.   Neurological:      Mental Status: He is  alert.   Psychiatric:         Mood and Affect: Mood normal.         Behavior: Behavior normal.            Neurological Exam:   LOC: alert  Attention Span: Good   Language: No aphasia  Articulation: No dysarthria  Orientation: Person, Place, Time   Visual Fields: Full  EOM (CN III, IV, VI): Full/intact  Facial Sensation (CN V): Normal  Facial Movement (CN VII): Symmetric facial expression    Motor: Arm left  Normal 5/5  Leg left  Normal 5/5  Arm right  Normal 5/5  Leg right Normal 5/5  Cerebellum: No evidence of appendicular or axial ataxia  Sensation: Intact to light touch, temperature and vibration  Tone: Normal tone throughout    Laboratory:  CMP:   Recent Labs   Lab 06/16/23  0519   CALCIUM 8.8   ALBUMIN 3.1*   PROT 5.9*      K 3.9   CO2 27      BUN 25*   CREATININE 1.0   ALKPHOS 48*   ALT 14   AST 18   BILITOT 0.4     CBC:   Recent Labs   Lab 06/16/23  0519   WBC 6.70   RBC 4.29*   HGB 13.5*   HCT 41.3      MCV 96   MCH 31.5*   MCHC 32.7     Lipid Panel: No results for input(s): CHOL, LDLCALC, HDL, TRIG in the last 168 hours.  Coagulation:   Recent Labs   Lab 06/14/23  0331   INR 1.0   APTT 28.5     Platelet Aggregation Study: No results for input(s): PLTAGG, PLTAGINTERP, PLTAGREGLACO, ADPPLTAGGREG in the last 168 hours.  Hgb A1C: No results for input(s): HGBA1C in the last 168 hours.  TSH:   Recent Labs   Lab 06/13/23  1629   TSH 1.331       Diagnostic Results     Brain Imaging   CT Head 6/14/23  Impression:     Stable exam as above.  No apparent detrimental interval change from recent study of 06/13/2023.    MRI W/WO 6/13/23  Impression:     New multifocal punctate infarcts throughout the left cerebral hemisphere.     New small left subdural hemorrhage along the left temporal lobe.       Vessel Imaging   CV US BL Carotid 6/14/23  There is less than 50% right internal carotid artery stenosis.  There is greater than 70% left internal carotid artery stenosis.  There is antegrade flow in the  vertebral arteries bilaterally.    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     In addition there is severe hypoplasia or absence right P1 segment of the PCA with essentially persistent fetal circulation right PCA via right posterior communicating artery.     No significant focal intracranial arterial stenosis or large vessel occlusion     CTA neck: Atherosclerotic disease carotid bifurcation and proximal ICA greater on the left with high-grade left proximal ICA stenosis measuring approximately 80% by NASCET criteria.  Please note degree of stenosis may be accentuated by the calcified plaque.  This could be further evaluated with carotid ultrasonography     Overall less than 50% right proximal ICA stenosis.     CT head:     No acute intracranial findings specifically without evidence for acute intracranial hemorrhage or sulcal effacement to suggest large territory recent infarction.  Patchy and confluent decreased attenuation supratentorial white matter while nonspecific concerning for chronic ischemic change.  Please note punctate areas of recent infarction seen on MRI are below CT resolution.     In addition small volume subdural hemorrhage seen on MRI is also below CT resolution and may be subacute to chronic.       Cardiac Imaging   TTE 6/14/23  The left ventricle is normal in size with normal systolic function.  Normal right ventricular size with normal right ventricular systolic function.  Mild aortic regurgitation.  The estimated ejection fraction is 60%.  Normal left ventricular diastolic function.  There is moderate aortic valve stenosis.  Aortic valve area is 1.16 cm2; peak velocity is 2.41 m/s; mean gradient is 13 mmHg (LVOT diameter 2.0 cm).  Normal central venous pressure (3 mmHg).  There is no significant tricuspid regurgitation and therefore the pulmonary artery systolic pressure cannot be  reported.

## 2023-06-17 NOTE — PROGRESS NOTES
[facility] - [facility] ([facility]-)  Vascular Neurology  Comprehensive Stroke Center  Progress Note    Assessment/Plan:     * Observed seizure-like activity  Possible cause of patient's symptoms  Small L temporal SDH found on imaging, likely 2/2 fall  No neurosurgical intervention, stable on repeat CT  EEG negative  Patient discharged on keppra      Cerebrovascular accident (CVA) due to stenosis of left carotid artery  James Dc is a 89 y.o. male with PMHx of Alzheimer's and essential tremor who was admitted to  for transient episodes of LOC and decreased responsiveness. MRI revealed multiple L hemispheric infarcts. Stroke team consulted. Patient without focal neurologic deficits. Low suspicion that infarcts are related to patient's transient symptoms. CTA revealed high grade L ICA stenosis. CV US confirmed >70% L ICA stenosis. 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    Patient was evaluated by vascular surgery who recommended medical management with asa 81 and atorvastatin 40 with outpatient follow up. Typically, DAPT would be recommended by vascular neurology for a patient with this particular stroke etiology. However, given patient's age and not previously on antiplatelet therapy, it is not unreasonable to use asa 81 mg only for stroke prevention. Stroke prevention can be further discussed outpatient in stroke clinic. Ambulatory referral ordered for stroke clinic. Discussed POC with patient. Educated patient on stroke warning signs.      Antithrombotics: ASA 81 mg daily    Statins: Lipitor 40 mg daily    Aggressive risk factor modification: HTN     Rehab efforts: none    Diagnostics ordered/pending: None     VTE prophylaxis: Heparin 5000 units SQ every 8 hours  Mechanical prophylaxis: Place SCDs    BP parameters: Infarct: SBP <160, long term BP goal of <130/80        Carotid stenosis, asymptomatic  Noted on CTA  Carotid US with < 50% R ICA stenosis and > 70% L ICA stenosis  Vascular surgery consulted,  medical management recommended      Recurrent falls  Probable cause of small SDH           No notes on file    STROKE DOCUMENTATION        NIH Scale:  Interval: 7 days or at discharge (whichever comes first)  1a. Level of Consciousness: 0-->Alert, keenly responsive  1b. LOC Questions: 0-->Answers both questions correctly  1c. LOC Commands: 0-->Performs both tasks correctly  2. Best Gaze: 0-->Normal  3. Visual: 0-->No visual loss  4. Facial Palsy: 0-->Normal symmetrical movements  5a. Motor Arm, Left: 0-->No drift, limb holds 90 (or 45) degrees for full 10 secs  5b. Motor Arm, Right: 0-->No drift, limb holds 90 (or 45) degrees for full 10 secs  6a. Motor Leg, Left: 0-->No drift, leg holds 30 degree position for full 5 secs  6b. Motor Leg, Right: 0-->No drift, leg holds 30 degree position for full 5 secs  7. Limb Ataxia: 0-->Absent  8. Sensory: 0-->Normal, no sensory loss  9. Best Language: 0-->No aphasia, normal  10. Dysarthria: 0-->Normal  11. Extinction and Inattention (formerly Neglect): 0-->No abnormality  Total (NIH Stroke Scale): 0       Modified Garner Score: 0 (ambulating independently)  Prabhjot Coma Scale:    ABCD2 Score:    [id number] Score:   HAS -BLED Score:   ICH Score:   Hunt & Fierro Classification:      Hemorrhagic change of an Ischemic Stroke: Does this patient have an ischemic stroke with hemorrhagic changes? No     Neurologic Chief Complaint: fall, episodes of catatonia/unresponsiveness    Subjective:     Interval History: Patient is seen for follow-up neurological assessment and treatment recommendations: patient seen by vascular surgery who recommended medical management of carotid stenosis     HPI, Past Medical, Family, and Social History remains the same as documented in the initial encounter.     Review of Systems   Constitutional:  Negative for fever.   Eyes:  Negative for visual disturbance.   Respiratory:  Negative for cough.    Cardiovascular:  Negative for chest pain.   Gastrointestinal:   Negative for nausea and vomiting.   Neurological:  Negative for facial asymmetry, speech difficulty, weakness and numbness.   Psychiatric/Behavioral:  Negative for agitation.    Scheduled Meds:  Continuous Infusions:  PRN Meds:    Objective:     Vital Signs (Most Recent):  Temp: 98.6 °F (37 °C) (06/16/23 1537)  Pulse: 72 (06/16/23 1537)  Resp: 18 (06/16/23 1537)  BP: (!) 142/66 (06/16/23 1537)  SpO2: 95 % (06/16/23 1537)  BP Location: Left arm    Vital Signs Range (Last 24H):  Temp:  [97.9 °F (36.6 °C)-98.8 °F (37.1 °C)]   Pulse:  [70-77]   Resp:  [16-20]   BP: (142-182)/(66-84)   SpO2:  [93 %-100 %]   BP Location: Left arm       Physical Exam  Vitals reviewed.   Constitutional:       General: He is not in acute distress.     Appearance: He is well-developed.   HENT:      Head: Normocephalic and atraumatic.      Right Ear: External ear normal.      Left Ear: External ear normal.      Nose: Nose normal.   Eyes:      General: No scleral icterus.  Cardiovascular:      Rate and Rhythm: Normal rate.   Pulmonary:      Effort: Pulmonary effort is normal. No respiratory distress.   Abdominal:      General: There is no distension.   Musculoskeletal:      Cervical back: Normal range of motion.      Right lower leg: No edema.      Left lower leg: No edema.   Skin:     General: Skin is warm and dry.   Neurological:      Mental Status: He is alert.   Psychiatric:         Mood and Affect: Mood normal.         Behavior: Behavior normal.            Neurological Exam:   LOC: alert  Attention Span: Good   Language: No aphasia  Articulation: No dysarthria  Orientation: Person, Place, Time   Visual Fields: Full  EOM (CN III, IV, VI): Full/intact  Facial Sensation (CN V): Normal  Facial Movement (CN VII): Symmetric facial expression    Motor: Arm left  Normal 5/5  Leg left  Normal 5/5  Arm right  Normal 5/5  Leg right Normal 5/5  Cerebellum: No evidence of appendicular or axial ataxia  Sensation: Intact to light touch, temperature and  vibration  Tone: Normal tone throughout    Laboratory:  CMP:   Recent Labs   Lab 06/16/23  0519   CALCIUM 8.8   ALBUMIN 3.1*   PROT 5.9*      K 3.9   CO2 27      BUN 25*   CREATININE 1.0   ALKPHOS 48*   ALT 14   AST 18   BILITOT 0.4     CBC:   Recent Labs   Lab 06/16/23  0519   WBC 6.70   RBC 4.29*   HGB 13.5*   HCT 41.3      MCV 96   MCH 31.5*   MCHC 32.7     Lipid Panel: No results for input(s): CHOL, LDLCALC, HDL, TRIG in the last 168 hours.  Coagulation:   Recent Labs   Lab 06/14/23  0331   INR 1.0   APTT 28.5     Platelet Aggregation Study: No results for input(s): PLTAGG, PLTAGINTERP, PLTAGREGLACO, ADPPLTAGGREG in the last 168 hours.  Hgb A1C: No results for input(s): HGBA1C in the last 168 hours.  TSH:   Recent Labs   Lab 06/13/23  1629   TSH 1.331       Diagnostic Results     Brain Imaging   CT Head 6/14/23  Impression:     Stable exam as above.  No apparent detrimental interval change from recent study of 06/13/2023.    MRI W/WO 6/13/23  Impression:     New multifocal punctate infarcts throughout the left cerebral hemisphere.     New small left subdural hemorrhage along the left temporal lobe.       Vessel Imaging   CV US BL Carotid 6/14/23   There is less than 50% right internal carotid artery stenosis.   There is greater than 70% left internal carotid artery stenosis.   There is antegrade flow in the vertebral arteries bilaterally.    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     In addition there is severe hypoplasia or absence right P1 segment of the PCA with essentially persistent fetal circulation right PCA via right posterior communicating artery.     No significant focal intracranial arterial stenosis or large vessel occlusion     CTA neck: Atherosclerotic disease carotid bifurcation and proximal ICA greater on the left with high-grade left proximal ICA stenosis measuring  approximately 80% by NASCET criteria.  Please note degree of stenosis may be accentuated by the calcified plaque.  This could be further evaluated with carotid ultrasonography     Overall less than 50% right proximal ICA stenosis.     CT head:     No acute intracranial findings specifically without evidence for acute intracranial hemorrhage or sulcal effacement to suggest large territory recent infarction.  Patchy and confluent decreased attenuation supratentorial white matter while nonspecific concerning for chronic ischemic change.  Please note punctate areas of recent infarction seen on MRI are below CT resolution.     In addition small volume subdural hemorrhage seen on MRI is also below CT resolution and may be subacute to chronic.       Cardiac Imaging   TTE 6/14/23   The left ventricle is normal in size with normal systolic function.   Normal right ventricular size with normal right ventricular systolic function.   Mild aortic regurgitation.   The estimated ejection fraction is 60%.   Normal left ventricular diastolic function.   There is moderate aortic valve stenosis.   Aortic valve area is 1.16 cm2; peak velocity is 2.41 m/s; mean gradient is 13 mmHg (LVOT diameter 2.0 cm).   Normal central venous pressure (3 mmHg).   There is no significant tricuspid regurgitation and therefore the pulmonary artery systolic pressure cannot be reported.      Selma Lancaster PA-C  Comprehensive Stroke Center  Department of Vascular Neurology   [facility] - Intensive Care ([facility]-)

## 2023-06-17 NOTE — PROCEDURES
EEG REPORT      James Dc  [id number]  12/26/1932    DATE OF SERVICE: 6/16/2023     -2    METHODOLOGY      Extended electroencephalographic recording is made while the patient is ambulatory and continuing normal daily activities.  Electrodes are placed according to the International 10-20 placement system and included T1 and T2 electrode placement.  Twenty four (24) channels of digital signal (sampling rate of 512/sec) was simultaneously recorded from the scalp including EKG and eye monitors.  Recording band pass was 0.1 to 100 hz and all data was stored digitally on the recorder.  The patient is instructed to press an event button when clinical symptoms occur and write the symptoms into a diary. Activation procedures which include photic stimulation, hyperventilation and instructing patients to perform simple task are done in selected patients.        The EEG is displayed on a monitor screen and can be reformatted into different montages for evaluation.  The entire recoding is submitted for computer assisted analysis to detect spike and electrographic seizure activity.  The entire recording is visually reviewed and the times identified by computer analysis as being spikes or seizures are reviewed again.  Compresses spectral analysis (CSA) is also performed on the activity recorded from each individual channel.  This is displayed as a power display of frequencies from 0 to 30 Hz over time.   The CSA analysis is done and displayed continuously.  This is reviewed for asymmetries in power between homologous areas of the scalp and for presence of changes in power which canbe seen when seizures occur.  Sections of suspected abnormalities on the CSA is then compared with the original EEG recording.  .     [employer] software was also utilized in the review of this study.  This software suite analyzes the EEG recording in multiple domains.  Coherence and rhythmicity is computed to identify EEG sections which may  contain organized seizures.  Each channel undergoes analysis to detect presence of spike and sharp waves which have special and morphological characteristic of epileptic activity.  The routine EEG recording is converted from spacial into frequency domain.  This is then displayed comparing homologous areas to identify areas of significant asymmetry.  Algorithm to identify non-cortically generated artifact is used to separate eye movement, EMG and other artifact from the EEG     Recording Times    A total of 4:16:48 hours of EEG was recorded.      EEG FINDINGS:  Background activity:   The background rhythm was characterized by alpha and anterior dominant beta activity with a 8-9Hz posterior dominant alpha rhythm at 30-70 microvolts.   Symmetry and continuity: the background was continuous and symmetric     Sleep:   Normal sleep transients including sleep spindles, K complexes, vertex waves were seen.    Activation procedures:   NA    Abnormal activity:   No epileptiform discharges, periodic discharges, lateralized rhythmic delta activity or electrographic seizures were seen.    IMPRESSION:   Normal continuous video EEG      Sukumar Ford MD  Neurology-Epilepsy.  [facility].

## 2023-07-12 ENCOUNTER — OFFICE VISIT (OUTPATIENT)
Dept: CARDIOLOGY | Facility: CLINIC | Age: 88
End: 2023-07-12
Payer: MEDICARE

## 2023-07-12 VITALS
WEIGHT: 111.31 LBS | BODY MASS INDEX: 19 KG/M2 | HEART RATE: 71 BPM | HEIGHT: 64 IN | SYSTOLIC BLOOD PRESSURE: 110 MMHG | DIASTOLIC BLOOD PRESSURE: 54 MMHG

## 2023-07-12 DIAGNOSIS — I65.23 ASYMPTOMATIC BILATERAL CAROTID ARTERY STENOSIS: Primary | ICD-10-CM

## 2023-07-12 DIAGNOSIS — I63.232 CEREBROVASCULAR ACCIDENT (CVA) DUE TO STENOSIS OF LEFT CAROTID ARTERY: ICD-10-CM

## 2023-07-12 DIAGNOSIS — I35.0 NONRHEUMATIC AORTIC VALVE STENOSIS: ICD-10-CM

## 2023-07-12 DIAGNOSIS — R55 SYNCOPE, UNSPECIFIED SYNCOPE TYPE: ICD-10-CM

## 2023-07-12 DIAGNOSIS — R53.81 PHYSICAL DECONDITIONING: ICD-10-CM

## 2023-07-12 DIAGNOSIS — F32.1 CURRENT MODERATE EPISODE OF MAJOR DEPRESSIVE DISORDER WITHOUT PRIOR EPISODE: ICD-10-CM

## 2023-07-12 DIAGNOSIS — I70.0 AORTIC ATHEROSCLEROSIS: ICD-10-CM

## 2023-07-12 PROCEDURE — 1159F MED LIST DOCD IN RCRD: CPT | Mod: CPTII,S$GLB,, | Performed by: INTERNAL MEDICINE

## 2023-07-12 PROCEDURE — 99999 PR PBB SHADOW E&M-EST. PATIENT-LVL IV: ICD-10-PCS | Mod: PBBFAC,,, | Performed by: INTERNAL MEDICINE

## 2023-07-12 PROCEDURE — 99999 PR PBB SHADOW E&M-EST. PATIENT-LVL IV: CPT | Mod: PBBFAC,,, | Performed by: INTERNAL MEDICINE

## 2023-07-12 PROCEDURE — 99204 PR OFFICE/OUTPT VISIT, NEW, LEVL IV, 45-59 MIN: ICD-10-PCS | Mod: S$GLB,,, | Performed by: INTERNAL MEDICINE

## 2023-07-12 PROCEDURE — 3288F FALL RISK ASSESSMENT DOCD: CPT | Mod: CPTII,S$GLB,, | Performed by: INTERNAL MEDICINE

## 2023-07-12 PROCEDURE — 99499 RISK ADDL DX/OHS AUDIT: ICD-10-PCS | Mod: HCNC,S$GLB,, | Performed by: INTERNAL MEDICINE

## 2023-07-12 PROCEDURE — 1126F AMNT PAIN NOTED NONE PRSNT: CPT | Mod: CPTII,S$GLB,, | Performed by: INTERNAL MEDICINE

## 2023-07-12 PROCEDURE — 99204 OFFICE O/P NEW MOD 45 MIN: CPT | Mod: S$GLB,,, | Performed by: INTERNAL MEDICINE

## 2023-07-12 PROCEDURE — 1126F PR PAIN SEVERITY QUANTIFIED, NO PAIN PRESENT: ICD-10-PCS | Mod: CPTII,S$GLB,, | Performed by: INTERNAL MEDICINE

## 2023-07-12 PROCEDURE — 1100F PR PT FALLS ASSESS DOC 2+ FALLS/FALL W/INJURY/YR: ICD-10-PCS | Mod: CPTII,S$GLB,, | Performed by: INTERNAL MEDICINE

## 2023-07-12 PROCEDURE — 1100F PTFALLS ASSESS-DOCD GE2>/YR: CPT | Mod: CPTII,S$GLB,, | Performed by: INTERNAL MEDICINE

## 2023-07-12 PROCEDURE — 1160F PR REVIEW ALL MEDS BY PRESCRIBER/CLIN PHARMACIST DOCUMENTED: ICD-10-PCS | Mod: CPTII,S$GLB,, | Performed by: INTERNAL MEDICINE

## 2023-07-12 PROCEDURE — 3288F PR FALLS RISK ASSESSMENT DOCUMENTED: ICD-10-PCS | Mod: CPTII,S$GLB,, | Performed by: INTERNAL MEDICINE

## 2023-07-12 PROCEDURE — 1159F PR MEDICATION LIST DOCUMENTED IN MEDICAL RECORD: ICD-10-PCS | Mod: CPTII,S$GLB,, | Performed by: INTERNAL MEDICINE

## 2023-07-12 PROCEDURE — 1160F RVW MEDS BY RX/DR IN RCRD: CPT | Mod: CPTII,S$GLB,, | Performed by: INTERNAL MEDICINE

## 2023-07-12 PROCEDURE — 99499 UNLISTED E&M SERVICE: CPT | Mod: HCNC,S$GLB,, | Performed by: INTERNAL MEDICINE

## 2023-07-12 NOTE — PROGRESS NOTES
Subjective:   Patient ID:  James Dc is a 89 y.o. male who presents for follow up of Hospital Follow Up      HPI: Very pleasant but clearly depressed gentleman presenting with his wife for evaluation after a hospitalization as below.  He admits, without questioning on the topic, to loss of interest in doing things, loss of energy, and feeling depressed.    He's lost muscle mass and weight and notes that his strength is markedly diminished.    No new TIA/stroke symptoms, and no recent recurrence of the catatonic spells that were occurring before he was hospitalized.    June 2023 Discharge Summary  HPI:   James Dc is a 89 year old man with history of Alzheimer's dementia and essential tremor presents with recurrent episodes of transient loss of consciousness and decreased responsiveness. Patient brought to the ED by family, who provided majority of history. Per family, the patient has been having episodes of catatonia, where his body becomes completely still and he becomes unresponsive. On 6/11, patient had a fall and family was concerned he had another catatonic episode that caused the fall. He was seen at [facility] ED and workup including labs, CT head, C-spine, and maxillary face were obtained. Workup was ultimately unremarkable and the patient was discharged with follow-up with his PCP today on 6/13. During his PCP visit, he was told to present to the ED if he had any recurrent episodes. Later today, family states that the patient was found unresponsive in his recliner at home. He was blankly staring and unresponsive for about 30 minutes, and was found to be confused afterwards. Patient states he can hear and register what is saying to him, but he is unable to move or speak during the episodes. Patient denies any headache, vision changes, focal weakness/numbness, or chest pain.     On arrival to the ER, patient was afebrile with stable vital signs. Labs were largely unremarkable. CT head showed no acute  intracranial findings. EKG without acute ischemic changes. Urinalysis without signs of infection. Patient admitted to hospital medicine for further observation of catatonic episodes with decreased responsiveness.        * No surgery found *       Hospital Course:   Patient admitted to  for evaluation of syncopal episodes and staring spells. Given significant HTN, there was concern for PRES. MRI brain was done which showed multiple new punctate infarcts throughout the left cerebral hemisphere. Additionally, a small subdural hematoma along the left temporal lobe that was not visualized on prior CT was seen, thought most likely secondary to his recent fall. Per neurosurgery, no intervention for subdural hematoma given its small size, and repeat CT head on 6/14 showed no interval change in size or concern for active bleeding. Neurology was consulted given pt's MRI findings and his presentation, recommended formal 24-hour EEG and initiation of ASA, statin, and keppra for seizure ppx. CTA head and neck showed atherosclerotic disease in the proximal ICA greater on the left with high-grade left proximal ICA stenosis measuring approximately 80%. Vascular surgery were consulted, reported no acute intervention needed and to follow up outpatient after carotid ultrasound obtained. TTE showed moderate aortic stenosis, but otherwise unremarkable. Patient was stable throughout admission and had no further syncopal events. His EEG was negative for any seizure activity. He exhibited sleep associated hypoxia so referral to sleep medicine was placed. He was stable on discharge with new meds ASA/lipitor/keppra. Referrals placed to Neurology (for follow up and to discuss continuation of Keppra), Vascular Surgery (for carotid artery stenosis) and PCP.     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    atorvastatin (LIPITOR) 40 MG tablet Take 1 tablet (40 mg total) by mouth every evening.    levETIRAcetam (KEPPRA) 500 MG Tab Take 1 tablet (500 mg total) by mouth 2 (two) times daily.    MULTIVITAMIN ORAL Take 1 tablet by mouth once daily.     No current facility-administered medications for this visit.       ROS  The review of systems is negative except as above.     Objective:   Physical Exam  Vitals reviewed.   Constitutional:       Appearance: He is well-developed.   HENT:      Head: Normocephalic and atraumatic.   Eyes:      General: No scleral icterus.     Conjunctiva/sclera: Conjunctivae normal.   Neck:      Vascular: No JVD.   Cardiovascular:      Rate and Rhythm: Normal rate and regular rhythm.      Pulses: Intact distal pulses.      Heart sounds: Normal heart sounds. No murmur heard.    No friction rub. No gallop.   Pulmonary:      Effort: Pulmonary effort is normal.      Breath sounds: Normal breath sounds. No wheezing or rales.   Abdominal:      General: Bowel sounds are normal. There is no distension.      Palpations: Abdomen is soft.      Tenderness: There is no abdominal tenderness.   Musculoskeletal:         General: Normal range of motion.      Cervical back: Normal range of motion and neck supple.   Skin:     General: Skin is warm and dry.      Findings: No erythema or rash.   Neurological:      Mental  Status: He is alert and oriented to person, place, and time.   Psychiatric:         Thought Content: Thought content normal.         Judgment: Judgment normal.      Comments: Appears sullen and depressed with apparent psychomotor slowing.       Lab Results   Component Value Date    WBC 6.70 06/16/2023    HGB 13.5 (L) 06/16/2023    HCT 41.3 06/16/2023    MCV 96 06/16/2023     06/16/2023         Chemistry        Component Value Date/Time     06/16/2023 0519    K 3.9 06/16/2023 0519     06/16/2023 0519    CO2 27 06/16/2023 0519    BUN 25 (H) 06/16/2023 0519    CREATININE 1.0 06/16/2023 0519    GLU 96 06/16/2023 0519        Component Value Date/Time    CALCIUM 8.8 06/16/2023 0519    ALKPHOS 48 (L) 06/16/2023 0519    AST 18 06/16/2023 0519    ALT 14 06/16/2023 0519    BILITOT 0.4 06/16/2023 0519    ESTGFRAFRICA >60.0 02/21/2022 0759    EGFRNONAA >60.0 02/21/2022 0759            Lab Results   Component Value Date    CHOL 185 02/21/2022    CHOL 190 01/07/2021    CHOL 181 01/03/2020     Lab Results   Component Value Date    HDL 63 02/21/2022    HDL 67 01/07/2021    HDL 60 01/03/2020     Lab Results   Component Value Date    LDLCALC 106.6 02/21/2022    LDLCALC 98.6 01/07/2021    LDLCALC 103.8 01/03/2020     Lab Results   Component Value Date    TRIG 77 02/21/2022    TRIG 122 01/07/2021    TRIG 86 01/03/2020     Lab Results   Component Value Date    CHOLHDL 34.1 02/21/2022    CHOLHDL 35.3 01/07/2021    CHOLHDL 33.1 01/03/2020       Lab Results   Component Value Date    TSH 1.331 06/13/2023       No results found for: HGBA1C    Assessment:     1. Asymptomatic bilateral carotid artery stenosis    2. Syncope, unspecified syncope type    3. Aortic atherosclerosis    4. Nonrheumatic aortic valve stenosis    5. Cerebrovascular accident (CVA) due to stenosis of left carotid artery        Plan:     PT/OT    Discuss depression treatment with Dr. Joya.    Continue current medicines.    Diet/exercise goals  reinforced.    F/U 6  months

## 2023-08-09 ENCOUNTER — CLINICAL SUPPORT (OUTPATIENT)
Dept: REHABILITATION | Facility: HOSPITAL | Age: 88
End: 2023-08-09
Attending: INTERNAL MEDICINE
Payer: MEDICARE

## 2023-08-09 DIAGNOSIS — R53.81 PHYSICAL DECONDITIONING: ICD-10-CM

## 2023-08-09 PROCEDURE — 97162 PT EVAL MOD COMPLEX 30 MIN: CPT | Mod: HCNC,PO

## 2023-08-09 NOTE — PLAN OF CARE
"  [facility]   Physical Therapy Initial Evaluation      Name: James Dc  [facility] Number: [id number]    Therapy Diagnosis:   Encounter Diagnosis   Name Primary?    Physical deconditioning         Physician: Donald Shi MD    Physician Orders: PT Eval and Treat  Medical Diagnosis from Referral: R53.81 (ICD-10-CM) - Physical deconditioning  Evaluation Date: 8/9/2023  Authorization Period Expiration: 10/01/2023  Plan of Care Expiration: 11/30/2023  Progress Note Due: 9/9/2023  Visit # / Visits authorized: 1/ 1   FOTO: 1/ 3    Precautions: Hx of CVA, Possible PD, HTN, AD     Time In: 1015  Time Out: 1100  Total Billable Time: 45 minutes    Subjective     Date of onset: Multiple years    History of current condition - James reports: Pt is a 89 y.o. male  presenting with c.o decreased strength and energy level. Pt states that he fell a couple of months ago and is here to get stronger. Pt reports that after he was discharged from the hospital is strength improved, "but it's nothing to write home about."    Falls: June 2023, was hospitalized    Imaging: CT scan films:    FINDINGS:  Evaluation limited by streak artifact from overlying EEG electrodes.     INTRACRANIAL COMPARTMENT:     Stable prominence of the ventricles and sulci compatible with generalized cerebral volume loss.  No hydrocephalus.     Brain parenchyma appears grossly unchanged.  Stable mild chronic small vessel ischemic changes.  Small remote right parietal infarct.  No new major vascular distribution infarct.  No acute hemorrhage.  No mass effect or midline shift.     Trace subdural hemorrhage over the lateral aspect of left superior temporal lobe.  This is better delineated on the prior MRI.  No significant increase in size or associated mass effect.  No new extra-axial blood or fluid collections elsewhere.     SKULL/EXTRACRANIAL CONTENTS (limited evaluation):     No fracture.  Mastoid air cells and paranasal sinuses are " "essentially clear.    Prior Therapy: None  Social History: Lives in a 2 story house lives with their spouse with 2 steps to enter  Occupation: Retired    Prior Level of Function: Limited with community ambulation and ADLs  Current Level of Function: "no energy or motivation"    Pain:  Current 0/10, worst 2/10, best 0/10   Location:   N/A  Description: Stiff  Aggravating Factors: NA  Easing Factors: NA    Patients goals: Get more energy, get back to moving     Medical History:   Past Medical History:   Diagnosis Date    Essential tremor     Hypertensive urgency 6/13/2023    Nuclear sclerosis - Both Eyes 9/19/2012       Surgical History:   James Dc  has a past surgical history that includes Tonsillectomy.    Medications:   James has a current medication list which includes the following prescription(s): aspirin, atorvastatin, levetiracetam, and multivitamin.    Allergies:   Review of patient's allergies indicates:   Allergen Reactions    Tetracyclines Nausea Only        Objective        Observation: Pt is AAOx3 (not oriented to date), pt maintains little eye contact with conversation     Posture: Pt assumes forward head, thoracic kyphotic posture     Gait: Pt amb with increased blanca and decreased step length     TUG Score: 10.45s     5TSTS: 15.43s     Functional ROM: WFL     Functional Mobility:  Sit to stand: Ind  Stand to sit: Ind  Bed mobility: Ind     Balance:   SLS R  SLS L  NBOS  WBOS     Lower Extremity Strength (graded 0-5 out of 5)    RLE LLE   Hip flexion:     Hip ER     Hip IR     Hip Extension     Hip abduction      Knee Extension     Knee flexion     Ankle plantarflexion     Ankle Dorsiflexion           1. Sitting to Standing   4 - able to stand without using hands and stabilize independently  2. Standing Unsupported   4 - able to stand safely 2 minutes without hold  3. Sitting Unsupported   4 - able to sit safely and securely 2 minutes  4. Standing to Sitting   4 - sits safely with " minimal use of hands  5. Pivot Transfer   4 - able to trnasfer safely with minor use of hands  6. Standing with Eyes Closed   4 - albe to stand 10 seconds safely  7. Standing with Feet Together   3 - able to place feet together independently and stand for 1 minute with supervision  8. Reaching Forward with Outstretched Arm   3 - can reach forward 12 cm/5 inches safely  9. Retrieving Object from Floor   4 - able to  slipper safely and easily  10. Turning to Look Behind   4 - looks behind from both sides and weights shifts well  11. Turning 360 Degrees   4 - able to turn 360 in seconds or less  12. Placing Alternate Foot on Step   3 - able to stand independently and completely 8 steps > 20 seconds  13. Standing with One Foot in Front   2 - able to take small step indenpendently and hold 30 seconds  14. Standing on One Foot   1 - tries to lift leg and unable to hold 3 seconds but remains standing independently  Total: 48  Maximum: 56    Treatment   No treatment performed today    Patient Education and Home Exercises     Education provided:   - HEP    Written Home Exercises Provided: yes. Exercises were reviewed and James was able to demonstrate them prior to the end of the session.  James demonstrated good  understanding of the education provided. See EMR under Patient Instructions for exercises provided during therapy sessions.    Assessment     James is a 89 y.o. male referred to outpatient Physical Therapy with a medical diagnosis of Physical deconditioning. All the patient presents with static balance within normal limits and normal strength in mobility, patient does present with difficulties with start and stopping tasks as well as gait abnormalities similar to that of PD. Patient presents with some cognitive deficits as noted by patient not being able to be oriented to time. Patient does present with some parkinsonian like symptoms and changes in his cognition and memory as of recent. PT to request speech  therapy from MD and to transfer patient upstairs to neurological PT so patient can address goals listed above.    Patient prognosis is Guarded.   Patient will benefit from skilled outpatient Physical Therapy to address the deficits stated above and in the chart below, provide patient /family education, and to maximize patientt's level of independence.     Plan of care discussed with patient: Yes  Patient's spiritual, cultural and educational needs considered and patient is agreeable to the plan of care and goals as stated below:     Anticipated Barriers for therapy: scheduling, age, level of deconditioning, chronicity of condition    Medical Necessity is demonstrated by the following  History  Co-morbidities and personal factors that may impact the plan of care [] LOW: no personal factors / co-morbidities  [] MODERATE: 1-2 personal factors / co-morbidities  [x] HIGH: 3+ personal factors / co-morbidities    Moderate / High Support Documentation:   Co-morbidities affecting plan of care: Hx of CVA, Possible PD, HTN, AD    Personal Factors:   age  lifestyle     Examination  Body Structures and Functions, activity limitations and participation restrictions that may impact the plan of care [] LOW: addressing 1-2 elements  [x] MODERATE: 3+ elements  [] HIGH: 4+ elements (please support below)    Moderate / High Support Documentation: Balance, strength, mobility     Clinical Presentation [] LOW: stable  [x] MODERATE: Evolving  [] HIGH: Unstable     Decision Making/ Complexity Score: moderate       Goals to be set by neuro PT upon 1st f/u      Plan     Plan of care Certification: 8/9/2023 to 11/30/2023.    Outpatient Physical Therapy 2 times weekly for 10 weeks to include the following interventions: Gait Training, Manual Therapy, Neuromuscular Re-ed, Therapeutic Activities, and Therapeutic Exercise.     Jimmie Corrales, PT, DPT        Physician's Signature: _________________________________________ Date: ________________

## 2023-08-10 RX ORDER — LEVETIRACETAM 500 MG/1
500 TABLET ORAL 2 TIMES DAILY
Qty: 60 TABLET | Refills: 1 | Status: SHIPPED | OUTPATIENT
Start: 2023-08-10 | End: 2023-10-26

## 2023-08-10 NOTE — TELEPHONE ENCOUNTER
----- Message from Aleyda Stevens sent at 8/10/2023  3:14 PM CDT -----  Contact: [phone]  Requesting an RX refill or new RX.  Is this a refill or new RX: REFILL  RX name and strength :  levETIRAcetam (KEPPRA) 500 MG Tab 60 tablet   Is this a 30 day or 90 day RX: 90  Pharmacy name and phone #   [id number] - [place] [address]   Phone:  [phone]  Fax:  [phone]        Patient is out of medication and need refill, please call to confirm.

## 2023-08-10 NOTE — TELEPHONE ENCOUNTER
----- Message from Aleyda Stevens sent at 8/10/2023  3:14 PM CDT -----  Contact: [phone]  Requesting an RX refill or new RX.  Is this a refill or new RX: REFILL  RX name and strength :  levETIRAcetam (KEPPRA) 500 MG Tab 60 tablet   Is this a 30 day or 90 day RX: 90  Pharmacy name and phone #   [id number] - [place] [address]   Phone:  [phone]  Fax:  [phone]        Patient is out of medication and need refill, please call to confirm.

## 2023-08-11 ENCOUNTER — CLINICAL SUPPORT (OUTPATIENT)
Dept: REHABILITATION | Facility: HOSPITAL | Age: 88
End: 2023-08-11
Payer: MEDICARE

## 2023-08-11 DIAGNOSIS — R26.89 IMPAIRMENT OF BALANCE: ICD-10-CM

## 2023-08-11 PROCEDURE — 97110 THERAPEUTIC EXERCISES: CPT | Mod: HCNC,PO

## 2023-08-11 PROCEDURE — 97112 NEUROMUSCULAR REEDUCATION: CPT | Mod: HCNC,PO

## 2023-08-11 NOTE — PROGRESS NOTES
"[facility]   Physical Therapy Treatment Note      Name: James Dc  [facility] Number: [id number]    Therapy Diagnosis:   Encounter Diagnosis   Name Primary?    Impairment of balance      Physician: Donald Shi MD    Visit Date: 8/11/2023    Physician Orders: PT Eval and Treat  Medical Diagnosis from Referral: R53.81 (ICD-10-CM) - Physical deconditioning  Evaluation Date: 8/9/2023  Authorization Period Expiration: 10/01/2023  Plan of Care Expiration: 11/30/2023  Progress Note Due: 9/9/2023  Visit # / Visits authorized: 1/ 1   FOTO: 1/ 3     Precautions: Hx of CVA, Possible PD, HTN, AD      Time In: 12:20  Time Out: 13:18  Total Billable Time: 58 minutes    PTA Visit #: 0/5       Subjective     Pt reports: he has tremors that affect his fine motor skills and he gets frustrated. He feels that clarity of thought has decreased, he is also having problems with memory. His wife (Brisa) also states that he hasn't been walking very far lately since his hospital admission. He states: "I have a brain fog where I don't feel clear in the head." He also endorses some trouble with initiating movement, like taking a step.   He was not given a home exercise program.  Response to previous treatment: eval only  Functional change: ongoing     Pain: 0/10  Location: none reported     Objective      Objective Measures updated at progress report unless specified.     Mental status: alert, oriented to person, place, and time, normal mood, behavior, speech, dress, motor activity, and thought processes  Attention Span and Concentration:  Normal  Follows commands: 100% of time   Speech: no deficits but reports difficulty with word finding at times and getting words out    Dominant hand: left     Posture Alignment in sitting:   Head: forward head   Scapulae: slouched posture   Trunk: increased kyphosis     Posture Alignment in standing:   Head: forward head   Scapulae: slouched posture   Trunk: increased " kyphosis     Sensation: Light Touch: Intact  Edema observed: No        Tone: normal  Limbs/muscles affected: N/A    Visual/Auditory: denies changes, has an optometry appt coming up     Coordination:   - fine motor:  Impaired: due to intentional tremor  - UE coordination: Impaired: dysdiadochokinesia with FRANTZ   - LE coordination: Impaired: tapping of feet; intact heel to shin     ROM:   UPPER EXTREMITY--AROM/PROM  (R) UE: WFLs  (L) UE: WFLs         RANGE OF MOTION--LOWER EXTREMITIES  (R) LE Hip: normal   Knee: normal   Ankle: normal    (L) LE: Hip: normal   Knee: normal   Ankle: normal    Strength: manual muscle test grades below   Lower Extremity Strength (taken seated)  Right LE  Left LE    Hip Flexion: 4+/5 Hip Flexion: 4+/5   Hip Extension:  4+/5 Hip Extension: 4+/5   Hip Abduction: 4+/5 Hip Abduction: 4+/5   Hip Adduction: 5/5 Hip Adduction 5/5   Knee Extension: 4+/5 Knee Extension: 4+/5   Knee Flexion: 5/5 Knee Flexion: 4+/5   Ankle Dorsiflexion: 4+/5 Ankle Dorsiflexion: 4+/5   Ankle Plantarflexion: 5/5 Ankle Plantarflexion: 4+/5     Flexibility: intact for age       Evaluation 08/11/2023   30 second Chair Rise   11 completed with no arms     5 times sit-stand  Fall risk cutoff scores by population:  General >12s  PD >16s  Vestibular/balance over 65 years >/= 15s  CVA >/=  12s   14 seconds with no arms       Balance Testing    Evaluation 08/11/2023   Tandem Stance R LE forward, eyes open 9s  (<30 sec = Increased FALL RISK)   Tandem Stance L LE forward, eyes open 2s  (<30 sec = Increased FALL RISK)   Single Limb Stance R LE  eyes open 10s  (<10 sec = HIGH FALL RISK)   Single Limb Stance L LE  eyes open 1s  (<10 sec = HIGH FALL RISK)       Postural control: MCTSIB: Evaluation 08/11/2023   1. Eyes Open/feet together/Firm:  30 seconds   2. Eyes Closed/feet together/Firm:  30 seconds   3. Eyes Open/feet together/Foam:  30 seconds   4. Eyes Closed/feet together/Foam:  30 seconds       GAIT ASSESSMENT  - AD used: No  Assistive Device  - Assistance: independence  - Distance: home distances    Observed Gait Deviations:  James displays the following deviations with ambulation:  Abnormal, decreased toe-to-floor clearance. Forefoot contact (no heel strike at IC), decreased arm swing bilaterally.     Impairments contributing to deviations/impairments: impaired balance, impaired coordination, and decreased strength     Evaluation 08/11/2023   Timed Up and Go  (< 20 sec safe for independent transfers, < 30 sec safe for dependent transfers/assist required) 10 sec with  No Assistive Device   Self Selected Walking Speed 1.2 m/sec (6m/5s) with  No Assistive Device   Fast Walking Speed 1.5 m/sec (6m/4s) with  No Assistive Device       Functional Gait Assessment:   1. Gait on level surface =  3   (3) Normal: less than 5.5 sec, no A.D., no imbalance, normal gait pattern, deviates< 6in   (2) Mild impairment: 7-5.6 sec, uses A.D., mild gait deviations, or deviates 6-10 in   (1) Moderate impairment: > 7 sec, slow speed, imbalance, deviates 10-15 in.   (0) Severe impairment: needs assist, deviates >15 in, reach/touch wall  2. Change in Gait Speed = 3   (3) Normal: smooth change w/o loss of balance or gait deviation, deviates < 6 in, significant difference between speeds   (2) Mild impairment: changes speed, but demonstrates mild gait deviations, deviates 6-10 in, OR no deviations but unable to significantly speed, OR uses A.D.   (1) Moderate impairment: minor changes to speed, OR changes speed w/ significant deviations, deviates 10-15 in, OR  Changes speed , but loses balance & recovers   (0) Severe impairment: cannot change speed, deviates >15 in, or loses balance & needs assist  3. Gait with horizontal head turns  = 1   (3) Normal: no change in gait, deviates <6 in   (2) Mild impairment: slight change in speed, deviates 6-10 in, OR uses A.D.   (1) Moderate impairment: moderate change in speed, deviates 10-15 in   (0) Severe impairment: severe  disruption of gait, deviates >15in  4. Gait with vertical head turns = 2   (3) Normal: no change in gait, deviates <6 in   (2) Mild impairment: slight change in speed, deviates 6-10 in OR uses A.D.   (1) Moderate impairment: moderate change in speed, deviates 10-15 in   (0) Severe impairment: severe disruption of gait, deviates >15 in  5. Gait with pivot turns = 3   (3) Normal: performs safely in 3 sec, no LOB   (2) Mild impairment: performs in >3 sec & no LOB, OR turns safely & requires several steps to regain LOB   (1) Moderate impairment: turns slow, OR requires several small steps for balance following turn & stop   (0) Severe impairment: cannot turn safely, needs assist  6. Step over obstacle = 2   (3) Normal: steps over 2 stacked boxes w/o change in speed or LOB   (2) Mild impairment: able to step over 1 box w/o change in speed or LOB   (1) Moderate impairment: steps over 1 box but must slow down, may require VC   (0) Severe impairment: cannot perform w/o assist  7. Gait with Narrow COLT = 0   (3) Normal: 10 steps no staggering   (2) Mild impairment: 7-9 steps   (1) Moderate impairment: 4-7 steps   (0) Severe impairment: < 4 steps or cannot perform w/o assist  8. Gait with eyes closed = 0   (3) Normal: < 7 sec, no A.D., no LOB, normal gait pattern, deviates <6 in   (2) Mild impairment: 7.1-9 sec, mild gait deviations, deviates 6-10 in   (1) Moderate impairment: > 9 sec, abnormal pattern, LOB, deviates 10-15 in   (0) Severe impairment: cannot perform w/o assist, LOB, deviates >15in  9. Ambulating Backwards = 1   (3) Normal: no A.D., no LOB, normal gait pattern, deviates <6in   (2) Mild impairment: uses A.D., slower speed, mild gait deviations, deviates 6-10 in   (1) Moderate impairment: slow speed, abnormal gait pattern, LOB, deviates 10-15 in   (0) Severe impairment: severe gait deviations or LOB, deviates >15in  10. Steps = 2   (3) Normal: alternating feet, no rail   (2) Mild Impairment: alternating feet, uses  rail   (1) Moderate impairment: step-to, uses rail   (0) Severe impairment: cannot perform safely    Score 17/30     Score:   <22/30 fall risk   <20/30 fall risk in older adults   <18/30 fall risk in Parkinsons       Endurance Deficit: moderate    6MWT (no assistive device)   Distance: 1013 ft    FOTO Survey: 50% limitation  Abbreviated ABC: 70%    Treatment     James received the treatments listed below:      therapeutic exercises to develop strength, endurance, ROM, and flexibility for 10 minutes including:  Objective testing    neuromuscular re-education activities to improve: Balance, Coordination, and Posture for 43 minutes. The following activities were included:  Objective testing    therapeutic activities to improve functional performance for 5 minutes, including:  FOTO survey    gait training to improve functional mobility and safety for 00 minutes, including:  N/A    Patient Education and Home Exercises       Education provided:   - Benefit of OT and SLP for interdisciplinary care     Written Home Exercises Provided:  None provided today .     Assessment     Mr. Ramirez demonstrates impairments with both static and dynamic balance, especially in conditions with a narrow base of support or vision-impaired situations. In addition, he has some strength deficits in his lower extremity which affects overall activity tolerance and walking endurance. Though he did fairly well on his functional transfers, especially STS, he scored well below the fall risk cut-off in the FGA. He would benefit greatly from outpatient neuro PT to address these deficits and improve his independence as well as maximize his return to prior level of function.     James Is progressing well towards his goals.   Pt prognosis is Good.     Pt will continue to benefit from skilled outpatient physical therapy to address the deficits listed in the problem list box on initial evaluation, provide pt/family education and to maximize pt's level of  independence in the home and community environment.     Pt's spiritual, cultural and educational needs considered and pt agreeable to plan of care and goals.     Anticipated barriers to physical therapy: age, chronicity of condition    Goals:   Short Term Goals: 3 weeks  Pt will improve bilaterally LE knee extension MMT scores to 5/5 for improved stability with stance and functional mobility.    Pt will improve bilaterally LE hip extension MMT scores to 5/5 for improved stability with stance and functional mobility.   Pt will improve 5x chair rise score to </= 13s without UE assist for improved muscular endurance.   Pt will improve 30s chair rise score to at least 12 reps without UE assist for improved muscular endurance.   Pt will improve tandem stance bilaterally to at least 12s for decreased fall risk.   Pt will improve single leg stance (SLS) right to at least 5s for decreased fall risk.   Pt will improve Functional Gait Assessment (FGA) score to at least 20/30 for increased independence with home and community ambulation.   Pt to perform 6 minute walk test for 1135 feet or greater to improve gait speed & endurance      Long Term Goals: 6 weeks  Pt will improve bilaterally LE hip flexion MMT scores to 5/5 for improved stability with stance and functional mobility.   Pt will improve bilaterally LE hip abduction MMT scores to 5/5 for improved stability with stance and functional mobility.   Pt will improve 5x chair rise score to </= 13s without UE assist for improved muscular endurance.   Pt will improve 30s chair rise score to at least 12 reps without UE assist for improved muscular endurance.   Pt will improve tandem stance bilaterally to at least 18s for decreased fall risk.   Pt will improve single leg stance (SLS) right to at least 7s for decreased fall risk.   Pt will improve Functional Gait Assessment (FGA) score to at least 23/30 for increased independence with home and community ambulation.   Pt will  improve FOTO limitation score to </= 40% for improved self perception of functional mobility.  Pt to perform 6 minute walk test for 1260 feet or greater to improve gait speed & endurance    Plan     Continue PT plan of care with a focus on coordination and balance training, gait training as appropriate     Melisa Childs, PT

## 2023-08-15 ENCOUNTER — CLINICAL SUPPORT (OUTPATIENT)
Dept: REHABILITATION | Facility: HOSPITAL | Age: 88
End: 2023-08-15
Payer: MEDICARE

## 2023-08-15 DIAGNOSIS — R26.89 IMPAIRMENT OF BALANCE: Primary | ICD-10-CM

## 2023-08-15 PROCEDURE — 97110 THERAPEUTIC EXERCISES: CPT | Mod: HCNC,PO,CQ

## 2023-08-15 NOTE — PATIENT INSTRUCTIONS
KNEE: Extension, Long Arc Quads - Sitting        Raise leg until knee is straight.  ___ reps per set, ___ sets per day, ___ days per week    Copyright © [facility]. All rights reserved.   High Stepping in Place (Sitting)        Sitting, alternately lift knees as high as possible. Keep torso erect.  Repeat ____ times, each leg.    Copyright © I. All rights reserved.   Abduction: Isometric - Bilateral (Sitting)        Position Patient: Keep legs slightly apart, feet flat. [place]: Place hands on outside of both knees. Motion - Cue patient to press legs apart. - [place] blocks movement.  Hold ___ seconds. Relax. Repeat ___ times. Do ___ sessions per day. Variation: Perform with knees ___ inches apart.    Copyright © [employer]. All rights reserved.   Adduction: Hip - Knees Together (Sitting)        Sit with towel roll between knees. Push knees together. Hold for ___ seconds. Rest for ___ seconds.  Repeat ___ times. Do ___ times a day.    Copyright © [employer]. All rights reserved.   Ankle Bend: Dorsiflexion / Plantar Flexion, Sitting        Sit with feet on floor. Point toes up, keeping both heels on floor. Then press toes to floor raising heels. Hold each position ___ seconds.  Repeat ___ times per session. Do ___ sessions per day.    Copyright © [employer]. All rights reserved.   KNEE: Extension, Long Arc Quads - Sitting      Copyright © [employer]. All rights reserved.

## 2023-08-15 NOTE — PROGRESS NOTES
"[facility]   Physical Therapy Treatment Note      Name: James Dc  Clinic Number: [id number]    Therapy Diagnosis:   Encounter Diagnosis   Name Primary?    Impairment of balance Yes     Physician: Donald Shi MD    Visit Date: 8/15/2023    Physician Orders: PT Eval and Treat  Medical Diagnosis from Referral: R53.81 (ICD-10-CM) - Physical deconditioning  Evaluation Date: 8/9/2023  Authorization Period Expiration: 10/01/2023  Plan of Care Expiration: 11/30/2023  Progress Note Due: 9/9/2023  Visit # / Visits authorized: 2/ 20 ( plus eval)  FOTO: 1/ 3     Precautions: Hx of CVA, Possible PD, HTN, AD      Time In: 16:15  Time Out: 17:00  Total Billable Time: 45 minutes    PTA Visit #: 1/5       Subjective     Pt reports: " I'm doing ok."   Wife reports: " I'm going to see if we can change his late appointments to earlier, so he isn't as tired."   He was given an HEP  Response to previous treatment: eval only  Functional change: ongoing     Pain: 0/10  Location: none reported     Objective        Treatment   Wife present during tx session.    James received the treatments listed below:      therapeutic exercises to develop strength, endurance, ROM, and flexibility for 40 minutes including:  X 5 min on Sci Fit recumbent stepper.  B UE/B LE on level 1.0    Sitting HEP including:   2 x 10 reps of B LE marching in place  2 x 10 reps of B LE LAQ's  2 x 10 reps of B LE hip abduction with YTB  2 x 10 reps of B LE hip adduction squeezes   2 x 10 reps of B LE DF/PF   1 x 10 reps of sit to stand with big arms    neuromuscular re-education activities to improve: Balance, Coordination, and Posture for 0 minutes. The following activities were included:    therapeutic activities to improve functional performance for 5 minutes, including:  Pt descended 2 flights of steps with 1 UE support and Supervision.    gait training to improve functional mobility and safety for 00 minutes, including:  Pt " ambulated out of the clinic with verbal and tactile cues for big arm swing.    Patient Education and Home Exercises       Education provided:   - Benefit of OT and SLP for interdisciplinary care     Written Home Exercises Provided:  None provided today .     Assessment     Mr. Ramirez tolerated his tx session well and did not have any complaints.  Mr. Ramirez began cardiovascular endurance on the stepper and was educated on sitting HEP.  Mr. Ramirez was able to demonstrate understanding of all exercises.  Cont with plan of care with focus on balance, endurance and strengthening and progressing HEP as appropriate.  Cont with plan of care.     James Is progressing well towards his goals.   Pt prognosis is Good.     Pt will continue to benefit from skilled outpatient physical therapy to address the deficits listed in the problem list box on initial evaluation, provide pt/family education and to maximize pt's level of independence in the home and community environment.     Pt's spiritual, cultural and educational needs considered and pt agreeable to plan of care and goals.     Anticipated barriers to physical therapy: age, chronicity of condition    Goals:   Short Term Goals: 3 weeks  Pt will improve bilaterally LE knee extension MMT scores to 5/5 for improved stability with stance and functional mobility.    Pt will improve bilaterally LE hip extension MMT scores to 5/5 for improved stability with stance and functional mobility.   Pt will improve 5x chair rise score to </= 13s without UE assist for improved muscular endurance.   Pt will improve 30s chair rise score to at least 12 reps without UE assist for improved muscular endurance.   Pt will improve tandem stance bilaterally to at least 12s for decreased fall risk.   Pt will improve single leg stance (SLS) right to at least 5s for decreased fall risk.   Pt will improve Functional Gait Assessment (FGA) score to at least 20/30 for increased independence with home and  community ambulation.   Pt to perform 6 minute walk test for 1135 feet or greater to improve gait speed & endurance      Long Term Goals: 6 weeks  Pt will improve bilaterally LE hip flexion MMT scores to 5/5 for improved stability with stance and functional mobility.   Pt will improve bilaterally LE hip abduction MMT scores to 5/5 for improved stability with stance and functional mobility.   Pt will improve 5x chair rise score to </= 13s without UE assist for improved muscular endurance.   Pt will improve 30s chair rise score to at least 12 reps without UE assist for improved muscular endurance.   Pt will improve tandem stance bilaterally to at least 18s for decreased fall risk.   Pt will improve single leg stance (SLS) right to at least 7s for decreased fall risk.   Pt will improve Functional Gait Assessment (FGA) score to at least 23/30 for increased independence with home and community ambulation.   Pt will improve FOTO limitation score to </= 40% for improved self perception of functional mobility.  Pt to perform 6 minute walk test for 1260 feet or greater to improve gait speed & endurance    Plan     Continue PT plan of care with a focus on coordination and balance training, gait training as appropriate     Kimberly Feldman, PTA

## 2023-08-18 DIAGNOSIS — I63.232 CEREBROVASCULAR ACCIDENT (CVA) DUE TO STENOSIS OF LEFT CAROTID ARTERY: Primary | ICD-10-CM

## 2023-08-21 ENCOUNTER — OFFICE VISIT (OUTPATIENT)
Dept: OPTOMETRY | Facility: CLINIC | Age: 88
End: 2023-08-21
Payer: MEDICARE

## 2023-08-21 ENCOUNTER — CLINICAL SUPPORT (OUTPATIENT)
Dept: REHABILITATION | Facility: HOSPITAL | Age: 88
End: 2023-08-21
Payer: MEDICARE

## 2023-08-21 DIAGNOSIS — R26.89 IMPAIRMENT OF BALANCE: Primary | ICD-10-CM

## 2023-08-21 DIAGNOSIS — H52.4 PRESBYOPIA: ICD-10-CM

## 2023-08-21 DIAGNOSIS — H25.13 NUCLEAR SCLEROSIS, BILATERAL: Primary | ICD-10-CM

## 2023-08-21 DIAGNOSIS — Z13.5 GLAUCOMA SCREENING: ICD-10-CM

## 2023-08-21 DIAGNOSIS — H02.055 TRICHIASIS OF LEFT LOWER EYELID: ICD-10-CM

## 2023-08-21 PROCEDURE — 99999 PR PBB SHADOW E&M-EST. PATIENT-LVL II: ICD-10-PCS | Mod: PBBFAC,,, | Performed by: OPTOMETRIST

## 2023-08-21 PROCEDURE — 92015 DETERMINE REFRACTIVE STATE: CPT | Mod: S$GLB,,, | Performed by: OPTOMETRIST

## 2023-08-21 PROCEDURE — 1159F PR MEDICATION LIST DOCUMENTED IN MEDICAL RECORD: ICD-10-PCS | Mod: CPTII,S$GLB,, | Performed by: OPTOMETRIST

## 2023-08-21 PROCEDURE — 1159F MED LIST DOCD IN RCRD: CPT | Mod: CPTII,S$GLB,, | Performed by: OPTOMETRIST

## 2023-08-21 PROCEDURE — 1160F PR REVIEW ALL MEDS BY PRESCRIBER/CLIN PHARMACIST DOCUMENTED: ICD-10-PCS | Mod: CPTII,S$GLB,, | Performed by: OPTOMETRIST

## 2023-08-21 PROCEDURE — 92014 PR EYE EXAM, EST PATIENT,COMPREHESV: ICD-10-PCS | Mod: S$GLB,,, | Performed by: OPTOMETRIST

## 2023-08-21 PROCEDURE — 3288F PR FALLS RISK ASSESSMENT DOCUMENTED: ICD-10-PCS | Mod: CPTII,S$GLB,, | Performed by: OPTOMETRIST

## 2023-08-21 PROCEDURE — 97110 THERAPEUTIC EXERCISES: CPT | Mod: HCNC,PO

## 2023-08-21 PROCEDURE — 1101F PT FALLS ASSESS-DOCD LE1/YR: CPT | Mod: CPTII,S$GLB,, | Performed by: OPTOMETRIST

## 2023-08-21 PROCEDURE — 1126F AMNT PAIN NOTED NONE PRSNT: CPT | Mod: CPTII,S$GLB,, | Performed by: OPTOMETRIST

## 2023-08-21 PROCEDURE — 3288F FALL RISK ASSESSMENT DOCD: CPT | Mod: CPTII,S$GLB,, | Performed by: OPTOMETRIST

## 2023-08-21 PROCEDURE — 97112 NEUROMUSCULAR REEDUCATION: CPT | Mod: HCNC,PO

## 2023-08-21 PROCEDURE — 99999 PR PBB SHADOW E&M-EST. PATIENT-LVL II: CPT | Mod: PBBFAC,,, | Performed by: OPTOMETRIST

## 2023-08-21 PROCEDURE — 1101F PR PT FALLS ASSESS DOC 0-1 FALLS W/OUT INJ PAST YR: ICD-10-PCS | Mod: CPTII,S$GLB,, | Performed by: OPTOMETRIST

## 2023-08-21 PROCEDURE — 92015 PR REFRACTION: ICD-10-PCS | Mod: S$GLB,,, | Performed by: OPTOMETRIST

## 2023-08-21 PROCEDURE — 1160F RVW MEDS BY RX/DR IN RCRD: CPT | Mod: CPTII,S$GLB,, | Performed by: OPTOMETRIST

## 2023-08-21 PROCEDURE — 92014 COMPRE OPH EXAM EST PT 1/>: CPT | Mod: S$GLB,,, | Performed by: OPTOMETRIST

## 2023-08-21 PROCEDURE — 1126F PR PAIN SEVERITY QUANTIFIED, NO PAIN PRESENT: ICD-10-PCS | Mod: CPTII,S$GLB,, | Performed by: OPTOMETRIST

## 2023-08-21 NOTE — PROGRESS NOTES
HPI    89 y/o male is here today for annual eye exam with C/o pt states that his   depth perception is not good and he cannot read very well anymore with Rx   glasses say's he has to squint and needs light to focus well pt also   states he is having light sensitivity   Patient is not experiencing any pain or discomfort  No flashes or floaters    Eye Meds: no gtt   Last edited by Renetta Appiah MA on 8/21/2023  9:07 AM.            Assessment /Plan     For exam results, see Encounter Report.    Nuclear sclerosis, bilateral    Glaucoma screening    Presbyopia      Reduced VA 2 to cat OS>OD--pt wishes surgery  Trichiasis  LLL--Epilated lashes with forceps after instillation of Fluress.  Pt tolerated procedure well     PLAN:    Surgical consult-_Dr Lozano.  May wish to get OCT at pre-op visit, to ro macular problems OS, since view is poor

## 2023-08-21 NOTE — PROGRESS NOTES
"[facility]   Physical Therapy Treatment Note      Name: James Dc  Clinic Number: [id number]    Therapy Diagnosis:   Encounter Diagnosis   Name Primary?    Impairment of balance Yes       Physician: Donald Shi MD    Visit Date: 8/21/2023    Physician Orders: PT Eval and Treat  Medical Diagnosis from Referral: R53.81 (ICD-10-CM) - Physical deconditioning  Evaluation Date: 8/9/2023  Authorization Period Expiration: 10/01/2023  Plan of Care Expiration: 11/30/2023  Progress Note Due: 9/9/2023  Visit # / Visits authorized: 3/ 20 (plus eval)  FOTO: 1/ 3     Precautions: Hx of CVA, Possible PD, HTN, AD      Time In: 14:33  Time Out: 15:20  Total Billable Time: 47 minutes    PTA Visit #: 0/5       Subjective     Pt reports: "I've noticed getting up from sitting down is easier."    He was given an HEP  Response to previous treatment: good  Functional change: ongoing     Pain: 0/10  Location: none reported     Objective      Objective measures last taken on 8/11/2023.     Treatment   Wife present during tx session.      James received the treatments listed below:      therapeutic exercises to develop strength, endurance, ROM, and flexibility for 10 minutes including:    X 5 min on Sci Fit recumbent stepper.  B UE/B LE on level 3.0 (attempted 8 min but got too tired at 5 min)     Leg press machine @ 90#, 3x8 reps     neuromuscular re-education activities to improve: Balance, Coordination, and Posture for 32 minutes. The following activities were included:    10x STS from low surface gym mat + 3# dowel toss to PT once standing  10x STS from low surface gym mat + 3# dowel raise overhead once standing    3 color dots with cones placed in front: Patient took a big step to randomly called out color + cone tap with contralateral foot     3 cones: weaving in/out of cones several times. Added cone tap with hand to middle cone while weaving.   2 cones: fwd walking + cone tap with hand <> bwd walking " + cone tap with hand  2 cones: side-stepping L/R + cone tap focusing on keeping pelvis in neutral alignment     therapeutic activities to improve functional performance for 5 minutes, including:    Putting on/taking off small YTB around knees to practice getting theraband on/off to perform home exercise program. Patient initially reporting problems with performing resisted exercises due to difficulty tying theraband. PT provided YTB already tied into a loop but then had patient practice bring it over feet and up to knees then sliding it back off 2x total.     gait training to improve functional mobility and safety for 00 minutes, including:    Pt ambulated out of the clinic with verbal and tactile cues for big arm swing.    Patient Education and Home Exercises       Education provided:   - Benefit of OT and SLP for interdisciplinary care     Written Home Exercises Provided: Patient instructed to cont prior HEP.     Assessment     Mr. Ramirez tolerated treatment session very well today, initially feeling down about lack of visible progress but more hopeful by end of session. He demonstrates more ease with transitioning to standing, even with added upper extremity component. He did have some trouble with single limb stance stability noted with foot taps to cones as well as difficulty maintaining neutral pelvic alignment with side-steps, indicating lateral hip weakness. Overall, he is progressing appropriately towards his goals and remains a good candidate for outpatient PT to address his functional deficits and to maximize his independence.     James Is progressing well towards his goals.   Pt prognosis is Good.     Pt will continue to benefit from skilled outpatient physical therapy to address the deficits listed in the problem list box on initial evaluation, provide pt/family education and to maximize pt's level of independence in the home and community environment.     Pt's spiritual, cultural and educational needs  considered and pt agreeable to plan of care and goals.     Anticipated barriers to physical therapy: age, chronicity of condition    Goals:   Short Term Goals: 3 weeks  Pt will improve bilaterally LE knee extension MMT scores to 5/5 for improved stability with stance and functional mobility. ongoing  Pt will improve bilaterally LE hip extension MMT scores to 5/5 for improved stability with stance and functional mobility. ongoing  Pt will improve 5x chair rise score to </= 13s without UE assist for improved muscular endurance. ongoing  Pt will improve 30s chair rise score to at least 12 reps without UE assist for improved muscular endurance. ongoing  Pt will improve tandem stance bilaterally to at least 12s for decreased fall risk. ongoing  Pt will improve single leg stance (SLS) right to at least 5s for decreased fall risk. ongoing  Pt will improve Functional Gait Assessment (FGA) score to at least 20/30 for increased independence with home and community ambulation. ongoing  Pt to perform 6 minute walk test for 1135 feet or greater to improve gait speed & endurance. ongoing      Long Term Goals: 6 weeks  Pt will improve bilaterally LE hip flexion MMT scores to 5/5 for improved stability with stance and functional mobility. ongoing  Pt will improve bilaterally LE hip abduction MMT scores to 5/5 for improved stability with stance and functional mobility. ongoing  Pt will improve 5x chair rise score to </= 13s without UE assist for improved muscular endurance. ongoing  Pt will improve 30s chair rise score to at least 12 reps without UE assist for improved muscular endurance. ongoing  Pt will improve tandem stance bilaterally to at least 18s for decreased fall risk. ongoing  Pt will improve single leg stance (SLS) right to at least 7s for decreased fall risk. ongoing  Pt will improve Functional Gait Assessment (FGA) score to at least 23/30 for increased independence with home and community ambulation. ongoing  Pt will  improve FOTO limitation score to </= 40% for improved self perception of functional mobility. ongoing  Pt to perform 6 minute walk test for 1260 feet or greater to improve gait speed & endurance. ongoing    Plan     Continue PT plan of care with a focus on coordination and balance training, gait training as appropriate     Melisa Childs, PT

## 2023-08-24 ENCOUNTER — CLINICAL SUPPORT (OUTPATIENT)
Dept: REHABILITATION | Facility: HOSPITAL | Age: 88
End: 2023-08-24
Payer: MEDICARE

## 2023-08-24 ENCOUNTER — CLINICAL SUPPORT (OUTPATIENT)
Dept: REHABILITATION | Facility: HOSPITAL | Age: 88
End: 2023-08-24
Attending: INTERNAL MEDICINE
Payer: MEDICARE

## 2023-08-24 DIAGNOSIS — R41.841 COGNITIVE COMMUNICATION DEFICIT: ICD-10-CM

## 2023-08-24 DIAGNOSIS — I63.232 CEREBROVASCULAR ACCIDENT (CVA) DUE TO STENOSIS OF LEFT CAROTID ARTERY: ICD-10-CM

## 2023-08-24 DIAGNOSIS — R26.89 IMPAIRMENT OF BALANCE: Primary | ICD-10-CM

## 2023-08-24 PROCEDURE — 96125 COGNITIVE TEST BY HC PRO: CPT | Mod: HCNC,PO

## 2023-08-24 PROCEDURE — 97112 NEUROMUSCULAR REEDUCATION: CPT | Mod: HCNC,PO,CQ

## 2023-08-24 PROCEDURE — 97110 THERAPEUTIC EXERCISES: CPT | Mod: HCNC,PO,CQ

## 2023-08-24 NOTE — PLAN OF CARE
"[facility]  Speech Therapy Evaluation -Neurological Rehabilitation     Name: James Dc   MRN: [id number]    Therapy Diagnosis:   Encounter Diagnosis   Name Primary?    Cerebrovascular accident (CVA) due to stenosis of left carotid artery       Physician: Donald Shi MD  Physician Orders: Ambulatory Referral to Speech Therapy   Medical Diagnosis: I63.232 (ICD-10-CM) - Cerebrovascular accident (CVA) due to stenosis of left carotid artery    Visit # / Visits Authorized:  1 / 1   Date of Evaluation:  8/24/2023   Insurance Authorization Period: 8/18/23 to 8/17/24  Plan of Care Certification:    8/24/2023 to 10/6/2023     Time In:9:15 am   Time Out: 10:10 am    Total time: 55 minutes    Procedure   Cognitive Communication Evaluation including scoring and interpretation (Total time: 85 minutes)     Precautions: Standard, Fall, Cognition, and h/o Alzheimer's dementia    Subjective      Date of Onset: June 13, 2023 (fell and hit his head with loss of consciousness)  History of Current Condition:  James Dc is a 89 y.o. male who presents to [facility] Speech Therapy for evaluation secondary to history of stroke and syncope episodes with fall and hitting head. Patient was referred to therapy by Donald Shi MD , which is the patient's cardiologist. Patient reports that he has no enthusiasm to do anything, is depressed, has lack of energy, and has no interest in eating. He also reported that he has difficulty organizing his thoughts, has brain fog, and "things fade out and doesn't register. Patient was accompanied to the evaluation by Brisa, his wife, who provided history.  Wife reported that patient began drooling after his fall and seizures. His seizures present with him staring into space and becomes unresponsive .Wife reported 3 occasions that he exhibited jibberish speech but not recently. Chart review mentioned Alzheimer's dementia. Patient was " "admitted to hospital with syncopal episodes and unresponsiveness.   MRI of Brain (6/13/2023) - "FINDINGS: Multifocal punctate areas of diffusion restriction throughout the left cerebral hemisphere.  No mass effect or midline shift.  No abnormal postcontrast enhancement. Multifocal remote lacunar infarcts throughout the cerebral hemispheres and cerebellum, similar prior MRI.  Generalized cerebral volume loss.  Microvascular ischemic changes. New small subdural hematoma along the left temporal lobe (series 8, image 11).Ventricles are unchanged in size.  No overt hydrocephalus. Mastoid air cells and paranasal sinuses are unremarkable. The T2 skull base flow voids are preserved.    Impression: New multifocal punctate infarcts throughout the left cerebral hemisphere. 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  James Dc  has a past surgical history that includes Tonsillectomy.  Medical Hx and Allergies: James has a current medication list which includes the following prescription(s): aspirin, atorvastatin, levetiracetam, and multivitamin.   Review of patient's allergies indicates:   Allergen Reactions    Tetracyclines Nausea Only     Prior Therapy:  no speech therapy  Social History:  Patient lives with wife in [place], Patient is not currently driving  Occupation:  retired   Prior Level of Function: independent   Current Level of Function: modified independent   Pain Scale: no pain indicated throughout session  Patient's Therapy Goals:  "Make me more interested in things"    Objective      Formal Assessment:  Cognitive Linguistic Quick Test (CLQT), Aphasia Administration was administered to quickly assess the patient's overall cognitive-linguistic function and to determine cognitive strengths and weaknesses. "     Cognitive Domain  Score  Severity Rating    Attention 171/215 WFL   Memory 146/185 WFL   Executive Functions 19/40 WFL   Language 28/37 WFL   Visuospatial Skills 78/105 [facility]   Clock Drawing 13/13 [facility]          Composite Score = 4/4 WFL     Task Score Ages 18-69 Cut Score Ages 70-89 Cut Score Below?   Personal Facts  8  8 8 average   Symbol Cancellation 12  11 10 above average   Confrontational naming  10  10 10 average   Clock drawing  13  12 11 above average   Story Retelling 4  6 5 below average   Symbol Trails 9  9 6 above average   Generative Naming 6  5 4 above average   Design Memory 6  5 4 above average   Mazes 4  7 4 average   Design Generation  0  6 5 below average       Cognition: Cognitive communication skills are considered mildly impaired. Patient answered orientation questions with 100% accuracy. Patient cancelled pre-determined symbol out of a field of many similar looking symbols with 100% accuracy, indicating adequate selective attention skills. Patient named line item photographs with 100% accuracy. Patient scored 13  (cut off score 12) on clock drawing task, indicating adequate planning, organizing, self-monitoring, and self-correction as the patient's clock model contained 12/12 numbers with appropriate spacing and orientation, 2/2 hands, and was set to the correct time. Patient recalled 6/18 details from a paragraph presented auditorily. Patient answered 5/6 y/n questions about the paragraph indicating good  comprehension but impaired auditory recall. Patient completed a symbol trails task (alternating size and shape) with 90% acc indicating adequate divided attention.  Patient completed divergent naming of concrete categories with 15 named items within one minute; completed divergent naming of abstract categories with 11 named items within one minute.  Patient recalled  6/6 designs in a design recall task indicating good visual recall skills. Patient completed a simple maze with  100% acc;  completed a complex maze with 0% acc indicating adequate planning and organization for simple information but impaired for complex information. Patient created 0 designs with 4 lines even after several repetitions and demonstrations of directions stating that he could not focus and he did not understand indicating impaired mental flexibility skills.  Patient was alert and cooperative throughout evaluation. He was oriented to person, time, place, and situation. He did require cues to attend to evaluation tasks throughout session. Patient with adequate insight into severity of deficits.     Treatment       Total Treatment Time Separate from Evaluation: not applicable   No treatment performed secondary to time to complete evaluation.     Education provided:   -role of Speech Therapy, goals/plan of care, scheduling/cancellations, insurance limitations with patient  -Additional Education provided:   Memory strategies  Attention Strategies  Progressive nature of his condition     Patient and family members expressed understanding.     Home Program: not established yet    Assessment      James presents to [facility] status post medical diagnosis of Cerebrovascular accident (CVA) due to stenosis of left carotid artery and history of Alzheimer's Dementia.     Interpretation of objective assessment:   He presents with mild cognitive-communicative impairment characterized by decreased memory, attention, mental flexibility, and problem solving. James admitted to being depressed and not having a drive to participate in any activities.     Demonstrates impairments including limitations as described in the problem list.     Positive prognostic factors: family support  Negative prognostic factors: progressive nature of condition, depression  Barriers to therapy: Barriers to therapy include depression, no motivation to engage in activities      Patient's spiritual, cultural, and educational needs considered and  patient agreeable to plan of care and goals.    Patient will benefit from skilled therapy for patient/family education for strategies.    Rehab Potential: fair    Short Term Goals: (4 weeks) Current Progress:   1.Patient and family will demonstrate understanding of current level and progression of dementia.    Progressing/ Not Met 8/24/2023   Established this date   2. Patient and family will recall and utilize memory strategies/external aids with minimum A to improve ease of everyday cognitive function at home.     Progressing/ Not Met 8/24/2023   Established this date   3. Patient will complete functional problem solving tasks with minimal cues for 80% accuracy.    Progressing/ Not Met 8/24/2023   Established this date    4.Patient will answer orientation questions (with calendar)  with 80% acc given minimum A to improve ease of everyday cognitive function at home Established this date       Long Term Goals: (6 weeks) Current Progress:   1.Patient will develop functional, cognitive-linguistic-based skills and utilize compensatory strategies to communicate wants and needs effectively, maintain safety during ADL's and participate socially in functional living environment.      Established this date           Plan    Recommended Treatment Plan:  Patient will participate in the [facility] rehabilitation program for speech therapy 1 times per week for 6 weeks to address his Cognition deficits, to educate patient and their family, and to participate in a home exercise program.    Other Recommendations:   Neuropsychological evaluation - if not completed recently  Follow up with neurology - [facility]  Psychology/psychiatry for depression and failure to thrive    Therapist's Name:   WILLA Greco, CCC-SLP, CBIS  8/24/2023           Physician's Signature: _________________________________________ Date: ________________

## 2023-08-24 NOTE — PROGRESS NOTES
"[facility]   Physical Therapy Treatment Note      Name: James Dc  Clinic Number: [id number]    Therapy Diagnosis:   Encounter Diagnosis   Name Primary?    Impairment of balance Yes       Physician: Donald Shi MD    Visit Date: 8/24/2023    Physician Orders: PT Eval and Treat  Medical Diagnosis from Referral: R53.81 (ICD-10-CM) - Physical deconditioning  Evaluation Date: 8/9/2023  Authorization Period Expiration: 10/01/2023  Plan of Care Expiration: 11/30/2023  Progress Note Due: 9/9/2023  Visit # / Visits authorized: 4/ 20 (plus eval)  FOTO: 1/ 3     Precautions: Hx of CVA, Possible PD, HTN, AD      Time In: 8:30  Time Out: 09:15  Total Billable Time: 45 minutes    PTA Visit #: 1/5       Subjective     Pt reports: "I need my security blanket with me." ( His wife)  He was complaint with his HEP  Response to previous treatment: good  Functional change: ongoing     Pain: 0/10  Location: none reported     Objective      Objective measures last taken on 8/11/2023.     Treatment   Wife present during tx session.      James received the treatments listed below:      therapeutic exercises to develop strength, endurance, ROM, and flexibility for 10 minutes including:    X 8 min on Sci Fit recumbent stepper.  B UE/B LE on level 1.0     2 x 10 reps of B UE rows with pink cook band.       neuromuscular re-education activities to improve: Balance, Coordination, and Posture for 30 minutes. The following activities were included:    X 5 min of static standing while using B UE to hit a balloon back and forth with tech and CGA--SBA  X 4 min of sitting EOM and kicking a soccer ball back and forth  to therapist    1 x 10 reps of B LE single leg hip flexion tapping the top of a small green cone with 1 UE support and CGA    1 x 10 reps of B LE car legs stepping over black yoga block.    therapeutic activities to improve functional performance for 5 minutes, including:    1 x 10 reps of sit to stand " with big arms    gait training to improve functional mobility and safety for 00 minutes, including:    Pt ambulated out of the clinic with verbal and tactile cues for big arm swing.    Patient Education and Home Exercises       Education provided:   - Benefit of OT and SLP for interdisciplinary care     Written Home Exercises Provided: Patient instructed to cont prior HEP.     Assessment     Mr. Ramirez tolerated this tx session well and did not have any problems noted.  Mr. Ramirez focused on functional activities and balance and required verbal cues for upright posture and bigness.      James Is progressing well towards his goals.   Pt prognosis is Good.     Pt will continue to benefit from skilled outpatient physical therapy to address the deficits listed in the problem list box on initial evaluation, provide pt/family education and to maximize pt's level of independence in the home and community environment.     Pt's spiritual, cultural and educational needs considered and pt agreeable to plan of care and goals.     Anticipated barriers to physical therapy: age, chronicity of condition    Goals:   Short Term Goals: 3 weeks  Pt will improve bilaterally LE knee extension MMT scores to 5/5 for improved stability with stance and functional mobility. ongoing  Pt will improve bilaterally LE hip extension MMT scores to 5/5 for improved stability with stance and functional mobility. ongoing  Pt will improve 5x chair rise score to </= 13s without UE assist for improved muscular endurance. ongoing  Pt will improve 30s chair rise score to at least 12 reps without UE assist for improved muscular endurance. ongoing  Pt will improve tandem stance bilaterally to at least 12s for decreased fall risk. ongoing  Pt will improve single leg stance (SLS) right to at least 5s for decreased fall risk. ongoing  Pt will improve Functional Gait Assessment (FGA) score to at least 20/30 for increased independence with home and community  ambulation. ongoing  Pt to perform 6 minute walk test for 1135 feet or greater to improve gait speed & endurance. ongoing      Long Term Goals: 6 weeks  Pt will improve bilaterally LE hip flexion MMT scores to 5/5 for improved stability with stance and functional mobility. ongoing  Pt will improve bilaterally LE hip abduction MMT scores to 5/5 for improved stability with stance and functional mobility. ongoing  Pt will improve 5x chair rise score to </= 13s without UE assist for improved muscular endurance. ongoing  Pt will improve 30s chair rise score to at least 12 reps without UE assist for improved muscular endurance. ongoing  Pt will improve tandem stance bilaterally to at least 18s for decreased fall risk. ongoing  Pt will improve single leg stance (SLS) right to at least 7s for decreased fall risk. ongoing  Pt will improve Functional Gait Assessment (FGA) score to at least 23/30 for increased independence with home and community ambulation. ongoing  Pt will improve FOTO limitation score to </= 40% for improved self perception of functional mobility. ongoing  Pt to perform 6 minute walk test for 1260 feet or greater to improve gait speed & endurance. ongoing    Plan     Continue PT plan of care with a focus on coordination and balance training, gait training as appropriate     Kimberly Feldman, PTA

## 2023-08-28 ENCOUNTER — OFFICE VISIT (OUTPATIENT)
Dept: OPHTHALMOLOGY | Facility: CLINIC | Age: 88
End: 2023-08-28
Payer: MEDICARE

## 2023-08-28 DIAGNOSIS — H43.813 VITREOUS DETACHMENT OF BOTH EYES: ICD-10-CM

## 2023-08-28 DIAGNOSIS — H25.13 NUCLEAR SCLEROSIS OF BOTH EYES: Primary | ICD-10-CM

## 2023-08-28 DIAGNOSIS — H52.7 REFRACTIVE ERROR: ICD-10-CM

## 2023-08-28 PROCEDURE — 1126F AMNT PAIN NOTED NONE PRSNT: CPT | Mod: HCNC,CPTII,S$GLB, | Performed by: OPHTHALMOLOGY

## 2023-08-28 PROCEDURE — 1160F PR REVIEW ALL MEDS BY PRESCRIBER/CLIN PHARMACIST DOCUMENTED: ICD-10-PCS | Mod: HCNC,CPTII,S$GLB, | Performed by: OPHTHALMOLOGY

## 2023-08-28 PROCEDURE — 3288F FALL RISK ASSESSMENT DOCD: CPT | Mod: HCNC,CPTII,S$GLB, | Performed by: OPHTHALMOLOGY

## 2023-08-28 PROCEDURE — 92004 COMPRE OPH EXAM NEW PT 1/>: CPT | Mod: HCNC,S$GLB,, | Performed by: OPHTHALMOLOGY

## 2023-08-28 PROCEDURE — 92004 PR EYE EXAM, NEW PATIENT,COMPREHESV: ICD-10-PCS | Mod: HCNC,S$GLB,, | Performed by: OPHTHALMOLOGY

## 2023-08-28 PROCEDURE — 1101F PT FALLS ASSESS-DOCD LE1/YR: CPT | Mod: HCNC,CPTII,S$GLB, | Performed by: OPHTHALMOLOGY

## 2023-08-28 PROCEDURE — 99999 PR PBB SHADOW E&M-EST. PATIENT-LVL II: ICD-10-PCS | Mod: PBBFAC,HCNC,, | Performed by: OPHTHALMOLOGY

## 2023-08-28 PROCEDURE — 1126F PR PAIN SEVERITY QUANTIFIED, NO PAIN PRESENT: ICD-10-PCS | Mod: HCNC,CPTII,S$GLB, | Performed by: OPHTHALMOLOGY

## 2023-08-28 PROCEDURE — 1159F PR MEDICATION LIST DOCUMENTED IN MEDICAL RECORD: ICD-10-PCS | Mod: HCNC,CPTII,S$GLB, | Performed by: OPHTHALMOLOGY

## 2023-08-28 PROCEDURE — 92136 OPHTHALMIC BIOMETRY: CPT | Mod: HCNC,LT,S$GLB, | Performed by: OPHTHALMOLOGY

## 2023-08-28 PROCEDURE — 99999 PR PBB SHADOW E&M-EST. PATIENT-LVL II: CPT | Mod: PBBFAC,HCNC,, | Performed by: OPHTHALMOLOGY

## 2023-08-28 PROCEDURE — 92136 BIOMETRY: ICD-10-PCS | Mod: HCNC,LT,S$GLB, | Performed by: OPHTHALMOLOGY

## 2023-08-28 PROCEDURE — 3288F PR FALLS RISK ASSESSMENT DOCUMENTED: ICD-10-PCS | Mod: HCNC,CPTII,S$GLB, | Performed by: OPHTHALMOLOGY

## 2023-08-28 PROCEDURE — 1160F RVW MEDS BY RX/DR IN RCRD: CPT | Mod: HCNC,CPTII,S$GLB, | Performed by: OPHTHALMOLOGY

## 2023-08-28 PROCEDURE — 1101F PR PT FALLS ASSESS DOC 0-1 FALLS W/OUT INJ PAST YR: ICD-10-PCS | Mod: HCNC,CPTII,S$GLB, | Performed by: OPHTHALMOLOGY

## 2023-08-28 PROCEDURE — 1159F MED LIST DOCD IN RCRD: CPT | Mod: HCNC,CPTII,S$GLB, | Performed by: OPHTHALMOLOGY

## 2023-08-28 NOTE — PROGRESS NOTES
Subjective:       Patient ID: James Dc is a 89 y.o. male.    Chief Complaint: No chief complaint on file.    HPI    89 y/o male is here today for annual eye exam with C/o pt states that his   depth perception is not good and he cannot read very well anymore with Rx   glasses say's he has to squint and needs light to focus well pt also   states he is having light sensitivity   Patient is not experiencing any pain or discomfort  No flashes or floaters    Eye Meds: no gtts   Last edited by Beth Gayle on 8/28/2023 10:25 AM.             Assessment:       1. Nuclear sclerosis of both eyes    2. Vitreous detachment of both eyes    3. Refractive error        Plan:       Visually significant cataract OU -Pt. Wants Sx.     PVD's OU-Stable.  RE-Pt does not want Toric IOL's.      Cataract Surgery Consent: Patient with a visually significant cataract with difficulties of ADLs, reading, driving, night vision, glare (any and all).  Discussed with Patient/Family/Caregiver: options, risks and benefits, expectations of cataract surgery, utilized an eye model with questions and answers to facilitate discussion.  Discussed lens options and patient understands that glasses may be required for optimal vision for distance and/or near vision after cataract surgery.  The Patient/Family/Caregiver  voice good understanding and patient wishes to proceed with surgery.  The patient will likely benefit from surgery and patient signed consent for Left Eye.   Will call Pt to schedule CE OS 1st CNAOTO 20.5,                                            OD 2nd CNAOTO 20.5.

## 2023-08-30 ENCOUNTER — CLINICAL SUPPORT (OUTPATIENT)
Dept: REHABILITATION | Facility: HOSPITAL | Age: 88
End: 2023-08-30
Payer: MEDICARE

## 2023-08-30 ENCOUNTER — TELEPHONE (OUTPATIENT)
Dept: OPHTHALMOLOGY | Facility: CLINIC | Age: 88
End: 2023-08-30
Payer: MEDICARE

## 2023-08-30 DIAGNOSIS — R26.89 IMPAIRMENT OF BALANCE: Primary | ICD-10-CM

## 2023-08-30 PROCEDURE — 97110 THERAPEUTIC EXERCISES: CPT | Mod: HCNC,PO,CQ

## 2023-08-30 PROCEDURE — 97112 NEUROMUSCULAR REEDUCATION: CPT | Mod: HCNC,PO,CQ

## 2023-08-30 NOTE — PROGRESS NOTES
"[facility]   Physical Therapy Treatment Note      Name: James Dc  Clinic Number: [id number]    Therapy Diagnosis:   No diagnosis found.      Physician: Donald Shi MD    Visit Date: 8/30/2023    Physician Orders: PT Eval and Treat  Medical Diagnosis from Referral: R53.81 (ICD-10-CM) - Physical deconditioning  Evaluation Date: 8/9/2023  Authorization Period Expiration: 10/01/2023  Plan of Care Expiration: 11/30/2023  Progress Note Due: 9/9/2023  Visit # / Visits authorized: 4/ 20 (plus eval)  FOTO: 1/ 3     Precautions: Hx of CVA, Possible PD, HTN, AD      Time In: 8:45 am  Time Out: 09:30 am   Total Billable Time: 45 minutes    PTA Visit #: 2/5       Subjective     Pt reports:  "I fell yesterday and landed on my knee. It's a little sore. But I'm okay, didn't hit my head."  He was complaint with his HEP  Response to previous treatment: good  Functional change: ongoing     Pain: 0/10  Location: none reported     Objective      Objective measures last taken on 8/11/2023.     Treatment   Wife present during tx session.      James received the treatments listed below:      therapeutic exercises to develop strength, endurance, ROM, and flexibility for 10 minutes including:    X 8 min on Sci Fit recumbent stepper.  B UE/B LE on level 1.0     2 x 10 reps of B UE rows with pink cook band.       neuromuscular re-education activities to improve: Balance, Coordination, and Posture for 30 minutes. The following activities were included:    X 5 min of static standing on Airex foam while using B UE to hit a balloon back and forth with tech and CGA--SBA  X 5 min of standing and kicking a basketball ball back and forth  to therapist    1 x 10 reps of B LE single leg hip flexion tapping the top of a small green cone with 1 UE support and CGA    1 x 10 reps of B LE car legs stepping over black yoga block.    200 feet of ambulation with simultaneous self ball catch and toss CGA     therapeutic " activities to improve functional performance for 04 minutes, including:    2 x 10 reps of sit to stand with big arms    gait training to improve functional mobility and safety for 01 minutes, including:    Pt ambulated out of the clinic with verbal and tactile cues for big arm swing.    Patient Education and Home Exercises       Education provided:   - Benefit of OT and SLP for interdisciplinary care     Written Home Exercises Provided: Patient instructed to cont prior HEP.     Assessment     Mr. Ramirez presents reporting a fall yesterday onto his knee.  His knee is slightly sore. He did not strike his head. He is okay to participate today but was asked to notify us should any intervention cause any pain. Nonetheless, James  tolerated this tx session well this morning.   Mr. Ramirez focused on functional activities and balance and required verbal cues for upright posture and bigness.      James Is progressing well towards his goals.   Pt prognosis is Good.     Pt will continue to benefit from skilled outpatient physical therapy to address the deficits listed in the problem list box on initial evaluation, provide pt/family education and to maximize pt's level of independence in the home and community environment.     Pt's spiritual, cultural and educational needs considered and pt agreeable to plan of care and goals.     Anticipated barriers to physical therapy: age, chronicity of condition    Goals:   Short Term Goals: 3 weeks  Pt will improve bilaterally LE knee extension MMT scores to 5/5 for improved stability with stance and functional mobility. ongoing  Pt will improve bilaterally LE hip extension MMT scores to 5/5 for improved stability with stance and functional mobility. ongoing  Pt will improve 5x chair rise score to </= 13s without UE assist for improved muscular endurance. ongoing  Pt will improve 30s chair rise score to at least 12 reps without UE assist for improved muscular endurance. ongoing  Pt will  improve tandem stance bilaterally to at least 12s for decreased fall risk. ongoing  Pt will improve single leg stance (SLS) right to at least 5s for decreased fall risk. ongoing  Pt will improve Functional Gait Assessment (FGA) score to at least 20/30 for increased independence with home and community ambulation. ongoing  Pt to perform 6 minute walk test for 1135 feet or greater to improve gait speed & endurance. ongoing      Long Term Goals: 6 weeks  Pt will improve bilaterally LE hip flexion MMT scores to 5/5 for improved stability with stance and functional mobility. ongoing  Pt will improve bilaterally LE hip abduction MMT scores to 5/5 for improved stability with stance and functional mobility. ongoing  Pt will improve 5x chair rise score to </= 13s without UE assist for improved muscular endurance. ongoing  Pt will improve 30s chair rise score to at least 12 reps without UE assist for improved muscular endurance. ongoing  Pt will improve tandem stance bilaterally to at least 18s for decreased fall risk. ongoing  Pt will improve single leg stance (SLS) right to at least 7s for decreased fall risk. ongoing  Pt will improve Functional Gait Assessment (FGA) score to at least 23/30 for increased independence with home and community ambulation. ongoing  Pt will improve FOTO limitation score to </= 40% for improved self perception of functional mobility. ongoing  Pt to perform 6 minute walk test for 1260 feet or greater to improve gait speed & endurance. ongoing    Plan     Continue PT plan of care with a focus on coordination and balance training, gait training as appropriate     Edenilson Dyer PTA

## 2023-08-30 NOTE — TELEPHONE ENCOUNTER
----- Message from Jarrett Sorto sent at 8/29/2023 12:49 PM CDT -----    ----- Message -----  From: Gris Juarez  Sent: 8/29/2023  12:37 PM CDT  To: Bruno JOSEPH Staff    Consult/Advisory    Name Of Caller:Judi       Contact Preference:[phone]    Nature of call: Ptn daughter called regarding a e-mail for a procedure on sept 13 she also called regarding his sx schedule please call to further assist

## 2023-09-01 ENCOUNTER — CLINICAL SUPPORT (OUTPATIENT)
Dept: REHABILITATION | Facility: HOSPITAL | Age: 88
End: 2023-09-01
Payer: MEDICARE

## 2023-09-01 DIAGNOSIS — R26.89 IMPAIRMENT OF BALANCE: Primary | ICD-10-CM

## 2023-09-01 PROCEDURE — 97112 NEUROMUSCULAR REEDUCATION: CPT | Mod: HCNC,PO,CQ

## 2023-09-01 PROCEDURE — 97530 THERAPEUTIC ACTIVITIES: CPT | Mod: HCNC,PO,CQ

## 2023-09-01 PROCEDURE — 97110 THERAPEUTIC EXERCISES: CPT | Mod: HCNC,PO,CQ

## 2023-09-01 NOTE — PROGRESS NOTES
"[facility]   Physical Therapy Treatment Note      Name: James Dc  Clinic Number: [id number]    Therapy Diagnosis:   Encounter Diagnosis   Name Primary?    Impairment of balance Yes         Physician: Donald Shi MD    Visit Date: 9/1/2023    Physician Orders: PT Eval and Treat  Medical Diagnosis from Referral: R53.81 (ICD-10-CM) - Physical deconditioning  Evaluation Date: 8/9/2023  Authorization Period Expiration: 10/01/2023  Plan of Care Expiration: 11/30/2023  Progress Note Due: 9/9/2023  Visit # / Visits authorized: 5/ 20 (plus eval)  FOTO: 1/ 3     Precautions: Hx of CVA, Possible PD, HTN, AD      Time In: 11:00a  Time Out: 11:45a  Total Billable Time: 45 minutes    PTA Visit #: 3/5       Subjective     Pt reports:  "I'm doing so, so.  I know I have to stand up tall and swing my arms."   He was complaint with his HEP  Response to previous treatment: good  Functional change: ongoing     Pain: 0/10  Location: none reported     Objective      Objective measures last taken on 8/11/2023.     Treatment   Wife present during tx session.      James received the treatments listed below:      therapeutic exercises to develop strength, endurance, ROM, and flexibility for 15 minutes including:    X 8 min on Sci Fit recumbent stepper.  B UE/B LE on level 1.0     2 x 10 reps of B UE rows with pink cook band.       neuromuscular re-education activities to improve: Balance, Coordination, and Posture for 22 minutes. The following activities were included:  X 10 reps of floor to ceiling stretches with 10 sec hold    X 2 laps of forward ambulation stepping over 5 orange hurdles    4 point foam fitter board: CGA   x 30 sec of static standing with WBOS, no UE support  X 30 sec of static standing with NBOS, no UE support  2 x 30 sec of static standing with NBOS, eyes closed and no UE support   X 60 sec of medial/lateral weight shifting, no UE support  X 60 sec of anterior/posterior weight " shifting, no UE support  1 x 10 reps of B LE single leg forward step and reach with 1 UE support      therapeutic activities to improve functional performance for 8 minutes, including:    1 x 10 reps of sit to stand from Black mat with big arms  1 x 10 reps of sit to stand from black mat with both feet on Airex foam pads and big arms    gait training to improve functional mobility and safety for 01 minutes, including:    Pt ambulated out of the clinic with verbal and tactile cues for big arm swing.    Patient Education and Home Exercises       Education provided:   - Benefit of OT and SLP for interdisciplinary care     Written Home Exercises Provided: Patient instructed to cont prior HEP.     Assessment     Mr. Ramirez tolerated his tx session well and cont to focus on postural awareness, postural strengthening, amplitude and balance.  Mr. Ramirez required occasional 1 UE support for safety and was able to progress to sit to stands on a Airex foam pad.  Cont with plan of care.     James Is progressing well towards his goals.   Pt prognosis is Good.     Pt will continue to benefit from skilled outpatient physical therapy to address the deficits listed in the problem list box on initial evaluation, provide pt/family education and to maximize pt's level of independence in the home and community environment.     Pt's spiritual, cultural and educational needs considered and pt agreeable to plan of care and goals.     Anticipated barriers to physical therapy: age, chronicity of condition    Goals:   Short Term Goals: 3 weeks  Pt will improve bilaterally LE knee extension MMT scores to 5/5 for improved stability with stance and functional mobility. ongoing  Pt will improve bilaterally LE hip extension MMT scores to 5/5 for improved stability with stance and functional mobility. ongoing  Pt will improve 5x chair rise score to </= 13s without UE assist for improved muscular endurance. ongoing  Pt will improve 30s chair rise  score to at least 12 reps without UE assist for improved muscular endurance. ongoing  Pt will improve tandem stance bilaterally to at least 12s for decreased fall risk. ongoing  Pt will improve single leg stance (SLS) right to at least 5s for decreased fall risk. ongoing  Pt will improve Functional Gait Assessment (FGA) score to at least 20/30 for increased independence with home and community ambulation. ongoing  Pt to perform 6 minute walk test for 1135 feet or greater to improve gait speed & endurance. ongoing      Long Term Goals: 6 weeks  Pt will improve bilaterally LE hip flexion MMT scores to 5/5 for improved stability with stance and functional mobility. ongoing  Pt will improve bilaterally LE hip abduction MMT scores to 5/5 for improved stability with stance and functional mobility. ongoing  Pt will improve 5x chair rise score to </= 13s without UE assist for improved muscular endurance. ongoing  Pt will improve 30s chair rise score to at least 12 reps without UE assist for improved muscular endurance. ongoing  Pt will improve tandem stance bilaterally to at least 18s for decreased fall risk. ongoing  Pt will improve single leg stance (SLS) right to at least 7s for decreased fall risk. ongoing  Pt will improve Functional Gait Assessment (FGA) score to at least 23/30 for increased independence with home and community ambulation. ongoing  Pt will improve FOTO limitation score to </= 40% for improved self perception of functional mobility. ongoing  Pt to perform 6 minute walk test for 1260 feet or greater to improve gait speed & endurance. ongoing    Plan     Continue PT plan of care with a focus on coordination and balance training, gait training as appropriate     Kimberly Feldman, PTA

## 2023-09-05 ENCOUNTER — DOCUMENTATION ONLY (OUTPATIENT)
Dept: REHABILITATION | Facility: HOSPITAL | Age: 88
End: 2023-09-05
Payer: MEDICARE

## 2023-09-05 ENCOUNTER — TELEPHONE (OUTPATIENT)
Dept: OPHTHALMOLOGY | Facility: CLINIC | Age: 88
End: 2023-09-05
Payer: MEDICARE

## 2023-09-05 NOTE — PROGRESS NOTES
PT/PTA met face to face to discuss pt's treatment plan and progress towards established goals.  Continue with current PT POC with focus on balance, and amplitude.  Patient will be seen by physical therapist at least every sixth treatment or 30 days, whichever occurs first.    Kimberly Feldman, PTA  09/05/2023

## 2023-09-06 ENCOUNTER — CLINICAL SUPPORT (OUTPATIENT)
Dept: REHABILITATION | Facility: HOSPITAL | Age: 88
End: 2023-09-06
Payer: MEDICARE

## 2023-09-06 DIAGNOSIS — R26.89 IMPAIRMENT OF BALANCE: Primary | ICD-10-CM

## 2023-09-06 PROCEDURE — 97530 THERAPEUTIC ACTIVITIES: CPT | Mod: HCNC,PO,CQ

## 2023-09-06 PROCEDURE — 97110 THERAPEUTIC EXERCISES: CPT | Mod: HCNC,PO,CQ

## 2023-09-06 NOTE — PATIENT INSTRUCTIONS
Hip Abduction: Standing Side Leg Lift (Eccentric)        Lift leg out to side quickly. Slowly lower for 3-5 seconds. Perform reps per set, _2x10__ sets per day       [email or web address]://ecce.[employer].[email or web address]     Copyright © I. All rights reserved.   Heel Raises        Stand with support. Tighten pelvic floor and hold. With knees straight, raise heels off ground. Hold ___ seconds. Relax for ___ seconds.  Repeat ___ times. Do ___ times a day.    Copyright © I. All rights reserved.   FUNCTIONAL MOBILITY: Marching - Standing        March in place by lifting left leg up, then right. Alternate.  __10_ reps per set, _2__ sets per day, ___ days per week Hold onto a support.    Copyright © I. All rights reserved.   Leg Flexion        Inhale. While exhaling, lift one ankle toward buttocks, keeping knees together. Slowly return to starting position.  Repeat __10__ times each leg. Do _2___ sets per session. Do ____ sessions per day.      Copyright © [employer]. All rights reserved.   Hip Extension (Standing)        Stand with support. Squeeze pelvic floor and hold. Move right leg backward with straight knee. Hold for _2__ seconds. Relax for __2_ seconds.  Repeat _2x10__ times. Do ___ times a day.  Repeat with other leg.      Copyright © I. All rights reserved.   Mini-Squats (Standing)        Stand with support. Bend knees slightly. Tighten pelvic floor. Hold for 5___ seconds. Return to straight standing.   Repeat _2x10__ times.     Copyright © [employer]. All rights reserved.

## 2023-09-06 NOTE — PROGRESS NOTES
"[facility]   Physical Therapy Treatment Note      Name: James Dc  Clinic Number: [id number]    Therapy Diagnosis:   Encounter Diagnosis   Name Primary?    Impairment of balance Yes         Physician: Donald Shi MD    Visit Date: 9/6/2023    Physician Orders: PT Eval and Treat  Medical Diagnosis from Referral: R53.81 (ICD-10-CM) - Physical deconditioning  Evaluation Date: 8/9/2023  Authorization Period Expiration: 10/01/2023  Plan of Care Expiration: 11/30/2023  Progress Note Due: 9/9/2023  Visit # / Visits authorized: 6/ 20 (plus eval)  FOTO: 1/ 3     Precautions: Hx of CVA, Possible PD, HTN, AD      Time In: 11:15a  Time Out: 12:00p  Total Billable Time: 45 minutes    PTA Visit #: 4/5       Subjective     Pt reports:  "I'm not motivated to do anything anymore, it takes an effort to do everything."   He was complaint with his HEP  Response to previous treatment: good  Functional change: ongoing     Pain: 0/10  Location: none reported     Objective      Objective measures last taken on 8/11/2023.     Treatment   Wife present during tx session.      James received the treatments listed below:      therapeutic exercises to develop strength, endurance, ROM, and flexibility for 30 minutes including:    X 8 min on Sci Fit recumbent stepper.  B UE/B LE on level 1.0     Standing:   2 x 10 reps of B UE rows with RTB  2 x 10 reps of B UE lateral pull downs with RTB    Near ballet bar:   2 x 10 reps of B LE hip flexion with 2 UE support  2 x 10 reps of B LE heel raises with 2 UE support  2 x 10 reps of B LE hip abduction with 2 UE support  2 x 10 reps of B LE HS Curls with 2 UE support  2 x 10 reps of B LE hip extension with 2 UE support  X 10 reps of mini squats    neuromuscular re-education activities to improve: Balance, Coordination, and Posture for 0 minutes. The following activities were included:        therapeutic activities to improve functional performance for 15 minutes, " including:  2 x 10 reps of LTR while hitting a boxing glove.  Pt able to wear the boxing glove on his R hand, but due to some finger contractures, opted to not wear the glove on the left  X 2 laps of side stepping 2 steps and then hitting the boxing pad  X 2 laps of backward stepping and then hitting the boxing pad    gait training to improve functional mobility and safety for 01 minutes, including:    Pt ambulated out of the clinic with verbal and tactile cues for big arm swing.    Patient Education and Home Exercises       Education provided:   - Benefit of OT and SLP for interdisciplinary care     Written Home Exercises Provided: Patient instructed to cont prior HEP.     Assessment     Mr. Ramirez tolerated his tx session fairly well despite arriving not feeling up to do anything as per report.  James was given an updated HEP in standing so he can have a variety of exercises to do at home and was given a sheet on the dance for Parkinson's class.  Pt is showing some signs of depression and was educated on different classes he could attend, like the people program or the dance for Parkinsons class, or speaking to his MD about his symptoms.   Cont with plan of care.     James Is progressing well towards his goals.   Pt prognosis is Good.     Pt will continue to benefit from skilled outpatient physical therapy to address the deficits listed in the problem list box on initial evaluation, provide pt/family education and to maximize pt's level of independence in the home and community environment.     Pt's spiritual, cultural and educational needs considered and pt agreeable to plan of care and goals.     Anticipated barriers to physical therapy: age, chronicity of condition    Goals:   Short Term Goals: 3 weeks  Pt will improve bilaterally LE knee extension MMT scores to 5/5 for improved stability with stance and functional mobility. ongoing  Pt will improve bilaterally LE hip extension MMT scores to 5/5 for improved  stability with stance and functional mobility. ongoing  Pt will improve 5x chair rise score to </= 13s without UE assist for improved muscular endurance. ongoing  Pt will improve 30s chair rise score to at least 12 reps without UE assist for improved muscular endurance. ongoing  Pt will improve tandem stance bilaterally to at least 12s for decreased fall risk. ongoing  Pt will improve single leg stance (SLS) right to at least 5s for decreased fall risk. ongoing  Pt will improve Functional Gait Assessment (FGA) score to at least 20/30 for increased independence with home and community ambulation. ongoing  Pt to perform 6 minute walk test for 1135 feet or greater to improve gait speed & endurance. ongoing      Long Term Goals: 6 weeks  Pt will improve bilaterally LE hip flexion MMT scores to 5/5 for improved stability with stance and functional mobility. ongoing  Pt will improve bilaterally LE hip abduction MMT scores to 5/5 for improved stability with stance and functional mobility. ongoing  Pt will improve 5x chair rise score to </= 13s without UE assist for improved muscular endurance. ongoing  Pt will improve 30s chair rise score to at least 12 reps without UE assist for improved muscular endurance. ongoing  Pt will improve tandem stance bilaterally to at least 18s for decreased fall risk. ongoing  Pt will improve single leg stance (SLS) right to at least 7s for decreased fall risk. ongoing  Pt will improve Functional Gait Assessment (FGA) score to at least 23/30 for increased independence with home and community ambulation. ongoing  Pt will improve FOTO limitation score to </= 40% for improved self perception of functional mobility. ongoing  Pt to perform 6 minute walk test for 1260 feet or greater to improve gait speed & endurance. ongoing    Plan     Continue PT plan of care with a focus on coordination and balance training, gait training as appropriate     Kimberly Feldman, PTA

## 2023-09-12 NOTE — PROGRESS NOTES
"  [facility]   Physical Therapy Treatment Note      Name: James Dc  Clinic Number: [id number]    Therapy Diagnosis:   Encounter Diagnosis   Name Primary?    Impairment of balance Yes       Physician: Donald Shi MD    Visit Date: 9/13/2023    Physician Orders: PT Eval and Treat  Medical Diagnosis from Referral: R53.81 (ICD-10-CM) - Physical deconditioning  Evaluation Date: 8/9/2023  Authorization Period Expiration: 10/01/2023  Plan of Care Expiration: 11/30/2023  Progress Note Due: 10/13/2023  Visit # / Visits authorized: 8/ 20 (plus eval)  FOTO: 1/ 3     Precautions: Hx of CVA, Possible PD, HTN, AD      Time In: 1030  Time Out: 1116  Total Billable Time: 46 minutes    PTA Visit #: 0/5       Subjective     Pt reports:  "Par for the course" re: PT asking how he's feeling this morning. Denies any pain at the start of the session. States that he gets back pain intermittently. He and wife state that they have been performing his home exercise program at home however has not noticed much functional change since starting therapy.     "I know I'm supposed to try to walk faster and swing my arms and dangle my fingers."    He was complaint with his HEP  Response to previous treatment: good  Functional change: ongoing     Pain: 0/10  Location: none reported     Objective      Objective measures last taken on 8/11/2023.     Strength: manual muscle test grades below   Lower Extremity Strength (taken seated)  Right LE Eval  9/13/23 Left LE  Eval 9/13/23   Hip Flexion: 4+/5 4+/5 Hip Flexion: 4+/5 4/5   Hip Extension:  4+/5 NT Hip Extension: 4+/5 NT   Hip Abduction: 4+/5 4+/5 Hip Abduction: 4+/5 4+/5   Hip Adduction: 5/5 4+/5 Hip Adduction 5/5 4+/5   Knee Extension: 4+/5 4+/5 Knee Extension: 4+/5 4+/5   Knee Flexion: 5/5 5/5 Knee Flexion: 4+/5 4+/5   Ankle Dorsiflexion: 4+/5 5/5 Ankle Dorsiflexion: 4+/5 5/5   Ankle Plantarflexion: 5/5 5/5 Ankle Plantarflexion: 4+/5 4+/5      Flexibility: intact " for age          Evaluation 08/11/2023 9/13/23   30 second Chair Rise    11 completed with no arms    11.5 completed with arms   5 times sit-stand  Fall risk cutoff scores by population:  General >12s  PD >16s  Vestibular/balance over 65 years >/= 15s  CVA >/=  12s    14 seconds with no arms 17.07 seconds with arms         Balance Testing     Evaluation 08/11/2023 9/13/23   Tandem Stance R LE forward, eyes open 9s  (<30 sec = Increased FALL RISK) 16s   Tandem Stance L LE forward, eyes open 2s  (<30 sec = Increased FALL RISK) 4s   Single Limb Stance R LE  eyes open 10s  (<10 sec = HIGH FALL RISK) 5s   Single Limb Stance L LE  eyes open 1s  (<10 sec = HIGH FALL RISK) 9s           Evaluation 08/11/2023 9/13/23   Timed Up and Go  (< 20 sec safe for independent transfers, < 30 sec safe for dependent transfers/assist required) 10 sec with  No Assistive Device 10.35 seconds with no assistive device   Self Selected Walking Speed 1.2 m/sec (6m/5s) with  No Assistive Device 0.83 m/sec (6m/7.24s) with  No Assistive Device   Fast Walking Speed 1.5 m/sec (6m/4s) with  No Assistive Device 1.09 m/sec (6m/5.47s) with  No Assistive Device         Functional Gait Assessment:   1. Gait on level surface =  1 (7.24s)   (3) Normal: less than 5.5 sec, no A.D., no imbalance, normal gait pattern, deviates< 6in   (2) Mild impairment: 7-5.6 sec, uses A.D., mild gait deviations, or deviates 6-10 in   (1) Moderate impairment: > 7 sec, slow speed, imbalance, deviates 10-15 in.   (0) Severe impairment: needs assist, deviates >15 in, reach/touch wall  2. Change in Gait Speed = 3   (3) Normal: smooth change w/o loss of balance or gait deviation, deviates < 6 in, significant difference between speeds   (2) Mild impairment: changes speed, but demonstrates mild gait deviations, deviates 6-10 in, OR no deviations but unable to significantly speed, OR uses A.D.   (1) Moderate impairment: minor changes to speed, OR changes speed w/ significant  deviations, deviates 10-15 in, OR  Changes speed , but loses balance & recovers   (0) Severe impairment: cannot change speed, deviates >15 in, or loses balance & needs assist  3. Gait with horizontal head turns  = 1   (3) Normal: no change in gait, deviates <6 in   (2) Mild impairment: slight change in speed, deviates 6-10 in, OR uses A.D.   (1) Moderate impairment: moderate change in speed, deviates 10-15 in   (0) Severe impairment: severe disruption of gait, deviates >15in  4. Gait with vertical head turns = 1   (3) Normal: no change in gait, deviates <6 in   (2) Mild impairment: slight change in speed, deviates 6-10 in OR uses A.D.   (1) Moderate impairment: moderate change in speed, deviates 10-15 in   (0) Severe impairment: severe disruption of gait, deviates >15 in  5. Gait with pivot turns = 2   (3) Normal: performs safely in 3 sec, no LOB   (2) Mild impairment: performs in >3 sec & no LOB, OR turns safely & requires several steps to regain LOB   (1) Moderate impairment: turns slow, OR requires several small steps for balance following turn & stop   (0) Severe impairment: cannot turn safely, needs assist  6. Step over obstacle = 3   (3) Normal: steps over 2 stacked boxes w/o change in speed or LOB   (2) Mild impairment: able to step over 1 box w/o change in speed or LOB   (1) Moderate impairment: steps over 1 box but must slow down, may require VC   (0) Severe impairment: cannot perform w/o assist  7. Gait with Narrow COLT = 0   (3) Normal: 10 steps no staggering   (2) Mild impairment: 7-9 steps   (1) Moderate impairment: 4-7 steps   (0) Severe impairment: < 4 steps or cannot perform w/o assist  8. Gait with eyes closed = 1   (3) Normal: < 7 sec, no A.D., no LOB, normal gait pattern, deviates <6 in   (2) Mild impairment: 7.1-9 sec, mild gait deviations, deviates 6-10 in   (1) Moderate impairment: > 9 sec, abnormal pattern, LOB, deviates 10-15 in   (0) Severe impairment: cannot perform w/o assist, LOB, deviates  >15in  9. Ambulating Backwards = 2   (3) Normal: no A.D., no LOB, normal gait pattern, deviates <6in   (2) Mild impairment: uses A.D., slower speed, mild gait deviations, deviates 6-10 in   (1) Moderate impairment: slow speed, abnormal gait pattern, LOB, deviates 10-15 in   (0) Severe impairment: severe gait deviations or LOB, deviates >15in  10. Steps = 2   (3) Normal: alternating feet, no rail   (2) Mild Impairment: alternating feet, uses rail   (1) Moderate impairment: step-to, uses rail   (0) Severe impairment: cannot perform safely    9/13/23 Score: 16/30   Eval Score: 17/30      Score:   <22/30 fall risk   <20/30 fall risk in older adults   <18/30 fall risk in Parkinsons         Endurance Deficit: moderate     6MWT (no assistive device)   9/13/23 Distance: 1196 ft  Eval Distance: 1013 ft     9/13/23 FOTO Survey: 40% limitation  Eval FOTO Survey: 50% limitation    Treatment   Wife present during tx session.      James received the treatments listed below:      therapeutic exercises to develop strength, endurance, ROM, and flexibility for 8 minutes including:    X 8 min on Sci Fit recumbent stepper.  B UE/B LE on level 1.0     neuromuscular re-education activities to improve: Balance, Coordination, and Posture for 00 minutes. The following activities were included:    NP    therapeutic activities to improve functional performance for 38 minutes, including:    Objective testing listed above  Patient education    gait training to improve functional mobility and safety for 01 minutes, including:    NP    Patient Education and Home Exercises       Education provided:   - Benefit of OT and SLP for interdisciplinary care   9/13:  - interpretation of results   - PT POC and plan for reassessment of continued need for therapy in about 1 month    Written Home Exercises Provided: Patient instructed to cont prior HEP.     Assessment     [place] JEET Dao tolerated today's session well with a focus on reassessment of progress  towards goals. He was able to tolerate performing the outcome measures listed above with improvements noted in his 6 Minute Walk Test, FOTO limitation score, tandem balance and single leg balance on the left which translates to improvements in his self-perception of his level of function, functional endurance and fall risk. His scores on strength testing, the Timed Up and Go, and Functional Gait Assessment were pretty similar to his initial evaluation with minimal changes noted. His scores on the 5 time sit to stand and gait speed, however, showed declines when compared to his initial evaluation however he has not reported impairments in his daily life when walking or performing transfers. To date, he has met 2/8 short and 1/9 long-term goals established at his initial evaluation; he has made progress towards the majority of his other goals however continues to need work on his functional strength, endurance, and balance. He remains appropriate for skilled physical therapy services at their current frequency for the remainder of the plan of care to continue addressing his functional deficits and goals related to improved functional safety and mobility.     Patient's wife states that he is scheduled to have cataract surgery in October. PT informed that POC is currently active beyond that date so following reassessment patient may benefit from pause of services vs discharge from therapy to allow patient to have surgery and recover with the plan to re-establish services as needed.     James Is progressing well towards his goals.   Pt prognosis is Good.     Pt will continue to benefit from skilled outpatient physical therapy to address the deficits listed in the problem list box on initial evaluation, provide pt/family education and to maximize pt's level of independence in the home and community environment.     Pt's spiritual, cultural and educational needs considered and pt agreeable to plan of care and goals.      Anticipated barriers to physical therapy: age, chronicity of condition    Goals:   Short Term Goals: 3 weeks  Pt will improve bilaterally LE knee extension MMT scores to 5/5 for improved stability with stance and functional mobility. ongoing  Pt will improve bilaterally LE hip extension MMT scores to 5/5 for improved stability with stance and functional mobility. ongoing  Pt will improve 5x chair rise score to </= 13s without UE assist for improved muscular endurance. ongoing  Pt will improve 30s chair rise score to at least 12 reps without UE assist for improved muscular endurance. Progressing  Pt will improve tandem stance bilaterally to at least 12s for decreased fall risk. Met with right lower extremity leading, progressing on left   Pt will improve single leg stance (SLS) right to at least 5s for decreased fall risk. Met 9/13/23  Pt will improve Functional Gait Assessment (FGA) score to at least 20/30 for increased independence with home and community ambulation. ongoing  Pt to perform 6 minute walk test for 1135 feet or greater to improve gait speed & endurance. Met 9/13/23      Long Term Goals: 6 weeks  Pt will improve bilaterally LE hip flexion MMT scores to 5/5 for improved stability with stance and functional mobility. ongoing  Pt will improve bilaterally LE hip abduction MMT scores to 5/5 for improved stability with stance and functional mobility. ongoing  Pt will improve 5x chair rise score to </= 13s without UE assist for improved muscular endurance. ongoing  Pt will improve 30s chair rise score to at least 12 reps without UE assist for improved muscular endurance. Progressing  Pt will improve tandem stance bilaterally to at least 18s for decreased fall risk. Progressing  Pt will improve single leg stance (SLS) right to at least 7s for decreased fall risk. Progressing  Pt will improve Functional Gait Assessment (FGA) score to at least 23/30 for increased independence with home and community  ambulation. ongoing  Pt will improve FOTO limitation score to </= 40% for improved self perception of functional mobility. Met 9/13/23  Pt to perform 6 minute walk test for 1260 feet or greater to improve gait speed & endurance. Progressing    Plan     Continue PT plan of care with a focus on coordination and balance training, gait training as appropriate     Devika Gray, PT

## 2023-09-13 ENCOUNTER — TELEPHONE (OUTPATIENT)
Dept: OPHTHALMOLOGY | Facility: CLINIC | Age: 88
End: 2023-09-13
Payer: MEDICARE

## 2023-09-13 ENCOUNTER — CLINICAL SUPPORT (OUTPATIENT)
Dept: REHABILITATION | Facility: HOSPITAL | Age: 88
End: 2023-09-13
Payer: MEDICARE

## 2023-09-13 DIAGNOSIS — H25.12 NS (NUCLEAR SCLEROSIS), LEFT: Primary | ICD-10-CM

## 2023-09-13 DIAGNOSIS — R41.841 COGNITIVE COMMUNICATION DEFICIT: Primary | ICD-10-CM

## 2023-09-13 DIAGNOSIS — R26.89 IMPAIRMENT OF BALANCE: Primary | ICD-10-CM

## 2023-09-13 PROCEDURE — 97129 THER IVNTJ 1ST 15 MIN: CPT | Mod: HCNC,PO

## 2023-09-13 PROCEDURE — 97130 THER IVNTJ EA ADDL 15 MIN: CPT | Mod: HCNC,PO

## 2023-09-13 PROCEDURE — 97530 THERAPEUTIC ACTIVITIES: CPT

## 2023-09-13 NOTE — PROGRESS NOTES
[facility]  Speech Therapy Treatment Note- Neurological Rehabilitation     Date: 9/13/2023     Name: James Dc   MRN: [id number]   Therapy Diagnosis:   Encounter Diagnosis   Name Primary?    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 Both wife and patient reported they do not think speech therapy is needed at this time, however many cognitive complaints were noted.  Nor patient or wife indicated knowledge of dementia diagnosis, diagnosis was not stated by Speech Language Pathologist, however therapy focus was targeted on strategy use.   He was compliant to home exercise program.   Response to previous treatment: fair  Pain Scale: no pain indicated throughout session    Objective     TIMED  Procedure Min.   Cognitive Therapeutic Interventions, first 15 minutes CPT 10422  15   Cognitive Therapeutic Interventions, each additional 15 minutes CPT 33939  30         UNTIMED  Procedure           Short Term Goals: (4 weeks) Current Progress:   Patient and family will demonstrate understanding of current level and progression of dementia.     Progressing/ Not Met 9/13/2023   Patient nor wife reported specific dementia diagnosis. At end of session, patient wife reported acknowledgment of progressive nature of disease, however patient did not discuss. Overall education was provided regarding focus on independence  and strategy use as most beneficial factor in therapy.    2.  Patient and family will recall and utilize memory strategies/external aids with minimum A to improve ease of everyday cognitive function at home.     Progressing/ Not Met 2023   Extensive discussion regarding memory strategies today    3. Patient will complete functional problem solving tasks with minimal cues for 80% accuracy.      Progressing/ Not Met 2023   Not formally addressed      Patient reported is having trouble in this area, however he has no motivation or energy to follow through with tasks.    4. Patient will answer orientation questions (with calendar)  with 80% acc given minimum A to improve ease of everyday cognitive function at home      Progressing/ Not Met 2023   Not formally addressed        Patient Education and Home Program   Patient educated regarding the followin. Memory strategies   2. Other professions beneficial to him (OT and psych)   3. Overall education in speech therapy and possible tips.   4. Encouraged wife to create general schedule for Dr. Dc to aid in brain fog, and steps to get ready to leave the house.     Home program established: yes-use memory strategies   Patient verbalized understanding to all above education provided.     See Electronic Medical Record under Patient Instructions for exercises provided throughout therapy.    Assessment   James  participated well today in today's session which focused on memory, meta-cognitive strategy training, and education. Today strengths were noted in awareness of deficits. Patient and wife reported they do not know if speech therapy Is necessary at this time. Extensive discussion regarding speech therapy and possible strategies to aid in reported concerns. Doctor has been contacted to get OT and psych orders.  Cognitive, Physical, and Emotional fatigue was believed to have been a barrier to the session. Encouraged wife to create general schedule for   Dao to aid in brain fog, and steps to get ready to leave the house. Additionally encouraged patient and wife to create opportunities to allow him to get out the house and socialize with others.  To date, James has met 0 goals.     James is progressing well towards his goals. Current goals remain appropriate. Goals to be updated as necessary.     Patient prognosis is Fair. Patient will continue to benefit from skilled outpatient speech and language therapy to address the deficits listed in the problem list on initial evaluation, provide patient/family education and to maximize patient's level of independence in the home and community environment.   Medical necessity is demonstrated by the following IMPAIRMENTS:  Cognition: Deficits in executive functioning, attention, and memory prevent the pt from relaying medically and safety relevant information in a timely manner in a state of emergency.   Barriers to Therapy: emotional difficulties   Patient's spiritual, cultural and educational needs considered and patient agreeable to plan of care and goals.      Plan     Continue Plan of Care with focus on strategy use and compensation for cognitive skills and overall maintenance of independence.     WILLA Garcia, CCC-SLP   9/13/2023

## 2023-09-14 ENCOUNTER — TELEPHONE (OUTPATIENT)
Dept: OPHTHALMOLOGY | Facility: CLINIC | Age: 88
End: 2023-09-14
Payer: MEDICARE

## 2023-09-14 DIAGNOSIS — H25.11 NS (NUCLEAR SCLEROSIS), RIGHT: Primary | ICD-10-CM

## 2023-09-15 ENCOUNTER — CLINICAL SUPPORT (OUTPATIENT)
Dept: REHABILITATION | Facility: HOSPITAL | Age: 88
End: 2023-09-15
Payer: MEDICARE

## 2023-09-15 DIAGNOSIS — R26.89 IMPAIRMENT OF BALANCE: Primary | ICD-10-CM

## 2023-09-15 PROCEDURE — 97112 NEUROMUSCULAR REEDUCATION: CPT | Mod: HCNC,PO,CQ

## 2023-09-15 PROCEDURE — 97530 THERAPEUTIC ACTIVITIES: CPT | Mod: HCNC,PO,CQ

## 2023-09-15 PROCEDURE — 97110 THERAPEUTIC EXERCISES: CPT | Mod: HCNC,PO,CQ

## 2023-09-15 NOTE — PROGRESS NOTES
"  [facility]   Physical Therapy Treatment Note      Name: James Dc  Clinic Number: [id number]    Therapy Diagnosis:   Encounter Diagnosis   Name Primary?    Impairment of balance Yes       Physician: Donald Shi MD    Visit Date: 9/15/2023    Physician Orders: PT Eval and Treat  Medical Diagnosis from Referral: R53.81 (ICD-10-CM) - Physical deconditioning  Evaluation Date: 8/9/2023  Authorization Period Expiration: 10/01/2023  Plan of Care Expiration: 11/30/2023  Progress Note Due: 10/13/2023  Visit # / Visits authorized: 9/ 20 (plus eval)  FOTO: 1/ 3     Precautions: Hx of CVA, Possible PD, HTN, AD      Time In: 11:00  Time Out: 11:45  Total Billable Time: 45 minutes    PTA Visit #: 1/5       Subjective     Pt reports:  "Par for the course"    He was complaint with his HEP  Response to previous treatment: good  Functional change: ongoing     Pain: 0/10  Location: none reported     Objective      Objective measures last taken on 8/11/2023.       Treatment   Wife present during tx session.      James received the treatments listed below:      therapeutic exercises to develop strength, endurance, ROM, and flexibility for 15 minutes including:    X 8 min on Sci Fit recumbent stepper.  B UE/B LE on level 1.0     2 x 10 reps of B UE rows with orange theraband    neuromuscular re-education activities to improve: Balance, Coordination, and Posture for 15 minutes. The following activities were included:  // bars: CGA  Small Bosu/ Flat side up:    1 x 10 reps of B LE single leg forward step and reach with 1 UE support to occasional no UE support   2 x 30 sec each of B LE single leg stance with unilateral support, occ 1 UE support     // bar:   1 x 10 reps of B LE single leg hip flexion tapping the top of an orange cone, 1 UE support  1 x 10 reps of B LE single leg hip flexion tapping the top of 3 orange cones without returning to midline with 1 UE support      therapeutic activities to " improve functional performance for 15 minutes, including:  Pt ambulated ~ 250ft while holding onto 2 dowels and performing large arm swings with therapist guiding the dowels.    Sitting EOM:   1 x 10 reps of B LE single leg car legs stepping over black yoga block      gait training to improve functional mobility and safety for 01 minutes, including:    NP    Patient Education and Home Exercises       Education provided:   - Benefit of OT and SLP for interdisciplinary care   9/13:  - interpretation of results   - PT POC and plan for reassessment of continued need for therapy in about 1 month    Written Home Exercises Provided: Patient instructed to cont prior HEP.     Assessment     James Dc tolerated his tx session well and did not have any problems noted.  James was able to improve his arm swing with dowel exercises and he required less assistance with car legs and single leg balance activities.  NO loss of balance noted.     Patient's wife states that he is scheduled to have cataract surgery in October. PT informed that POC is currently active beyond that date so following reassessment patient may benefit from pause of services vs discharge from therapy to allow patient to have surgery and recover with the plan to re-establish services as needed.     Jamse Is progressing well towards his goals.   Pt prognosis is Good.     Pt will continue to benefit from skilled outpatient physical therapy to address the deficits listed in the problem list box on initial evaluation, provide pt/family education and to maximize pt's level of independence in the home and community environment.     Pt's spiritual, cultural and educational needs considered and pt agreeable to plan of care and goals.     Anticipated barriers to physical therapy: age, chronicity of condition    Goals:   Short Term Goals: 3 weeks  Pt will improve bilaterally LE knee extension MMT scores to 5/5 for improved stability with stance and functional  mobility. ongoing  Pt will improve bilaterally LE hip extension MMT scores to 5/5 for improved stability with stance and functional mobility. ongoing  Pt will improve 5x chair rise score to </= 13s without UE assist for improved muscular endurance. ongoing  Pt will improve 30s chair rise score to at least 12 reps without UE assist for improved muscular endurance. Progressing  Pt will improve tandem stance bilaterally to at least 12s for decreased fall risk. Met with right lower extremity leading, progressing on left   Pt will improve single leg stance (SLS) right to at least 5s for decreased fall risk. Met 9/13/23  Pt will improve Functional Gait Assessment (FGA) score to at least 20/30 for increased independence with home and community ambulation. ongoing  Pt to perform 6 minute walk test for 1135 feet or greater to improve gait speed & endurance. Met 9/13/23      Long Term Goals: 6 weeks  Pt will improve bilaterally LE hip flexion MMT scores to 5/5 for improved stability with stance and functional mobility. ongoing  Pt will improve bilaterally LE hip abduction MMT scores to 5/5 for improved stability with stance and functional mobility. ongoing  Pt will improve 5x chair rise score to </= 13s without UE assist for improved muscular endurance. ongoing  Pt will improve 30s chair rise score to at least 12 reps without UE assist for improved muscular endurance. Progressing  Pt will improve tandem stance bilaterally to at least 18s for decreased fall risk. Progressing  Pt will improve single leg stance (SLS) right to at least 7s for decreased fall risk. Progressing  Pt will improve Functional Gait Assessment (FGA) score to at least 23/30 for increased independence with home and community ambulation. ongoing  Pt will improve FOTO limitation score to </= 40% for improved self perception of functional mobility. Met 9/13/23  Pt to perform 6 minute walk test for 1260 feet or greater to improve gait speed & endurance.  Progressing    Plan     Continue PT plan of care with a focus on coordination and balance training, gait training as appropriate     Kimberly Feldman, PTA

## 2023-09-16 DIAGNOSIS — H25.13 NUCLEAR SCLEROTIC CATARACT OF BOTH EYES: Primary | ICD-10-CM

## 2023-09-16 RX ORDER — OFLOXACIN 3 MG/ML
1 SOLUTION/ DROPS OPHTHALMIC 4 TIMES DAILY
Qty: 5 ML | Refills: 1 | Status: SHIPPED | OUTPATIENT
Start: 2023-10-07 | End: 2023-10-17

## 2023-09-16 RX ORDER — KETOROLAC TROMETHAMINE 5 MG/ML
1 SOLUTION OPHTHALMIC 4 TIMES DAILY
Qty: 5 ML | Refills: 1 | Status: SHIPPED | OUTPATIENT
Start: 2023-10-07 | End: 2023-10-18 | Stop reason: HOSPADM

## 2023-09-16 RX ORDER — PREDNISOLONE ACETATE 10 MG/ML
1 SUSPENSION/ DROPS OPHTHALMIC 4 TIMES DAILY
Qty: 5 ML | Refills: 1 | Status: SHIPPED | OUTPATIENT
Start: 2023-10-10 | End: 2023-10-18 | Stop reason: HOSPADM

## 2023-09-18 PROBLEM — I63.232 CEREBROVASCULAR ACCIDENT (CVA) DUE TO STENOSIS OF LEFT CAROTID ARTERY: Status: RESOLVED | Noted: 2023-06-13 | Resolved: 2023-09-18

## 2023-09-20 ENCOUNTER — CLINICAL SUPPORT (OUTPATIENT)
Dept: REHABILITATION | Facility: HOSPITAL | Age: 88
End: 2023-09-20
Payer: MEDICARE

## 2023-09-20 DIAGNOSIS — R26.89 IMPAIRMENT OF BALANCE: Primary | ICD-10-CM

## 2023-09-20 PROCEDURE — 97110 THERAPEUTIC EXERCISES: CPT | Mod: PO,CQ

## 2023-09-20 PROCEDURE — 97112 NEUROMUSCULAR REEDUCATION: CPT | Mod: PO,CQ

## 2023-09-20 PROCEDURE — 97530 THERAPEUTIC ACTIVITIES: CPT | Mod: PO,CQ

## 2023-09-20 NOTE — PROGRESS NOTES
"  [facility]   Physical Therapy Treatment Note      Name: James Dc  Clinic Number: [id number]    Therapy Diagnosis:   Encounter Diagnosis   Name Primary?    Impairment of balance Yes           Physician: Donald Shi MD    Visit Date: 9/20/2023    Physician Orders: PT Eval and Treat  Medical Diagnosis from Referral: R53.81 (ICD-10-CM) - Physical deconditioning  Evaluation Date: 8/9/2023  Authorization Period Expiration: 10/01/2023  Plan of Care Expiration: 11/30/2023  Progress Note Due: 10/13/2023  Visit # / Visits authorized: 10/ 20 (plus eval)  FOTO: 1/ 3     Precautions: Hx of CVA, Possible PD, HTN, AD      Time In: 11:15 am  Time Out: 12:00 pm  Total Billable Time: 40 minutes    PTA Visit #: 1/5       Subjective     Pt reports:  "it's luis felipe."    He was complaint with his HEP  Response to previous treatment: good  Functional change: ongoing     Pain: 0/10  Location: none reported     Objective      Objective measures last taken on 8/11/2023.       Treatment   Wife present during tx session.      James received the treatments listed below:      therapeutic exercises to develop strength, endurance, ROM, and flexibility for 10 minutes including:    X 8 min on Sci Fit recumbent stepper.  B UE/B LE on level 1.0     2 x 10 reps of B UE rows with orange theraband    neuromuscular re-education activities to improve: Balance, Coordination, and Posture for 15 minutes. The following activities were included:  // bars: CGA  Small Bosu/ Flat side up:    1 x 10 reps of B LE single leg forward step and reach with 1 UE support to occasional no UE support   2 x 30 sec each of B LE single leg stance with unilateral support, occ 1 UE support     // bar:   1 x 10 reps of B LE single leg hip flexion tapping the top of an orange cone, 1 UE support  1 x 10 reps of B LE single leg hip flexion tapping the top of 3 orange cones without returning to midline with 1 UE support      therapeutic activities to " "improve functional performance for 15 minutes, including:  Pt ambulated ~ 250ft while holding onto 2 dowels and performing large arm swings with therapist guiding the dowels.    Sitting EOM:   1 x 10 reps of B LE single leg car legs stepping over black 8" yoga block    Prone ventral body positional postural stretch 5 minutes with prone pillow   Prone hamstring curls 2x10 reps bilateral alternately as passive quad stretch      gait training to improve functional mobility and safety for 01 minutes, including:    NP    Patient Education and Home Exercises       Education provided:   - Benefit of OT and SLP for interdisciplinary care   9/13:  - interpretation of results   - PT POC and plan for reassessment of continued need for therapy in about 1 month    Written Home Exercises Provided: Patient instructed to cont prior HEP.     Assessment     James Dc tolerated his tx session well and did not have any problems noted. Added prone ventral body positional postural stretch to address flexion posture. This was well-tolerated and elicited no back pain nor other complaints.  Noted upright trunk posture improved following this intervention.  James was able to improve his arm swing with dowel exercises and he required less assistance with car legs and single leg balance activities.  NO loss of balance noted.     Patient's wife states that he is scheduled to have cataract surgery in October. PT informed that POC is currently active beyond that date so following reassessment patient may benefit from pause of services vs discharge from therapy to allow patient to have surgery and recover with body postural stretch. e plan to re-establish services as needed.     James Is progressing well towards his goals.   Pt prognosis is Good.     Pt will continue to benefit from skilled outpatient physical therapy to address the deficits listed in the problem list box on initial evaluation, provide pt/family education and to maximize pt's " level of independence in the home and community environment.     Pt's spiritual, cultural and educational needs considered and pt agreeable to plan of care and goals.     Anticipated barriers to physical therapy: age, chronicity of condition    Goals:   Short Term Goals: 3 weeks  Pt will improve bilaterally LE knee extension MMT scores to 5/5 for improved stability with stance and functional mobility. ongoing  Pt will improve bilaterally LE hip extension MMT scores to 5/5 for improved stability with stance and functional mobility. ongoing  Pt will improve 5x chair rise score to </= 13s without UE assist for improved muscular endurance. ongoing  Pt will improve 30s chair rise score to at least 12 reps without UE assist for improved muscular endurance. Progressing  Pt will improve tandem stance bilaterally to at least 12s for decreased fall risk. Met with right lower extremity leading, progressing on left   Pt will improve single leg stance (SLS) right to at least 5s for decreased fall risk. Met 9/13/23  Pt will improve Functional Gait Assessment (FGA) score to at least 20/30 for increased independence with home and community ambulation. ongoing  Pt to perform 6 minute walk test for 1135 feet or greater to improve gait speed & endurance. Met 9/13/23      Long Term Goals: 6 weeks  Pt will improve bilaterally LE hip flexion MMT scores to 5/5 for improved stability with stance and functional mobility. ongoing  Pt will improve bilaterally LE hip abduction MMT scores to 5/5 for improved stability with stance and functional mobility. ongoing  Pt will improve 5x chair rise score to </= 13s without UE assist for improved muscular endurance. ongoing  Pt will improve 30s chair rise score to at least 12 reps without UE assist for improved muscular endurance. Progressing  Pt will improve tandem stance bilaterally to at least 18s for decreased fall risk. Progressing  Pt will improve single leg stance (SLS) right to at least 7s for  decreased fall risk. Progressing  Pt will improve Functional Gait Assessment (FGA) score to at least 23/30 for increased independence with home and community ambulation. ongoing  Pt will improve FOTO limitation score to </= 40% for improved self perception of functional mobility. Met 9/13/23  Pt to perform 6 minute walk test for 1260 feet or greater to improve gait speed & endurance. Progressing    Plan     Continue PT plan of care with a focus on coordination and balance training, gait training as appropriate     Edenilson Dyer, LEONELA

## 2023-09-22 ENCOUNTER — CLINICAL SUPPORT (OUTPATIENT)
Dept: REHABILITATION | Facility: HOSPITAL | Age: 88
End: 2023-09-22
Payer: MEDICARE

## 2023-09-22 DIAGNOSIS — R26.89 IMPAIRMENT OF BALANCE: Primary | ICD-10-CM

## 2023-09-22 PROCEDURE — 97110 THERAPEUTIC EXERCISES: CPT | Mod: PO

## 2023-09-22 PROCEDURE — 97112 NEUROMUSCULAR REEDUCATION: CPT | Mod: PO

## 2023-09-22 NOTE — PROGRESS NOTES
"[facility]   Physical Therapy Treatment Note      Name: James Dc  Clinic Number: [id number]    Therapy Diagnosis:   Encounter Diagnosis   Name Primary?    Impairment of balance Yes         Physician: Donald Shi MD    Visit Date: 9/22/2023    Physician Orders: PT Eval and Treat  Medical Diagnosis from Referral: R53.81 (ICD-10-CM) - Physical deconditioning  Evaluation Date: 8/9/2023  Authorization Period Expiration: 10/01/2023  Plan of Care Expiration: 11/30/2023  Progress Note Due: 10/12/2023  Visit # / Visits authorized: 11/ 20 (plus eval)  FOTO: 1/ 3     Precautions: Hx of CVA, Possible PD, HTN, AD      Time In: 11:00  Time Out: 11:45  Total Billable Time: 45 minutes    PTA Visit #: 0/5       Subjective     Pt reports: his ears are a little "stopped up" and sounds are a little muffled. He also reports some back pain following last session that is now gone. He does state he feels he doesn't have a lot of interest in activities, eating, etc. PT discussed with wife about getting in touch with PCP to re-visit conversation about anti-depressants.     He was complaint with his HEP  Response to previous treatment: good  Functional change: ongoing     Pain: 0/10  Location: none reported at start of session     Objective      Objective measures last taken on 9/12/2023.       Treatment   Wife present during tx session.      James received the treatments listed below:      therapeutic exercises to develop strength, endurance, ROM, and flexibility for 18 minutes including:    X 8 min on Sci Fit recumbent stepper.  B UE/B LE on level 1.0     Chest openers on the wall, 10x  Prone ventral body positional postural stretch 5 minutes with prone pillow   Supine shoulder flexion with wooden dowel + 5# small plate, 10x     neuromuscular re-education activities to improve: Balance, Coordination, and Posture for 27 minutes. The following activities were included:    STS with feet on 4-point foam " "fitter, 2x10  5# KB deadlift to 4" box focusing on hip hinge, 2x10    PT CGA:  Standing hip flexion with 2 sec hold from foot placed on 4" box, 10x/leg  Step-up onto 4" box + knee drive, 2x5/leg     therapeutic activities to improve functional performance for 00 minutes, including:    N/A    gait training to improve functional mobility and safety for 01 minutes, including:    N/A    Patient Education and Home Exercises       Education provided:   - Benefit of OT and SLP for interdisciplinary care   9/13:  - interpretation of results   - PT POC and plan for reassessment of continued need for therapy in about 1 month    Written Home Exercises Provided: Patient instructed to cont prior HEP.     Assessment     James Dc demonstrates slow but steady progress with balance, performing well with transitory single limb stance activities this session. He remains limited by poor upper back strength and mobility, still needing significant cues to achieve more postural extension. He will be appropriate for discharge at the end of his scheduled visits (2 more) as he has a planned surgery, otherwise remains a good candidate for outpatient neuro PT.     James Is progressing well towards his goals.   Pt prognosis is Good.     Pt will continue to benefit from skilled outpatient physical therapy to address the deficits listed in the problem list box on initial evaluation, provide pt/family education and to maximize pt's level of independence in the home and community environment.     Pt's spiritual, cultural and educational needs considered and pt agreeable to plan of care and goals.     Anticipated barriers to physical therapy: age, chronicity of condition    Goals:   Short Term Goals: 3 weeks  Pt will improve bilaterally LE knee extension MMT scores to 5/5 for improved stability with stance and functional mobility. ongoing  Pt will improve bilaterally LE hip extension MMT scores to 5/5 for improved stability with stance and " functional mobility. ongoing  Pt will improve 5x chair rise score to </= 13s without UE assist for improved muscular endurance. ongoing  Pt will improve 30s chair rise score to at least 12 reps without UE assist for improved muscular endurance. Progressing  Pt will improve tandem stance bilaterally to at least 12s for decreased fall risk. Met with right lower extremity leading, progressing on left   Pt will improve single leg stance (SLS) right to at least 5s for decreased fall risk. Met 9/13/23  Pt will improve Functional Gait Assessment (FGA) score to at least 20/30 for increased independence with home and community ambulation. ongoing  Pt to perform 6 minute walk test for 1135 feet or greater to improve gait speed & endurance. Met 9/13/23      Long Term Goals: 6 weeks  Pt will improve bilaterally LE hip flexion MMT scores to 5/5 for improved stability with stance and functional mobility. ongoing  Pt will improve bilaterally LE hip abduction MMT scores to 5/5 for improved stability with stance and functional mobility. ongoing  Pt will improve 5x chair rise score to </= 12s without UE assist for improved muscular endurance. ongoing  Pt will improve 30s chair rise score to at least 13 reps without UE assist for improved muscular endurance. Progressing  Pt will improve tandem stance bilaterally to at least 18s for decreased fall risk. Progressing  Pt will improve single leg stance (SLS) right to at least 7s for decreased fall risk. Progressing  Pt will improve Functional Gait Assessment (FGA) score to at least 23/30 for increased independence with home and community ambulation. ongoing  Pt will improve FOTO limitation score to </= 40% for improved self perception of functional mobility. Met 9/13/23  Pt to perform 6 minute walk test for 1260 feet or greater to improve gait speed & endurance. Progressing    Plan     Continue PT plan of care with a focus on coordination and balance training, gait training as appropriate      Melisa Childs, PT

## 2023-09-26 ENCOUNTER — CLINICAL SUPPORT (OUTPATIENT)
Dept: REHABILITATION | Facility: HOSPITAL | Age: 88
End: 2023-09-26
Payer: MEDICARE

## 2023-09-26 DIAGNOSIS — R26.89 IMPAIRMENT OF BALANCE: Primary | ICD-10-CM

## 2023-09-26 PROCEDURE — 97530 THERAPEUTIC ACTIVITIES: CPT | Mod: PO,CQ

## 2023-09-26 PROCEDURE — 97110 THERAPEUTIC EXERCISES: CPT | Mod: PO,CQ

## 2023-09-26 PROCEDURE — 97112 NEUROMUSCULAR REEDUCATION: CPT | Mod: PO,CQ

## 2023-09-26 NOTE — PROGRESS NOTES
"[facility]   Physical Therapy Treatment Note      Name: James Dc  Clinic Number: [id number]    Therapy Diagnosis:   Encounter Diagnosis   Name Primary?    Impairment of balance Yes         Physician: Donald Shi MD    Visit Date: 9/26/2023    Physician Orders: PT Eval and Treat  Medical Diagnosis from Referral: R53.81 (ICD-10-CM) - Physical deconditioning  Evaluation Date: 8/9/2023  Authorization Period Expiration: 10/01/2023  Plan of Care Expiration: 11/30/2023  Progress Note Due: 10/12/2023  Visit # / Visits authorized: 12/ 20 (plus eval)  FOTO: 1/ 3     Precautions: Hx of CVA, Possible PD, HTN, AD      Time In: 14:30  Time Out: 15:15  Total Billable Time: 45 minutes    PTA Visit #: 1/5       Subjective     Pt reports: " I'm doing pretty good."      He was complaint with his HEP  Response to previous treatment: good  Functional change: ongoing     Pain: 0/10  Location: none reported at start of session     Objective      Objective measures last taken on 9/12/2023.       Treatment   Wife present during tx session.      James received the treatments listed below:      therapeutic exercises to develop strength, endurance, ROM, and flexibility for 23 minutes including:    X 8 min on recumbent bike  B UE/B LE on level 1.0     Standing:   2 x 10 reps of B UE rows with RTB  2 x 10 reps of B UE lateral pull downs with RTB        neuromuscular re-education activities to improve: Balance, Coordination, and Posture for  7 minutes. The following activities were included:    4 point foam fitter board: CGA  1 x 10 reps of B LE single leg step up with unilateral hip flexion tapping the top of an orange cone.  Occasional 1 UE support to no UE support      therapeutic activities to improve functional performance for 15 minutes, including:    1 x 10 reps of floor to ceiling stretches with 10 sec hold  1 x 10 reps of sit to stand with both feet on Airex foam pad  Pt ambulated 2 x 100 ft of " forward ambulation with head turns focusing on big arm swings    gait training to improve functional mobility and safety for 00 minutes, including:    N/A    Patient Education and Home Exercises       Education provided:   - Benefit of OT and SLP for interdisciplinary care   9/13:  - interpretation of results   - PT POC and plan for reassessment of continued need for therapy in about 1 month    Written Home Exercises Provided: Patient instructed to cont prior HEP.     Assessment     James Dc tolerated his tx session well and did not have any problems.  Dr. Dc focused on functional mobility, stretching and strengthening and was able to perform some step ups today with no UE support.      James Is progressing well towards his goals.   Pt prognosis is Good.     Pt will continue to benefit from skilled outpatient physical therapy to address the deficits listed in the problem list box on initial evaluation, provide pt/family education and to maximize pt's level of independence in the home and community environment.     Pt's spiritual, cultural and educational needs considered and pt agreeable to plan of care and goals.     Anticipated barriers to physical therapy: age, chronicity of condition    Goals:   Short Term Goals: 3 weeks  Pt will improve bilaterally LE knee extension MMT scores to 5/5 for improved stability with stance and functional mobility. ongoing  Pt will improve bilaterally LE hip extension MMT scores to 5/5 for improved stability with stance and functional mobility. ongoing  Pt will improve 5x chair rise score to </= 13s without UE assist for improved muscular endurance. ongoing  Pt will improve 30s chair rise score to at least 12 reps without UE assist for improved muscular endurance. Progressing  Pt will improve tandem stance bilaterally to at least 12s for decreased fall risk. Met with right lower extremity leading, progressing on left   Pt will improve single leg stance (SLS) right to at least  5s for decreased fall risk. Met 9/13/23  Pt will improve Functional Gait Assessment (FGA) score to at least 20/30 for increased independence with home and community ambulation. ongoing  Pt to perform 6 minute walk test for 1135 feet or greater to improve gait speed & endurance. Met 9/13/23      Long Term Goals: 6 weeks  Pt will improve bilaterally LE hip flexion MMT scores to 5/5 for improved stability with stance and functional mobility. ongoing  Pt will improve bilaterally LE hip abduction MMT scores to 5/5 for improved stability with stance and functional mobility. ongoing  Pt will improve 5x chair rise score to </= 12s without UE assist for improved muscular endurance. ongoing  Pt will improve 30s chair rise score to at least 13 reps without UE assist for improved muscular endurance. Progressing  Pt will improve tandem stance bilaterally to at least 18s for decreased fall risk. Progressing  Pt will improve single leg stance (SLS) right to at least 7s for decreased fall risk. Progressing  Pt will improve Functional Gait Assessment (FGA) score to at least 23/30 for increased independence with home and community ambulation. ongoing  Pt will improve FOTO limitation score to </= 40% for improved self perception of functional mobility. Met 9/13/23  Pt to perform 6 minute walk test for 1260 feet or greater to improve gait speed & endurance. Progressing    Plan     Continue PT plan of care with a focus on coordination and balance training, gait training as appropriate     Kimberly Feldman, PTA

## 2023-09-27 ENCOUNTER — DOCUMENTATION ONLY (OUTPATIENT)
Dept: REHABILITATION | Facility: HOSPITAL | Age: 88
End: 2023-09-27
Payer: MEDICARE

## 2023-09-27 DIAGNOSIS — R41.841 COGNITIVE COMMUNICATION DEFICIT: Primary | ICD-10-CM

## 2023-10-04 ENCOUNTER — TELEPHONE (OUTPATIENT)
Dept: OPHTHALMOLOGY | Facility: CLINIC | Age: 88
End: 2023-10-04
Payer: MEDICARE

## 2023-10-06 ENCOUNTER — DOCUMENTATION ONLY (OUTPATIENT)
Dept: REHABILITATION | Facility: HOSPITAL | Age: 88
End: 2023-10-06
Payer: MEDICARE

## 2023-10-06 NOTE — PRE-PROCEDURE INSTRUCTIONS
- Nothing to eat or drink after midinight, except AM meds with small sips of water, Gatorade/Powerade  - Hold all Diabetic meds AM of surgery  - Hold all Insulin AM of surgery  - Hold all Fluid pills AM of surgery  - Hold all non-insulin shots until after surgery (Ozempic, Mounjaro, Trulicity, Victoza, Byetta, Wegovy and Adlyxin) (up to 7 days prior)  - Take all B/P meds, except those that contain a fluid pill  - Hold all vits and herbal meds AM of surgery  - Use inhalers as needed and bring AM of surgery  - Take blood thinner meds AM of surgery  - Use eye drops as directed  - Shower and wash face with dial soap for 3 mins PM prior and AM of surgery  - No powder, lotions, creams, (makeup),  or jewelry    - Wear comfortable clothing (button up shirt)    (Patients ride may not leave while patient is in surgery)    -- 2nd floor surgery ctr at [facility] @ [id number] [place] [address]       Pt wife voiced understanding

## 2023-10-06 NOTE — PROGRESS NOTES
PT/PTA met face to face to discuss pt's treatment plan and progress towards established goals.  Continue with current PT POC with focus on amplitude, functional mobility, balance, sit to stands and postural strengthening and stretching.  Patient will be seen by physical therapist at least every sixth treatment or 30 days, whichever occurs first.    Kimberly Feldman, PTA  10/06/2023

## 2023-10-07 NOTE — H&P
[facility]  History & Physical    Subjective:      Chief Complaint/Reason for Admission:     James Dc is a 89 y.o. male.    Past Medical History:   Diagnosis Date    Essential tremor     Hypertensive urgency 6/13/2023    Nuclear sclerosis - Both Eyes 9/19/2012     Past Surgical History:   Procedure Laterality Date    TONSILLECTOMY       Family History   Problem Relation Age of Onset    Prostate cancer Father     Cataracts Son     Psoriasis Neg Hx     Eczema Neg Hx     Glaucoma Neg Hx     Amblyopia Neg Hx     Blindness Neg Hx     Macular degeneration Neg Hx     Strabismus Neg Hx     Retinal detachment Neg Hx      Social History     Tobacco Use    Smoking status: Former    Smokeless tobacco: Former   Substance Use Topics    Alcohol use: Yes    Drug use: No       No medications prior to admission.     Review of patient's allergies indicates:   Allergen Reactions    Tetracyclines Nausea Only        Review of Systems   Eyes:  Positive for blurred vision.   All other systems reviewed and are negative.      Objective:      Vital Signs (Most Recent)       Vital Signs Range (Last 24H):       Physical Exam  Constitutional:       Appearance: He is well-developed.   HENT:      Head: Normocephalic.   Eyes:      Conjunctiva/sclera: Conjunctivae normal.      Pupils: Pupils are equal, round, and reactive to light.   Cardiovascular:      Rate and Rhythm: Normal rate.   Pulmonary:      Effort: Pulmonary effort is normal.      Breath sounds: Normal breath sounds.   Abdominal:      General: Bowel sounds are normal.      Palpations: Abdomen is soft.   Musculoskeletal:         General: Normal range of motion.      Cervical back: Normal range of motion and neck supple.   Skin:     General: Skin is warm.   Neurological:      Mental Status: He is alert and oriented to person, place, and time.         Data Review:     ECG:     Assessment:      Cataract OS.    Plan:    CE OS.

## 2023-10-10 ENCOUNTER — ANESTHESIA EVENT (OUTPATIENT)
Dept: SURGERY | Facility: HOSPITAL | Age: 88
End: 2023-10-10
Payer: MEDICARE

## 2023-10-10 ENCOUNTER — ANESTHESIA (OUTPATIENT)
Dept: SURGERY | Facility: HOSPITAL | Age: 88
End: 2023-10-10
Payer: MEDICARE

## 2023-10-10 ENCOUNTER — HOSPITAL ENCOUNTER (OUTPATIENT)
Facility: HOSPITAL | Age: 88
Discharge: HOME | End: 2023-10-10
Attending: OPHTHALMOLOGY | Admitting: OPHTHALMOLOGY
Payer: MEDICARE

## 2023-10-10 VITALS
WEIGHT: 115 LBS | BODY MASS INDEX: 19.63 KG/M2 | HEART RATE: 72 BPM | TEMPERATURE: 100 F | HEIGHT: 64 IN | DIASTOLIC BLOOD PRESSURE: 63 MMHG | OXYGEN SATURATION: 96 % | RESPIRATION RATE: 16 BRPM | SYSTOLIC BLOOD PRESSURE: 134 MMHG

## 2023-10-10 DIAGNOSIS — H25.12 NUCLEAR SCLEROTIC CATARACT OF LEFT EYE: Primary | ICD-10-CM

## 2023-10-10 PROCEDURE — 27201423 OPTIME MED/SURG SUP & DEVICES STERILE SUPPLY: Performed by: OPHTHALMOLOGY

## 2023-10-10 PROCEDURE — 25000003 PHARM REV CODE 250: Performed by: OPHTHALMOLOGY

## 2023-10-10 PROCEDURE — 66984 XCAPSL CTRC RMVL W/O ECP: CPT | Mod: HCNC,LT,, | Performed by: OPHTHALMOLOGY

## 2023-10-10 PROCEDURE — V2632 POST CHMBR INTRAOCULAR LENS: HCPCS | Performed by: OPHTHALMOLOGY

## 2023-10-10 PROCEDURE — 37000009 HC ANESTHESIA EA ADD 15 MINS: Performed by: OPHTHALMOLOGY

## 2023-10-10 PROCEDURE — 71000015 HC POSTOP RECOV 1ST HR: Performed by: OPHTHALMOLOGY

## 2023-10-10 PROCEDURE — 94761 N-INVAS EAR/PLS OXIMETRY MLT: CPT

## 2023-10-10 PROCEDURE — D9220A PRA ANESTHESIA: Mod: ,,, | Performed by: NURSE ANESTHETIST, CERTIFIED REGISTERED

## 2023-10-10 PROCEDURE — D9220A PRA ANESTHESIA: ICD-10-PCS | Mod: ,,, | Performed by: NURSE ANESTHETIST, CERTIFIED REGISTERED

## 2023-10-10 PROCEDURE — 37000008 HC ANESTHESIA 1ST 15 MINUTES: Performed by: OPHTHALMOLOGY

## 2023-10-10 PROCEDURE — 36000706: Performed by: OPHTHALMOLOGY

## 2023-10-10 PROCEDURE — 66984 PR REMOVAL, CATARACT, W/INSRT INTRAOC LENS, W/O ENDO CYCLO: ICD-10-PCS | Mod: HCNC,LT,, | Performed by: OPHTHALMOLOGY

## 2023-10-10 PROCEDURE — 36000707: Performed by: OPHTHALMOLOGY

## 2023-10-10 PROCEDURE — 63600175 PHARM REV CODE 636 W HCPCS: Performed by: OPHTHALMOLOGY

## 2023-10-10 PROCEDURE — 99900035 HC TECH TIME PER 15 MIN (STAT)

## 2023-10-10 DEVICE — LENS CLAREON AUTONOME 20.5D: Type: IMPLANTABLE DEVICE | Site: EYE | Status: FUNCTIONAL

## 2023-10-10 RX ORDER — PREDNISOLONE ACETATE 10 MG/ML
SUSPENSION/ DROPS OPHTHALMIC
Status: DISCONTINUED | OUTPATIENT
Start: 2023-10-10 | End: 2023-10-10 | Stop reason: HOSPADM

## 2023-10-10 RX ORDER — EPINEPHRINE 1 MG/ML
INJECTION, SOLUTION, CONCENTRATE INTRAVENOUS
Status: DISCONTINUED | OUTPATIENT
Start: 2023-10-10 | End: 2023-10-10 | Stop reason: HOSPADM

## 2023-10-10 RX ORDER — CYCLOP/TROP/PROPA/PHEN/KET/WAT 1-1-0.1%
1 DROPS (EA) OPHTHALMIC (EYE)
Status: DISCONTINUED | OUTPATIENT
Start: 2023-10-10 | End: 2023-10-10 | Stop reason: HOSPADM

## 2023-10-10 RX ORDER — ACETAMINOPHEN 325 MG/1
650 TABLET ORAL EVERY 4 HOURS PRN
Status: DISCONTINUED | OUTPATIENT
Start: 2023-10-10 | End: 2023-10-10 | Stop reason: HOSPADM

## 2023-10-10 RX ORDER — MOXIFLOXACIN 5 MG/ML
SOLUTION/ DROPS OPHTHALMIC
Status: DISCONTINUED | OUTPATIENT
Start: 2023-10-10 | End: 2023-10-10 | Stop reason: HOSPADM

## 2023-10-10 RX ORDER — MOXIFLOXACIN 5 MG/ML
1 SOLUTION/ DROPS OPHTHALMIC
Status: DISPENSED | OUTPATIENT
Start: 2023-10-10 | End: 2023-10-10

## 2023-10-10 RX ORDER — TETRACAINE HYDROCHLORIDE 5 MG/ML
SOLUTION OPHTHALMIC
Status: DISCONTINUED | OUTPATIENT
Start: 2023-10-10 | End: 2023-10-10 | Stop reason: HOSPADM

## 2023-10-10 RX ORDER — SODIUM CHLORIDE 9 MG/ML
INJECTION, SOLUTION INTRAVENOUS CONTINUOUS
Status: DISCONTINUED | OUTPATIENT
Start: 2023-10-10 | End: 2023-10-10 | Stop reason: HOSPADM

## 2023-10-10 RX ORDER — HYDROCODONE BITARTRATE AND ACETAMINOPHEN 5; 325 MG/1; MG/1
1 TABLET ORAL EVERY 4 HOURS PRN
Status: DISCONTINUED | OUTPATIENT
Start: 2023-10-10 | End: 2023-10-10 | Stop reason: HOSPADM

## 2023-10-10 RX ORDER — LIDOCAINE HYDROCHLORIDE 40 MG/ML
INJECTION, SOLUTION RETROBULBAR
Status: DISCONTINUED | OUTPATIENT
Start: 2023-10-10 | End: 2023-10-10 | Stop reason: HOSPADM

## 2023-10-10 NOTE — ANESTHESIA PREPROCEDURE EVALUATION
10/10/2023  James Dc is a 89 y.o., male.  Past Medical History:   Diagnosis Date    Essential tremor     Hypertensive urgency 6/13/2023    Nuclear sclerosis - Both Eyes 9/19/2012     Past Surgical History:   Procedure Laterality Date    TONSILLECTOMY       No current facility-administered medications on file prior to encounter.     Current Outpatient Medications on File Prior to Encounter   Medication Sig Dispense Refill    atorvastatin (LIPITOR) 40 MG tablet Take 1 tablet (40 mg total) by mouth every evening. 90 tablet 3    MULTIVITAMIN ORAL Take 1 tablet by mouth once daily.      aspirin (ECOTRIN) 81 MG EC tablet Take 1 tablet (81 mg total) by mouth once daily. (Patient not taking: Reported on 10/6/2023) 30 tablet 3    levETIRAcetam (KEPPRA) 500 MG Tab Take 1 tablet (500 mg total) by mouth 2 (two) times daily. (Patient not taking: Reported on 10/6/2023) 60 tablet 1         Pre-op Assessment    I have reviewed the Patient Summary Reports.    I have reviewed the NPO Status.   I have reviewed the Medications.     Review of Systems  Anesthesia Hx:  No problems with previous Anesthesia    Hematology/Oncology:  Hematology Normal   Oncology Normal     EENT/Dental:EENT/Dental Normal   Cardiovascular:   Hypertension    Pulmonary:  Pulmonary Normal    Renal/:  Renal/ Normal     Hepatic/GI:  Hepatic/GI Normal    Musculoskeletal:  Musculoskeletal Normal    Neurological:   Alzheimer's/Dementia   Endocrine:  Endocrine Normal    Dermatological:  Skin Normal    Psych:  Psychiatric Normal           Physical Exam  General: Well nourished, Cooperative, Alert and Oriented    Airway:  Mallampati: II   Mouth Opening: Normal  TM Distance: Normal  Tongue: Normal  Neck ROM: Normal ROM    Dental:  Intact        Anesthesia Plan  Type of Anesthesia, risks & benefits discussed:    Anesthesia Type: Gen Natural  Airway  Intra-op Monitoring Plan: Standard ASA Monitors  Induction:  IV  Informed Consent: Informed consent signed with the Patient and all parties understand the risks and agree with anesthesia plan.  All questions answered. Patient consented to blood products? No  ASA Score: 2  Day of Surgery Review of History & Physical: H&P Update referred to the surgeon/provider.    Ready For Surgery From Anesthesia Perspective.     .

## 2023-10-10 NOTE — ANESTHESIA POSTPROCEDURE EVALUATION
Anesthesia Post Evaluation    Patient: James Dc    Procedure(s) Performed: Procedure(s) (LRB):  EXTRACTION, CATARACT, WITH IOL INSERTION (Left)    Final Anesthesia Type: MAC      Patient location during evaluation: PACU  Patient participation: Yes- Able to Participate  Level of consciousness: awake and alert  Post-procedure vital signs: reviewed and stable  Pain management: adequate  Airway patency: patent    PONV status at discharge: No PONV  Anesthetic complications: no      Cardiovascular status: stable  Respiratory status: spontaneous ventilation  Hydration status: euvolemic  Follow-up not needed.          Vitals Value Taken Time   /63 10/10/23 1131   Temp 37.5 °C (99.5 °F) 10/10/23 1102   Pulse 73 10/10/23 1133   Resp 16 10/10/23 1130   SpO2 97 % 10/10/23 1133   Vitals shown include unvalidated device data.      No case tracking events are documented in the log.      Pain/Avery Score: Avery Score: 10 (10/10/2023 11:15 AM)

## 2023-10-10 NOTE — DISCHARGE INSTRUCTIONS
Cataract Post Surgery Instructions     START YOUR DROPS AS SOON AS YOU GET HOME FROM SURGERY      Instill the following drops 1 drop, three (3) times daily, every four (4) hours.      FIVE MINUTES APART FROM EACH OTHER      OFLOXACIN  ( Tan Top)    KETOROLAC  ( Gray Top)    PREDNISOLONE ACETATE  ( [place] or White Top)         RESTRICTIONS FOR SEVEN (7) DAYS FOLLOWING SURGERY     DO NOT lift anything over 10 pounds.     DO NOT bend at the waist, only at the knees.      DO NOT rub your eye.      DO NOT get any water into the eye.      DO NOT wear any makeup, lotions, or creams on/around the eye.     Wear the eye shield you were given after surgery anytime you go to sleep.  You may remove the shield while awake.           NOTE:     YOU MAY resume taking ALL your medications after surgery.     YOU MAY resume driving on your first post-operative appointment IF your vision is clear. DO NOT wear your eye shield while driving for your post operative appointments.      YOU MAY take an over-the counter pain medication such as Tylenol or Ibuprofen as needed for pain.     CALL US:  MILD DISCOMFORT IS NORMAL. HOWEVER, IF YOU EXPERIENCE SEVERE THROBBING PAIN, LOSS OF VISION, ONSET OF FLASHES AND/OR FLOATERS- CALL DR. SPANGLER OFFICE: [phone]/ AFTER HOUR [phone] OR PROCEED TO THE EMERGENCY ROOM.

## 2023-10-10 NOTE — PLAN OF CARE
Discharge instructions given to patient, wife and daughter and all verbalized understanding of all.  VSS, denies n/v and tolerating PO, rates pain level tolerable. IV removed, and family notified for patient discharge home.

## 2023-10-10 NOTE — PLAN OF CARE
Pt in preop bay 35, VSS, meds given and IV inserted. Pt denies any open wounds on body or the use of any weight loss injections. Pt needs anesthesia consent, otherwise ready to roll.

## 2023-10-10 NOTE — OP NOTE
Operative Date:  10/10/2023    Discharge Date:  10/10/2023    Discharge Patient Home    Report Title: Operative Note      SURGEON: Lon Carr MD     ASSISTANT:     PREOPERATIVE DIAGNOSIS: Visually significant NSC cataract,  Left Eye.    POSTOPERATIVE DIAGNOSIS: Visually-significant NSC cataract,  Left Eye.    PROCEDURE PERFORMED: Phacoemulsification of the cataract with posterior chamber intraocular lens Left Eye.    ANESTHESIA: Topical with MAC     COMPLICATIONS: None    ESTIMATED BLOOD LOSS: Minimal    INDICATIONS FOR PROCEDURE:   The patient is a pleasant 89 year old gentleman with increasing difficulties with activities of daily living secondary to a dense visually significant cataract in the Left Eye.  Discussions have been carried out with this patient concerning the options to surgery, risks, benefits and expectations.  The patient voiced good understanding and wished to proceed with the above procedure.    PROCEDURE IN DETAIL: The patient was brought to the operating room and received topical anesthetic to the eye and then was prepped and draped in the usual sterile fashion.  Using the Roblero ring and the guarded iain blade set at 0.37 mm, a partial thickness clear cornea incision was made temporally.  The paracentesis site was made at the six o'clock position.  Omidria was injected into the anterior chamber through the paracentesis.  Viscoat was then injected into the anterior chamber.  The eye was then reentered at the primary surgical site with a 2.4 mm keratome followed by continuous capsulotomy, hydrodissection, hydrodelineation and phacoemulsification of the cataract.  Residual cortical material was removed using automated irrigation-aspiration technique.  Healon was injected into the posterior chamber and a CNAOTO 20.5 diopter lens was placed in the bag without difficulty. Residual viscoelastic was removed using automated irrigation-aspiration technique. The eye was re-pressurized  using BSS solution and both the paracentesis site and the primary surgical site were demonstrated to be watertight at the end of the case with Weck--Mary manipulation.  One drop of Ofloxacin and one drop of Pred acetate 1% was applied to the Left Eye .The eye was closed, patched and a Barrientos shield placed.  The patient was taken to the recovery room in good and stable condition.  The patient tolerated the procedure well.  The patient was instructed to refrain from any heavy lifting, bending, stooping or straining activities, discharged home  and to follow-up in the morning for routine postoperative care with Lon Carr MD.

## 2023-10-10 NOTE — BRIEF OP NOTE
[facility]  Brief Operative Note    Surgery Date: 10/10/2023     Surgeon(s) and Role:     * Lon Carr MD - Primary    Assisting Surgeon: None    Pre-op Diagnosis:  NS (nuclear sclerosis), left [H25.12]    Post-op Diagnosis:  Post-Op Diagnosis Codes:     * NS (nuclear sclerosis), left [H25.12]    Procedure(s) (LRB):  EXTRACTION, CATARACT, WITH IOL INSERTION (Left)    Anesthesia: Local MAC    Operative Findings:     Estimated Blood Loss: * No values recorded between 10/10/2023 10:30 AM and 10/10/2023 11:02 AM *         Specimens:   Specimen (24h ago, onward)      None              Discharge Note    OUTCOME: Patient tolerated treatment/procedure well without complication and is now ready for discharge.    DISPOSITION: Home or Self Care    FINAL DIAGNOSIS:  Nuclear sclerotic cataract of left eye    FOLLOWUP: In clinic    DISCHARGE INSTRUCTIONS:    Discharge Procedure Orders   Other restrictions (specify):   Order Comments: No heavy lifting or bending for 1 week.

## 2023-10-10 NOTE — TRANSFER OF CARE
"Anesthesia Transfer of Care Note    Patient: James Dc    Procedure(s) Performed: Procedure(s) (LRB):  EXTRACTION, CATARACT, WITH IOL INSERTION (Left)    Patient location: PACU    Anesthesia Type: MAC    Transport from OR: Transported from OR on room air with adequate spontaneous ventilation    Post pain: adequate analgesia    Post assessment: no apparent anesthetic complications    Post vital signs: stable    Level of consciousness: awake, alert and oriented    Nausea/Vomiting: no nausea/vomiting    Complications: none    Transfer of care protocol was followed      Last vitals:   Visit Vitals  BP (!) 169/76 (BP Location: Right arm, Patient Position: Lying)   Pulse 74   Temp 36.6 °C (97.8 °F) (Temporal)   Resp 18   Ht 5' 4" (1.626 m)   Wt 52.2 kg (115 lb)   SpO2 97%   BMI 19.74 kg/m²     "

## 2023-10-11 ENCOUNTER — OFFICE VISIT (OUTPATIENT)
Dept: OPHTHALMOLOGY | Facility: CLINIC | Age: 88
End: 2023-10-11
Payer: MEDICARE

## 2023-10-11 DIAGNOSIS — Z98.890 POST-OPERATIVE STATE: Primary | ICD-10-CM

## 2023-10-11 PROCEDURE — 99999 PR PBB SHADOW E&M-EST. PATIENT-LVL II: CPT | Mod: PBBFAC,HCNC,, | Performed by: OPHTHALMOLOGY

## 2023-10-11 PROCEDURE — 1159F PR MEDICATION LIST DOCUMENTED IN MEDICAL RECORD: ICD-10-PCS | Mod: HCNC,CPTII,S$GLB, | Performed by: OPHTHALMOLOGY

## 2023-10-11 PROCEDURE — 99024 PR POST-OP FOLLOW-UP VISIT: ICD-10-PCS | Mod: HCNC,S$GLB,, | Performed by: OPHTHALMOLOGY

## 2023-10-11 PROCEDURE — 1160F RVW MEDS BY RX/DR IN RCRD: CPT | Mod: HCNC,CPTII,S$GLB, | Performed by: OPHTHALMOLOGY

## 2023-10-11 PROCEDURE — 1101F PT FALLS ASSESS-DOCD LE1/YR: CPT | Mod: HCNC,CPTII,S$GLB, | Performed by: OPHTHALMOLOGY

## 2023-10-11 PROCEDURE — 3288F PR FALLS RISK ASSESSMENT DOCUMENTED: ICD-10-PCS | Mod: HCNC,CPTII,S$GLB, | Performed by: OPHTHALMOLOGY

## 2023-10-11 PROCEDURE — 1159F MED LIST DOCD IN RCRD: CPT | Mod: HCNC,CPTII,S$GLB, | Performed by: OPHTHALMOLOGY

## 2023-10-11 PROCEDURE — 3288F FALL RISK ASSESSMENT DOCD: CPT | Mod: HCNC,CPTII,S$GLB, | Performed by: OPHTHALMOLOGY

## 2023-10-11 PROCEDURE — 1160F PR REVIEW ALL MEDS BY PRESCRIBER/CLIN PHARMACIST DOCUMENTED: ICD-10-PCS | Mod: HCNC,CPTII,S$GLB, | Performed by: OPHTHALMOLOGY

## 2023-10-11 PROCEDURE — 99999 PR PBB SHADOW E&M-EST. PATIENT-LVL II: ICD-10-PCS | Mod: PBBFAC,HCNC,, | Performed by: OPHTHALMOLOGY

## 2023-10-11 PROCEDURE — 99024 POSTOP FOLLOW-UP VISIT: CPT | Mod: HCNC,S$GLB,, | Performed by: OPHTHALMOLOGY

## 2023-10-11 PROCEDURE — 1101F PR PT FALLS ASSESS DOC 0-1 FALLS W/OUT INJ PAST YR: ICD-10-PCS | Mod: HCNC,CPTII,S$GLB, | Performed by: OPHTHALMOLOGY

## 2023-10-11 NOTE — PROGRESS NOTES
Subjective:       Patient ID: James Dc is a 89 y.o. male.    Chief Complaint: Post-op Evaluation    HPI    PT Here for 1 day PO OS  PT presents well no signs of pain or irritation.         EYE GTTS:  OFLOXACIN QID  PRED TID  KETOROLAC TID  Last edited by Angelito Xie on 10/11/2023  8:08 AM.             Assessment:       1. Post-operative state        Plan:       S/p CE OS- Doing well.         CPM OS.  RTC 1 wk.     25

## 2023-10-18 ENCOUNTER — TELEPHONE (OUTPATIENT)
Dept: OPHTHALMOLOGY | Facility: CLINIC | Age: 88
End: 2023-10-18
Payer: MEDICARE

## 2023-10-18 ENCOUNTER — TELEPHONE (OUTPATIENT)
Dept: OPHTHALMOLOGY | Facility: CLINIC | Age: 88
End: 2023-10-18

## 2023-10-18 ENCOUNTER — OFFICE VISIT (OUTPATIENT)
Dept: OPHTHALMOLOGY | Facility: CLINIC | Age: 88
End: 2023-10-18
Payer: MEDICARE

## 2023-10-18 DIAGNOSIS — H25.11 NS (NUCLEAR SCLEROSIS), RIGHT: ICD-10-CM

## 2023-10-18 DIAGNOSIS — Z98.890 POST-OPERATIVE STATE: Primary | ICD-10-CM

## 2023-10-18 PROCEDURE — 99024 PR POST-OP FOLLOW-UP VISIT: ICD-10-PCS | Mod: S$GLB,,, | Performed by: OPHTHALMOLOGY

## 2023-10-18 PROCEDURE — 3288F FALL RISK ASSESSMENT DOCD: CPT | Mod: CPTII,S$GLB,, | Performed by: OPHTHALMOLOGY

## 2023-10-18 PROCEDURE — 1160F PR REVIEW ALL MEDS BY PRESCRIBER/CLIN PHARMACIST DOCUMENTED: ICD-10-PCS | Mod: CPTII,S$GLB,, | Performed by: OPHTHALMOLOGY

## 2023-10-18 PROCEDURE — 1159F PR MEDICATION LIST DOCUMENTED IN MEDICAL RECORD: ICD-10-PCS | Mod: CPTII,S$GLB,, | Performed by: OPHTHALMOLOGY

## 2023-10-18 PROCEDURE — 1126F AMNT PAIN NOTED NONE PRSNT: CPT | Mod: CPTII,S$GLB,, | Performed by: OPHTHALMOLOGY

## 2023-10-18 PROCEDURE — 99024 POSTOP FOLLOW-UP VISIT: CPT | Mod: S$GLB,,, | Performed by: OPHTHALMOLOGY

## 2023-10-18 PROCEDURE — 1160F RVW MEDS BY RX/DR IN RCRD: CPT | Mod: CPTII,S$GLB,, | Performed by: OPHTHALMOLOGY

## 2023-10-18 PROCEDURE — 1159F MED LIST DOCD IN RCRD: CPT | Mod: CPTII,S$GLB,, | Performed by: OPHTHALMOLOGY

## 2023-10-18 PROCEDURE — 92136 PR OPHTHAL BIOMETRY,INTRAOC LENS POW CALC: ICD-10-PCS | Mod: 26,RT,S$GLB, | Performed by: OPHTHALMOLOGY

## 2023-10-18 PROCEDURE — 1126F PR PAIN SEVERITY QUANTIFIED, NO PAIN PRESENT: ICD-10-PCS | Mod: CPTII,S$GLB,, | Performed by: OPHTHALMOLOGY

## 2023-10-18 PROCEDURE — 1101F PR PT FALLS ASSESS DOC 0-1 FALLS W/OUT INJ PAST YR: ICD-10-PCS | Mod: CPTII,S$GLB,, | Performed by: OPHTHALMOLOGY

## 2023-10-18 PROCEDURE — 1101F PT FALLS ASSESS-DOCD LE1/YR: CPT | Mod: CPTII,S$GLB,, | Performed by: OPHTHALMOLOGY

## 2023-10-18 PROCEDURE — 92136 OPHTHALMIC BIOMETRY: CPT | Mod: 26,RT,S$GLB, | Performed by: OPHTHALMOLOGY

## 2023-10-18 PROCEDURE — 3288F PR FALLS RISK ASSESSMENT DOCUMENTED: ICD-10-PCS | Mod: CPTII,S$GLB,, | Performed by: OPHTHALMOLOGY

## 2023-10-18 PROCEDURE — 99999 PR PBB SHADOW E&M-EST. PATIENT-LVL II: CPT | Mod: PBBFAC,,, | Performed by: OPHTHALMOLOGY

## 2023-10-18 PROCEDURE — 99999 PR PBB SHADOW E&M-EST. PATIENT-LVL II: ICD-10-PCS | Mod: PBBFAC,,, | Performed by: OPHTHALMOLOGY

## 2023-10-18 NOTE — TELEPHONE ENCOUNTER
----- Message from Rosalinda Gallegos sent at 10/18/2023 12:11 PM CDT -----  Regarding: FW: advice  Contact: Brisa (wife ) @  [phone]    ----- Message -----  From: Ave Davis  Sent: 10/18/2023  12:07 PM CDT  To: Bruno JOSEPH Staff  Subject: advice                                           Pts wife is req to speak with Rosalinda again.  Pls call.

## 2023-10-18 NOTE — PROGRESS NOTES
Subjective:       Patient ID: James Dc is a 89 y.o. male.    Chief Complaint: No chief complaint on file.    HPI    PT Here for 1 day PO OS    PT presents well no signs of pain or irritation.         EYE GTTS:  OFLOXACIN QID  PRED TID  KETOROLAC TID    POHx:   1. NSC  OU  S/P CE OS (10/10/2023)  Last edited by Rosa Mcclelland MA on 10/18/2023  9:36 AM.             Assessment:       1. Post-operative state    2. NS (nuclear sclerosis), right        Plan:       S/p CE OS- Doing well.     Visually significant cataract OD -Pt. Wants Sx.       Taper gtts OS.  Cataract Surgery Consent: Patient with a visually significant cataract with difficulties of ADLs, reading, driving, night vision, glare (any and all).  Discussed with Patient/Family/Caregiver: options, risks and benefits, expectations of cataract surgery, utilized an eye model with questions and answers to facilitate discussion.  Discussed lens options and patient understands that glasses may be required for optimal vision for distance and/or near vision after cataract surgery.  The Patient/Family/Caregiver  voice good understanding and patient wishes to proceed with surgery.  The patient will likely benefit from surgery and patient signed consent for Right Eye.   CE OD 10/24/23.

## 2023-10-19 ENCOUNTER — LAB VISIT (OUTPATIENT)
Dept: LAB | Facility: HOSPITAL | Age: 88
End: 2023-10-19
Attending: INTERNAL MEDICINE
Payer: MEDICARE

## 2023-10-19 DIAGNOSIS — E78.5 DYSLIPIDEMIA: ICD-10-CM

## 2023-10-19 DIAGNOSIS — R29.818 PARKINSONIAN FEATURES: ICD-10-CM

## 2023-10-19 DIAGNOSIS — I35.0 AORTIC VALVE STENOSIS, ETIOLOGY OF CARDIAC VALVE DISEASE UNSPECIFIED: ICD-10-CM

## 2023-10-19 LAB
BILIRUB UR QL STRIP: NEGATIVE
CLARITY UR REFRACT.AUTO: CLEAR
COLOR UR AUTO: YELLOW
GLUCOSE UR QL STRIP: NEGATIVE
HGB UR QL STRIP: NEGATIVE
KETONES UR QL STRIP: NEGATIVE
LEUKOCYTE ESTERASE UR QL STRIP: NEGATIVE
NITRITE UR QL STRIP: NEGATIVE
PH UR STRIP: 5 [PH] (ref 5–8)
PROT UR QL STRIP: NEGATIVE
SP GR UR STRIP: 1.02 (ref 1–1.03)
URN SPEC COLLECT METH UR: NORMAL

## 2023-10-19 PROCEDURE — 81003 URINALYSIS AUTO W/O SCOPE: CPT | Mod: HCNC | Performed by: INTERNAL MEDICINE

## 2023-10-22 NOTE — H&P
[facility]  History & Physical    Subjective:      Chief Complaint/Reason for Admission:     James Dc is a 89 y.o. male.    Past Medical History:   Diagnosis Date    Essential tremor     Hypertensive urgency 6/13/2023    Nuclear sclerosis - Both Eyes 9/19/2012     Past Surgical History:   Procedure Laterality Date    CATARACT EXTRACTION W/  INTRAOCULAR LENS IMPLANT Left 10/10/2023    Procedure: EXTRACTION, CATARACT, WITH IOL INSERTION;  Surgeon: Lon Carr MD;  Location: [facility];  Service: Ophthalmology;  Laterality: Left;    TONSILLECTOMY         Family history non-contributory to current problem   Social History     Tobacco Use    Smoking status: Former    Smokeless tobacco: Former   Substance Use Topics    Alcohol use: Yes    Drug use: No       No medications prior to admission.     Review of patient's allergies indicates:   Allergen Reactions    Tetracyclines Nausea Only        Review of Systems   Eyes:  Positive for blurred vision.   All other systems reviewed and are negative.      Objective:      Vital Signs (Most Recent)       Vital Signs Range (Last 24H):  BP: ()/()   Arterial Line BP: ()/()     Physical Exam  Constitutional:       Appearance: He is well-developed.   HENT:      Head: Normocephalic.   Eyes:      Conjunctiva/sclera: Conjunctivae normal.      Pupils: Pupils are equal, round, and reactive to light.   Cardiovascular:      Rate and Rhythm: Normal rate.   Pulmonary:      Effort: Pulmonary effort is normal.      Breath sounds: Normal breath sounds.   Abdominal:      General: Bowel sounds are normal.      Palpations: Abdomen is soft.   Musculoskeletal:         General: Normal range of motion.      Cervical back: Normal range of motion and neck supple.   Skin:     General: Skin is warm.   Neurological:      Mental Status: He is alert and oriented to person, place, and time.         Data Review:     ECG:     Assessment:      Cataract OD.    Plan:     CE OD.

## 2023-10-23 NOTE — PRE-PROCEDURE INSTRUCTIONS
- Nothing to eat or drink after midinight, except AM meds with small sips of water or Gatorade/Powerade (no red)  - Hold all Diabetic meds AM of surgery  - Hold all Insulin AM of surgery  - Hold all Fluid pills AM of surgery  - Hold all non-insulin shots until after surgery (Ozempic, Mounjaro, Trulicity, Victoza, Byetta, Wegovy, Adlyxin, etc...) (7 days prior)  - Take all B/P meds, except those that contain a fluid pill  - Hold all vits and herbal meds AM of surgery  - Use inhalers as needed and bring AM of surgery  - Take blood thinner meds AM of surgery (unless directed otherwise)  - Use eye drops as directed  - Shower and wash face with dial soap for 3 mins PM prior and AM of surgery  - No powder, lotions, creams, (makeup),  jewelry or deodorant     - Wear comfortable clothing (button up shirt)        (Patients ride may not leave while patient is in surgery)        -- 2nd floor surgery ctr at [facility] @ [address], [place] [phone]          Pt wife voiced understanding

## 2023-10-24 ENCOUNTER — TELEPHONE (OUTPATIENT)
Dept: OPHTHALMOLOGY | Facility: CLINIC | Age: 88
End: 2023-10-24
Payer: MEDICARE

## 2023-10-24 ENCOUNTER — ANESTHESIA (OUTPATIENT)
Dept: SURGERY | Facility: HOSPITAL | Age: 88
End: 2023-10-24
Payer: MEDICARE

## 2023-10-24 ENCOUNTER — HOSPITAL ENCOUNTER (OUTPATIENT)
Facility: HOSPITAL | Age: 88
Discharge: HOME | End: 2023-10-24
Attending: OPHTHALMOLOGY | Admitting: OPHTHALMOLOGY
Payer: MEDICARE

## 2023-10-24 ENCOUNTER — ANESTHESIA EVENT (OUTPATIENT)
Dept: SURGERY | Facility: HOSPITAL | Age: 88
End: 2023-10-24
Payer: MEDICARE

## 2023-10-24 VITALS
SYSTOLIC BLOOD PRESSURE: 179 MMHG | DIASTOLIC BLOOD PRESSURE: 80 MMHG | BODY MASS INDEX: 19.63 KG/M2 | RESPIRATION RATE: 18 BRPM | OXYGEN SATURATION: 97 % | WEIGHT: 115 LBS | TEMPERATURE: 100 F | HEIGHT: 64 IN | HEART RATE: 73 BPM

## 2023-10-24 DIAGNOSIS — H25.11 NUCLEAR SCLEROTIC CATARACT OF RIGHT EYE: Primary | ICD-10-CM

## 2023-10-24 PROCEDURE — V2632 POST CHMBR INTRAOCULAR LENS: HCPCS | Performed by: OPHTHALMOLOGY

## 2023-10-24 PROCEDURE — 36000706: Performed by: OPHTHALMOLOGY

## 2023-10-24 PROCEDURE — 27201423 OPTIME MED/SURG SUP & DEVICES STERILE SUPPLY: Performed by: OPHTHALMOLOGY

## 2023-10-24 PROCEDURE — D9220A PRA ANESTHESIA: Mod: ,,, | Performed by: NURSE ANESTHETIST, CERTIFIED REGISTERED

## 2023-10-24 PROCEDURE — 66984 PR REMOVAL, CATARACT, W/INSRT INTRAOC LENS, W/O ENDO CYCLO: ICD-10-PCS | Mod: 79,HCNC,RT, | Performed by: OPHTHALMOLOGY

## 2023-10-24 PROCEDURE — D9220A PRA ANESTHESIA: ICD-10-PCS | Mod: ,,, | Performed by: NURSE ANESTHETIST, CERTIFIED REGISTERED

## 2023-10-24 PROCEDURE — 25000003 PHARM REV CODE 250: Performed by: OPHTHALMOLOGY

## 2023-10-24 PROCEDURE — 99900035 HC TECH TIME PER 15 MIN (STAT)

## 2023-10-24 PROCEDURE — 94761 N-INVAS EAR/PLS OXIMETRY MLT: CPT

## 2023-10-24 PROCEDURE — 71000015 HC POSTOP RECOV 1ST HR: Performed by: OPHTHALMOLOGY

## 2023-10-24 PROCEDURE — 37000009 HC ANESTHESIA EA ADD 15 MINS: Performed by: OPHTHALMOLOGY

## 2023-10-24 PROCEDURE — 36000707: Performed by: OPHTHALMOLOGY

## 2023-10-24 PROCEDURE — 37000008 HC ANESTHESIA 1ST 15 MINUTES: Performed by: OPHTHALMOLOGY

## 2023-10-24 PROCEDURE — 63600175 PHARM REV CODE 636 W HCPCS: Performed by: OPHTHALMOLOGY

## 2023-10-24 PROCEDURE — 66984 XCAPSL CTRC RMVL W/O ECP: CPT | Mod: 79,HCNC,RT, | Performed by: OPHTHALMOLOGY

## 2023-10-24 DEVICE — LENS CLAREON AUTONOME 20.5D: Type: IMPLANTABLE DEVICE | Site: EYE | Status: FUNCTIONAL

## 2023-10-24 RX ORDER — TETRACAINE HYDROCHLORIDE 5 MG/ML
SOLUTION OPHTHALMIC
Status: DISCONTINUED | OUTPATIENT
Start: 2023-10-24 | End: 2023-10-24 | Stop reason: HOSPADM

## 2023-10-24 RX ORDER — ACETAMINOPHEN 325 MG/1
650 TABLET ORAL EVERY 4 HOURS PRN
Status: DISCONTINUED | OUTPATIENT
Start: 2023-10-24 | End: 2023-10-24 | Stop reason: HOSPADM

## 2023-10-24 RX ORDER — EPINEPHRINE 1 MG/ML
INJECTION, SOLUTION, CONCENTRATE INTRAVENOUS
Status: DISCONTINUED | OUTPATIENT
Start: 2023-10-24 | End: 2023-10-24 | Stop reason: HOSPADM

## 2023-10-24 RX ORDER — SODIUM CHLORIDE 9 MG/ML
INJECTION, SOLUTION INTRAVENOUS CONTINUOUS
Status: DISCONTINUED | OUTPATIENT
Start: 2023-10-24 | End: 2023-10-24 | Stop reason: HOSPADM

## 2023-10-24 RX ORDER — LIDOCAINE HYDROCHLORIDE 40 MG/ML
INJECTION, SOLUTION RETROBULBAR
Status: DISCONTINUED | OUTPATIENT
Start: 2023-10-24 | End: 2023-10-24 | Stop reason: HOSPADM

## 2023-10-24 RX ORDER — CYCLOP/TROP/PROPA/PHEN/KET/WAT 1-1-0.1%
1 DROPS (EA) OPHTHALMIC (EYE)
Status: COMPLETED | OUTPATIENT
Start: 2023-10-24 | End: 2023-10-24

## 2023-10-24 RX ORDER — HYDROCODONE BITARTRATE AND ACETAMINOPHEN 5; 325 MG/1; MG/1
1 TABLET ORAL EVERY 4 HOURS PRN
Status: DISCONTINUED | OUTPATIENT
Start: 2023-10-24 | End: 2023-10-24 | Stop reason: HOSPADM

## 2023-10-24 RX ORDER — PREDNISOLONE ACETATE 10 MG/ML
SUSPENSION/ DROPS OPHTHALMIC
Status: DISCONTINUED | OUTPATIENT
Start: 2023-10-24 | End: 2023-10-24 | Stop reason: HOSPADM

## 2023-10-24 RX ORDER — MOXIFLOXACIN 5 MG/ML
1 SOLUTION/ DROPS OPHTHALMIC
Status: COMPLETED | OUTPATIENT
Start: 2023-10-24 | End: 2023-10-24

## 2023-10-24 RX ORDER — MOXIFLOXACIN 5 MG/ML
SOLUTION/ DROPS OPHTHALMIC
Status: DISCONTINUED | OUTPATIENT
Start: 2023-10-24 | End: 2023-10-24 | Stop reason: HOSPADM

## 2023-10-24 RX ADMIN — Medication 1 DROP: at 08:10

## 2023-10-24 RX ADMIN — MOXIFLOXACIN OPHTHALMIC 1 DROP: 5 SOLUTION/ DROPS OPHTHALMIC at 08:10

## 2023-10-24 NOTE — DISCHARGE INSTRUCTIONS
Cataract Post Surgery Instructions     START YOUR DROPS AS SOON AS YOU GET HOME FROM SURGERY      Instill the following drops 1 drop, three (3) times daily, every four (4) hours.      FIVE MINUTES APART FROM EACH OTHER      OFLOXACIN  ( Tan Top)    KETOROLAC  ( Gray Top)    PREDNISOLONE ACETATE  ( [place] or White Top)         RESTRICTIONS FOR SEVEN (7) DAYS FOLLOWING SURGERY     DO NOT lift anything over 10 pounds.     DO NOT bend at the waist, only at the knees.      DO NOT rub your eye.      DO NOT get any water into the eye.      DO NOT wear any makeup, lotions, or creams on/around the eye.     Wear the eye shield you were given after surgery anytime you go to sleep.  You may remove the shield while awake.           NOTE:     YOU MAY resume taking ALL your medications after surgery.     YOU MAY resume driving on your first post-operative appointment IF your vision is clear. DO NOT wear your eye shield while driving for your post operative appointments.      YOU MAY take an over-the counter pain medication such as Tylenol or Ibuprofen as needed for pain.     CALL US:  MILD DISCOMFORT IS NORMAL. HOWEVER, IF YOU EXPERIENCE SEVERE THROBBING PAIN, LOSS OF VISION, ONSET OF FLASHES AND/OR FLOATERS- CALL DR. SPANGLER OFFICE: [phone]/ AFTER HOUR [phone] OR PROCEED TO THE EMERGENCY ROOM.

## 2023-10-24 NOTE — ANESTHESIA POSTPROCEDURE EVALUATION
Anesthesia Post Evaluation    Patient: James Dc    Procedure(s) Performed: Procedure(s) (LRB):  EXTRACTION, CATARACT, WITH IOL INSERTION (Right)    Final Anesthesia Type: MAC      Patient location during evaluation: PACU  Patient participation: Yes- Able to Participate  Level of consciousness: awake and alert  Post-procedure vital signs: reviewed and stable  Pain management: adequate  Airway patency: patent    PONV status at discharge: No PONV  Anesthetic complications: no      Cardiovascular status: stable  Respiratory status: room air  Hydration status: euvolemic  Follow-up not needed.          Vitals Value Taken Time   /72 10/24/23 1107   Temp 37.8 °C (100 °F) 10/24/23 1050   Pulse 73 10/24/23 1108   Resp 18 10/24/23 1050   SpO2 96 % 10/24/23 1108   Vitals shown include unvalidated device data.      No case tracking events are documented in the log.      Pain/Avery Score: Avery Score: 9 (10/24/2023 11:01 AM)

## 2023-10-24 NOTE — PLAN OF CARE
Pt AAOx3, VSS on room air. Pt tolerated procedure well. Pt denies any pain, Dizziness or N/V. IV removed with catheter tip intact. Assisted up for the first time, steady on feet. Pt Discharge instructions given and explained to patient and family with verbalization of understanding all instructions. Pt denies any further questions at this time. Pt DC'd home VIA wheelchair with family.

## 2023-10-24 NOTE — ANESTHESIA PREPROCEDURE EVALUATION
10/24/2023  James Dc is a 89 y.o., male.      Pre-op Assessment    I have reviewed the Patient Summary Reports.    I have reviewed the NPO Status.   I have reviewed the Medications.     Review of Systems  Anesthesia Hx:  No problems with previous Anesthesia               Denies Personal Hx of Anesthesia complications.                    Social:  Former Smoker       Cardiovascular:     Hypertension Valvular problems/Murmurs, AS          hyperlipidemia                             Pulmonary:  Pulmonary Normal                       Renal/:  Renal/ Normal                 Hepatic/GI:  Hepatic/GI Normal                 Neurological:       Seizures                                Endocrine:  Endocrine Normal                Physical Exam  General: Well nourished and Cooperative    Airway:  Mallampati: II   Mouth Opening: Normal  TM Distance: Normal  Tongue: Normal  Neck ROM: Normal ROM    Dental:  Intact        Anesthesia Plan  Type of Anesthesia, risks & benefits discussed:    Anesthesia Type: MAC  Intra-op Monitoring Plan: Standard ASA Monitors  Post Op Pain Control Plan: multimodal analgesia and IV/PO Opioids PRN  Induction:  IV  Informed Consent: Informed consent signed with the Patient and all parties understand the risks and agree with anesthesia plan.  All questions answered.   ASA Score: 3  Day of Surgery Review of History & Physical: H&P Update referred to the surgeon/provider.H&P completed by Anesthesiologist.    Ready For Surgery From Anesthesia Perspective.     .

## 2023-10-24 NOTE — BRIEF OP NOTE
[facility]  Brief Operative Note    Surgery Date: 10/24/2023     Surgeon(s) and Role:     * Lon Carr MD - Primary    Assisting Surgeon: None    Pre-op Diagnosis:  NS (nuclear sclerosis), right [H25.11]    Post-op Diagnosis:  Post-Op Diagnosis Codes:     * NS (nuclear sclerosis), right [H25.11]    Procedure(s) (LRB):  EXTRACTION, CATARACT, WITH IOL INSERTION (Right)    Anesthesia: Local MAC    Operative Findings:     Estimated Blood Loss: * No values recorded between 10/24/2023 10:25 AM and 10/24/2023 10:48 AM *         Specimens:   Specimen (24h ago, onward)      None              Discharge Note    OUTCOME: Patient tolerated treatment/procedure well without complication and is now ready for discharge.    DISPOSITION: Home or Self Care    FINAL DIAGNOSIS:  Nuclear sclerotic cataract of right eye    FOLLOWUP: In clinic    DISCHARGE INSTRUCTIONS:    Discharge Procedure Orders   Other restrictions (specify):   Order Comments: No heavy lifting or bending for 1 week.

## 2023-10-24 NOTE — TRANSFER OF CARE
"Anesthesia Transfer of Care Note    Patient: James Dc    Procedure(s) Performed: Procedure(s) (LRB):  EXTRACTION, CATARACT, WITH IOL INSERTION (Right)    Patient location: PACU    Anesthesia Type: MAC    Transport from OR: Transported from OR on room air with adequate spontaneous ventilation    Post pain: adequate analgesia    Post assessment: no apparent anesthetic complications and tolerated procedure well    Post vital signs: stable    Level of consciousness: awake, alert and oriented    Nausea/Vomiting: no nausea/vomiting    Complications: none    Transfer of care protocol was followed      Last vitals:   Visit Vitals  BP (!) 159/70 (BP Location: Right arm, Patient Position: Lying)   Pulse 97   Temp 37.2 °C (99 °F) (Tympanic)   Resp 18   Ht 5' 4" (1.626 m)   Wt 52.2 kg (115 lb)   SpO2 97%   BMI 19.74 kg/m²     "

## 2023-10-24 NOTE — TELEPHONE ENCOUNTER
----- Message from Rosalinda Gallegos sent at 10/23/2023  4:55 PM CDT -----  Contact: [phone]    ----- Message -----  From: Massimo Lovett  Sent: 10/23/2023   4:54 PM CDT  To: Bruno JOSEPH Staff    Pt wife is calling because her  fell and she doesn't think he can make the Sx appt tomorrow. Please call back to further assist.

## 2023-10-24 NOTE — OP NOTE
Operative Date:  10/24/2023    Discharge Date:  10/24/2023    Discharge Patient Home    Report Title: Operative Note      SURGEON: Lon Carr MD     ASSISTANT:     PREOPERATIVE DIAGNOSIS: Visually significant NSC cataract,  Right Eye.    POSTOPERATIVE DIAGNOSIS: Visually-significant NSC cataract,  Right Eye.    PROCEDURE PERFORMED: Phacoemulsification of the cataract with posterior chamber intraocular lens Right Eye.    ANESTHESIA: Topical with MAC     COMPLICATIONS: None    ESTIMATED BLOOD LOSS: Minimal    INDICATIONS FOR PROCEDURE:   The patient is a pleasant 89 year old gentleman with increasing difficulties with activities of daily living secondary to a dense visually significant cataract in the Right Eye.  Discussions have been carried out with this patient concerning the options to surgery, risks, benefits and expectations.  The patient voiced good understanding and wished to proceed with the above procedure.    PROCEDURE IN DETAIL: The patient was brought to the operating room and received topical anesthetic to the eye and then was prepped and draped in the usual sterile fashion.  Using the Roblero ring and the guarded iain blade set at 0.37 mm, a partial thickness clear cornea incision was made temporally.  The paracentesis site was made at the twelve o'clock position.  Omidria was injected into the anterior chamber through the paracentesis.  Viscoat was then injected into the anterior chamber.  The eye was then reentered at the primary surgical site with a 2.4 mm keratome followed by continuous capsulotomy, hydrodissection, hydrodelineation and phacoemulsification of the cataract.  Residual cortical material was removed using automated irrigation-aspiration technique.  Healon was injected into the posterior chamber and a CNAOTO 20.5 diopter lens was placed in the bag without difficulty. Residual viscoelastic was removed using automated irrigation-aspiration technique. The eye was  re-pressurized using BSS solution and both the paracentesis site and the primary surgical site were demonstrated to be watertight at the end of the case with Weck--Mary manipulation.  One drop of Ofloxacin and one drop of Pred acetate 1% was applied to the Right Eye .The eye was closed, patched and a Barrientos shield placed.  The patient was taken to the recovery room in good and stable condition.  The patient tolerated the procedure well.  The patient was instructed to refrain from any heavy lifting, bending, stooping or straining activities, discharged home  and to follow-up in the morning for routine postoperative care with Lon Carr MD.

## 2023-10-25 ENCOUNTER — OFFICE VISIT (OUTPATIENT)
Dept: OPHTHALMOLOGY | Facility: CLINIC | Age: 88
End: 2023-10-25
Payer: MEDICARE

## 2023-10-25 DIAGNOSIS — Z98.890 POST-OPERATIVE STATE: Primary | ICD-10-CM

## 2023-10-25 PROCEDURE — 1160F PR REVIEW ALL MEDS BY PRESCRIBER/CLIN PHARMACIST DOCUMENTED: ICD-10-PCS | Mod: CPTII,S$GLB,, | Performed by: OPHTHALMOLOGY

## 2023-10-25 PROCEDURE — 1126F AMNT PAIN NOTED NONE PRSNT: CPT | Mod: CPTII,S$GLB,, | Performed by: OPHTHALMOLOGY

## 2023-10-25 PROCEDURE — 3288F PR FALLS RISK ASSESSMENT DOCUMENTED: ICD-10-PCS | Mod: CPTII,S$GLB,, | Performed by: OPHTHALMOLOGY

## 2023-10-25 PROCEDURE — 1159F PR MEDICATION LIST DOCUMENTED IN MEDICAL RECORD: ICD-10-PCS | Mod: CPTII,S$GLB,, | Performed by: OPHTHALMOLOGY

## 2023-10-25 PROCEDURE — 3288F FALL RISK ASSESSMENT DOCD: CPT | Mod: CPTII,S$GLB,, | Performed by: OPHTHALMOLOGY

## 2023-10-25 PROCEDURE — 99999 PR PBB SHADOW E&M-EST. PATIENT-LVL II: CPT | Mod: PBBFAC,,, | Performed by: OPHTHALMOLOGY

## 2023-10-25 PROCEDURE — 99024 PR POST-OP FOLLOW-UP VISIT: ICD-10-PCS | Mod: S$GLB,,, | Performed by: OPHTHALMOLOGY

## 2023-10-25 PROCEDURE — 1101F PT FALLS ASSESS-DOCD LE1/YR: CPT | Mod: CPTII,S$GLB,, | Performed by: OPHTHALMOLOGY

## 2023-10-25 PROCEDURE — 99024 POSTOP FOLLOW-UP VISIT: CPT | Mod: S$GLB,,, | Performed by: OPHTHALMOLOGY

## 2023-10-25 PROCEDURE — 99999 PR PBB SHADOW E&M-EST. PATIENT-LVL II: ICD-10-PCS | Mod: PBBFAC,,, | Performed by: OPHTHALMOLOGY

## 2023-10-25 PROCEDURE — 1160F RVW MEDS BY RX/DR IN RCRD: CPT | Mod: CPTII,S$GLB,, | Performed by: OPHTHALMOLOGY

## 2023-10-25 PROCEDURE — 1159F MED LIST DOCD IN RCRD: CPT | Mod: CPTII,S$GLB,, | Performed by: OPHTHALMOLOGY

## 2023-10-25 PROCEDURE — 1101F PR PT FALLS ASSESS DOC 0-1 FALLS W/OUT INJ PAST YR: ICD-10-PCS | Mod: CPTII,S$GLB,, | Performed by: OPHTHALMOLOGY

## 2023-10-25 PROCEDURE — 1126F PR PAIN SEVERITY QUANTIFIED, NO PAIN PRESENT: ICD-10-PCS | Mod: CPTII,S$GLB,, | Performed by: OPHTHALMOLOGY

## 2023-10-25 NOTE — PROGRESS NOTES
Subjective:       Patient ID: James Dc is a 89 y.o. male.    Chief Complaint: Post-op Evaluation (James Dc is a 89 y/o male )    HPI     Post-op Evaluation     Additional comments: James Dc is a 89 y/o male            Comments    Pt here for 1 day Post OP OD  Pt state no complaints at this time   Denies f/f    Gtts;  Oflox qidOD   Pred qid OD, BID OS   Keto qid OD , BID OS           Last edited by Jarrett Sorto on 10/25/2023  8:18 AM.             Assessment:       1. Post-operative state        Plan:       S/p CE OU- Doing well.       CPM OD.  Taper gtts OS.  RTC 1 wk.

## 2023-10-26 ENCOUNTER — OFFICE VISIT (OUTPATIENT)
Dept: INTERNAL MEDICINE | Facility: CLINIC | Age: 88
End: 2023-10-26
Payer: MEDICARE

## 2023-10-26 VITALS
SYSTOLIC BLOOD PRESSURE: 130 MMHG | HEART RATE: 74 BPM | BODY MASS INDEX: 18.21 KG/M2 | DIASTOLIC BLOOD PRESSURE: 68 MMHG | TEMPERATURE: 97 F | HEIGHT: 64 IN | OXYGEN SATURATION: 99 % | WEIGHT: 106.69 LBS

## 2023-10-26 DIAGNOSIS — R29.818 PARKINSONIAN FEATURES: ICD-10-CM

## 2023-10-26 DIAGNOSIS — F06.70 MILD NEUROCOGNITIVE DISORDER DUE TO ALZHEIMER'S DISEASE: ICD-10-CM

## 2023-10-26 DIAGNOSIS — Z00.00 ENCOUNTER FOR PREVENTIVE HEALTH EXAMINATION: Primary | ICD-10-CM

## 2023-10-26 DIAGNOSIS — I35.0 NONRHEUMATIC AORTIC VALVE STENOSIS: ICD-10-CM

## 2023-10-26 DIAGNOSIS — G30.9 MILD NEUROCOGNITIVE DISORDER DUE TO ALZHEIMER'S DISEASE: ICD-10-CM

## 2023-10-26 DIAGNOSIS — I70.0 AORTIC ATHEROSCLEROSIS: ICD-10-CM

## 2023-10-26 PROCEDURE — 1100F PR PT FALLS ASSESS DOC 2+ FALLS/FALL W/INJURY/YR: ICD-10-PCS | Mod: HCNC,CPTII,S$GLB, | Performed by: INTERNAL MEDICINE

## 2023-10-26 PROCEDURE — 99397 PR PREVENTIVE VISIT,EST,65 & OVER: ICD-10-PCS | Mod: HCNC,S$GLB,, | Performed by: INTERNAL MEDICINE

## 2023-10-26 PROCEDURE — 1159F PR MEDICATION LIST DOCUMENTED IN MEDICAL RECORD: ICD-10-PCS | Mod: HCNC,CPTII,S$GLB, | Performed by: INTERNAL MEDICINE

## 2023-10-26 PROCEDURE — 1126F PR PAIN SEVERITY QUANTIFIED, NO PAIN PRESENT: ICD-10-PCS | Mod: HCNC,CPTII,S$GLB, | Performed by: INTERNAL MEDICINE

## 2023-10-26 PROCEDURE — 99999 PR PBB SHADOW E&M-EST. PATIENT-LVL III: CPT | Mod: PBBFAC,HCNC,, | Performed by: INTERNAL MEDICINE

## 2023-10-26 PROCEDURE — 3288F PR FALLS RISK ASSESSMENT DOCUMENTED: ICD-10-PCS | Mod: HCNC,CPTII,S$GLB, | Performed by: INTERNAL MEDICINE

## 2023-10-26 PROCEDURE — 1100F PTFALLS ASSESS-DOCD GE2>/YR: CPT | Mod: HCNC,CPTII,S$GLB, | Performed by: INTERNAL MEDICINE

## 2023-10-26 PROCEDURE — 1126F AMNT PAIN NOTED NONE PRSNT: CPT | Mod: HCNC,CPTII,S$GLB, | Performed by: INTERNAL MEDICINE

## 2023-10-26 PROCEDURE — 99397 PER PM REEVAL EST PAT 65+ YR: CPT | Mod: HCNC,S$GLB,, | Performed by: INTERNAL MEDICINE

## 2023-10-26 PROCEDURE — 3288F FALL RISK ASSESSMENT DOCD: CPT | Mod: HCNC,CPTII,S$GLB, | Performed by: INTERNAL MEDICINE

## 2023-10-26 PROCEDURE — 1159F MED LIST DOCD IN RCRD: CPT | Mod: HCNC,CPTII,S$GLB, | Performed by: INTERNAL MEDICINE

## 2023-10-26 PROCEDURE — 99999 PR PBB SHADOW E&M-EST. PATIENT-LVL III: ICD-10-PCS | Mod: PBBFAC,HCNC,, | Performed by: INTERNAL MEDICINE

## 2023-10-26 NOTE — PROGRESS NOTES
History of present illness:   89-year-old gentleman in today with his wife for general health review.    Current medications:  No prescription medications.  He self-discontinued prescribed medication.    Review of systems:   General: no fever, chills, generalized body aches.  Chronic fatigue lack of energy.  Gradual weight loss.  Eyes:  No visual disturbances.  HEENT:  No hoarseness, dysphagia, ear pain.  Respiratory:  No cough, no shortness of breath.  Cardiovascular: no chest pain, palpitations, cough, exertional limb pain. No edema.  GI: no nausea, vomiting.  No abdominal pain. No change in bowel habits.  No melena, no hematochezia.  : no dysuria. No change in the color or character of the urine. No urinary frequency.  Musculoskeletal: no joint pain or swelling.  Neurologic:  Hand tremor he thinks is stable  Psych:  Some forgetfulness.  Probably mild depression symptomatologies.    Health screenings:  Declines any updated vaccinations.      Physical examination:  GENERAL:  Alert, appropriately groomed, no acute distress.  VS:  Blood pressure taken manually by this examiner 130/70.  Other vital signs normal.  EYES: sclerae white ,nonicteric. PERRL.  HEENT:  Normocephalic. Ear canals and tympanic membranes normal. Mouth and pharynx normal. No thyromegaly. Trachea midline and freely mobile.  LUNGS:  Clear to ascultation and normal to percussion.  CARDIOVASCULAR:  Normal heart sounds.  2/6 systolic murmur heard best at the aortic area. Carotids full bilaterally without bruit.  Pedal pulses intact .  No abdominal bruit.  No peripheral extremity edema.  GI: the abdomen is soft, no distension. No masses , tenderness, organomegaly.    LYMPHATIC:  No axillary, inguinal , cervical adenopathy.  MUSCULOSKELETAL:  No hot joints.  Dupuytren's deformity left hand.  NEUROLOGIC:  Some generalized bradykinesia.  Mild resting tremor.  MS:  Alert, oriented , somewhat flat affect.  No suicidal or homicidal ideation.  No psychotic  thinking.       Data:  Recent health maintenance lab data noted reviewed in all reasonable.        Impression:   89-year-old gentleman with a couple of chronic medical issues.      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      Aortic stenosis asymptomatic.    Aortic atherosclerosis ptosis on imaging studies clinically stable.    Fluctuating blood pressures probably related to dysautonomia.        Plan:   Advise is that he follow-up with Neurology though he does not seem too interested in such not being interested in medication at this time.  He also does not want to follow-up regarding the potential absence seizure issue.  He self discontinued Keppra indicating he did not like the way it made him feel.  He declines any vaccines.  Follow-up in six months or before if needed or if he changes his mind regarding additional Neurology follow-up

## 2023-11-01 ENCOUNTER — OFFICE VISIT (OUTPATIENT)
Dept: OPHTHALMOLOGY | Facility: CLINIC | Age: 88
End: 2023-11-01
Payer: MEDICARE

## 2023-11-01 DIAGNOSIS — Z98.890 POST-OPERATIVE STATE: Primary | ICD-10-CM

## 2023-11-01 PROCEDURE — 1126F AMNT PAIN NOTED NONE PRSNT: CPT | Mod: CPTII,S$GLB,, | Performed by: OPHTHALMOLOGY

## 2023-11-01 PROCEDURE — 1101F PR PT FALLS ASSESS DOC 0-1 FALLS W/OUT INJ PAST YR: ICD-10-PCS | Mod: CPTII,S$GLB,, | Performed by: OPHTHALMOLOGY

## 2023-11-01 PROCEDURE — 99999 PR PBB SHADOW E&M-EST. PATIENT-LVL II: ICD-10-PCS | Mod: PBBFAC,,, | Performed by: OPHTHALMOLOGY

## 2023-11-01 PROCEDURE — 99024 POSTOP FOLLOW-UP VISIT: CPT | Mod: S$GLB,,, | Performed by: OPHTHALMOLOGY

## 2023-11-01 PROCEDURE — 1160F RVW MEDS BY RX/DR IN RCRD: CPT | Mod: CPTII,S$GLB,, | Performed by: OPHTHALMOLOGY

## 2023-11-01 PROCEDURE — 1101F PT FALLS ASSESS-DOCD LE1/YR: CPT | Mod: CPTII,S$GLB,, | Performed by: OPHTHALMOLOGY

## 2023-11-01 PROCEDURE — 1159F MED LIST DOCD IN RCRD: CPT | Mod: CPTII,S$GLB,, | Performed by: OPHTHALMOLOGY

## 2023-11-01 PROCEDURE — 99024 PR POST-OP FOLLOW-UP VISIT: ICD-10-PCS | Mod: S$GLB,,, | Performed by: OPHTHALMOLOGY

## 2023-11-01 PROCEDURE — 1160F PR REVIEW ALL MEDS BY PRESCRIBER/CLIN PHARMACIST DOCUMENTED: ICD-10-PCS | Mod: CPTII,S$GLB,, | Performed by: OPHTHALMOLOGY

## 2023-11-01 PROCEDURE — 99999 PR PBB SHADOW E&M-EST. PATIENT-LVL II: CPT | Mod: PBBFAC,,, | Performed by: OPHTHALMOLOGY

## 2023-11-01 PROCEDURE — 3288F FALL RISK ASSESSMENT DOCD: CPT | Mod: CPTII,S$GLB,, | Performed by: OPHTHALMOLOGY

## 2023-11-01 PROCEDURE — 1126F PR PAIN SEVERITY QUANTIFIED, NO PAIN PRESENT: ICD-10-PCS | Mod: CPTII,S$GLB,, | Performed by: OPHTHALMOLOGY

## 2023-11-01 PROCEDURE — 1159F PR MEDICATION LIST DOCUMENTED IN MEDICAL RECORD: ICD-10-PCS | Mod: CPTII,S$GLB,, | Performed by: OPHTHALMOLOGY

## 2023-11-01 PROCEDURE — 3288F PR FALLS RISK ASSESSMENT DOCUMENTED: ICD-10-PCS | Mod: CPTII,S$GLB,, | Performed by: OPHTHALMOLOGY

## 2023-11-01 RX ORDER — OFLOXACIN 3 MG/ML
1 SOLUTION/ DROPS OPHTHALMIC
COMMUNITY
Start: 2023-10-24

## 2023-11-01 RX ORDER — KETOROLAC TROMETHAMINE 5 MG/ML
SOLUTION OPHTHALMIC
COMMUNITY
Start: 2023-10-20

## 2023-11-01 NOTE — PROGRESS NOTES
Subjective:       Patient ID: James Dc is a 89 y.o. male.    Chief Complaint: Post-op Evaluation    HPI    89 year old patient is here today for one week post op OD.  Pt has no complaints at this time.    Pred 3x/DAY  Ketorolac 3x/DAY  Last edited by Jenise Cobb on 11/1/2023  1:33 PM.             Assessment:       1. Post-operative state        Plan:       S/p CE OU- Doing well.       Taper gtts OU.  RTC 3 wks.

## 2023-11-22 ENCOUNTER — OFFICE VISIT (OUTPATIENT)
Dept: OPHTHALMOLOGY | Facility: CLINIC | Age: 88
End: 2023-11-22
Payer: MEDICARE

## 2023-11-22 DIAGNOSIS — Z98.890 POST-OPERATIVE STATE: Primary | ICD-10-CM

## 2023-11-22 DIAGNOSIS — H52.7 REFRACTIVE ERROR: ICD-10-CM

## 2023-11-22 PROCEDURE — 3288F FALL RISK ASSESSMENT DOCD: CPT | Mod: CPTII,S$GLB,, | Performed by: OPHTHALMOLOGY

## 2023-11-22 PROCEDURE — 1159F MED LIST DOCD IN RCRD: CPT | Mod: CPTII,S$GLB,, | Performed by: OPHTHALMOLOGY

## 2023-11-22 PROCEDURE — 3288F PR FALLS RISK ASSESSMENT DOCUMENTED: ICD-10-PCS | Mod: CPTII,S$GLB,, | Performed by: OPHTHALMOLOGY

## 2023-11-22 PROCEDURE — 99024 POSTOP FOLLOW-UP VISIT: CPT | Mod: S$GLB,,, | Performed by: OPHTHALMOLOGY

## 2023-11-22 PROCEDURE — 1160F PR REVIEW ALL MEDS BY PRESCRIBER/CLIN PHARMACIST DOCUMENTED: ICD-10-PCS | Mod: CPTII,S$GLB,, | Performed by: OPHTHALMOLOGY

## 2023-11-22 PROCEDURE — 1101F PR PT FALLS ASSESS DOC 0-1 FALLS W/OUT INJ PAST YR: ICD-10-PCS | Mod: CPTII,S$GLB,, | Performed by: OPHTHALMOLOGY

## 2023-11-22 PROCEDURE — 99024 PR POST-OP FOLLOW-UP VISIT: ICD-10-PCS | Mod: S$GLB,,, | Performed by: OPHTHALMOLOGY

## 2023-11-22 PROCEDURE — 1101F PT FALLS ASSESS-DOCD LE1/YR: CPT | Mod: CPTII,S$GLB,, | Performed by: OPHTHALMOLOGY

## 2023-11-22 PROCEDURE — 99999 PR PBB SHADOW E&M-EST. PATIENT-LVL II: ICD-10-PCS | Mod: PBBFAC,,, | Performed by: OPHTHALMOLOGY

## 2023-11-22 PROCEDURE — 99999 PR PBB SHADOW E&M-EST. PATIENT-LVL II: CPT | Mod: PBBFAC,,, | Performed by: OPHTHALMOLOGY

## 2023-11-22 PROCEDURE — 1159F PR MEDICATION LIST DOCUMENTED IN MEDICAL RECORD: ICD-10-PCS | Mod: CPTII,S$GLB,, | Performed by: OPHTHALMOLOGY

## 2023-11-22 PROCEDURE — 1160F RVW MEDS BY RX/DR IN RCRD: CPT | Mod: CPTII,S$GLB,, | Performed by: OPHTHALMOLOGY

## 2023-11-22 NOTE — PROGRESS NOTES
Subjective:       Patient ID: James Dc is a 89 y.o. male.    Chief Complaint: Post-op Evaluation    HPI    89 year old patient is here today for three week post op OD.  Pt has no complaints at this time.    EYE GTTS:  None   Last edited by Angelito Xie on 11/22/2023 12:54 PM.             Assessment:       1. Post-operative state    2. Refractive error        Plan:       S/p CE OU- Doing well.  RE-Pt wants MRx.      Give MRx.  RTC 6 mos with Dr Rodney.

## 2023-12-21 ENCOUNTER — OFFICE VISIT (OUTPATIENT)
Dept: INTERNAL MEDICINE | Facility: CLINIC | Age: 88
End: 2023-12-21
Payer: MEDICARE

## 2023-12-21 VITALS
HEIGHT: 61 IN | SYSTOLIC BLOOD PRESSURE: 110 MMHG | DIASTOLIC BLOOD PRESSURE: 76 MMHG | WEIGHT: 107.13 LBS | BODY MASS INDEX: 20.22 KG/M2

## 2023-12-21 DIAGNOSIS — Z76.89 ENCOUNTER TO ESTABLISH CARE: Primary | ICD-10-CM

## 2023-12-21 DIAGNOSIS — F06.70 MILD NEUROCOGNITIVE DISORDER DUE TO ALZHEIMER'S DISEASE: ICD-10-CM

## 2023-12-21 DIAGNOSIS — R29.818 PARKINSONIAN FEATURES: ICD-10-CM

## 2023-12-21 DIAGNOSIS — G30.9 MILD NEUROCOGNITIVE DISORDER DUE TO ALZHEIMER'S DISEASE: ICD-10-CM

## 2023-12-21 DIAGNOSIS — I50.32 CHRONIC DIASTOLIC HEART FAILURE: ICD-10-CM

## 2023-12-21 PROCEDURE — 3288F FALL RISK ASSESSMENT DOCD: CPT | Mod: HCNC,CPTII,GC,S$GLB | Performed by: STUDENT IN AN ORGANIZED HEALTH CARE EDUCATION/TRAINING PROGRAM

## 2023-12-21 PROCEDURE — 1100F PTFALLS ASSESS-DOCD GE2>/YR: CPT | Mod: HCNC,CPTII,GC,S$GLB | Performed by: STUDENT IN AN ORGANIZED HEALTH CARE EDUCATION/TRAINING PROGRAM

## 2023-12-21 PROCEDURE — 3288F PR FALLS RISK ASSESSMENT DOCUMENTED: ICD-10-PCS | Mod: HCNC,CPTII,GC,S$GLB | Performed by: STUDENT IN AN ORGANIZED HEALTH CARE EDUCATION/TRAINING PROGRAM

## 2023-12-21 PROCEDURE — 1126F PR PAIN SEVERITY QUANTIFIED, NO PAIN PRESENT: ICD-10-PCS | Mod: HCNC,CPTII,GC,S$GLB | Performed by: STUDENT IN AN ORGANIZED HEALTH CARE EDUCATION/TRAINING PROGRAM

## 2023-12-21 PROCEDURE — 99999 PR PBB SHADOW E&M-EST. PATIENT-LVL III: CPT | Mod: PBBFAC,HCNC,GC, | Performed by: STUDENT IN AN ORGANIZED HEALTH CARE EDUCATION/TRAINING PROGRAM

## 2023-12-21 PROCEDURE — 99214 OFFICE O/P EST MOD 30 MIN: CPT | Mod: HCNC,GC,S$GLB, | Performed by: STUDENT IN AN ORGANIZED HEALTH CARE EDUCATION/TRAINING PROGRAM

## 2023-12-21 PROCEDURE — 99999 PR PBB SHADOW E&M-EST. PATIENT-LVL III: ICD-10-PCS | Mod: PBBFAC,HCNC,GC, | Performed by: STUDENT IN AN ORGANIZED HEALTH CARE EDUCATION/TRAINING PROGRAM

## 2023-12-21 PROCEDURE — 1100F PR PT FALLS ASSESS DOC 2+ FALLS/FALL W/INJURY/YR: ICD-10-PCS | Mod: HCNC,CPTII,GC,S$GLB | Performed by: STUDENT IN AN ORGANIZED HEALTH CARE EDUCATION/TRAINING PROGRAM

## 2023-12-21 PROCEDURE — 99214 PR OFFICE/OUTPT VISIT, EST, LEVL IV, 30-39 MIN: ICD-10-PCS | Mod: HCNC,GC,S$GLB, | Performed by: STUDENT IN AN ORGANIZED HEALTH CARE EDUCATION/TRAINING PROGRAM

## 2023-12-21 PROCEDURE — 1126F AMNT PAIN NOTED NONE PRSNT: CPT | Mod: HCNC,CPTII,GC,S$GLB | Performed by: STUDENT IN AN ORGANIZED HEALTH CARE EDUCATION/TRAINING PROGRAM

## 2023-12-21 NOTE — PROGRESS NOTES
89M with HTN, HFpEF, recurrent falls, carotid stenosis, multiple CVAs, Alzheimer dementia vs possible Parkinsons presenting to clinic to transfer care, possible absence seizures (self-discontinued AEDs) who presents to clinic to [facility] and get a second opinion on options for his dementia.    Patient seems very educated (prior ) and has done personal research into his condition. He is concerned there could be another condition going on besides the Alzheimer with Parkinsonian features. He has seen Dr. Smith (movement disorder specialist) here in the past but has not followed up with her.   I believe his current symptoms of brain fog, rigidity (feet feel stuck to floor), decreased appetite are due to his Alzheimer with Parkinsonian features, and I explained this to him and his wife. We discussed the typical course of Alzheimers and how sometimes you see a stepwise decline with injury (his recent fall over the summer and not really returning to baseline afterward).   Past labs reviewed for other etiologies: normal TSH, B12, et.al. Will check Vitamin D to rule out deficiency.  I advised them to return to neurology if they have further questions about underlying other neuro conditions.  Offered appetite-enhancing medication (mirtazapine) but he will think about it. Offered referral to palliative medicine and discussed the purpose as being symptom-management (he is miserable with fatigue, low appetite, etc) and he will consider it.

## 2023-12-21 NOTE — PATIENT INSTRUCTIONS
I have placed a referral to palliative care. These are experts in management of symptoms associated with advanced illness.    Please follow up in 3 months.

## 2023-12-21 NOTE — PROGRESS NOTES
"[facility]IC  CLINIC NOTE    Patient Name: James Dc  YOB: 1932    PRESENTING HISTORY       History of Present Illness:  Mr. James Dc is a 89 y.o. male with aortic valve stenosis, HTN, and HFpEF who presents to the clinic to establish care. He was previously evaluated by neurology and diagnosed with Alzheimer's. He was also found to have tremors and symptoms consistent with Parkinson like syndrome. He presents today with concerns regarding his recent decline in functional status, specifically noting brain fog and dizziness after meals. This acutely worsened after a recent fall, sustaining a hematoma to his right forehead. He underwent 3 months of physical therapy without significant improvement, though still completes some exercises he learned. He also notes poor appetite, "heaviness" in his feet, and depression related to poor functional status.    He specifically would like recommendations regarding non-pharmacologic options for his symptoms. He has tried Boost supplements but did not like it.    Review of Systems   Constitutional:  Positive for malaise/fatigue.   Musculoskeletal:  Positive for falls.   Neurological:  Positive for tremors, seizures and weakness.   Psychiatric/Behavioral:  Positive for depression and memory loss. The patient does not have insomnia.        PAST HISTORY:     Past Medical History:   Diagnosis Date    Essential tremor     Hypertensive urgency 6/13/2023    Nuclear sclerosis - Both Eyes 9/19/2012       Past Surgical History:   Procedure Laterality Date    CATARACT EXTRACTION W/  INTRAOCULAR LENS IMPLANT Left 10/10/2023    Procedure: EXTRACTION, CATARACT, WITH IOL INSERTION;  Surgeon: Lon Carr MD;  Location: [facility];  Service: Ophthalmology;  Laterality: Left;    CATARACT EXTRACTION W/  INTRAOCULAR LENS IMPLANT Right 10/24/2023    Procedure: EXTRACTION, CATARACT, WITH IOL INSERTION;  Surgeon: Lon Carr MD;  " Location: [facility] OR;  Service: Ophthalmology;  Laterality: Right;    TONSILLECTOMY         Family History   Problem Relation Age of Onset    Prostate cancer Father     Cataracts Son     Psoriasis Neg Hx     Eczema Neg Hx     Glaucoma Neg Hx     Amblyopia Neg Hx     Blindness Neg Hx     Macular degeneration Neg Hx     Strabismus Neg Hx     Retinal detachment Neg Hx        Social History     Socioeconomic History    Marital status:    Tobacco Use    Smoking status: Former    Smokeless tobacco: Former   Substance and Sexual Activity    Alcohol use: Yes    Drug use: No    Sexual activity: Yes     Partners: Female     Social Determinants of Health     Financial Resource Strain: Patient Declined (12/20/2023)    Overall Financial Resource Strain (CARDIA)     Difficulty of Paying Living Expenses: Patient declined   Food Insecurity: Patient Declined (12/20/2023)    Hunger Vital Sign     Worried About Running Out of Food in the Last Year: Patient declined     Ran Out of Food in the Last Year: Patient declined   Transportation Needs: Patient Declined (12/20/2023)    PRAPARE - Transportation     Lack of Transportation (Medical): Patient declined     Lack of Transportation (Non-Medical): Patient declined   Physical Activity: Unknown (12/20/2023)    Exercise Vital Sign     Days of Exercise per Week: Patient declined     Minutes of Exercise per Session: 30 min   Stress: No Stress Concern Present (6/14/2023)    [employer] [place] [employer] - Occupational Stress Questionnaire     Feeling of Stress : Not at all   Social Connections: Unknown (12/20/2023)    Social Connection and Isolation Panel [NHANES]     Frequency of Communication with Friends and Family: Patient declined     Frequency of Social Gatherings with Friends and Family: Patient declined     Attends Religion Services: Never     Active Member of Clubs or Organizations: Patient declined     Attends Club or Organization Meetings: Patient declined      "Marital Status: Patient declined   Housing Stability: Patient Declined (12/20/2023)    Housing Stability Vital Sign     Unable to Pay for Housing in the Last Year: Patient declined     Number of Places Lived in the Last Year: 1     Unstable Housing in the Last Year: Patient declined       MEDICATIONS & ALLERGIES:     Current Outpatient Medications on File Prior to Visit   Medication Sig    aspirin (ECOTRIN) 81 MG EC tablet Take 1 tablet (81 mg total) by mouth once daily.    B6/FA/B12/co Q10/herb no.225 (HEALTHY HEART COMPLEX ORAL) Take by mouth.    ketorolac 0.5% (ACULAR) 0.5 % Drop Place into both eyes.    MULTIVITAMIN ORAL Take 1 tablet by mouth once daily.    ofloxacin (OCUFLOX) 0.3 % ophthalmic solution Place 1 drop into the left eye. for ten days    UNABLE TO FIND Healthy cholesterol     No current facility-administered medications on file prior to visit.       Review of patient's allergies indicates:   Allergen Reactions    Tetracyclines Nausea Only       OBJECTIVE:   Vital Signs:  Vitals:    12/21/23 1522   BP: 110/76   Weight: 48.6 kg (107 lb 2.3 oz)   Height: 5' 1" (1.549 m)       No results found for this or any previous visit (from the past 24 hour(s)).      Physical Exam  Vitals and nursing note reviewed.   Constitutional:       General: He is not in acute distress.     Appearance: He is well-groomed and underweight. He is ill-appearing. He is not toxic-appearing.   Eyes:      Extraocular Movements: Extraocular movements intact.      Pupils: Pupils are equal, round, and reactive to light.   Cardiovascular:      Rate and Rhythm: Normal rate.   Pulmonary:      Effort: Pulmonary effort is normal. No respiratory distress.   Musculoskeletal:      Cervical back: Normal range of motion and neck supple.   Skin:     General: Skin is warm and dry.   Neurological:      General: No focal deficit present.      Mental Status: He is alert.      Motor: Tremor present.      Comments: + slow, shuffling gait   Psychiatric: " "        Mood and Affect: Affect is blunt.         Behavior: Behavior is slowed.         Cognition and Memory: Memory is impaired.         Laboratory  Lab Results   Component Value Date    WBC 6.58 10/19/2023    HGB 14.0 10/19/2023    HCT 43.3 10/19/2023    MCV 98 10/19/2023     10/19/2023     @YBBAAVROL12(GLU,NA,K,Cl,CO2,BUN,Creatinine,Calcium,MG)@  Lab Results   Component Value Date    INR 1.0 06/14/2023     Lab Results   Component Value Date    HGBA1C 5.4 10/19/2023     No results for input(s): "POCTGLUCOSE" in the last 72 hours.    Diagnostic Results:  Labs: Reviewed    ASSESSMENT & PLAN:     James was seen today for establish care. He is a 89-year-old retired  who would like to address his symptoms of brain fog and gait instability, particularly in a non-pharmacologic way. He is also concerned regarding the potential for an underlying neurologic condition. We discussed that his symptoms are consistent with advanced Alzheimer's with Parkinson-like features and the progression which may be a step-wise decline. For his symptoms, he is recommended to follow up with palliative care as these are the experts in managing symptoms of advanced illness. He was recommended to eat small, frequent meals given his low appetite and symptoms of brain fog worsened by large meals, and to supplement with Boost drinks. He continues with his exercises he learned in physical therapy, does not want further PT. He was also reminded to try weighted silverware and to use his walker for ambulation.    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 Daniel  - staff attestation to follow    Mandie " DO Fei  Internal Medicine PGY-1

## 2024-01-01 ENCOUNTER — HOSPITAL ENCOUNTER (INPATIENT)
Facility: HOSPITAL | Age: 89
LOS: 5 days | Discharge: HOSPICE | DRG: 329 | End: 2024-07-07
Attending: EMERGENCY MEDICINE | Admitting: INTERNAL MEDICINE
Payer: MEDICARE

## 2024-01-01 ENCOUNTER — HOSPITAL ENCOUNTER (INPATIENT)
Facility: HOSPITAL | Age: 89
LOS: 1 days | DRG: 951 | End: 2024-07-08
Attending: INTERNAL MEDICINE | Admitting: INTERNAL MEDICINE
Payer: MEDICARE

## 2024-01-01 VITALS
WEIGHT: 100 LBS | HEART RATE: 102 BPM | BODY MASS INDEX: 18.4 KG/M2 | DIASTOLIC BLOOD PRESSURE: 61 MMHG | RESPIRATION RATE: 18 BRPM | SYSTOLIC BLOOD PRESSURE: 119 MMHG | OXYGEN SATURATION: 75 % | TEMPERATURE: 98 F | HEIGHT: 62 IN

## 2024-01-01 VITALS
TEMPERATURE: 103 F | HEART RATE: 125 BPM | SYSTOLIC BLOOD PRESSURE: 115 MMHG | RESPIRATION RATE: 38 BRPM | BODY MASS INDEX: 18.41 KG/M2 | WEIGHT: 100.06 LBS | HEIGHT: 62 IN | DIASTOLIC BLOOD PRESSURE: 59 MMHG | OXYGEN SATURATION: 84 %

## 2024-01-01 DIAGNOSIS — K40.30 INGUINAL HERNIA OF RIGHT SIDE WITH OBSTRUCTION: Primary | ICD-10-CM

## 2024-01-01 DIAGNOSIS — G30.9 ALZHEIMER'S DEMENTIA: ICD-10-CM

## 2024-01-01 DIAGNOSIS — R07.9 CHEST PAIN: ICD-10-CM

## 2024-01-01 DIAGNOSIS — F02.80 ALZHEIMER'S DEMENTIA: ICD-10-CM

## 2024-01-01 DIAGNOSIS — R53.1 WEAKNESS: ICD-10-CM

## 2024-01-01 DIAGNOSIS — K41.30: ICD-10-CM

## 2024-01-01 LAB
ALBUMIN SERPL BCP-MCNC: 2.8 G/DL (ref 3.5–5.2)
ALBUMIN SERPL BCP-MCNC: 3.1 G/DL (ref 3.5–5.2)
ALBUMIN SERPL BCP-MCNC: 3.6 G/DL (ref 3.5–5.2)
ALP SERPL-CCNC: 44 U/L (ref 55–135)
ALP SERPL-CCNC: 50 U/L (ref 55–135)
ALP SERPL-CCNC: 52 U/L (ref 55–135)
ALT SERPL W/O P-5'-P-CCNC: 106 U/L (ref 10–44)
ALT SERPL W/O P-5'-P-CCNC: 46 U/L (ref 10–44)
ALT SERPL W/O P-5'-P-CCNC: 73 U/L (ref 10–44)
AMMONIA PLAS-SCNC: 29 UMOL/L (ref 10–50)
ANION GAP SERPL CALC-SCNC: 13 MMOL/L (ref 8–16)
ANION GAP SERPL CALC-SCNC: 13 MMOL/L (ref 8–16)
ANION GAP SERPL CALC-SCNC: 14 MMOL/L (ref 8–16)
ANION GAP SERPL CALC-SCNC: 20 MMOL/L (ref 8–16)
ANISOCYTOSIS BLD QL SMEAR: SLIGHT
AST SERPL-CCNC: 28 U/L (ref 10–40)
AST SERPL-CCNC: 40 U/L (ref 10–40)
AST SERPL-CCNC: 73 U/L (ref 10–40)
BACTERIA #/AREA URNS AUTO: NORMAL /HPF
BACTERIA BLD CULT: NORMAL
BACTERIA BLD CULT: NORMAL
BASOPHILS # BLD AUTO: 0 K/UL (ref 0–0.2)
BASOPHILS # BLD AUTO: 0.01 K/UL (ref 0–0.2)
BASOPHILS # BLD AUTO: ABNORMAL K/UL (ref 0–0.2)
BASOPHILS NFR BLD: 0 % (ref 0–1.9)
BASOPHILS NFR BLD: 0 % (ref 0–1.9)
BASOPHILS NFR BLD: 0.1 % (ref 0–1.9)
BILIRUB SERPL-MCNC: 0.7 MG/DL (ref 0.1–1)
BILIRUB SERPL-MCNC: 1 MG/DL (ref 0.1–1)
BILIRUB SERPL-MCNC: 1 MG/DL (ref 0.1–1)
BILIRUB UR QL STRIP: NEGATIVE
BUN SERPL-MCNC: 32 MG/DL (ref 10–30)
BUN SERPL-MCNC: 33 MG/DL (ref 10–30)
BUN SERPL-MCNC: 34 MG/DL (ref 10–30)
BUN SERPL-MCNC: 34 MG/DL (ref 10–30)
CALCIUM SERPL-MCNC: 10.2 MG/DL (ref 8.7–10.5)
CALCIUM SERPL-MCNC: 9.1 MG/DL (ref 8.7–10.5)
CALCIUM SERPL-MCNC: 9.1 MG/DL (ref 8.7–10.5)
CALCIUM SERPL-MCNC: 9.5 MG/DL (ref 8.7–10.5)
CHLORIDE SERPL-SCNC: 101 MMOL/L (ref 95–110)
CHLORIDE SERPL-SCNC: 105 MMOL/L (ref 95–110)
CHLORIDE SERPL-SCNC: 109 MMOL/L (ref 95–110)
CHLORIDE SERPL-SCNC: 111 MMOL/L (ref 95–110)
CK SERPL-CCNC: 21 U/L (ref 20–200)
CLARITY UR REFRACT.AUTO: CLEAR
CO2 SERPL-SCNC: 16 MMOL/L (ref 23–29)
CO2 SERPL-SCNC: 21 MMOL/L (ref 23–29)
CO2 SERPL-SCNC: 27 MMOL/L (ref 23–29)
CO2 SERPL-SCNC: 27 MMOL/L (ref 23–29)
COLOR UR AUTO: YELLOW
CREAT SERPL-MCNC: 0.8 MG/DL (ref 0.5–1.4)
CREAT SERPL-MCNC: 1 MG/DL (ref 0.5–1.4)
CREAT SERPL-MCNC: 1.1 MG/DL (ref 0.5–1.4)
CREAT SERPL-MCNC: 1.1 MG/DL (ref 0.5–1.4)
DIFFERENTIAL METHOD BLD: ABNORMAL
EOSINOPHIL # BLD AUTO: 0 K/UL (ref 0–0.5)
EOSINOPHIL # BLD AUTO: ABNORMAL K/UL (ref 0–0.5)
EOSINOPHIL NFR BLD: 0 % (ref 0–8)
EOSINOPHIL NFR BLD: 0.1 % (ref 0–8)
EOSINOPHIL NFR BLD: 0.3 % (ref 0–8)
ERYTHROCYTE [DISTWIDTH] IN BLOOD BY AUTOMATED COUNT: 12.8 % (ref 11.5–14.5)
ERYTHROCYTE [DISTWIDTH] IN BLOOD BY AUTOMATED COUNT: 12.9 % (ref 11.5–14.5)
ERYTHROCYTE [DISTWIDTH] IN BLOOD BY AUTOMATED COUNT: 13 % (ref 11.5–14.5)
ERYTHROCYTE [DISTWIDTH] IN BLOOD BY AUTOMATED COUNT: 13.1 % (ref 11.5–14.5)
ERYTHROCYTE [DISTWIDTH] IN BLOOD BY AUTOMATED COUNT: 13.2 % (ref 11.5–14.5)
EST. GFR  (NO RACE VARIABLE): >60 ML/MIN/1.73 M^2
ESTIMATED AVG GLUCOSE: 97 MG/DL (ref 68–131)
GLUCOSE SERPL-MCNC: 109 MG/DL (ref 70–110)
GLUCOSE SERPL-MCNC: 112 MG/DL (ref 70–110)
GLUCOSE SERPL-MCNC: 66 MG/DL (ref 70–110)
GLUCOSE SERPL-MCNC: 79 MG/DL (ref 70–110)
GLUCOSE UR QL STRIP: NEGATIVE
HBA1C MFR BLD: 5 % (ref 4–5.6)
HCT VFR BLD AUTO: 32.6 % (ref 40–54)
HCT VFR BLD AUTO: 36.2 % (ref 40–54)
HCT VFR BLD AUTO: 40.1 % (ref 40–54)
HCT VFR BLD AUTO: 42.2 % (ref 40–54)
HCT VFR BLD AUTO: 43.1 % (ref 40–54)
HCV AB SERPL QL IA: NORMAL
HGB BLD-MCNC: 10.7 G/DL (ref 14–18)
HGB BLD-MCNC: 11.8 G/DL (ref 14–18)
HGB BLD-MCNC: 13.5 G/DL (ref 14–18)
HGB BLD-MCNC: 13.9 G/DL (ref 14–18)
HGB BLD-MCNC: 14.8 G/DL (ref 14–18)
HGB UR QL STRIP: ABNORMAL
HIV 1+2 AB+HIV1 P24 AG SERPL QL IA: NORMAL
HYPOCHROMIA BLD QL SMEAR: ABNORMAL
IMM GRANULOCYTES # BLD AUTO: 0.01 K/UL (ref 0–0.04)
IMM GRANULOCYTES # BLD AUTO: 0.02 K/UL (ref 0–0.04)
IMM GRANULOCYTES # BLD AUTO: 0.03 K/UL (ref 0–0.04)
IMM GRANULOCYTES # BLD AUTO: 0.03 K/UL (ref 0–0.04)
IMM GRANULOCYTES # BLD AUTO: ABNORMAL K/UL (ref 0–0.04)
IMM GRANULOCYTES NFR BLD AUTO: 0.1 % (ref 0–0.5)
IMM GRANULOCYTES NFR BLD AUTO: 0.3 % (ref 0–0.5)
IMM GRANULOCYTES NFR BLD AUTO: ABNORMAL % (ref 0–0.5)
KETONES UR QL STRIP: ABNORMAL
LEUKOCYTE ESTERASE UR QL STRIP: NEGATIVE
LYMPHOCYTES # BLD AUTO: 0.6 K/UL (ref 1–4.8)
LYMPHOCYTES # BLD AUTO: 0.7 K/UL (ref 1–4.8)
LYMPHOCYTES # BLD AUTO: 0.9 K/UL (ref 1–4.8)
LYMPHOCYTES # BLD AUTO: 1 K/UL (ref 1–4.8)
LYMPHOCYTES # BLD AUTO: ABNORMAL K/UL (ref 1–4.8)
LYMPHOCYTES NFR BLD: 1 % (ref 18–48)
LYMPHOCYTES NFR BLD: 11.3 % (ref 18–48)
LYMPHOCYTES NFR BLD: 13.8 % (ref 18–48)
LYMPHOCYTES NFR BLD: 6.1 % (ref 18–48)
LYMPHOCYTES NFR BLD: 6.4 % (ref 18–48)
MAGNESIUM SERPL-MCNC: 2 MG/DL (ref 1.6–2.6)
MAGNESIUM SERPL-MCNC: 2.1 MG/DL (ref 1.6–2.6)
MAGNESIUM SERPL-MCNC: 2.1 MG/DL (ref 1.6–2.6)
MAGNESIUM SERPL-MCNC: 2.2 MG/DL (ref 1.6–2.6)
MCH RBC QN AUTO: 31.5 PG (ref 27–31)
MCH RBC QN AUTO: 31.6 PG (ref 27–31)
MCH RBC QN AUTO: 31.8 PG (ref 27–31)
MCH RBC QN AUTO: 32.1 PG (ref 27–31)
MCH RBC QN AUTO: 32.2 PG (ref 27–31)
MCHC RBC AUTO-ENTMCNC: 32.6 G/DL (ref 32–36)
MCHC RBC AUTO-ENTMCNC: 32.8 G/DL (ref 32–36)
MCHC RBC AUTO-ENTMCNC: 32.9 G/DL (ref 32–36)
MCHC RBC AUTO-ENTMCNC: 33.7 G/DL (ref 32–36)
MCHC RBC AUTO-ENTMCNC: 34.3 G/DL (ref 32–36)
MCV RBC AUTO: 94 FL (ref 82–98)
MCV RBC AUTO: 95 FL (ref 82–98)
MCV RBC AUTO: 96 FL (ref 82–98)
MCV RBC AUTO: 97 FL (ref 82–98)
MCV RBC AUTO: 97 FL (ref 82–98)
MICROSCOPIC COMMENT: NORMAL
MONOCYTES # BLD AUTO: 0.4 K/UL (ref 0.3–1)
MONOCYTES # BLD AUTO: 0.6 K/UL (ref 0.3–1)
MONOCYTES # BLD AUTO: 0.7 K/UL (ref 0.3–1)
MONOCYTES # BLD AUTO: 0.8 K/UL (ref 0.3–1)
MONOCYTES # BLD AUTO: ABNORMAL K/UL (ref 0.3–1)
MONOCYTES NFR BLD: 0 % (ref 4–15)
MONOCYTES NFR BLD: 10.2 % (ref 4–15)
MONOCYTES NFR BLD: 4.4 % (ref 4–15)
MONOCYTES NFR BLD: 5.7 % (ref 4–15)
MONOCYTES NFR BLD: 9 % (ref 4–15)
NEUTROPHILS # BLD AUTO: 5.6 K/UL (ref 1.8–7.7)
NEUTROPHILS # BLD AUTO: 6.6 K/UL (ref 1.8–7.7)
NEUTROPHILS # BLD AUTO: 8.7 K/UL (ref 1.8–7.7)
NEUTROPHILS # BLD AUTO: 9 K/UL (ref 1.8–7.7)
NEUTROPHILS NFR BLD: 75.3 % (ref 38–73)
NEUTROPHILS NFR BLD: 79.4 % (ref 38–73)
NEUTROPHILS NFR BLD: 87.9 % (ref 38–73)
NEUTROPHILS NFR BLD: 88 % (ref 38–73)
NEUTROPHILS NFR BLD: 88.8 % (ref 38–73)
NEUTS BAND NFR BLD MANUAL: 11 %
NITRITE UR QL STRIP: NEGATIVE
NRBC BLD-RTO: 0 /100 WBC
OHS QRS DURATION: 76 MS
OHS QRS DURATION: 78 MS
OHS QTC CALCULATION: 407 MS
OHS QTC CALCULATION: 422 MS
OVALOCYTES BLD QL SMEAR: ABNORMAL
PH UR STRIP: 5 [PH] (ref 5–8)
PHOSPHATE SERPL-MCNC: 4.1 MG/DL (ref 2.7–4.5)
PHOSPHATE SERPL-MCNC: 4.2 MG/DL (ref 2.7–4.5)
PHOSPHATE SERPL-MCNC: 4.6 MG/DL (ref 2.7–4.5)
PLATELET # BLD AUTO: 181 K/UL (ref 150–450)
PLATELET # BLD AUTO: 187 K/UL (ref 150–450)
PLATELET # BLD AUTO: 193 K/UL (ref 150–450)
PLATELET # BLD AUTO: 201 K/UL (ref 150–450)
PLATELET # BLD AUTO: 218 K/UL (ref 150–450)
PMV BLD AUTO: 9.1 FL (ref 9.2–12.9)
PMV BLD AUTO: 9.2 FL (ref 9.2–12.9)
PMV BLD AUTO: 9.3 FL (ref 9.2–12.9)
PMV BLD AUTO: 9.3 FL (ref 9.2–12.9)
PMV BLD AUTO: 9.7 FL (ref 9.2–12.9)
POCT GLUCOSE: 104 MG/DL (ref 70–110)
POCT GLUCOSE: 129 MG/DL (ref 70–110)
POIKILOCYTOSIS BLD QL SMEAR: SLIGHT
POLYCHROMASIA BLD QL SMEAR: ABNORMAL
POTASSIUM SERPL-SCNC: 3.5 MMOL/L (ref 3.5–5.1)
POTASSIUM SERPL-SCNC: 3.7 MMOL/L (ref 3.5–5.1)
POTASSIUM SERPL-SCNC: 3.8 MMOL/L (ref 3.5–5.1)
POTASSIUM SERPL-SCNC: 4 MMOL/L (ref 3.5–5.1)
PROT SERPL-MCNC: 5.1 G/DL (ref 6–8.4)
PROT SERPL-MCNC: 5.5 G/DL (ref 6–8.4)
PROT SERPL-MCNC: 6.7 G/DL (ref 6–8.4)
PROT UR QL STRIP: ABNORMAL
RBC # BLD AUTO: 3.4 M/UL (ref 4.6–6.2)
RBC # BLD AUTO: 3.73 M/UL (ref 4.6–6.2)
RBC # BLD AUTO: 4.21 M/UL (ref 4.6–6.2)
RBC # BLD AUTO: 4.37 M/UL (ref 4.6–6.2)
RBC # BLD AUTO: 4.6 M/UL (ref 4.6–6.2)
RBC #/AREA URNS AUTO: 4 /HPF (ref 0–4)
SODIUM SERPL-SCNC: 142 MMOL/L (ref 136–145)
SODIUM SERPL-SCNC: 145 MMOL/L (ref 136–145)
SP GR UR STRIP: 1.02 (ref 1–1.03)
SPHEROCYTES BLD QL SMEAR: ABNORMAL
SQUAMOUS #/AREA URNS AUTO: 0 /HPF
TROPONIN I SERPL DL<=0.01 NG/ML-MCNC: 0.01 NG/ML (ref 0–0.03)
TSH SERPL DL<=0.005 MIU/L-ACNC: 0.95 UIU/ML (ref 0.4–4)
URN SPEC COLLECT METH UR: ABNORMAL
WBC # BLD AUTO: 10.11 K/UL (ref 3.9–12.7)
WBC # BLD AUTO: 11.47 K/UL (ref 3.9–12.7)
WBC # BLD AUTO: 7.46 K/UL (ref 3.9–12.7)
WBC # BLD AUTO: 8.26 K/UL (ref 3.9–12.7)
WBC # BLD AUTO: 9.95 K/UL (ref 3.9–12.7)
WBC #/AREA URNS AUTO: 1 /HPF (ref 0–5)

## 2024-01-01 PROCEDURE — 93010 ELECTROCARDIOGRAM REPORT: CPT | Mod: HCNC,,, | Performed by: INTERNAL MEDICINE

## 2024-01-01 PROCEDURE — 99497 ADVNCD CARE PLAN 30 MIN: CPT | Mod: HCNC,,, | Performed by: STUDENT IN AN ORGANIZED HEALTH CARE EDUCATION/TRAINING PROGRAM

## 2024-01-01 PROCEDURE — 44120 REMOVAL OF SMALL INTESTINE: CPT | Mod: HCNC,,, | Performed by: SURGERY

## 2024-01-01 PROCEDURE — 36415 COLL VENOUS BLD VENIPUNCTURE: CPT | Mod: HCNC | Performed by: PHYSICIAN ASSISTANT

## 2024-01-01 PROCEDURE — 87389 HIV-1 AG W/HIV-1&-2 AB AG IA: CPT | Mod: HCNC | Performed by: PHYSICIAN ASSISTANT

## 2024-01-01 PROCEDURE — 99498 ADVNCD CARE PLAN ADDL 30 MIN: CPT | Mod: HCNC,,, | Performed by: STUDENT IN AN ORGANIZED HEALTH CARE EDUCATION/TRAINING PROGRAM

## 2024-01-01 PROCEDURE — 4A1BXSH MONITORING OF GASTROINTESTINAL VASCULAR PERFUSION USING INDOCYANINE GREEN DYE, EXTERNAL APPROACH: ICD-10-PCS | Performed by: SURGERY

## 2024-01-01 PROCEDURE — 96361 HYDRATE IV INFUSION ADD-ON: CPT | Mod: HCNC

## 2024-01-01 PROCEDURE — 80048 BASIC METABOLIC PNL TOTAL CA: CPT | Mod: HCNC,XB | Performed by: SURGERY

## 2024-01-01 PROCEDURE — 80053 COMPREHEN METABOLIC PANEL: CPT | Mod: HCNC | Performed by: PHYSICIAN ASSISTANT

## 2024-01-01 PROCEDURE — 99233 SBSQ HOSP IP/OBS HIGH 50: CPT | Mod: HCNC,95,, | Performed by: INTERNAL MEDICINE

## 2024-01-01 PROCEDURE — 36000709 HC OR TIME LEV III EA ADD 15 MIN: Mod: HCNC | Performed by: SURGERY

## 2024-01-01 PROCEDURE — 63600175 PHARM REV CODE 636 W HCPCS: Mod: HCNC | Performed by: STUDENT IN AN ORGANIZED HEALTH CARE EDUCATION/TRAINING PROGRAM

## 2024-01-01 PROCEDURE — 82550 ASSAY OF CK (CPK): CPT | Mod: HCNC | Performed by: EMERGENCY MEDICINE

## 2024-01-01 PROCEDURE — 71000015 HC POSTOP RECOV 1ST HR: Mod: HCNC | Performed by: SURGERY

## 2024-01-01 PROCEDURE — G0378 HOSPITAL OBSERVATION PER HR: HCPCS | Mod: HCNC

## 2024-01-01 PROCEDURE — 27000221 HC OXYGEN, UP TO 24 HOURS

## 2024-01-01 PROCEDURE — 99233 SBSQ HOSP IP/OBS HIGH 50: CPT | Mod: GV,HCNC,, | Performed by: INTERNAL MEDICINE

## 2024-01-01 PROCEDURE — 99223 1ST HOSP IP/OBS HIGH 75: CPT | Mod: HCNC,,, | Performed by: STUDENT IN AN ORGANIZED HEALTH CARE EDUCATION/TRAINING PROGRAM

## 2024-01-01 PROCEDURE — 84484 ASSAY OF TROPONIN QUANT: CPT | Mod: HCNC | Performed by: EMERGENCY MEDICINE

## 2024-01-01 PROCEDURE — 63600175 PHARM REV CODE 636 W HCPCS: Mod: HCNC

## 2024-01-01 PROCEDURE — 83735 ASSAY OF MAGNESIUM: CPT | Mod: HCNC | Performed by: PHYSICIAN ASSISTANT

## 2024-01-01 PROCEDURE — 99223 1ST HOSP IP/OBS HIGH 75: CPT | Mod: 57,HCNC,GC, | Performed by: SURGERY

## 2024-01-01 PROCEDURE — 97535 SELF CARE MNGMENT TRAINING: CPT | Mod: HCNC

## 2024-01-01 PROCEDURE — 85025 COMPLETE CBC W/AUTO DIFF WBC: CPT | Mod: 91,HCNC | Performed by: SURGERY

## 2024-01-01 PROCEDURE — 97165 OT EVAL LOW COMPLEX 30 MIN: CPT | Mod: HCNC

## 2024-01-01 PROCEDURE — 83036 HEMOGLOBIN GLYCOSYLATED A1C: CPT | Mod: HCNC | Performed by: PHYSICIAN ASSISTANT

## 2024-01-01 PROCEDURE — 11000001 HC ACUTE MED/SURG PRIVATE ROOM: Mod: HCNC

## 2024-01-01 PROCEDURE — 25000003 PHARM REV CODE 250: Mod: HCNC

## 2024-01-01 PROCEDURE — 84100 ASSAY OF PHOSPHORUS: CPT | Mod: HCNC | Performed by: PHYSICIAN ASSISTANT

## 2024-01-01 PROCEDURE — 63600175 PHARM REV CODE 636 W HCPCS: Performed by: INTERNAL MEDICINE

## 2024-01-01 PROCEDURE — 88307 TISSUE EXAM BY PATHOLOGIST: CPT | Mod: HCNC | Performed by: STUDENT IN AN ORGANIZED HEALTH CARE EDUCATION/TRAINING PROGRAM

## 2024-01-01 PROCEDURE — 86803 HEPATITIS C AB TEST: CPT | Mod: HCNC | Performed by: PHYSICIAN ASSISTANT

## 2024-01-01 PROCEDURE — 63600175 PHARM REV CODE 636 W HCPCS: Mod: HCNC | Performed by: PHYSICIAN ASSISTANT

## 2024-01-01 PROCEDURE — 27000221 HC OXYGEN, UP TO 24 HOURS: Mod: HCNC

## 2024-01-01 PROCEDURE — 49507 PRP I/HERN INIT BLOCK >5 YR: CPT | Mod: 51,HCNC,RT, | Performed by: SURGERY

## 2024-01-01 PROCEDURE — 20600001 HC STEP DOWN PRIVATE ROOM: Mod: HCNC

## 2024-01-01 PROCEDURE — 25000003 PHARM REV CODE 250: Mod: HCNC | Performed by: SURGERY

## 2024-01-01 PROCEDURE — 99497 ADVNCD CARE PLAN 30 MIN: CPT | Mod: HCNC,25,, | Performed by: STUDENT IN AN ORGANIZED HEALTH CARE EDUCATION/TRAINING PROGRAM

## 2024-01-01 PROCEDURE — 71000016 HC POSTOP RECOV ADDL HR: Mod: HCNC | Performed by: SURGERY

## 2024-01-01 PROCEDURE — 82140 ASSAY OF AMMONIA: CPT | Mod: HCNC

## 2024-01-01 PROCEDURE — 27201423 OPTIME MED/SURG SUP & DEVICES STERILE SUPPLY: Mod: HCNC | Performed by: SURGERY

## 2024-01-01 PROCEDURE — 71000033 HC RECOVERY, INTIAL HOUR: Mod: HCNC | Performed by: SURGERY

## 2024-01-01 PROCEDURE — 94761 N-INVAS EAR/PLS OXIMETRY MLT: CPT | Mod: HCNC

## 2024-01-01 PROCEDURE — 36000708 HC OR TIME LEV III 1ST 15 MIN: Mod: HCNC | Performed by: SURGERY

## 2024-01-01 PROCEDURE — 36000706: Mod: HCNC | Performed by: SURGERY

## 2024-01-01 PROCEDURE — 85027 COMPLETE CBC AUTOMATED: CPT | Mod: HCNC | Performed by: PHYSICIAN ASSISTANT

## 2024-01-01 PROCEDURE — 97530 THERAPEUTIC ACTIVITIES: CPT | Mod: HCNC

## 2024-01-01 PROCEDURE — 85025 COMPLETE CBC W/AUTO DIFF WBC: CPT | Mod: HCNC | Performed by: STUDENT IN AN ORGANIZED HEALTH CARE EDUCATION/TRAINING PROGRAM

## 2024-01-01 PROCEDURE — 99285 EMERGENCY DEPT VISIT HI MDM: CPT | Mod: 25,HCNC

## 2024-01-01 PROCEDURE — 92526 ORAL FUNCTION THERAPY: CPT | Mod: HCNC

## 2024-01-01 PROCEDURE — 63600175 PHARM REV CODE 636 W HCPCS: Mod: HCNC | Performed by: SURGERY

## 2024-01-01 PROCEDURE — 96360 HYDRATION IV INFUSION INIT: CPT | Mod: HCNC

## 2024-01-01 PROCEDURE — 37000008 HC ANESTHESIA 1ST 15 MINUTES: Mod: HCNC | Performed by: SURGERY

## 2024-01-01 PROCEDURE — 84443 ASSAY THYROID STIM HORMONE: CPT | Mod: HCNC | Performed by: EMERGENCY MEDICINE

## 2024-01-01 PROCEDURE — 80053 COMPREHEN METABOLIC PANEL: CPT | Mod: HCNC | Performed by: EMERGENCY MEDICINE

## 2024-01-01 PROCEDURE — 37000009 HC ANESTHESIA EA ADD 15 MINS: Mod: HCNC | Performed by: SURGERY

## 2024-01-01 PROCEDURE — 92610 EVALUATE SWALLOWING FUNCTION: CPT | Mod: HCNC

## 2024-01-01 PROCEDURE — 81001 URINALYSIS AUTO W/SCOPE: CPT | Mod: HCNC | Performed by: EMERGENCY MEDICINE

## 2024-01-01 PROCEDURE — 63600175 PHARM REV CODE 636 W HCPCS: Mod: HCNC | Performed by: INTERNAL MEDICINE

## 2024-01-01 PROCEDURE — 88302 TISSUE EXAM BY PATHOLOGIST: CPT | Mod: 26,HCNC,, | Performed by: STUDENT IN AN ORGANIZED HEALTH CARE EDUCATION/TRAINING PROGRAM

## 2024-01-01 PROCEDURE — 11000001 HC ACUTE MED/SURG PRIVATE ROOM

## 2024-01-01 PROCEDURE — 88305 TISSUE EXAM BY PATHOLOGIST: CPT | Mod: 26,HCNC,, | Performed by: STUDENT IN AN ORGANIZED HEALTH CARE EDUCATION/TRAINING PROGRAM

## 2024-01-01 PROCEDURE — 94761 N-INVAS EAR/PLS OXIMETRY MLT: CPT

## 2024-01-01 PROCEDURE — 36415 COLL VENOUS BLD VENIPUNCTURE: CPT | Mod: HCNC | Performed by: SURGERY

## 2024-01-01 PROCEDURE — 85025 COMPLETE CBC W/AUTO DIFF WBC: CPT | Mod: HCNC | Performed by: PHYSICIAN ASSISTANT

## 2024-01-01 PROCEDURE — 84100 ASSAY OF PHOSPHORUS: CPT | Mod: 91,HCNC | Performed by: SURGERY

## 2024-01-01 PROCEDURE — 63600175 PHARM REV CODE 636 W HCPCS: Mod: JZ,JG,HCNC | Performed by: SURGERY

## 2024-01-01 PROCEDURE — 88302 TISSUE EXAM BY PATHOLOGIST: CPT | Mod: HCNC | Performed by: STUDENT IN AN ORGANIZED HEALTH CARE EDUCATION/TRAINING PROGRAM

## 2024-01-01 PROCEDURE — 88305 TISSUE EXAM BY PATHOLOGIST: CPT | Mod: HCNC | Performed by: STUDENT IN AN ORGANIZED HEALTH CARE EDUCATION/TRAINING PROGRAM

## 2024-01-01 PROCEDURE — 87040 BLOOD CULTURE FOR BACTERIA: CPT | Mod: HCNC | Performed by: EMERGENCY MEDICINE

## 2024-01-01 PROCEDURE — 85025 COMPLETE CBC W/AUTO DIFF WBC: CPT | Mod: HCNC | Performed by: EMERGENCY MEDICINE

## 2024-01-01 PROCEDURE — 0DB80ZZ EXCISION OF SMALL INTESTINE, OPEN APPROACH: ICD-10-PCS | Performed by: SURGERY

## 2024-01-01 PROCEDURE — 85007 BL SMEAR W/DIFF WBC COUNT: CPT | Mod: HCNC | Performed by: PHYSICIAN ASSISTANT

## 2024-01-01 PROCEDURE — 25500020 PHARM REV CODE 255: Mod: HCNC | Performed by: INTERNAL MEDICINE

## 2024-01-01 PROCEDURE — 99233 SBSQ HOSP IP/OBS HIGH 50: CPT | Mod: HCNC,,, | Performed by: STUDENT IN AN ORGANIZED HEALTH CARE EDUCATION/TRAINING PROGRAM

## 2024-01-01 PROCEDURE — 25000242 PHARM REV CODE 250 ALT 637 W/ HCPCS: Mod: HCNC | Performed by: SURGERY

## 2024-01-01 PROCEDURE — 36415 COLL VENOUS BLD VENIPUNCTURE: CPT | Mod: HCNC | Performed by: STUDENT IN AN ORGANIZED HEALTH CARE EDUCATION/TRAINING PROGRAM

## 2024-01-01 PROCEDURE — 88304 TISSUE EXAM BY PATHOLOGIST: CPT | Mod: HCNC | Performed by: STUDENT IN AN ORGANIZED HEALTH CARE EDUCATION/TRAINING PROGRAM

## 2024-01-01 PROCEDURE — 97162 PT EVAL MOD COMPLEX 30 MIN: CPT | Mod: HCNC

## 2024-01-01 PROCEDURE — 93005 ELECTROCARDIOGRAM TRACING: CPT | Mod: HCNC

## 2024-01-01 PROCEDURE — 63600175 PHARM REV CODE 636 W HCPCS: Performed by: STUDENT IN AN ORGANIZED HEALTH CARE EDUCATION/TRAINING PROGRAM

## 2024-01-01 PROCEDURE — 36000707: Mod: HCNC | Performed by: SURGERY

## 2024-01-01 PROCEDURE — 99233 SBSQ HOSP IP/OBS HIGH 50: CPT | Mod: GV,,, | Performed by: STUDENT IN AN ORGANIZED HEALTH CARE EDUCATION/TRAINING PROGRAM

## 2024-01-01 PROCEDURE — 99291 CRITICAL CARE FIRST HOUR: CPT | Mod: HCNC,,, | Performed by: NURSE PRACTITIONER

## 2024-01-01 PROCEDURE — 83735 ASSAY OF MAGNESIUM: CPT | Mod: HCNC | Performed by: EMERGENCY MEDICINE

## 2024-01-01 PROCEDURE — 0YQ50ZZ REPAIR RIGHT INGUINAL REGION, OPEN APPROACH: ICD-10-PCS | Performed by: SURGERY

## 2024-01-01 PROCEDURE — 83735 ASSAY OF MAGNESIUM: CPT | Mod: 91,HCNC | Performed by: SURGERY

## 2024-01-01 RX ORDER — IBUPROFEN 200 MG
16 TABLET ORAL
Status: DISCONTINUED | OUTPATIENT
Start: 2024-01-01 | End: 2024-01-01

## 2024-01-01 RX ORDER — OXYCODONE HYDROCHLORIDE 5 MG/1
5 TABLET ORAL
Status: DISCONTINUED | OUTPATIENT
Start: 2024-01-01 | End: 2024-01-01 | Stop reason: HOSPADM

## 2024-01-01 RX ORDER — DEXTROSE MONOHYDRATE, SODIUM CHLORIDE, AND POTASSIUM CHLORIDE 50; 1.49; 4.5 G/1000ML; G/1000ML; G/1000ML
INJECTION, SOLUTION INTRAVENOUS CONTINUOUS
Status: DISCONTINUED | OUTPATIENT
Start: 2024-01-01 | End: 2024-01-01

## 2024-01-01 RX ORDER — KETOROLAC TROMETHAMINE 30 MG/ML
30 INJECTION, SOLUTION INTRAMUSCULAR; INTRAVENOUS ONCE
Status: COMPLETED | OUTPATIENT
Start: 2024-01-01 | End: 2024-01-01

## 2024-01-01 RX ORDER — MORPHINE SULFATE 2 MG/ML
2 INJECTION, SOLUTION INTRAMUSCULAR; INTRAVENOUS
Status: DISCONTINUED | OUTPATIENT
Start: 2024-01-01 | End: 2024-01-01

## 2024-01-01 RX ORDER — MIDAZOLAM HYDROCHLORIDE 1 MG/ML
0.5 INJECTION, SOLUTION INTRAMUSCULAR; INTRAVENOUS EVERY 4 HOURS PRN
Status: DISCONTINUED | OUTPATIENT
Start: 2024-01-01 | End: 2024-01-01

## 2024-01-01 RX ORDER — GLYCOPYRROLATE 0.2 MG/ML
0.2 INJECTION INTRAMUSCULAR; INTRAVENOUS EVERY 8 HOURS PRN
Status: DISCONTINUED | OUTPATIENT
Start: 2024-01-01 | End: 2024-01-01

## 2024-01-01 RX ORDER — FENTANYL CITRATE 50 UG/ML
25 INJECTION, SOLUTION INTRAMUSCULAR; INTRAVENOUS EVERY 5 MIN PRN
Status: DISCONTINUED | OUTPATIENT
Start: 2024-01-01 | End: 2024-01-01 | Stop reason: HOSPADM

## 2024-01-01 RX ORDER — IPRATROPIUM BROMIDE AND ALBUTEROL SULFATE 2.5; .5 MG/3ML; MG/3ML
3 SOLUTION RESPIRATORY (INHALATION) EVERY 4 HOURS PRN
Status: DISCONTINUED | OUTPATIENT
Start: 2024-01-01 | End: 2024-01-01

## 2024-01-01 RX ORDER — LORAZEPAM 2 MG/ML
1 INJECTION INTRAMUSCULAR EVERY 30 MIN PRN
Status: DISCONTINUED | OUTPATIENT
Start: 2024-01-01 | End: 2024-01-01 | Stop reason: HOSPADM

## 2024-01-01 RX ORDER — METHOCARBAMOL 500 MG/1
500 TABLET, FILM COATED ORAL 2 TIMES DAILY
Status: DISCONTINUED | OUTPATIENT
Start: 2024-01-01 | End: 2024-01-01

## 2024-01-01 RX ORDER — ACETAMINOPHEN 325 MG/1
650 TABLET ORAL EVERY 4 HOURS PRN
Status: DISCONTINUED | OUTPATIENT
Start: 2024-01-01 | End: 2024-01-01

## 2024-01-01 RX ORDER — MORPHINE SULFATE 2 MG/ML
2 INJECTION, SOLUTION INTRAMUSCULAR; INTRAVENOUS
Status: DISCONTINUED | OUTPATIENT
Start: 2024-01-01 | End: 2024-01-01 | Stop reason: HOSPADM

## 2024-01-01 RX ORDER — IBUPROFEN 200 MG
24 TABLET ORAL
Status: DISCONTINUED | OUTPATIENT
Start: 2024-01-01 | End: 2024-01-01

## 2024-01-01 RX ORDER — BISACODYL 10 MG/1
10 SUPPOSITORY RECTAL DAILY PRN
Status: DISCONTINUED | OUTPATIENT
Start: 2024-01-01 | End: 2024-01-01

## 2024-01-01 RX ORDER — NALOXONE HCL 0.4 MG/ML
0.4 VIAL (ML) INJECTION
Status: DISCONTINUED | OUTPATIENT
Start: 2024-01-01 | End: 2024-01-01

## 2024-01-01 RX ORDER — LIDOCAINE HCL/EPINEPHRINE/PF 2%-1:200K
VIAL (ML) INJECTION
Status: DISCONTINUED | OUTPATIENT
Start: 2024-01-01 | End: 2024-01-01 | Stop reason: HOSPADM

## 2024-01-01 RX ORDER — GLYCOPYRROLATE 0.2 MG/ML
0.2 INJECTION INTRAMUSCULAR; INTRAVENOUS EVERY 8 HOURS PRN
Status: DISCONTINUED | OUTPATIENT
Start: 2024-01-01 | End: 2024-01-01 | Stop reason: HOSPADM

## 2024-01-01 RX ORDER — ACETAMINOPHEN 10 MG/ML
1000 INJECTION, SOLUTION INTRAVENOUS EVERY 8 HOURS
Status: COMPLETED | OUTPATIENT
Start: 2024-01-01 | End: 2024-01-01

## 2024-01-01 RX ORDER — ACETAMINOPHEN 325 MG/1
650 TABLET ORAL 3 TIMES DAILY
Status: DISCONTINUED | OUTPATIENT
Start: 2024-01-01 | End: 2024-01-01

## 2024-01-01 RX ORDER — ONDANSETRON HYDROCHLORIDE 2 MG/ML
4 INJECTION, SOLUTION INTRAVENOUS DAILY PRN
Status: DISCONTINUED | OUTPATIENT
Start: 2024-01-01 | End: 2024-01-01 | Stop reason: HOSPADM

## 2024-01-01 RX ORDER — LORAZEPAM 2 MG/ML
0.5 INJECTION INTRAMUSCULAR
Status: DISCONTINUED | OUTPATIENT
Start: 2024-01-01 | End: 2024-01-01

## 2024-01-01 RX ORDER — ONDANSETRON 8 MG/1
8 TABLET, ORALLY DISINTEGRATING ORAL EVERY 6 HOURS PRN
Status: DISCONTINUED | OUTPATIENT
Start: 2024-01-01 | End: 2024-01-01

## 2024-01-01 RX ORDER — MORPHINE SULFATE 2 MG/ML
1 INJECTION, SOLUTION INTRAMUSCULAR; INTRAVENOUS
Status: DISCONTINUED | OUTPATIENT
Start: 2024-01-01 | End: 2024-01-01

## 2024-01-01 RX ORDER — GLUCAGON 1 MG
1 KIT INJECTION
Status: DISCONTINUED | OUTPATIENT
Start: 2024-01-01 | End: 2024-01-01

## 2024-01-01 RX ORDER — GLYCOPYRROLATE 0.2 MG/ML
0.2 INJECTION INTRAMUSCULAR; INTRAVENOUS EVERY 8 HOURS PRN
Status: CANCELLED | OUTPATIENT
Start: 2024-01-01

## 2024-01-01 RX ORDER — ACETAMINOPHEN 325 MG/1
650 TABLET ORAL EVERY 6 HOURS
Status: DISCONTINUED | OUTPATIENT
Start: 2024-01-01 | End: 2024-01-01

## 2024-01-01 RX ORDER — OXYCODONE HCL 5 MG/5 ML
2.5 SOLUTION, ORAL ORAL EVERY 4 HOURS PRN
Status: DISCONTINUED | OUTPATIENT
Start: 2024-01-01 | End: 2024-01-01

## 2024-01-01 RX ORDER — TALC
6 POWDER (GRAM) TOPICAL NIGHTLY PRN
Status: DISCONTINUED | OUTPATIENT
Start: 2024-01-01 | End: 2024-01-01

## 2024-01-01 RX ORDER — ENOXAPARIN SODIUM 100 MG/ML
30 INJECTION SUBCUTANEOUS EVERY 24 HOURS
Status: DISCONTINUED | OUTPATIENT
Start: 2024-01-01 | End: 2024-01-01

## 2024-01-01 RX ORDER — LORAZEPAM 2 MG/ML
0.5 INJECTION INTRAMUSCULAR
Status: CANCELLED | OUTPATIENT
Start: 2024-01-01

## 2024-01-01 RX ORDER — KETOROLAC TROMETHAMINE 15 MG/ML
15 INJECTION, SOLUTION INTRAMUSCULAR; INTRAVENOUS EVERY 6 HOURS PRN
Status: DISCONTINUED | OUTPATIENT
Start: 2024-01-01 | End: 2024-01-01 | Stop reason: HOSPADM

## 2024-01-01 RX ORDER — NALOXONE HCL 0.4 MG/ML
VIAL (ML) INJECTION
Status: COMPLETED
Start: 2024-01-01 | End: 2024-01-01

## 2024-01-01 RX ORDER — MORPHINE SULFATE 4 MG/ML
4 INJECTION, SOLUTION INTRAMUSCULAR; INTRAVENOUS
Status: DISCONTINUED | OUTPATIENT
Start: 2024-01-01 | End: 2024-01-01 | Stop reason: HOSPADM

## 2024-01-01 RX ORDER — SODIUM CHLORIDE, SODIUM LACTATE, POTASSIUM CHLORIDE, CALCIUM CHLORIDE 600; 310; 30; 20 MG/100ML; MG/100ML; MG/100ML; MG/100ML
INJECTION, SOLUTION INTRAVENOUS CONTINUOUS
Status: ACTIVE | OUTPATIENT
Start: 2024-01-01 | End: 2024-01-01

## 2024-01-01 RX ORDER — BUPIVACAINE HYDROCHLORIDE 5 MG/ML
INJECTION, SOLUTION EPIDURAL; INTRACAUDAL
Status: DISCONTINUED | OUTPATIENT
Start: 2024-01-01 | End: 2024-01-01 | Stop reason: HOSPADM

## 2024-01-01 RX ORDER — ONDANSETRON 8 MG/1
8 TABLET, ORALLY DISINTEGRATING ORAL EVERY 8 HOURS PRN
Status: DISCONTINUED | OUTPATIENT
Start: 2024-01-01 | End: 2024-01-01

## 2024-01-01 RX ORDER — AMLODIPINE BESYLATE 5 MG/1
5 TABLET ORAL
Status: DISCONTINUED | OUTPATIENT
Start: 2024-01-01 | End: 2024-01-01

## 2024-01-01 RX ORDER — POLYETHYLENE GLYCOL 3350 17 G/17G
17 POWDER, FOR SOLUTION ORAL DAILY PRN
Status: DISCONTINUED | OUTPATIENT
Start: 2024-01-01 | End: 2024-01-01

## 2024-01-01 RX ADMIN — SODIUM CHLORIDE, POTASSIUM CHLORIDE, SODIUM LACTATE AND CALCIUM CHLORIDE 500 ML: 600; 310; 30; 20 INJECTION, SOLUTION INTRAVENOUS at 06:07

## 2024-01-01 RX ADMIN — MORPHINE SULFATE 2 MG: 2 INJECTION, SOLUTION INTRAMUSCULAR; INTRAVENOUS at 06:07

## 2024-01-01 RX ADMIN — ACETAMINOPHEN 1000 MG: 10 INJECTION, SOLUTION INTRAVENOUS at 02:07

## 2024-01-01 RX ADMIN — MORPHINE SULFATE 2 MG: 2 INJECTION, SOLUTION INTRAMUSCULAR; INTRAVENOUS at 07:07

## 2024-01-01 RX ADMIN — SODIUM CHLORIDE, POTASSIUM CHLORIDE, SODIUM LACTATE AND CALCIUM CHLORIDE 500 ML: 600; 310; 30; 20 INJECTION, SOLUTION INTRAVENOUS at 05:07

## 2024-01-01 RX ADMIN — MORPHINE SULFATE 2 MG: 2 INJECTION, SOLUTION INTRAMUSCULAR; INTRAVENOUS at 02:07

## 2024-01-01 RX ADMIN — SODIUM CHLORIDE, POTASSIUM CHLORIDE, SODIUM LACTATE AND CALCIUM CHLORIDE 500 ML: 600; 310; 30; 20 INJECTION, SOLUTION INTRAVENOUS at 08:07

## 2024-01-01 RX ADMIN — MORPHINE SULFATE 2 MG: 2 INJECTION, SOLUTION INTRAMUSCULAR; INTRAVENOUS at 10:07

## 2024-01-01 RX ADMIN — ACETAMINOPHEN 1000 MG: 10 INJECTION, SOLUTION INTRAVENOUS at 05:07

## 2024-01-01 RX ADMIN — NALXONE HYDROCHLORIDE 0.4 MG: 0.4 INJECTION INTRAMUSCULAR; INTRAVENOUS; SUBCUTANEOUS at 08:07

## 2024-01-01 RX ADMIN — POTASSIUM CHLORIDE, DEXTROSE MONOHYDRATE AND SODIUM CHLORIDE: 150; 5; 450 INJECTION, SOLUTION INTRAVENOUS at 10:07

## 2024-01-01 RX ADMIN — Medication 0.4 MG: at 08:07

## 2024-01-01 RX ADMIN — POTASSIUM CHLORIDE, DEXTROSE MONOHYDRATE AND SODIUM CHLORIDE: 150; 5; 450 INJECTION, SOLUTION INTRAVENOUS at 09:07

## 2024-01-01 RX ADMIN — ACETAMINOPHEN 1000 MG: 10 INJECTION, SOLUTION INTRAVENOUS at 09:07

## 2024-01-01 RX ADMIN — IOHEXOL 75 ML: 350 INJECTION, SOLUTION INTRAVENOUS at 10:07

## 2024-01-01 RX ADMIN — KETOROLAC TROMETHAMINE 30 MG: 30 INJECTION, SOLUTION INTRAMUSCULAR at 07:07

## 2024-01-01 RX ADMIN — MORPHINE SULFATE 4 MG: 4 INJECTION INTRAVENOUS at 10:07

## 2024-01-01 RX ADMIN — SODIUM CHLORIDE, POTASSIUM CHLORIDE, SODIUM LACTATE AND CALCIUM CHLORIDE: 600; 310; 30; 20 INJECTION, SOLUTION INTRAVENOUS at 01:07

## 2024-01-01 RX ADMIN — MORPHINE SULFATE 2 MG: 2 INJECTION, SOLUTION INTRAMUSCULAR; INTRAVENOUS at 08:07

## 2024-01-01 RX ADMIN — GLYCOPYRROLATE 0.2 MG: 0.2 INJECTION INTRAMUSCULAR; INTRAVENOUS at 10:07

## 2024-01-01 RX ADMIN — GLYCOPYRROLATE 0.2 MG: 0.2 INJECTION INTRAMUSCULAR; INTRAVENOUS at 08:07

## 2024-01-01 RX ADMIN — OXYCODONE HYDROCHLORIDE 2.5 MG: 5 SOLUTION ORAL at 06:07

## 2024-02-16 ENCOUNTER — OFFICE VISIT (OUTPATIENT)
Dept: INTERNAL MEDICINE | Facility: CLINIC | Age: 89
End: 2024-02-16
Payer: MEDICARE

## 2024-02-16 ENCOUNTER — TELEPHONE (OUTPATIENT)
Dept: INTERNAL MEDICINE | Facility: CLINIC | Age: 89
End: 2024-02-16
Payer: MEDICARE

## 2024-02-16 VITALS
BODY MASS INDEX: 19.88 KG/M2 | WEIGHT: 108 LBS | SYSTOLIC BLOOD PRESSURE: 110 MMHG | DIASTOLIC BLOOD PRESSURE: 80 MMHG | HEIGHT: 62 IN

## 2024-02-16 DIAGNOSIS — Z51.5 END OF LIFE CARE: ICD-10-CM

## 2024-02-16 DIAGNOSIS — F32.1 CURRENT MODERATE EPISODE OF MAJOR DEPRESSIVE DISORDER WITHOUT PRIOR EPISODE: ICD-10-CM

## 2024-02-16 DIAGNOSIS — G30.9 ALZHEIMER DISEASE: Primary | ICD-10-CM

## 2024-02-16 DIAGNOSIS — I50.32 CHRONIC DIASTOLIC HEART FAILURE: ICD-10-CM

## 2024-02-16 DIAGNOSIS — F02.80 ALZHEIMER DISEASE: Primary | ICD-10-CM

## 2024-02-16 DIAGNOSIS — I70.0 AORTIC ATHEROSCLEROSIS: ICD-10-CM

## 2024-02-16 PROCEDURE — 99213 OFFICE O/P EST LOW 20 MIN: CPT | Mod: HCNC,GC,S$GLB,

## 2024-02-16 PROCEDURE — 99999 PR PBB SHADOW E&M-EST. PATIENT-LVL III: CPT | Mod: PBBFAC,HCNC,GC,

## 2024-02-16 NOTE — TELEPHONE ENCOUNTER
Spoke with Mrs. Santo the patients daughter given a appointment for the patient 03/11/2024 11;00am but patent would have liked to have a emergency appointment today!

## 2024-02-16 NOTE — TELEPHONE ENCOUNTER
----- Message from Benita Linda sent at 2/16/2024  8:55 AM CST -----  Contact: Ms Santo Daughter of patient @ [phone]  Caller is requesting an earlier appointment then we can schedule.  Caller is requesting a message be sent to the provider.  If this is for urgent care symptoms, did you offer other providers at this location, providers at other locations, or [facility]? (yes, no, n/a):  na  If this is for the patients physical, did you offer to schedule next available and put on wait list, or to see NP or PA for their physical?  (yes, no, n/a):  na  When is the next available appointment with their provider:  no appointments  Reason for the appointment:  Est Care Transfer from Dr Briggs Annual visit  Patient preference of timeframe to be scheduled:  Today but no later than June please   Would the patient like a call back, or a response through their [facility] portal?:   call   Comments:  Patient had an appointment but it was cancelled by mistake and need to please reschedule

## 2024-02-16 NOTE — PROGRESS NOTES
Subjective     Chief Complaint: weakness    History of Present Illness:  Mr. James Dc is a 89 y.o. male aortic valve stenosis, HTN, and HFpEF recently established with Dr. Enamorado 12/21/23 for Alzheimer with Parkinson's like features. It was discussed to see palliative and neurology at that time with the recommendation to use nutritional protein supplements.    Pt tries to eat a variety of foods consisting of cereal, fruit, vegetables, sandwiches. His weight has been stable over the past couple months. He has not tried boost or ensure, but will start these supplements for an additional protein source. He still has a right eyebrow hematoma since June 2023 fall that is resolving. Noted to have carotid stenosis, but is not a surgical candidate due to his age and co-morbidities. He is still having a lot more falls. He is not currently on any medications, just daily vitamins. He is agreeable to follow up with neurology and palliative care.     ROS    PAST HISTORY:     Past Medical History:   Diagnosis Date    Essential tremor     Hypertensive urgency 6/13/2023    Nuclear sclerosis - Both Eyes 9/19/2012       Past Surgical History:   Procedure Laterality Date    CATARACT EXTRACTION W/  INTRAOCULAR LENS IMPLANT Left 10/10/2023    Procedure: EXTRACTION, CATARACT, WITH IOL INSERTION;  Surgeon: Lon Carr MD;  Location: [facility] OR;  Service: Ophthalmology;  Laterality: Left;    CATARACT EXTRACTION W/  INTRAOCULAR LENS IMPLANT Right 10/24/2023    Procedure: EXTRACTION, CATARACT, WITH IOL INSERTION;  Surgeon: Lon Carr MD;  Location: [facility] OR;  Service: Ophthalmology;  Laterality: Right;    TONSILLECTOMY         Family History   Problem Relation Age of Onset    Prostate cancer Father     Cataracts Son     Psoriasis Neg Hx     Eczema Neg Hx     Glaucoma Neg Hx     Amblyopia Neg Hx     Blindness Neg Hx     Macular degeneration Neg Hx     Strabismus Neg Hx     Retinal detachment Neg Hx        Social  History     Socioeconomic History    Marital status:    Tobacco Use    Smoking status: Former    Smokeless tobacco: Former   Substance and Sexual Activity    Alcohol use: Yes    Drug use: No    Sexual activity: Yes     Partners: Female     Social Determinants of Health     Financial Resource Strain: Patient Declined (12/20/2023)    Overall Financial Resource Strain (CARDIA)     Difficulty of Paying Living Expenses: Patient declined   Food Insecurity: Patient Declined (12/20/2023)    Hunger Vital Sign     Worried About Running Out of Food in the Last Year: Patient declined     Ran Out of Food in the Last Year: Patient declined   Transportation Needs: Patient Declined (12/20/2023)    PRAPARE - Transportation     Lack of Transportation (Medical): Patient declined     Lack of Transportation (Non-Medical): Patient declined   Physical Activity: Unknown (12/20/2023)    Exercise Vital Sign     Days of Exercise per Week: Patient declined     Minutes of Exercise per Session: 30 min   Stress: No Stress Concern Present (6/14/2023)    [employer] [place] [employer] - Occupational Stress Questionnaire     Feeling of Stress : Not at all   Social Connections: Unknown (12/20/2023)    Social Connection and Isolation Panel [NHANES]     Frequency of Communication with Friends and Family: Patient declined     Frequency of Social Gatherings with Friends and Family: Patient declined     Attends Rastafari Services: Never     Active Member of Clubs or Organizations: Patient declined     Attends Club or Organization Meetings: Patient declined     Marital Status: Patient declined   Housing Stability: Patient Declined (12/20/2023)    Housing Stability Vital Sign     Unable to Pay for Housing in the Last Year: Patient declined     Number of Places Lived in the Last Year: 1     Unstable Housing in the Last Year: Patient declined       MEDICATIONS & ALLERGIES:     Current Outpatient Medications on File Prior to Visit   Medication  Sig    aspirin (ECOTRIN) 81 MG EC tablet Take 1 tablet (81 mg total) by mouth once daily.    B6/FA/B12/co Q10/herb no.225 (HEALTHY HEART COMPLEX ORAL) Take by mouth.    ketorolac 0.5% (ACULAR) 0.5 % Drop Place into both eyes.    MULTIVITAMIN ORAL Take 1 tablet by mouth once daily.    ofloxacin (OCUFLOX) 0.3 % ophthalmic solution Place 1 drop into the left eye. for ten days    UNABLE TO FIND Healthy cholesterol     No current facility-administered medications on file prior to visit.       Review of patient's allergies indicates:   Allergen Reactions    Tetracyclines Nausea Only       OBJECTIVE:     Vital Signs:  There were no vitals filed for this visit.    There is no height or weight on file to calculate BMI.     Physical Exam  Constitutional:       General: He is not in acute distress.  HENT:      Head:      Comments: Right eyebrow hematoma     Mouth/Throat:      Mouth: Mucous membranes are moist.      Pharynx: Oropharynx is clear.   Eyes:      Extraocular Movements: Extraocular movements intact.   Cardiovascular:      Rate and Rhythm: Normal rate and regular rhythm.      Heart sounds: Murmur heard.   Pulmonary:      Effort: No respiratory distress.      Breath sounds: No wheezing.   Abdominal:      General: Abdomen is flat. Bowel sounds are normal.   Musculoskeletal:         General: No swelling.      Right lower leg: No edema.      Left lower leg: No edema.   Skin:     General: Skin is warm.   Neurological:      Mental Status: He is alert.      Motor: Weakness present.      Gait: Gait abnormal.      Comments: Hand tremors and shuffling gait         Laboratory  No results found for this or any previous visit (from the past 24 hour(s)).    Diagnostic Results:      Health Maintenance Due   Topic Date Due    COVID-19 Vaccine (1) Never done    TETANUS VACCINE  Never done    Shingles Vaccine (1 of 2) Never done    RSV Vaccine (Age 60+ and Pregnant patients) (1 - 1-dose 60+ series) Never done    Influenza Vaccine (1)  Never done         ASSESSMENT & PLAN:     1. Alzheimer disease  -     Ambulatory referral/consult to Neurology; Future; Expected date: 02/23/2024    2. End of life care  -     Ambulatory referral/consult to CLINIC Palliative Care; Future; Expected date: 02/23/2024    3. Current moderate episode of major depressive disorder without prior episode    4. Chronic diastolic heart failure    5. Aortic atherosclerosis    Additional referrals sent out to follow up and establish with a neurologist for his neurocognitive disorder and to establish with Dr. Rodney in the palliative care clinic to discuss GOC, future, and his disease processes.    See above for further detail.    Discussed with Dr Romero -- staff attestation to follow      Masood Monaco MD  Internal Medicine PGY-1  [facility]

## 2024-03-11 ENCOUNTER — OFFICE VISIT (OUTPATIENT)
Dept: INTERNAL MEDICINE | Facility: CLINIC | Age: 89
End: 2024-03-11
Payer: MEDICARE

## 2024-03-11 VITALS
DIASTOLIC BLOOD PRESSURE: 82 MMHG | HEIGHT: 62 IN | BODY MASS INDEX: 19.71 KG/M2 | SYSTOLIC BLOOD PRESSURE: 122 MMHG | WEIGHT: 107.13 LBS

## 2024-03-11 DIAGNOSIS — R29.818 PARKINSONIAN FEATURES: ICD-10-CM

## 2024-03-11 DIAGNOSIS — Z66 DNR (DO NOT RESUSCITATE): ICD-10-CM

## 2024-03-11 DIAGNOSIS — F32.A DEPRESSION, UNSPECIFIED DEPRESSION TYPE: Primary | ICD-10-CM

## 2024-03-11 PROBLEM — R56.9 OBSERVED SEIZURE-LIKE ACTIVITY: Status: RESOLVED | Noted: 2023-06-13 | Resolved: 2024-03-11

## 2024-03-11 PROCEDURE — 1101F PT FALLS ASSESS-DOCD LE1/YR: CPT | Mod: HCNC,CPTII,S$GLB, | Performed by: INTERNAL MEDICINE

## 2024-03-11 PROCEDURE — 1160F RVW MEDS BY RX/DR IN RCRD: CPT | Mod: HCNC,CPTII,S$GLB, | Performed by: INTERNAL MEDICINE

## 2024-03-11 PROCEDURE — 99215 OFFICE O/P EST HI 40 MIN: CPT | Mod: HCNC,S$GLB,, | Performed by: INTERNAL MEDICINE

## 2024-03-11 PROCEDURE — 3288F FALL RISK ASSESSMENT DOCD: CPT | Mod: HCNC,CPTII,S$GLB, | Performed by: INTERNAL MEDICINE

## 2024-03-11 PROCEDURE — 1126F AMNT PAIN NOTED NONE PRSNT: CPT | Mod: HCNC,CPTII,S$GLB, | Performed by: INTERNAL MEDICINE

## 2024-03-11 PROCEDURE — 1159F MED LIST DOCD IN RCRD: CPT | Mod: HCNC,CPTII,S$GLB, | Performed by: INTERNAL MEDICINE

## 2024-03-11 PROCEDURE — 99999 PR PBB SHADOW E&M-EST. PATIENT-LVL III: CPT | Mod: PBBFAC,HCNC,, | Performed by: INTERNAL MEDICINE

## 2024-03-11 RX ORDER — CITALOPRAM 10 MG/1
10 TABLET ORAL DAILY
Qty: 30 TABLET | Refills: 3 | Status: SHIPPED | OUTPATIENT
Start: 2024-03-11 | End: 2024-04-24

## 2024-03-11 NOTE — PROGRESS NOTES
Subjective:       Patient ID: James Dc is a 89 y.o. male.    Chief Complaint: Establish Care    Patient is here for followup for chronic conditions.    Unsteady on his feet.    C/o losing his faculties.     He has been very depressed.  He compared human medicine and veterinary medicine and noted that in animals euthanasia is used toward the end of life.  At 1st I thought he was enquiring about euthanasia for himself but at his next appointment 6 weeks later he clarified that he was not discussing euthanasia for himself.      Review of Systems   Constitutional:  Negative for activity change.   Respiratory:  Negative for chest tightness.    Cardiovascular:  Negative for chest pain, palpitations and leg swelling.   Gastrointestinal:  Negative for blood in stool.   Skin:  Negative for rash and wound.   Neurological:  Positive for tremors and weakness. Negative for dizziness and syncope.        Unsteady   Psychiatric/Behavioral:  Positive for confusion and dysphoric mood. Negative for agitation.            Objective:      Physical Exam  Vitals reviewed.   Constitutional:       General: He is not in acute distress.     Appearance: Normal appearance. He is well-developed. He is not ill-appearing, toxic-appearing or diaphoretic.      Comments: Frail appearing   HENT:      Head: Normocephalic and atraumatic.   Eyes:      General: No scleral icterus.  Neck:      Thyroid: No thyromegaly.   Cardiovascular:      Rate and Rhythm: Normal rate and regular rhythm.      Heart sounds: Murmur heard.      No friction rub. No gallop.      Comments: Systolic murmur  Pulmonary:      Effort: Pulmonary effort is normal. No respiratory distress.      Breath sounds: Normal breath sounds. No wheezing or rales.   Abdominal:      General: Bowel sounds are normal. There is no distension.      Palpations: Abdomen is soft. There is no mass.      Tenderness: There is no abdominal tenderness. There is no guarding or rebound.   Musculoskeletal:          General: Normal range of motion.      Cervical back: Normal range of motion.   Lymphadenopathy:      Cervical: No cervical adenopathy.   Skin:     Findings: No lesion.   Neurological:      Mental Status: He is alert and oriented to person, place, and time.      Comments: Resting tremor and rigidity noted.    Appears mostly cognitively intact.  Able to recount recent events in his medical history.  Clearly appears competent.   Psychiatric:         Mood and Affect: Mood normal.         Behavior: Behavior normal.         Thought Content: Thought content normal.         Assessment:       1. Depression, unspecified depression type    2. Parkinsonian features        Plan:       James was seen today for establish care.    Diagnoses and all orders for this visit:    Depression, unspecified depression type  -     citalopram (CELEXA) 10 MG tablet; Take 1 tablet (10 mg total) by mouth once daily.  Discussed that physician assisted suicide is not available in our state.  Offered treatment for depression instead which he is agreeable to.  As an addendum 6 weeks later he clarified that he was not enquiring about physician assisted suicide or euthanasia for himself.  I told him I would update the chart.        Parkinsonian features  -     Ambulatory referral/consult to Neurology; Future      Reviewed living will -- clearly DNR/I and no TF    Over 40 minutes spent with patient and majority in counseling and patient education.          Follow up in about 6 weeks (around 4/22/2024).    Future Appointments   Date Time Provider Department Center   4/24/2024  2:40 PM Tim Joya MD [facility]

## 2024-03-14 PROBLEM — Z66 DNR (DO NOT RESUSCITATE): Status: ACTIVE | Noted: 2024-03-14

## 2024-03-14 PROBLEM — F32.A DEPRESSION: Status: ACTIVE | Noted: 2024-03-14

## 2024-04-01 NOTE — PROGRESS NOTES
Outpatient Therapy Discharge Summary   Discharge Date: 9/27/2023   Name: James Dc  Clinic Number: [id number]  Therapy Diagnosis: No diagnosis found.  Physician: No ref. provider found  Physician Orders: Ambulatory Referral to Speech Therapy   Medical Diagnosis: Cerebrovascular accident (CVA) due to stenosis of left carotid artery [I63.232]  Evaluation Date: 8/24/23    Date of Last visit: 9/13/23  Total Visits Received: 2  Cancelled Visits: 0  No Show Visits: 0    Assessment    Assessment of Current Status: Mr. Ramirez attended one session of treatment following his evaluation, to focus on strategies at home. He has not been seen since 9/13/23 and has not scheduled additional appointments. Due to this, he will be discharged at this time.      Short Term Goals: (4 weeks) Current Progress:   1.Patient and family will demonstrate understanding of current level and progression of dementia.     Progressing/ Not Met 8/24/2023   Goal Not Met / Discontinue    2. Patient and family will recall and utilize memory strategies/external aids with minimum A to improve ease of everyday cognitive function at home.      Progressing/ Not Met 8/24/2023   Goal Not Met / Discontinue    3. Patient will complete functional problem solving tasks with minimal cues for 80% accuracy.     Progressing/ Not Met 8/24/2023   Goal Not Met / Discontinue    4.Patient will answer orientation questions (with calendar)  with 80% acc given minimum A to improve ease of everyday cognitive function at home Goal Not Met / Discontinue          Long Term Goals: (6 weeks) Current Progress:   1.Patient will develop functional, cognitive-linguistic-based skills and utilize compensatory strategies to communicate wants and needs effectively, maintain safety during ADL's and participate socially in functional living environment.        Goal Not Met / Discontinue         Discharge reason: Patient has not attended therapy since 9/13/23    Plan   This patient is  Caller:   patient    Situation:   Exposure to influenza A on Thurs  Starts having symptoms  Fever - today: 100 F (oral)    Background:  Positive COVID-19 -- March 12th        Influenza-Like Illness (LULU) RN Standing Order (13+)    Kasandra Winkler      Age: 45 year old     YOB: 1978    Patient has been triaged using Epic triage guidelines: Patient does not need higher level of care     Has the patient been seen at a [facility] or [facility] (established in primary or specialty care) in the last two years? Yes     Do any of the following exclusions apply to the patient?    Does the patient have a history of CrCl less than or equal to 60 ml/min in the previous 12 months?    Estimated Creatinine Clearance: 153.5 mL/min (based on SCr of 0.6 mg/dL).  *If no result, instruct patient to do an evisit No   Is the patient taking Probenecid?     (Probencecid & Tamiflu together are not recommended) No   Patient reports a positive Covid-19 test:     (Encourage at home COVID-19 test or place PCR order per standing order) No     Reported Tamiflu allergy or intolerance    No     Does this patient have ANY of the above exclusions answered Yes?  No.     Does the patient have symptoms or been exposed to a confirmed case of influenza?  Symptoms- patient has LULU symptoms that started <48 hours ago and has had close contact with a confirmed case of inlfuenza within 5 days.     Since this patient has symptoms, do they have ANY of the following conditions?  Chemotherapy or radiation within the last 3 months No   Organ or bone marrow transplant No   Metabolic disorder Yes   HIV patient with CD4 count <200 No     Does this patient have ANY of the above conditions? Yes. Warm transfer to Specialty RN Care Coordinator/Provider (LP) to assess due to potential of symptoms to rapidly progress to critical illness.    Additional educational resources include:  http://www.[employer].com  http://www.cdc.gov/flu/      No provider is  available at this time - patient was transferred to complete a virtual visit.  Ervin Ochoa RN                   Reason for Disposition   Patient is HIGH RISK (e.g., age > 64 years, pregnant, HIV+, or chronic medical condition)    Additional Information   Negative: SEVERE difficulty breathing (e.g., struggling for each breath, speaks in single words)   Negative: Difficult to awaken or acting confused (e.g., disoriented, slurred speech)   Negative: Bluish (or gray) lips or face now   Negative: Shock suspected (e.g., cold/pale/clammy skin, too weak to stand, low BP, rapid pulse)   Negative: Chest pain  (Exception: MILD central chest pain, present only when coughing.)   Negative: [1] Headache AND [2] stiff neck (can't touch chin to chest)   Negative: Fever > 104 F (40 C)   Negative: [1] Difficulty breathing AND [2] not severe AND [3] not from stuffy nose (e.g., not relieved by cleaning out the nose)   Negative: Patient sounds very sick or weak to the triager   Negative: [1] Fever > 101 F (38.3 C) AND [2] age > 60 years   Negative: [1] Fever > 100.0 F (37.8 C) AND [2] bedridden (e.g., CVA, chronic illness, recovering from surgery)   Negative: [1] Fever > 100.0 F (37.8 C) AND [2] diabetes mellitus or weak immune system (e.g., HIV positive, cancer chemo, splenectomy, organ transplant, chronic steroids)   Negative: Fever present > 3 days (72 hours)   Negative: [1] Fever returns after gone for over 24 hours AND [2] symptoms worse or not improved   Negative: [1] Using nasal washes and pain medicine > 24 hours AND [2] sinus pain (around cheekbone or eye) persists   Negative: Earache    Protocols used: Influenza (Flu) - Seasonal-A-AH     discharged from Speech Therapy    WILLA Garcia, CCC-SLP   9/27/2023

## 2024-04-24 ENCOUNTER — OFFICE VISIT (OUTPATIENT)
Dept: INTERNAL MEDICINE | Facility: CLINIC | Age: 89
End: 2024-04-24
Payer: MEDICARE

## 2024-04-24 VITALS
DIASTOLIC BLOOD PRESSURE: 80 MMHG | OXYGEN SATURATION: 97 % | SYSTOLIC BLOOD PRESSURE: 135 MMHG | BODY MASS INDEX: 19.6 KG/M2 | HEART RATE: 74 BPM | HEIGHT: 62 IN

## 2024-04-24 DIAGNOSIS — I35.0 NONRHEUMATIC AORTIC VALVE STENOSIS: ICD-10-CM

## 2024-04-24 DIAGNOSIS — F32.1 CURRENT MODERATE EPISODE OF MAJOR DEPRESSIVE DISORDER WITHOUT PRIOR EPISODE: ICD-10-CM

## 2024-04-24 DIAGNOSIS — R29.818 PARKINSONIAN FEATURES: ICD-10-CM

## 2024-04-24 DIAGNOSIS — Z66 DNR (DO NOT RESUSCITATE): ICD-10-CM

## 2024-04-24 DIAGNOSIS — E86.1 HYPOTENSION DUE TO HYPOVOLEMIA: Primary | ICD-10-CM

## 2024-04-24 DIAGNOSIS — Z87.898 HISTORY OF SYNCOPE: ICD-10-CM

## 2024-04-24 PROCEDURE — 1159F MED LIST DOCD IN RCRD: CPT | Mod: CPTII,S$GLB,, | Performed by: INTERNAL MEDICINE

## 2024-04-24 PROCEDURE — 99215 OFFICE O/P EST HI 40 MIN: CPT | Mod: S$GLB,,, | Performed by: INTERNAL MEDICINE

## 2024-04-24 PROCEDURE — 3288F FALL RISK ASSESSMENT DOCD: CPT | Mod: CPTII,S$GLB,, | Performed by: INTERNAL MEDICINE

## 2024-04-24 PROCEDURE — 99999 PR PBB SHADOW E&M-EST. PATIENT-LVL III: CPT | Mod: PBBFAC,,, | Performed by: INTERNAL MEDICINE

## 2024-04-24 PROCEDURE — 1160F RVW MEDS BY RX/DR IN RCRD: CPT | Mod: CPTII,S$GLB,, | Performed by: INTERNAL MEDICINE

## 2024-04-24 PROCEDURE — 1101F PT FALLS ASSESS-DOCD LE1/YR: CPT | Mod: CPTII,S$GLB,, | Performed by: INTERNAL MEDICINE

## 2024-04-24 NOTE — PROGRESS NOTES
Subjective:       Patient ID: James Dc is a 89 y.o. male.    Chief Complaint: Follow-up    Patient is here for followup for chronic conditions.    No benefit from celexa, he noticed that it may have made his tremor worse.  He self stopped Celexa.    Some low pressures after eating especially.  Gets clear symptoms of low blood pressure.  His wife checks his blood pressure before and after meals.  He also gets high blood pressures at x1 60s.    Had a fall 2 days ago -- R leg weakened acutely and gave out.    He clarified that he would not want euthanasia for himself, he was just comparing the difference of veterinary an human medicine.      Review of Systems   Constitutional:  Negative for activity change.   Respiratory:  Negative for chest tightness.    Cardiovascular:  Negative for chest pain, palpitations and leg swelling.   Gastrointestinal:  Negative for blood in stool.   Skin:  Negative for rash and wound.   Neurological:  Positive for tremors, weakness and light-headedness. Negative for dizziness and syncope (near syncope).        Unsteady   Psychiatric/Behavioral:  Positive for confusion and dysphoric mood. Negative for agitation.            Objective:      Physical Exam  Vitals reviewed.   Constitutional:       General: He is not in acute distress.     Appearance: Normal appearance. He is well-developed. He is not ill-appearing, toxic-appearing or diaphoretic.      Comments: Frail appearing   HENT:      Head: Normocephalic and atraumatic.   Eyes:      General: No scleral icterus.  Neck:      Thyroid: No thyromegaly.   Cardiovascular:      Rate and Rhythm: Normal rate and regular rhythm.      Heart sounds: Murmur heard.      No friction rub. No gallop.      Comments: Systolic murmur  Pulmonary:      Effort: Pulmonary effort is normal. No respiratory distress.      Breath sounds: Normal breath sounds. No wheezing or rales.   Abdominal:      General: Bowel sounds are normal. There is no distension.       Palpations: Abdomen is soft. There is no mass.      Tenderness: There is no abdominal tenderness. There is no guarding or rebound.   Musculoskeletal:         General: Normal range of motion.      Cervical back: Normal range of motion.   Lymphadenopathy:      Cervical: No cervical adenopathy.   Skin:     Findings: No lesion.   Neurological:      Mental Status: He is alert and oriented to person, place, and time.      Comments: Resting tremor and rigidity noted.    Appears mostly cognitively intact.  Able to recount recent events in his medical history.  Clearly appears competent.   Psychiatric:         Mood and Affect: Mood normal.         Behavior: Behavior normal.         Thought Content: Thought content normal.         Assessment:       1. Hypotension due to hypovolemia    2. History of syncope    3. Parkinsonian features    4. DNR (do not resuscitate)    5. Current moderate episode of major depressive disorder without prior episode    6. Nonrheumatic aortic valve stenosis        Plan:       James was seen today for follow-up.    Diagnoses and all orders for this visit:    Hypotension due to hypovolemia  Could be related to autonomic dysfunction, also could be cardiac related-aortic stenosis versus heart rhythm issue.  Wife will e-mail me on the portal his blood pressure is and pulse rate during these episodes.  He does have multiple episodes per week.  If his pulse rate is normal during these episodes will consider midodrine.  I did discuss the risks of midodrine elevating his pressure too much.  Patient and family willing to take that risk to see if it can help with these episodes.  I explained that I 1st need to see the actual numbers along with his pulse rates.    History of syncope  As above    Parkinsonian features  I offer that he see back the neurologist, he declines for now.    DNR (do not resuscitate)  Reconfirmed.    Current moderate episode of major depressive disorder without prior episode  He appears  brighter today and less depressed compared to last time.  Could consider a different SSRI in the future.    Nonrheumatic aortic valve stenosis  He has declined any invasive procedures.    He had a recent fall and I offered home PT and nurse evaluation.  He declines for now but will consider.    Over 40 minutes spent with patient and majority in counseling and patient education.      Follow up in about 6 months (around 10/24/2024).    Future Appointments   Date Time Provider Department Center   10/25/2024 10:40 AM Tim Joya MD [facility]

## 2024-05-01 ENCOUNTER — PATIENT MESSAGE (OUTPATIENT)
Dept: INTERNAL MEDICINE | Facility: CLINIC | Age: 89
End: 2024-05-01
Payer: MEDICARE

## 2024-05-01 DIAGNOSIS — G20.A1 PARKINSON'S DISEASE, UNSPECIFIED WHETHER DYSKINESIA PRESENT, UNSPECIFIED WHETHER MANIFESTATIONS FLUCTUATE: Primary | ICD-10-CM

## 2024-05-02 RX ORDER — MIDODRINE HYDROCHLORIDE 2.5 MG/1
2.5 TABLET ORAL 3 TIMES DAILY
Qty: 90 TABLET | Refills: 1 | Status: SHIPPED | OUTPATIENT
Start: 2024-05-02 | End: 2024-06-04

## 2024-06-04 ENCOUNTER — TELEPHONE (OUTPATIENT)
Dept: INTERNAL MEDICINE | Facility: CLINIC | Age: 89
End: 2024-06-04
Payer: MEDICARE

## 2024-06-04 DIAGNOSIS — G20.A1 PARKINSON'S DISEASE, UNSPECIFIED WHETHER DYSKINESIA PRESENT, UNSPECIFIED WHETHER MANIFESTATIONS FLUCTUATE: ICD-10-CM

## 2024-06-04 RX ORDER — MIDODRINE HYDROCHLORIDE 5 MG/1
5 TABLET ORAL 3 TIMES DAILY
Qty: 90 TABLET | Refills: 1 | Status: SHIPPED | OUTPATIENT
Start: 2024-06-04

## 2024-06-04 NOTE — TELEPHONE ENCOUNTER
I saw wife at her appt with me and says that midodrine helps a bit but she and  requests increased dose. Rx sent in, they will continue watching pressures (have not been kannan).

## 2024-06-10 ENCOUNTER — PATIENT MESSAGE (OUTPATIENT)
Dept: INTERNAL MEDICINE | Facility: CLINIC | Age: 89
End: 2024-06-10
Payer: MEDICARE

## 2024-07-01 ENCOUNTER — NURSE TRIAGE (OUTPATIENT)
Dept: ADMINISTRATIVE | Facility: CLINIC | Age: 89
End: 2024-07-01
Payer: MEDICARE

## 2024-07-01 ENCOUNTER — TELEPHONE (OUTPATIENT)
Dept: INTERNAL MEDICINE | Facility: CLINIC | Age: 89
End: 2024-07-01
Payer: MEDICARE

## 2024-07-01 PROBLEM — R53.1 GENERALIZED WEAKNESS: Status: ACTIVE | Noted: 2024-07-01

## 2024-07-01 NOTE — ED TRIAGE NOTES
Pt. Is a 89 y.o. male presenting to the ED via EMS from home. Per the pt. & the family, the pt. Has been becoming increasingly weak over the past month. Per the pt., the past two days have been worse and he has been unable to get out of his recliner. Also reporting decreased appetite and difficulty swallowing.

## 2024-07-01 NOTE — TELEPHONE ENCOUNTER
----- Message from Lucina Alvarez sent at 7/1/2024  9:02 AM CDT -----  Contact: [phone]  [email or web address]     Patient is calling for Medical Advice regarding:pt wife is calling about need home healthcare and she also wants to speak with someone in the office about her  also.  Please call her back.    How long has patient had these symptoms:    Pharmacy name and phone#:    Patient wants a call back or thru [email or web address]:callback    Comments:    Please advise patient replies from provider may take up to 48 hours.

## 2024-07-01 NOTE — TELEPHONE ENCOUNTER
Spoke to pt's wife. Her  is having trouble to get up of his recliner. Try to get up his legs were to weak. Pt had to spend the night at recliner. He has no appetite. Still urinated in urinal. Wife talk to nurse, nurse advice her to call 911 but wife doesn't want to take him to hospital until Dr. Joya advice her. But if theres no other choice than she will. Pt's wife is overwhelm and needs help wants to start the process of Home Care.

## 2024-07-01 NOTE — TELEPHONE ENCOUNTER
Hi, I do think they should call 911 and he should go to the emergency room.  Please let the wife know that is my recommendation.  Let me know if wife has any more questions.  Thank you, Tim Joya

## 2024-07-01 NOTE — TELEPHONE ENCOUNTER
"Spoke with wife of pt who reports that pt has been in his recliner for past 2 days, because he is too weak to stand up. Reports that pt states," I dont want to die alone." Wife states that she is 89 years old and has tried to assist with getting him out of the chair with his walker but can only stand for few seconds, and will have to sit back down. Reports decreased appetite. Advised to call 911. States that she would like to hear from PCP's office prior to calling. Will send message to office. Verbalized understanding    Reason for Disposition   Shock suspected (e.g., cold/pale/clammy skin, too weak to stand, low BP, rapid pulse)    Additional Information   Negative: SEVERE difficulty breathing (e.g., struggling for each breath, speaks in single words)    Protocols used: Weakness (Generalized) and Fatigue-A-AH    "

## 2024-07-01 NOTE — ED PROVIDER NOTES
Encounter Date: 7/1/2024       History     Chief Complaint   Patient presents with    Weakness     Generalized weakness and loss of appetite x4 days. Pt c/o difficulty swallowing. Coming from home.     HPI    Patient is a 89-year-old male with a history of aortic valve stenosis, chronic diastolic heart failure taking midodrine presenting due to progressive weakness the acutely worsened yesterday.  Patient's wife reports that he was able to walk up and down the stairs and then yesterday, he sat in his recliner and was not able to get back.  They endorse poor p.o. intake over the last 2 days.  Patient reports he is drinking less because he can not get up to urinate easily.  He reports poor appetite and weakness, primarily in his legs.  Endorses chronic cough.    Denies new headache, chest pain, shortness of breath, nausea, vomiting, abdominal pain.  Decreased urinary frequency.  No fevers.    Review of patient's allergies indicates:   Allergen Reactions    Celexa [citalopram] Other (See Comments)     Worsening tremor    Tetracyclines Nausea Only     Past Medical History:   Diagnosis Date    Essential tremor     Hypertensive urgency 6/13/2023    Nuclear sclerosis - Both Eyes 9/19/2012     Past Surgical History:   Procedure Laterality Date    CATARACT EXTRACTION W/  INTRAOCULAR LENS IMPLANT Left 10/10/2023    Procedure: EXTRACTION, CATARACT, WITH IOL INSERTION;  Surgeon: Lon Carr MD;  Location: [facility] OR;  Service: Ophthalmology;  Laterality: Left;    CATARACT EXTRACTION W/  INTRAOCULAR LENS IMPLANT Right 10/24/2023    Procedure: EXTRACTION, CATARACT, WITH IOL INSERTION;  Surgeon: Lon Carr MD;  Location: [facility] OR;  Service: Ophthalmology;  Laterality: Right;    TONSILLECTOMY       Family History   Problem Relation Name Age of Onset    Prostate cancer Father      Cataracts Son      Psoriasis Neg Hx      Eczema Neg Hx      Glaucoma Neg Hx      Amblyopia Neg Hx      Blindness Neg Hx      Macular  degeneration Neg Hx      Strabismus Neg Hx      Retinal detachment Neg Hx       Social History     Tobacco Use    Smoking status: Former    Smokeless tobacco: Former   Substance Use Topics    Alcohol use: Yes    Drug use: No     Physical Exam     Initial Vitals [07/01/24 1459]   BP Pulse Resp Temp SpO2   (!) 140/74 98 16 98 °F (36.7 °C) (!) 94 %      MAP       --         Physical Exam    Constitutional: He is not diaphoretic. He appears cachectic. No distress.   HENT:   Head: Normocephalic and atraumatic.   Eyes: No scleral icterus.   Cardiovascular:  Normal rate, regular rhythm and intact distal pulses.           Pulmonary/Chest: Breath sounds normal. No stridor. No respiratory distress.   Abdominal: There is no abdominal tenderness.   Musculoskeletal:         General: No edema.     Neurological: He is alert.   Skin: Skin is warm and dry. Capillary refill takes more than 3 seconds.   Psychiatric: He has a normal mood and affect. Thought content normal.         ED Course   Procedures  Labs Reviewed   CBC W/ AUTO DIFFERENTIAL - Abnormal; Notable for the following components:       Result Value    MCH 32.2 (*)     MPV 9.1 (*)     Lymph # 0.9 (*)     Gran % 79.4 (*)     Lymph % 11.3 (*)     All other components within normal limits    Narrative:     add on ck per [employer] DO order# [id number] 07/01/2024 @ 17:32    COMPREHENSIVE METABOLIC PANEL - Abnormal; Notable for the following components:    BUN 32 (*)     Alkaline Phosphatase 52 (*)     AST 73 (*)      (*)     All other components within normal limits    Narrative:     add on ck per Betzy Rasch DO order# [id number] 07/01/2024 @ 17:32    URINALYSIS, REFLEX TO URINE CULTURE - Abnormal; Notable for the following components:    Protein, UA Trace (*)     Ketones, UA 2+ (*)     Occult Blood UA 3+ (*)     All other components within normal limits    Narrative:     Specimen Source->Urine   HIV 1 / 2 ANTIBODY    Narrative:     Release to patient->Immediate    HEPATITIS C ANTIBODY    Narrative:     Release to patient->Immediate   MAGNESIUM    Narrative:     add on ck per Betzy Rasch DO order# [id number] 07/01/2024 @ 17:32    TROPONIN I    Narrative:     add on ck per [place] Rasch DO order# [id number] 07/01/2024 @ 17:32    TSH    Narrative:     add on ck per Betzy Rasch DO order# [id number] 07/01/2024 @ 17:32    CK   CK    Narrative:     add on ck per Betzy Rasch DO order# [id number] 07/01/2024 @ 17:32    AMMONIA   URINALYSIS MICROSCOPIC    Narrative:     Specimen Source->Urine     EKG Readings: (Independently Interpreted)   Initial Reading: No STEMI. Rhythm: Sinus Tachycardia. Heart Rate: 104. Ectopy: PACs. Conduction: Normal. T Waves Flipped: III, AVR and V1. Axis: Left Axis Deviation. Clinical Impression: Sinus Tachycardia with PACs     ECG Results              EKG 12-lead (Final result)        Collection Time Result Time QRS Duration OHS QTC Calculation    07/01/24 18:31:58 07/02/24 12:04:15 78 422                     Final result by Interface, Lab In [facility] (07/02/24 12:04:21)                   Narrative:    Test Reason : R53.1,    Vent. Rate : 106 BPM     Atrial Rate : 106 BPM     P-R Int : 160 ms          QRS Dur : 078 ms      QT Int : 318 ms       P-R-T Axes : 070 -54 -03 degrees     QTc Int : 422 ms    Sinus tachycardia with occasional Premature atrial complexes  Left anterior fascicular block  Nonspecific ST and/or T wave abnormalities  Abnormal ECG  When compared with ECG of 01-JUL-2024 17:08,  No significant change was found  Confirmed by Kev Arredondo MD ([id number]) on 7/2/2024 12:04:14 PM    Referred By: [email or web address]   SELF           Confirmed By:Kev Arredondo MD                                     EKG 12-lead (Final result)        Collection Time Result Time QRS Duration OHS QTC Calculation    07/01/24 17:08:26 07/01/24 20:11:07 76 407                     Final result by Interface, Lab In [facility] (07/01/24 20:11:15)                   Narrative:    Test Reason :  R53.1,    Vent. Rate : 104 BPM     Atrial Rate : 104 BPM     P-R Int : 164 ms          QRS Dur : 076 ms      QT Int : 310 ms       P-R-T Axes : 058 -55 -03 degrees     QTc Int : 407 ms    Sinus tachycardia with Premature atrial complexes  Left anterior fascicular block  Possible Bear- Lateral infarct (cited on or before 02-FEB-2011)  Abnormal ECG  When compared with ECG of 13-JUN-2023 15:38,  Premature atrial complexes are now Present  Confirmed by Danial MULLEN MD ([id number]) on 7/1/2024 8:11:06 PM    Referred By: [email or web address]   SELF           Confirmed By:Danial MULLEN MD                                  Imaging Results              CT Head Without Contrast (Final result)  Result time 07/01/24 22:34:16      Final result by Je Arreola MD (07/01/24 22:34:16)                   Impression:      Chronic change noted, there is no evidence for acute intracranial process.      Electronically signed by: Je Arreola  Date:    07/01/2024  Time:    22:34               Narrative:    EXAMINATION:  CT HEAD WITHOUT CONTRAST    CLINICAL HISTORY:  Mental status change, unknown cause;progressive generalized weakness and memory loss;    TECHNIQUE:  Low dose axial images were obtained through the head.  Coronal and sagittal reformations were also performed. Contrast was not administered.    COMPARISON:  CT examination of the brain without contrast June 14, 2023    FINDINGS:  The ventricular system, sulcal pattern and parenchymal attenuation characteristics are consistent with chronic change.  Involutional change and chronic appearing white matter change noted, small focal area of gliosis at the superior posterior right parietal lobe noted consistent with an area of remote ischemia/infarct or other insult/injury.  Focal low-density areas consistent with remote lacunar-type ischemic change are noted.    There is no evidence for intracranial mass, mass effect or midline shift and there is no evidence for acute intracranial hemorrhage.   Appropriate CSF spaces are seen at the skull base.    The mastoid air cells appear well aerated.  The paranasal sinuses appear well aerated.  The visualized orbits appear intact.  The osseous structures appear intact.  There are areas of suspected prominent arachnoid granulations at the inferior occipital level again noted appearing stable.                                        CT Lumbar Spine Without Contrast (Final result)  Result time 07/01/24 22:49:44      Final result by Je Arreola MD (07/01/24 22:49:44)                   Impression:      Multilevel chronic change of the lumbar spine noted, as discussed above, correlation for any specific level of symptomatology is needed.    There is no evidence for acute lumbar spine fracture deformity.    Dilated small bowel loops there appears to be interloop edema and or inflammation, the bowel is not well evaluated on this examination however this may relate to a small bowel obstruction, clinical and historical correlation is needed, if clinically warranted follow-up CT examination of the abdomen and pelvis may be helpful.    This report was flagged in Epic as abnormal.      Electronically signed by: Je Arreola  Date:    07/01/2024  Time:    22:49               Narrative:    EXAMINATION:  CT LUMBAR SPINE WITHOUT CONTRAST    CLINICAL HISTORY:  Low back pain, increased fracture risk;    TECHNIQUE:  Low-dose axial, sagittal and coronal reformations are obtained through the lumbar spine.  Contrast was not administered.    COMPARISON:  None.    FINDINGS:  The osseous structures demonstrate chronic change.  There is diminished mineralization and degenerative change noted.  There is multilevel vacuum phenomena throughout the lumbar spine including T12-L1, L2-3 through L5-S1.  Multilevel chronic endplate change and loss of disc space height.  There is minimal grade 1 retrolisthesis of L1 with respect L2, L2 with respect L3, L3 with respect L4 and L4 with respect L5.   There is no high-grade spondylolisthesis and there is no evidence for high-grade or acute compression fracture deformity.    Facet arthropathy is noted, there is no evidence for facet dislocation or facet fracture deformity.  On coronal reconstruction imaging there is curvature of the lumbar spine convex to the right, and there are prominent osteophytes noted projected to the left.    The T11-12 and T12-L1 levels demonstrate no evidence for high-grade spinal canal or foraminal stenosis.    The L1-2 level demonstrates mild degenerative disc bulge, mild anterior impression upon the dural sac.  There is no high-grade spinal canal or foraminal stenosis.    The L2-3 level demonstrates degenerative disc bulge and marginal osteophyte formation there is anterior impression upon the dural sac, mild spinal canal narrowing there is no high-grade spinal canal stenosis.  There is mild left foraminal stenosis.    The L3-4 level demonstrates diffuse degenerative disc bulge, there is anterior impression upon the dural sac.  There is posterior facet arthropathy with ligamentous thickening, there is mild spinal canal stenosis.  There is left foraminal stenosis and right foraminal narrowing.  Correlation for exiting nerve root symptomatology is needed.    The L4-5 level demonstrates diffuse degenerative disc bulge, there is anterior impression upon the dural sac.  There is posterior ligamentous thickening and facet arthropathy.  There appears to be mild spinal canal stenosis, when combining somewhat prominent appearing posterior ligamentous thickening with mild degenerative disc bulge.  There is bilateral foraminal stenosis.  Correlation for exiting nerve root symptomatology is needed.    The L5-S1 level demonstrates mild diffuse degenerative disc bulge, mild anterior impression upon the dural sac.  There is posterior facet arthropathy with mild ligamentous thickening.  There is marginal osteophyte formation and facet arthropathy,  there is bilateral foraminal stenosis.    There are chronic changes at the sacroiliac joints.    On close evaluation of available imaging, there is no evidence for acute lumbar spine fracture deformity.    There is a hypodense structure of the left kidney measuring approximately 5.4 cm on axial imaging, not optimally evaluated on this examination however measuring 12.1 Hounsfield units and likely represents a cyst.    There are multiple dilated bowel loops appearing to represent dilated small bowel loops and there is appearance suggesting interloop edema and or inflammation, the bowel is not well evaluated on this examination, clinical and historical correlation is needed, bowel obstruction would be in the differential.  If clinically warranted follow-up with CT examination of the abdomen and pelvis may be helpful.                                       X-Ray Chest AP Portable (Final result)  Result time 07/01/24 19:32:45      Final result by Richie Ling DO (07/01/24 19:32:45)                   Impression:      No acute abnormality.      Electronically signed by: Richie Ling  Date:    07/01/2024  Time:    19:32               Narrative:    EXAMINATION:  XR CHEST AP PORTABLE    CLINICAL HISTORY:  Chest Pain;    TECHNIQUE:  Single frontal view of the chest was performed.    COMPARISON:  06/13/2023.    FINDINGS:  The lungs are well expanded and clear. No focal opacities are seen. The pleural spaces are clear. The cardiac silhouette is unremarkable.  There are calcifications of the aortic arch.  The visualized osseous structures demonstrate degenerative changes.                                       Medications   albuterol-ipratropium 2.5 mg-0.5 mg/3 mL nebulizer solution 3 mL (has no administration in time range)   melatonin tablet 6 mg (has no administration in time range)   ondansetron disintegrating tablet 8 mg (has no administration in time range)   acetaminophen tablet 650 mg (has no administration in time  range)   glucose chewable tablet 16 g (has no administration in time range)   glucose chewable tablet 24 g (has no administration in time range)   glucagon (human recombinant) injection 1 mg (has no administration in time range)   dextrose 10% bolus 125 mL 125 mL (has no administration in time range)   dextrose 10% bolus 250 mL 250 mL (has no administration in time range)   lactated ringers infusion ( Intravenous New Bag 7/2/24 0147)   enoxaparin injection 30 mg (has no administration in time range)   fentaNYL 50 mcg/mL injection 25 mcg (has no administration in time range)   oxyCODONE immediate release tablet 5 mg (has no administration in time range)   ondansetron injection 4 mg (has no administration in time range)   methocarbamoL tablet 500 mg (has no administration in time range)   oxyCODONE 5 mg/5 mL solution 2.5 mg (2.5 mg Oral Given 7/2/24 1834)   lactated ringers bolus 500 mL (0 mLs Intravenous Stopped 7/1/24 1833)   lactated ringers bolus 500 mL (0 mLs Intravenous Stopped 7/1/24 1957)   iohexoL (OMNIPAQUE 350) injection 100 mL (75 mLs Intravenous Given 7/2/24 1002)     Medical Decision Making  Risk  Prescription drug management.    Patient is a 89-year-old male with a history of aortic valve stenosis, chronic diastolic heart failure taking midodrine presenting due to progressive weakness that acutely worsened yesterday.     He reports concerns for dehydration due to poor p.o. intake over the last several days along with his poor appetite.  Concern for lower extremity weakness over upper extremity weakness.    Basic labs ordered to identify potential cause of his weakness.    Patient without leukocytosis.  Electrolytes WNL, however noted slight increase in AST and ALT.  Troponin and CK unremarkable.  Ammonia also normal.  UA noninfectious.    Due to patient's poor p.o. intake, 500 cc bolus of LR ordered.  Patient unable to produce urine after this, additional bolus ordered.    Due to patient's significant  weakness, patient admitted to Hospital Medicine for additional management.          Attending Attestation:   Physician Attestation Statement for Resident:  As the supervising MD   Physician Attestation Statement: I have personally seen and examined this patient.   I agree with the above history.  -:   History assist with the patient's wife.  They know chronic issues with movement and functional status.  Was able to get to a chair yesterday but unable to get out of it today.   He believes his symptoms are due to dehydration.  He has been drinking less because he does not want to get up to urinate.  On exam he appears cachectic but not necessarily dry.    His strength is preserved in extremities x4.  Able to keep bilateral lower extremities elevated for 5 seconds without drift. Presentation does not lateralized to an acute CVA  Or cord compression.  Will hydrate and admit.   As the supervising MD I agree with the above PE.     As the supervising MD I agree with the above treatment, course, plan, and disposition.                                           Clinical Impression:  Final diagnoses:  [R53.1] Weakness  [R07.9] Chest pain          ED Disposition Condition    Observation                 Betzy Franco DO  Resident  07/01/24 8461       Francis Carpenter MD  07/02/24 1017

## 2024-07-01 NOTE — TELEPHONE ENCOUNTER
----- Message from Allison Cannon sent at 7/1/2024 12:59 PM CDT -----  Contact: [phone]  [email or web address]     Patient is calling for Medical Advice regarding: Mrs Dc is calling to notify that her  is in pain and is waiting for a respond.    How long has patient had these symptoms: n/a    Pharmacy name and phone#:n/a    Patient [facility] a call back or [facility]: call back     Comments: thank you

## 2024-07-02 ENCOUNTER — ANESTHESIA (OUTPATIENT)
Dept: SURGERY | Facility: HOSPITAL | Age: 89
End: 2024-07-02
Payer: MEDICARE

## 2024-07-02 ENCOUNTER — ANESTHESIA EVENT (OUTPATIENT)
Dept: SURGERY | Facility: HOSPITAL | Age: 89
End: 2024-07-02
Payer: MEDICARE

## 2024-07-02 PROBLEM — R93.7 ABNORMAL CT SCAN, LUMBAR SPINE: Status: ACTIVE | Noted: 2024-01-01

## 2024-07-02 PROBLEM — K41.30: Status: ACTIVE | Noted: 2024-01-01

## 2024-07-02 PROBLEM — K56.609 SMALL BOWEL OBSTRUCTION: Status: ACTIVE | Noted: 2024-01-01

## 2024-07-02 PROBLEM — R13.10 DYSPHAGIA: Status: ACTIVE | Noted: 2024-01-01

## 2024-07-02 PROBLEM — E43 SEVERE MALNUTRITION: Status: ACTIVE | Noted: 2024-01-01

## 2024-07-02 PROBLEM — I95.9 HYPOTENSION: Status: ACTIVE | Noted: 2024-01-01

## 2024-07-02 PROBLEM — I95.9 HYPOTENSION: Status: RESOLVED | Noted: 2024-01-01 | Resolved: 2024-01-01

## 2024-07-02 PROCEDURE — 63600175 PHARM REV CODE 636 W HCPCS: Mod: HCNC | Performed by: NURSE ANESTHETIST, CERTIFIED REGISTERED

## 2024-07-02 PROCEDURE — 25000003 PHARM REV CODE 250: Mod: HCNC | Performed by: NURSE ANESTHETIST, CERTIFIED REGISTERED

## 2024-07-02 RX ORDER — CEFAZOLIN SODIUM 1 G/3ML
INJECTION, POWDER, FOR SOLUTION INTRAMUSCULAR; INTRAVENOUS
Status: DISCONTINUED | OUTPATIENT
Start: 2024-07-02 | End: 2024-07-02

## 2024-07-02 RX ORDER — INDOCYANINE GREEN AND WATER 25 MG
KIT INJECTION
Status: DISCONTINUED | OUTPATIENT
Start: 2024-07-02 | End: 2024-07-02

## 2024-07-02 RX ORDER — FENTANYL CITRATE 50 UG/ML
INJECTION, SOLUTION INTRAMUSCULAR; INTRAVENOUS
Status: DISCONTINUED | OUTPATIENT
Start: 2024-07-02 | End: 2024-07-02

## 2024-07-02 RX ORDER — DEXMEDETOMIDINE HYDROCHLORIDE 100 UG/ML
INJECTION, SOLUTION INTRAVENOUS
Status: DISCONTINUED | OUTPATIENT
Start: 2024-07-02 | End: 2024-07-02

## 2024-07-02 RX ADMIN — FENTANYL CITRATE 50 MCG: 50 INJECTION, SOLUTION INTRAMUSCULAR; INTRAVENOUS at 03:07

## 2024-07-02 RX ADMIN — SODIUM CHLORIDE: 9 INJECTION, SOLUTION INTRAVENOUS at 03:07

## 2024-07-02 RX ADMIN — FENTANYL CITRATE 50 MCG: 50 INJECTION, SOLUTION INTRAMUSCULAR; INTRAVENOUS at 04:07

## 2024-07-02 RX ADMIN — INDOCYANINE GREEN AND WATER 5 MG: KIT at 04:07

## 2024-07-02 RX ADMIN — FENTANYL CITRATE 25 MCG: 50 INJECTION, SOLUTION INTRAMUSCULAR; INTRAVENOUS at 03:07

## 2024-07-02 RX ADMIN — DEXMEDETOMIDINE 4 MCG: 100 INJECTION, SOLUTION, CONCENTRATE INTRAVENOUS at 05:07

## 2024-07-02 RX ADMIN — CEFAZOLIN 2 G: 330 INJECTION, POWDER, FOR SOLUTION INTRAMUSCULAR; INTRAVENOUS at 03:07

## 2024-07-02 NOTE — ED NOTES
Telemetry Verification   Patient placed on Telemetry Box  Verified with War Room  Box # [id number]   Monitor Tech War room   Rate 92   Rhythm nsr

## 2024-07-02 NOTE — BRIEF OP NOTE
[facility] - Surgery ([facility])  Brief Operative Note    SUMMARY     Surgery Date: 7/2/2024     Surgeons and Role:     * Shen Araujo MD - Primary     * Dianna Sandoval MD - Resident - Assisting        Pre-op Diagnosis:  Inguinal hernia of right side with obstruction [K40.30]    Post-op Diagnosis:  Post-Op Diagnosis Codes:     * Inguinal hernia of right side with obstruction [K40.30]    Procedure(s) (LRB):  REPAIR, HERNIA, INGUINAL, INCARCERATED, INITIAL, AGE 5 YEARS OR OLDER (Right)    Anesthesia: Local MAC    Implants:  * No implants in log *    Operative Findings: strangulated kern's hernia in the right femoral canal; Small bowel resection with anastomosis. Primary tissue repair performed. Procedure completed under local anesthetic     Estimated Blood Loss: 20 mL    Estimated Blood Loss has been documented.         Specimens:   Specimen (24h ago, onward)       Start     Ordered    07/02/24 1748  Specimen to Pathology, Surgery General Surgery  Once        Comments: Pre-op Diagnosis: Inguinal hernia of right side with obstruction [K40.30]Procedure(s):REPAIR, HERNIA, INGUINAL, INCARCERATED, INITIAL, AGE 5 YEARS OR OLDER Number of specimens: 4Name of specimens: 1) Right inguinal nerve                                  2) Hernia sac                                  3) Small bowel                                  4) Chord lipoma     References:    Click here for ordering Quick Tip   Question Answer Comment   Procedure Type: General Surgery    Specimen Class: Routine/Screening    Release to patient Immediate        07/02/24 4615                    [id number]

## 2024-07-02 NOTE — HPI
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 He and wife report him straining very hard for a bowel movement on Friday and having abdominal pain following this and into Saturday. Sunday he noted new weakness to the point where he had difficulty rising freom his chair which is very abnormal for him. This persisted into Monday ultimately prompting ED presentation.     He was hypertensive on arrival to ED, labs largely unremarkable. CTH was obtained without acute findings. CT L spine was obtained given lower back pain which showed possible SBO and an dedicated CT abdomen pelvis was obtained as follow up. This demonstrated right inguinal hernia containing small knuckle of bowel with proximal obstruction. General surgery was consulted for these findings.     No prior abdominal surgery   Lived at home with wife independently, ambulatory, prior to this admission

## 2024-07-02 NOTE — PT/OT/SLP EVAL
Physical Therapy Co-Evaluation    Patient Name:  James Dc   MRN:  [id number]    Recommendations:     Discharge Recommendations: Moderate Intensity Therapy   Discharge Equipment Recommendations: to be determined by next level of care   Barriers to discharge: None    Assessment:     James Dc is a 89 y.o. male admitted with a medical diagnosis of Small bowel obstruction.  He presents with the following impairments/functional limitations: weakness, impaired endurance, impaired self care skills, impaired functional mobility, gait instability, impaired balance, decreased lower extremity function, decreased upper extremity function Pt. cooperative but had limited tolerance to treatment with delayed responses at times and fatigued quickly with activity.    Rehab Prognosis: Fair; patient would benefit from acute skilled PT services to address these deficits and reach maximum level of function.    Recent Surgery: Procedure(s) (LRB):  REPAIR, HERNIA, INGUINAL, INCARCERATED, INITIAL, AGE 5 YEARS OR OLDER (Right) Day of Surgery    Plan:     During this hospitalization, patient to be seen 4 x/week to address the identified rehab impairments via gait training, therapeutic activities, therapeutic exercises, neuromuscular re-education and progress toward the following goals:    Plan of Care Expires:  08/01/24    Subjective     Chief Complaint: weakness  Patient/Family Comments/goals: pt. Agreeable to PT  Pain/Comfort:  Pain Rating 1: 0/10  Pain Rating Post-Intervention 1: 0/10    Patients cultural, spiritual, Episcopal conflicts given the current situation: no    Living Environment:  Pt. Lives with spouse in 2-story home with bedroom/bathroom on 1st floor and 2 ROLY with no handrails.  Prior to admission, patients level of function was amb. with AD.  Equipment used at home: walker, rolling, cane, straight, grab bar.  Upon discharge, patient will have assistance from family.    Objective:     Communicated with nursing prior  to session.  Patient found supine with Leonora, peripheral IV, telemetry  upon PT entry to room.    General Precautions: Standard, fall  Orthopedic Precautions:N/A   Braces: N/A  Respiratory Status: Room air    Exams:  RLE ROM: WFL  RLE Strength: WFL  LLE ROM: WFL  LLE Strength: WFL    Functional Mobility:  Bed Mobility:     Rolling Right: minimum assistance  Scooting: minimum assistance  Supine to Sit: minimum assistance  Sit to Supine: maximal assistance  Transfers:     Sit to Stand:  minimum assistance with rolling walker  Gait: 6 steps with RW and Mod A for balance/safety/guidance/RW management with decreased step length/blanca/floor clearance and forward flexed posture.  Balance: fair sitting and poor standing      AM-PAC 6 CLICK MOBILITY  Total Score:14       Treatment & Education:  Discussed therapy needs, goals, and POC.     Patient left up in chair with all lines intact and call button in reach.    GOALS:   Multidisciplinary Problems       Physical Therapy Goals          Problem: Physical Therapy    Goal Priority Disciplines Outcome Goal Variances Interventions   Physical Therapy Goal     PT, PT/OT Progressing     Description: Goals to be met by: 2024     Patient will increase functional independence with mobility by performin. Supine to sit with Stand-by Assistance  2. Sit to supine with Stand-by Assistance  3. Sit to stand transfer with Stand-by Assistance  4. Bed to chair transfer with Stand-by Assistance using Rolling Walker  5. Gait  x 50 feet with Stand-by Assistance using Rolling Walker.   6. Ascend/descend 2 stair with no Handrails Minimal Assistance using HHA.   7. Lower extremity exercise program x15 reps per handout, with supervision                         History:     Past Medical History:   Diagnosis Date    Essential tremor     Hypertensive urgency 2023    Nuclear sclerosis - Both Eyes 2012       Past Surgical History:   Procedure Laterality Date    CATARACT EXTRACTION  W/  INTRAOCULAR LENS IMPLANT Left 10/10/2023    Procedure: EXTRACTION, CATARACT, WITH IOL INSERTION;  Surgeon: Lon Carr MD;  Location: [facility] OR;  Service: Ophthalmology;  Laterality: Left;    CATARACT EXTRACTION W/  INTRAOCULAR LENS IMPLANT Right 10/24/2023    Procedure: EXTRACTION, CATARACT, WITH IOL INSERTION;  Surgeon: Lon Carr MD;  Location: [facility] OR;  Service: Ophthalmology;  Laterality: Right;    TONSILLECTOMY         Time Tracking:     PT Received On: 07/02/24  PT Start Time: 1135     PT Stop Time: 1205  PT Total Time (min): 30 min     Billable Minutes: Evaluation 15 and Therapeutic Activity 15      07/02/2024

## 2024-07-02 NOTE — PROGRESS NOTES
[facility] [address] [place] [address] Medicine  Progress Note    Patient Name: James Dc  MRN: [id number]  Patient Class: IP- Inpatient   Admission Date: 7/1/2024  Length of Stay: 0 days  Attending Physician: Gulshan Alex MD  Primary Care Provider: Tim Joya MD        Subjective:     Principal Problem:Small bowel obstruction        HPI:  James Dc is a 89 y.o. male with a PMHx of hypotension on midodrine, diastolic CHF, hx CVA due to carotid artery stenosis, hx SDH, parkinsonism, early onset Alzheimer's who presents to [facility] for evaluation of generalized weakness. Patient is oriented to person, place, year and president. He does have some memory issues at baseline but is able to provide recent history. Patient reports progressive generalized weakness for quite some time which worsened over the last few weeks. He attributes it to his msucle weakness/atrophy from not moving much/ not working out. He's unable to lift himself out of his chair due to the weakness. Upon ROS questioning, patient admits to nonspecific low back pain for the past 1 months. Reports his feet occasionally feel like they're falling asleep. Additionally, he reports difficulty swallowing, poor appetite and decreasing PO intake. Denies sore throat, globus sensation, chest pain, SOB, or leg swelling. No saddle anesthesia or bowel/ bladder incontinence.     ED: hypertensive to /93, improved to SBP 140s without intervention. No leukocytosis or electrolyte abnormalities. CT head without acute findings. CT L spine with possible SBO, no acute spinal fracture. Given 1L IVFs.     Overview/Hospital Course:  James Dc is a 89 y.o. male admitted to  for generalized weakness. CT A/P showed small bowel containing right inguinal hernia with associated high-grade small bowel obstruction as above. There is mesenteric edema and small volume ascites. No free intraperitoneal air. General surgery consulted. Plan for urgent surgery 7/2.      Interval History: NAEON. AF, VSS. Remained NPO. SLP consulted. CT A/P showed Small bowel containing right inguinal hernia with associated high-grade small bowel obstruction as above. There is mesenteric edema and small volume ascites. No free intraperitoneal air. General surgery consulted. Plan for emergent surgical intervention. Patient and wife consent to plan. Gen surg plan to take over as primary.     Review of Systems   Reason unable to perform ROS: Hx of dementia.   Constitutional:  Positive for activity change, appetite change and fatigue. Negative for chills and fever.   HENT:  Positive for trouble swallowing.    Respiratory:  Negative for chest tightness and shortness of breath.    Cardiovascular:  Negative for chest pain and leg swelling.   Gastrointestinal:  Negative for abdominal pain, nausea and vomiting.   Neurological:  Positive for weakness. Negative for dizziness.     Objective:     Vital Signs (Most Recent):  Temp: 98.2 °F (36.8 °C) (07/02/24 1128)  Pulse: 91 (07/02/24 1128)  Resp: 20 (07/02/24 1128)  BP: (!) 144/83 (07/02/24 1128)  SpO2: (!) 94 % (07/02/24 1128) Vital Signs (24h Range):  Temp:  [97.6 °F (36.4 °C)-98.6 °F (37 °C)] 98.2 °F (36.8 °C)  Pulse:  [] 91  Resp:  [13-20] 20  SpO2:  [1 %-100 %] 94 %  BP: (132-216)/(66-93) 144/83     Weight: 45.4 kg (100 lb)  Body mass index is 18.29 kg/m².    Intake/Output Summary (Last 24 hours) at 7/2/2024 1407  Last data filed at 7/1/2024 1957  Gross per 24 hour   Intake 500 ml   Output --   Net 500 ml         Physical Exam  Vitals and nursing note reviewed.   Constitutional:       Appearance: He is underweight.      Comments: Frail appearing   Eyes:      Pupils: Pupils are equal, round, and reactive to light.   Cardiovascular:      Rate and Rhythm: Normal rate and regular rhythm.   Pulmonary:      Effort: Pulmonary effort is normal.      Breath sounds: Normal breath sounds.   Abdominal:      Palpations: Abdomen is soft.      Tenderness: There  is no abdominal tenderness.   Musculoskeletal:         General: No tenderness.   Skin:     General: Skin is warm and dry.   Neurological:      Mental Status: He is alert and oriented to person, place, and time.      Comments: AAOx4  Able to answer questions appropriately however likely forgets question asked/ losses train of thought   Psychiatric:         Behavior: Behavior normal.             Significant Labs: All pertinent labs within the past 24 hours have been reviewed.    Significant Imaging: I have reviewed all pertinent imaging results/findings within the past 24 hours.    Assessment/Plan:      * Small bowel obstruction  - CT obtained for vague back pain & showed possible SBO  - no abdominal pain or N/V  - CT abd/pelv reviewed  - if develops any nausea or vomiting, place NG tube and consult general surgery  - cIVFs overnight   - General surgery consulted, plan for emergent surgery 7/2    Dysphagia  - satting 96% on RA  - CXR without evidence of aspiration  - SLP consulted  - keep NPO  - strict aspiration precautions     Hypotension  - on midodrine at home but unclear how often he takes it  - will hold here given hypertension     Generalized weakness  Impairment of balance   - progressive debility over the past year-- suspect multifactorial from alzheimer's/ parkinsonism, chronic gait instability, immobility, and dehydration   - no focal deficits  - CTH negative for acute findings  - CT L spine negative for acute fracture  - s/p 1L IVFs in the ED  - will give an additional 1L IVFs overnight  - PT/OT eval  - fall precautions   - Palliative consulted, appreciate assistance     DNR (do not resuscitate)  - has been DNR in the past per PCP clinic note  - patient again confirms DNR status-- will place order in chart      Aortic atherosclerosis  - continue ASA    Chronic diastolic heart failure  - appears dry on exam  - monitor volume status closely with IVFs  - strict I/Os, daily weights     Cerebrovascular accident  (CVA) due to stenosis of left carotid artery  - hx CVA in 2023  - continue ASA    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  Cognitive communication deficit   Parkinsonian features   tremor  - A&O x4, oriented to situation but occasionally slow to answer questions and loses train of thought, conversation/ answers are consistent on repeat interview  - delirium precautions       VTE Risk Mitigation (From admission, onward)           Ordered     enoxaparin injection 30 mg  Every 24 hours         07/02/24 0111     Reason for No Pharmacological VTE Prophylaxis  Once        Question:  Reasons:  Answer:  Physician Provided (leave comment)  Comment:  recent SDH    07/01/24 2134     IP VTE HIGH RISK PATIENT  Once         07/01/24 2134                    Discharge Planning   BECKY: 7/3/2024     Code Status: DNR   Is the patient medically ready for discharge?:     Reason for patient still in hospital (select all that apply): Patient trending condition, Laboratory test, Treatment, Imaging, Consult recommendations, PT / OT recommendations, and Pending disposition  Discharge Plan A: Home with family        Lovely Eubanks PA-C  Department of Hospital Medicine   [facility]

## 2024-07-02 NOTE — ASSESSMENT & PLAN NOTE
- satting 96% on RA  - CXR without evidence of aspiration  - SLP consulted  - keep NPO  - strict aspiration precautions

## 2024-07-02 NOTE — TREATMENT PLAN
General Surgery Treatment Plan     89 yo M admitted for weakness, incidentally found to have incarcerated right inguinal hernia containing bowel with associated small bowel obstruction. Plan for urgent open repair under local anesthesia, potential exploratory laparotomy. Discussed in depth with  and wife who voice understanding. Full consult to follow.     Keep NPO.    Dianna Sandoval MD  PGY-4, General Surgery  [facility]

## 2024-07-02 NOTE — ANESTHESIA PREPROCEDURE EVALUATION
07/02/2024  Pre-operative evaluation for Procedure(s) (LRB):  REPAIR, HERNIA, INGUINAL, INCARCERATED, INITIAL, AGE 5 YEARS OR OLDER (Right)    James Dc is a 89 y.o. male w/ PMHx of hypotension on midodrine, diastolic CHF, hx CVA due to carotid artery stenosis, hx SDH, parkinsonism, early onset Alzheimer's who [place] [facility] [place]. Found to have incarcerated right inguinal hernia containing bowel with associated SBO.    Alert and oriented to name, birth date and place, not year.   Ok with reversing code status for OR  Discussed will start with local only and progress to GA if needed    TTE 6/2023  The left ventricle is normal in size with normal systolic function.  Normal right ventricular size with normal right ventricular systolic function.  Mild aortic regurgitation.  The estimated ejection fraction is 60%.  Normal left ventricular diastolic function.  There is moderate aortic valve stenosis.  Aortic valve area is 1.16 cm2; peak velocity is 2.41 m/s; mean gradient is 13 mmHg (LVOT diameter 2.0 cm).  Normal central venous pressure (3 mmHg).  There is no significant tricuspid regurgitation and therefore the pulmonary artery systolic pressure cannot be reported.    6/2023  There is less than 50% right internal carotid artery stenosis.  There is greater than 70% left internal carotid artery stenosis.  There is antegrade flow in the vertebral arteries bilaterally.    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    Parkinsonian features    Recurrent falls    Physical deconditioning    Hypertensive urgency    Cerebrovascular accident (CVA) due to stenosis of left carotid artery    Subdural hematoma     Chronic diastolic heart failure    LOC (loss of consciousness)    Carotid stenosis, asymptomatic    Aortic atherosclerosis    Hypoxia, sleep related    Transient alteration of awareness    Impairment of balance    Cognitive communication deficit    Nuclear sclerotic cataract of left eye    Nuclear sclerotic cataract of right eye    Current moderate episode of major depressive disorder without prior episode    DNR (do not resuscitate)    Depression    Generalized weakness    Abnormal CT scan, lumbar spine    Hypotension    Dysphagia       Review of patient's allergies indicates:   Allergen Reactions    Celexa [citalopram] Other (See Comments)     Worsening tremor    Tetracyclines Nausea Only       No current facility-administered medications on file prior to encounter.     Current Outpatient Medications on File Prior to Encounter   Medication Sig Dispense Refill    aspirin (ECOTRIN) 81 MG EC tablet Take 1 tablet (81 mg total) by mouth once daily. (Patient not taking: Reported on 4/24/2024) 30 tablet 3    B6/FA/B12/co Q10/herb no.225 (HEALTHY HEART COMPLEX ORAL) Take by mouth. (Patient not taking: Reported on 4/24/2024)      ketorolac 0.5% (ACULAR) 0.5 % Drop Place into both eyes. (Patient not taking: Reported on 4/24/2024)      midodrine (PROAMATINE) 5 MG Tab Take 1 tablet (5 mg total) by mouth 3 (three) times daily. 90 tablet 1    MULTIVITAMIN ORAL Take 1 tablet by mouth once daily.      ofloxacin (OCUFLOX) 0.3 % ophthalmic solution Place 1 drop into the left eye. for ten days (Patient not taking: Reported on 4/24/2024)      UNABLE TO FIND Healthy cholesterol (Patient not taking: Reported on 4/24/2024)         Past Surgical History:   Procedure Laterality Date    CATARACT EXTRACTION W/  INTRAOCULAR LENS IMPLANT Left 10/10/2023    Procedure: EXTRACTION, CATARACT, WITH IOL INSERTION;  Surgeon: Lon Carr MD;  Location: [facility];  Service: Ophthalmology;  Laterality: Left;    CATARACT EXTRACTION W/   INTRAOCULAR LENS IMPLANT Right 10/24/2023    Procedure: EXTRACTION, CATARACT, WITH IOL INSERTION;  Surgeon: Lon Carr MD;  Location: [facility];  Service: Ophthalmology;  Laterality: Right;    TONSILLECTOMY         Social History     Socioeconomic History    Marital status:    Tobacco Use    Smoking status: Former    Smokeless tobacco: Former   Substance and Sexual Activity    Alcohol use: Yes    Drug use: No    Sexual activity: Yes     Partners: Female     Social Determinants of Health     Financial Resource Strain: Patient Declined (12/20/2023)    Overall Financial Resource Strain (CARDIA)     Difficulty of Paying Living Expenses: Patient declined   Food Insecurity: Patient Declined (12/20/2023)    Hunger Vital Sign     Worried About Running Out of Food in the Last Year: Patient declined     Ran Out of Food in the Last Year: Patient declined   Transportation Needs: Patient Declined (12/20/2023)    PRAPARE - Transportation     Lack of Transportation (Medical): Patient declined     Lack of Transportation (Non-Medical): Patient declined   Physical Activity: Unknown (12/20/2023)    Exercise Vital Sign     Days of Exercise per Week: Patient declined   Stress: No Stress Concern Present (6/14/2023)    [employer] - Occupational Stress Questionnaire     Feeling of Stress : Not at all   Housing Stability: Patient Declined (12/20/2023)    Housing Stability Vital Sign     Unable to Pay for Housing in the Last Year: Patient declined     Number of Places Lived in the Last Year: 1     Unstable Housing in the Last Year: Patient declined         CBC:   Recent Labs     07/01/24  1716 07/02/24  0631   WBC 8.26 7.46   RBC 4.60 4.37*   HGB 14.8 13.9*   HCT 43.1 42.2    193   MCV 94 97   MCH 32.2* 31.8*   MCHC 34.3 32.9       CMP:   Recent Labs     07/01/24  1716 07/02/24  0631    145   K 3.8 3.5    105   CO2 27 27   BUN 32* 33*   CREATININE 1.0 0.8   GLU 79 66*   MG  "2.2 2.1   PHOS  --  4.1   CALCIUM 10.2 9.5   ALBUMIN 3.6 3.1*   PROT 6.7 5.5*   ALKPHOS 52* 50*   * 73*   AST 73* 40   BILITOT 1.0 1.0       INR  No results for input(s): "PT", "INR", "PROTIME", "APTT" in the last 72 hours.        Diagnostic Studies:      EKD Echo:  No results found for this or any previous visit.       Pre-op Assessment    I have reviewed the Patient Summary Reports.     I have reviewed the Nursing Notes. I have reviewed the NPO Status.   I have reviewed the Medications.     Review of Systems      Physical Exam  General: Well nourished and Cooperative    Airway:  Mallampati: II   Mouth Opening: Normal  TM Distance: Normal  Tongue: Normal  Neck ROM: Normal ROM    Chest/Lungs:  Clear to auscultation, Normal Respiratory Rate    Heart:  Rate: Normal  Rhythm: Regular Rhythm  Sounds: Normal        Anesthesia Plan  Type of Anesthesia, risks & benefits discussed:    Anesthesia Type: Gen Natural Airway, Gen ETT  Intra-op Monitoring Plan: Standard ASA Monitors  Post Op Pain Control Plan: multimodal analgesia and IV/PO Opioids PRN  Induction:  IV  Airway Plan: Direct and Video, Post-Induction  Informed Consent: Informed consent signed with the Patient and all parties understand the risks and agree with anesthesia plan.  All questions answered.   ASA Score: 4    Ready For Surgery From Anesthesia Perspective.     .      "

## 2024-07-02 NOTE — CONSULTS
Consult acknowledged. Discussed case with primary team. Briefly, patient is a 89 year old male admitted for weakness, found to have a high grade small bowel obstruction. Palliative care consulted for goals of care.    Prior to visit, discussed case with general surgery resident who had a long discussion with patient and wife at bedside who are electing to move forward with any offered surgical intervention, accepting risks involved. Goals are clear. Wish to remain DNR but would rescind for a procedure    -will defer formal consultation at this time, given the above  -will follow peripherally if condition or goals change  -recommend clear and direct communication regarding prognosis and treatment options as a prerequisite for quality goals of care discussions    Remberto Rice M.D.  Department of Hospice and Palliative Medicine  [facility]

## 2024-07-02 NOTE — ASSESSMENT & PLAN NOTE
Impairment of balance   - progressive debility over the past year-- suspect multifactorial from alzheimer's/ parkinsonism, chronic gait instability, immobility, and dehydration   - no focal deficits  - CTH negative for acute findings  - CT L spine negative for acute fracture  - s/p 1L IVFs in the ED  - will give an additional 1L IVFs overnight  - PT/OT eval  - fall precautions

## 2024-07-02 NOTE — HPI
James Dc is a 89 y.o. male with a PMHx of hypotension on midodrine, diastolic CHF, hx CVA due to carotid artery stenosis, hx SDH, parkinsonism, early onset Alzheimer's who presents to [facility] [place] of generalized weakness. Patient is oriented to person, place, year and president. He does have some memory issues at baseline but is able to provide recent history. Patient reports progressive generalized weakness for quite some time which worsened over the last few weeks. He attributes it to his msucle weakness/atrophy from not moving much/ not working out. He's unable to lift himself out of his chair due to the weakness. Upon ROS questioning, patient admits to nonspecific low back pain for the past 1 months. Reports his feet occasionally feel like they're falling asleep. Additionally, he reports difficulty swallowing, poor appetite and decreasing PO intake. Denies sore throat, globus sensation, chest pain, SOB, or leg swelling. No saddle anesthesia or bowel/ bladder incontinence.     ED: hypertensive to /93, improved to SBP 140s without intervention. No leukocytosis or electrolyte abnormalities. CT head without acute findings. CT L spine with possible SBO, no acute spinal fracture. Given 1L IVFs.

## 2024-07-02 NOTE — ASSESSMENT & PLAN NOTE
Malnutrition Type:  Context: acute illness or injury  Level: severe    Related to (etiology):   SBO    Signs and Symptoms (as evidenced by):   Moderate muscle fat wasting and Low PO intake    Malnutrition Characteristic Summary:  Energy Intake (Malnutrition): less than or equal to 50% for greater than or equal to 5 days  Subcutaneous Fat (Malnutrition): moderate depletion  Muscle Mass (Malnutrition): moderate depletion    Interventions/Recommendations (treatment strategy):  Collaboration of nutritional care with other providers.    Nutrition Diagnosis Status:   New

## 2024-07-02 NOTE — NURSING
Patient educated on bed controls/ call system. Patient verbalized understanding. Bed lowest position, bed alarm on. Reviewed plan of with patient, verbalized understanding.

## 2024-07-02 NOTE — HOSPITAL COURSE
James Dc is a 89 y.o. male admitted to  for generalized weakness. CT A/P showed small bowel containing right inguinal hernia with associated high-grade small bowel obstruction as above. There is mesenteric edema and small volume ascites. No free intraperitoneal air. General surgery consulted. Plan for urgent surgery 7/2.

## 2024-07-02 NOTE — PLAN OF CARE
Problem: Adult Inpatient Plan of Care  Goal: Plan of Care Review  Outcome: Progressing  Goal: Patient-Specific Goal (Individualized)  Outcome: Progressing  Goal: Absence of Hospital-Acquired Illness or Injury  Outcome: Progressing  Goal: Optimal Comfort and Wellbeing  Outcome: Progressing  Goal: Readiness for Transition of Care  Outcome: Progressing     Problem: Infection  Goal: Absence of Infection Signs and Symptoms  Outcome: Progressing     Problem: Fall Injury Risk  Goal: Absence of Fall and Fall-Related Injury  Outcome: Progressing     Problem: Skin Injury Risk Increased  Goal: Skin Health and Integrity  Outcome: Progressing     Problem: Heart Failure  Goal: Optimal Coping  Outcome: Progressing  Goal: Optimal Cardiac Output  Outcome: Progressing  Goal: Stable Heart Rate and Rhythm  Outcome: Progressing  Goal: Optimal Functional Ability  Outcome: Progressing  Goal: Fluid and Electrolyte Balance  Outcome: Progressing  Goal: Improved Oral Intake  Outcome: Progressing  Goal: Effective Oxygenation and Ventilation  Outcome: Progressing  Goal: Effective Breathing Pattern During Sleep  Outcome: Progressing     Problem: Depressive Signs/Symptoms  Goal: Optimized Energy Level (Depressive Signs/Symptoms)  Outcome: Progressing  Goal: Optimized Cognitive Function (Depressive Signs/Symptoms)  Outcome: Progressing  Goal: Increased Participation and Engagement (Depressive Signs/Symptoms)  Outcome: Progressing  Goal: Enhanced Self-Esteem and Confidence (Depressive Signs/Symptoms)  Outcome: Progressing  Goal: Improved Mood Symptoms (Depressive Signs/Symptoms)  Outcome: Progressing  Goal: Optimized Nutrition Intake (Depressive Signs/Symptoms)  Outcome: Progressing  Goal: Improved Psychomotor Symptoms (Depressive Signs/Symptoms)  Outcome: Progressing  Goal: Improved Sleep (Depressive Signs/Symptoms)  Outcome: Progressing  Goal: Enhanced Social, Occupational or Functional Skills (Depressive Signs/Symptoms)  Outcome: Progressing      Problem: Fatigue  Goal: Improved Activity Tolerance  Outcome: Progressing     Problem: Sensory Impairment  Goal: Optimal Sensory Management  Outcome: Progressing     Problem: Mobility Impairment  Goal: Optimal Mobility [place] and Safety  Outcome: Progressing

## 2024-07-02 NOTE — PLAN OF CARE
Recommendations     1.) If and when medically able, recommend to ADAT.     2.) If diet advances and PO intake remains low, recommend Boost plus BID to help meet needs.      3.) Re-consult if nutrition support medically warranted.      4.) RD to monitor wt, PO intake, skin, labs.      Goals: to meet 75-10% of EEN/EPN by next RD f/u  Nutrition Goal Status: new  Communication of RD Recs:  (POC)

## 2024-07-02 NOTE — ASSESSMENT & PLAN NOTE
Cognitive communication deficit   Parkinsonian features   tremor  - A&O x4, oriented to situation but occasionally slow to answer questions and loses train of thought, conversation/ answers are consistent on repeat interview  - delirium precautions

## 2024-07-02 NOTE — ASSESSMENT & PLAN NOTE
- CT obtained for vague back pain & showed possible SBO  - no abdominal pain or N/V  - CT abd/pelv ordered  - if develops any nausea or vomiting, place NG tube and consult general surgery  - cIVFs overnight

## 2024-07-02 NOTE — ASSESSMENT & PLAN NOTE
- has been DNR in the past per PCP clinic note  - patient again confirms DNR status-- will place order in chart

## 2024-07-02 NOTE — SUBJECTIVE & OBJECTIVE
No current facility-administered medications on file prior to encounter.     Current Outpatient Medications on File Prior to Encounter   Medication Sig    aspirin (ECOTRIN) 81 MG EC tablet Take 1 tablet (81 mg total) by mouth once daily. (Patient not taking: Reported on 4/24/2024)    B6/FA/B12/co Q10/herb no.225 (HEALTHY HEART COMPLEX ORAL) Take by mouth. (Patient not taking: Reported on 4/24/2024)    ketorolac 0.5% (ACULAR) 0.5 % Drop Place into both eyes. (Patient not taking: Reported on 4/24/2024)    midodrine (PROAMATINE) 5 MG Tab Take 1 tablet (5 mg total) by mouth 3 (three) times daily.    MULTIVITAMIN ORAL Take 1 tablet by mouth once daily.    ofloxacin (OCUFLOX) 0.3 % ophthalmic solution Place 1 drop into the left eye. for ten days (Patient not taking: Reported on 4/24/2024)    UNABLE TO FIND Healthy cholesterol (Patient not taking: Reported on 4/24/2024)       Review of patient's allergies indicates:   Allergen Reactions    Celexa [citalopram] Other (See Comments)     Worsening tremor    Tetracyclines Nausea Only       Past Medical History:   Diagnosis Date    Essential tremor     Hypertensive urgency 6/13/2023    Nuclear sclerosis - Both Eyes 9/19/2012     Past Surgical History:   Procedure Laterality Date    CATARACT EXTRACTION W/  INTRAOCULAR LENS IMPLANT Left 10/10/2023    Procedure: EXTRACTION, CATARACT, WITH IOL INSERTION;  Surgeon: Lon Carr MD;  Location: [facility] OR;  Service: Ophthalmology;  Laterality: Left;    CATARACT EXTRACTION W/  INTRAOCULAR LENS IMPLANT Right 10/24/2023    Procedure: EXTRACTION, CATARACT, WITH IOL INSERTION;  Surgeon: Lon Carr MD;  Location: [facility] OR;  Service: Ophthalmology;  Laterality: Right;    TONSILLECTOMY       Family History       Problem Relation (Age of Onset)    Cataracts Son    Prostate cancer Father          Tobacco Use    Smoking status: Former    Smokeless tobacco: Former   Substance and Sexual Activity    Alcohol use: Yes    Drug use:  No    Sexual activity: Yes     Partners: Female     Review of Systems   Constitutional:  Positive for activity change, appetite change and fatigue. Negative for fever.   Respiratory:  Negative for shortness of breath.    Gastrointestinal:  Positive for abdominal pain. Negative for abdominal distention, nausea and vomiting.   Genitourinary: Negative.    Musculoskeletal: Negative.    Skin: Negative.    Psychiatric/Behavioral: Negative.       Objective:     Vital Signs (Most Recent):  Temp: 98.2 °F (36.8 °C) (07/02/24 1128)  Pulse: 91 (07/02/24 1128)  Resp: 20 (07/02/24 1128)  BP: (!) 144/83 (07/02/24 1128)  SpO2: (!) 94 % (07/02/24 1128) Vital Signs (24h Range):  Temp:  [97.6 °F (36.4 °C)-98.6 °F (37 °C)] 98.2 °F (36.8 °C)  Pulse:  [] 91  Resp:  [13-20] 20  SpO2:  [1 %-100 %] 94 %  BP: (132-216)/(66-93) 144/83     Weight: 45.4 kg (100 lb)  Body mass index is 18.29 kg/m².     Physical Exam  Vitals and nursing note reviewed.   Constitutional:       Appearance: He is ill-appearing.   HENT:      Head: Normocephalic and atraumatic.   Cardiovascular:      Rate and Rhythm: Normal rate and regular rhythm.   Pulmonary:      Effort: Pulmonary effort is normal. No respiratory distress.   Abdominal:      General: Abdomen is flat. There is no distension.      Palpations: Abdomen is soft.      Tenderness: There is no abdominal tenderness. There is no guarding.      Comments: Right inguinal hernia with hard, tender loop of bowel, nonreducible hernia   No overlying skin changes   Neurological:      General: No focal deficit present.      Mental Status: He is oriented to person, place, and time.   Psychiatric:         Mood and Affect: Mood normal.         Behavior: Behavior normal.            I have reviewed all pertinent lab results within the past 24 hours.  CBC:   Recent Labs   Lab 07/02/24  0631   WBC 7.46   RBC 4.37*   HGB 13.9*   HCT 42.2      MCV 97   MCH 31.8*   MCHC 32.9     CMP:   Recent Labs   Lab  07/02/24  0631   GLU 66*   CALCIUM 9.5   ALBUMIN 3.1*   PROT 5.5*      K 3.5   CO2 27      BUN 33*   CREATININE 0.8   ALKPHOS 50*   ALT 73*   AST 40   BILITOT 1.0       Significant Diagnostics:  I have reviewed all pertinent imaging results/findings within the past 24 hours.

## 2024-07-02 NOTE — SUBJECTIVE & OBJECTIVE
Interval History: NAEON. AF, VSS. Remained NPO. SLP consulted. CT A/P showed Small bowel containing right inguinal hernia with associated high-grade small bowel obstruction as above. There is mesenteric edema and small volume ascites. No free intraperitoneal air. General surgery consulted. Plan for emergent surgical intervention. Patient and wife consent to plan. Gen surg plan to take over as primary.     Review of Systems   Reason unable to perform ROS: Hx of dementia.   Constitutional:  Positive for activity change, appetite change and fatigue. Negative for chills and fever.   HENT:  Positive for trouble swallowing.    Respiratory:  Negative for chest tightness and shortness of breath.    Cardiovascular:  Negative for chest pain and leg swelling.   Gastrointestinal:  Negative for abdominal pain, nausea and vomiting.   Neurological:  Positive for weakness. Negative for dizziness.     Objective:     Vital Signs (Most Recent):  Temp: 98.2 °F (36.8 °C) (07/02/24 1128)  Pulse: 91 (07/02/24 1128)  Resp: 20 (07/02/24 1128)  BP: (!) 144/83 (07/02/24 1128)  SpO2: (!) 94 % (07/02/24 1128) Vital Signs (24h Range):  Temp:  [97.6 °F (36.4 °C)-98.6 °F (37 °C)] 98.2 °F (36.8 °C)  Pulse:  [] 91  Resp:  [13-20] 20  SpO2:  [1 %-100 %] 94 %  BP: (132-216)/(66-93) 144/83     Weight: 45.4 kg (100 lb)  Body mass index is 18.29 kg/m².    Intake/Output Summary (Last 24 hours) at 7/2/2024 1407  Last data filed at 7/1/2024 1957  Gross per 24 hour   Intake 500 ml   Output --   Net 500 ml         Physical Exam  Vitals and nursing note reviewed.   Constitutional:       Appearance: He is underweight.      Comments: Frail appearing   Eyes:      Pupils: Pupils are equal, round, and reactive to light.   Cardiovascular:      Rate and Rhythm: Normal rate and regular rhythm.   Pulmonary:      Effort: Pulmonary effort is normal.      Breath sounds: Normal breath sounds.   Abdominal:      Palpations: Abdomen is soft.      Tenderness: There is no  abdominal tenderness.   Musculoskeletal:         General: No tenderness.   Skin:     General: Skin is warm and dry.   Neurological:      Mental Status: He is alert and oriented to person, place, and time.      Comments: AAOx4  Able to answer questions appropriately however likely forgets question asked/ losses train of thought   Psychiatric:         Behavior: Behavior normal.             Significant Labs: All pertinent labs within the past 24 hours have been reviewed.    Significant Imaging: I have reviewed all pertinent imaging results/findings within the past 24 hours.

## 2024-07-02 NOTE — PLAN OF CARE
Problem: Occupational Therapy  Goal: Occupational Therapy Goal  Description: Goals to be met by: 8/1/24     Patient will increase functional independence with ADLs by performing:    UE Dressing with Stand-by Assistance.  LE Dressing with Stand-by Assistance.  Grooming while standing at sink with Stand-by Assistance.  Toileting from toilet with Stand-by Assistance for hygiene and clothing management.   All functional transfers performed with SBA    Outcome: Progressing

## 2024-07-02 NOTE — PT/OT/SLP EVAL
Speech Language Pathology Evaluation  Bedside Swallow    Patient Name:  James Dc   MRN:  [id number]  Admitting Diagnosis: Small bowel obstruction    Recommendations:                 General Recommendations:   Ongoing Swallow Assessment  Diet recommendations:  NPO, NPO (ice chips sparingly, puree for pleasure)  Aspiration Precautions: 1 bite/sip at a time, Eliminate distractions, Feed only when awake/alert, HOB to 90 degrees, Ice chips sparingly, Meds crushed in puree, Puree for pleasure, Small bites/sips, and Strict aspiration precautions   General Precautions: Standard, fall, aspiration  Communication strategies:  none    Assessment:     James Dc is a 89 y.o. male with an SLP diagnosis of Dysphagia.  He presents with progressive dysphagia related to current comorbities.    History:     Past Medical History:   Diagnosis Date    Essential tremor     Hypertensive urgency 6/13/2023    Nuclear sclerosis - Both Eyes 9/19/2012       Past Surgical History:   Procedure Laterality Date    CATARACT EXTRACTION W/  INTRAOCULAR LENS IMPLANT Left 10/10/2023    Procedure: EXTRACTION, CATARACT, WITH IOL INSERTION;  Surgeon: Lon Carr MD;  [facility]  Service: Ophthalmology;  Laterality: Left;    CATARACT EXTRACTION W/  INTRAOCULAR LENS IMPLANT Right 10/24/2023    Procedure: EXTRACTION, CATARACT, WITH IOL INSERTION;  Surgeon: Lon Carr MD;  [facility];  Service: Ophthalmology;  Laterality: Right;    TONSILLECTOMY       Prior Intubation HX: none this admit    Modified Barium Swallow: none found on file    Chest X-Rays: no acute abnormality    Prior diet: unclear recollection from pt    Subjective     SLP communicated with RN prior to entry and received clearance for therapy this date.   Pt awake/alert and agreeable to participate.     Pain/Comfort:  Pain Rating 1: 0/10    Respiratory Status: Room air    Objective:     Oral Musculature Evaluation  Oral Musculature: general  weakness  Secretion Management: adequate  Mucosal Quality: adequate  Lingual Strength and Mobility: impaired strength  Volitional Cough: weak  Volitional Swallow: delayed  Voice Prior to PO Intake: clear quality    Bedside Swallow Eval:   Consistencies Assessed:  Thin liquids - x3 cup-edge sips water  Puree - x2 tsp pudding      Oral Phase:   Decreased closure around utensil  Slow oral transit time    Pharyngeal Phase:   coughing/choking following all thin liquid trials  decreased hyolaryngeal excursion to palpation and multiple spontaneous swallows across all trials    Compensatory Strategies  None    SLP offered to trial thickened liquids at the bedside, however transport arrived to take the pt to his CT scan. SLP and pt discussed return this afternoon to complete swallow evaluation.     Upon return to the bedside, the pt was being assessed by MD and his spouse was present at the bedside. SLP educated the pt's spouse re: details of prior assessment, current impressions, limited recs, and plan for trials of thickened liquids. She expressed agreement but limited understanding. MD at bedside notified SLP that ongoing trials were to be deferred, as the pt was pending an emergent procedure 2/2 incidental hernia and bowel obstruction findings.     Goals:   Multidisciplinary Problems       SLP Goals          Problem: SLP    Goal Priority Disciplines Outcome   SLP Goal     SLP Progressing   Description: Speech Language Pathology Goals  Goals expected to be met by 7/9:  1. Pt will participate in ongoing assessment of swallow function to determine safest and least restrictive diet.                        Plan:     Patient to be seen:  4 x/week   Plan of Care expires:  07/31/24  Plan of Care reviewed with:  patient, spouse   SLP Follow-Up:  Yes       Discharge recommendations:   (tbd)     Time Tracking:     SLP Treatment Date:   07/02/24  Speech Start Time:  0909 (1250)  Speech Stop Time:  0932  (1300)  Speech Total Time  (min):  23 min + 10 min -- 33 min    Billable Minutes: Eval Swallow and Oral Function 10 and Self Care/Home Management Training 23 07/02/2024

## 2024-07-02 NOTE — ASSESSMENT & PLAN NOTE
Impairment of balance   - progressive debility over the past year-- suspect multifactorial from alzheimer's/ parkinsonism, chronic gait instability, immobility, and dehydration   - no focal deficits  - CTH negative for acute findings  - CT L spine negative for acute fracture  - s/p 1L IVFs in the ED  - will give an additional 1L IVFs overnight  - PT/OT eval  - fall precautions   - Palliative consulted, appreciate assistance

## 2024-07-02 NOTE — CONSULTS
[facility] (2nd Fl)  Adult Nutrition  Consult Note    SUMMARY     Recommendations    1.) If and when medically able, recommend to ADAT.    2.) If diet advances and PO intake remains low, recommend Boost plus BID to help meet needs.     3.) Re-consult if nutrition support medically warranted.     4.) RD to monitor wt, PO intake, skin, labs.     Goals: to meet 75-10% of EEN/EPN by next RD f/u  Nutrition Goal Status: new  Communication of RD Recs:  (POC)    Assessment and Plan    Endocrine  Severe malnutrition  Malnutrition Type:  Context: acute illness or injury  Level: severe    Related to (etiology):   SBO    Signs and Symptoms (as evidenced by):   Moderate muscle fat wasting and Low PO intake    Malnutrition Characteristic Summary:  Energy Intake (Malnutrition): less than or equal to 50% for greater than or equal to 5 days  Subcutaneous Fat (Malnutrition): moderate depletion  Muscle Mass (Malnutrition): moderate depletion    Interventions/Recommendations (treatment strategy):  Collaboration of nutritional care with other providers.    Nutrition Diagnosis Status:   New         Malnutrition Assessment  Malnutrition Context: acute illness or injury  Malnutrition Level: severe          Energy Intake (Malnutrition): less than or equal to 50% for greater than or equal to 5 days  Subcutaneous Fat (Malnutrition): moderate depletion  Muscle Mass (Malnutrition): moderate depletion     Reason for Assessment    Reason For Assessment: consult  Diagnosis:  (generalized weakness)  Relevant Medical History: hypotension, CHF, hx CVA, hx SDH, parkinsonism, early onset Alzheimer's  Interdisciplinary Rounds: did not attend    General Information Comments: RD consulted for decreased appetite. Pt was pleasantly confused, and was only able to answer few RD's questions. Pt denies n/v/d/c. Caregiver was present in the room and jeremias to help with RD's questions. Caregiver stated that Pt's last meal was on Saturday. Pt had CT scan,  "found SBO 2/2 hiatal hernia. Taken to OR for repair today. Pt was not able to say UBW. Per chart review, - 115#, #. NFPE was performed on 7/2: RD feels pt meets the criteria for severe malnutrition in the context of acute illness. Please see PES for details. RD team to continue to monitor and f/u.     Nutrition Discharge Planning: adequate PO intake and ONS of choice    Nutrition Related Social Determinants of Health: SDOH: None Identified     Nutrition Risk Screen    Nutrition Risk Screen: difficulty chewing/swallowing, reduced oral intake over the last month, unintentional loss of 10 lbs or more in the past 2 months    Nutrition/Diet History    Patient Reported Diet/Restrictions/Preferences: general  Typical Food/Fluid Intake: 3 meals /day + snacks  Spiritual, Cultural Beliefs, Rastafari Practices, Values that Affect Care: no  Food Allergies: NKFA  Factors Affecting Nutritional Intake: decreased appetite    Anthropometrics    Temp: 98.2 °F (36.8 °C)  Height Method: Stated  Height: 5' 2" (157.5 cm)  Height (inches): 62 in  Weight Method: Bed Scale  Weight: 45.4 kg (100 lb)  Weight (lb): 100 lb  Ideal Body Weight (IBW), Male: 118 lb  % Ideal Body Weight, Male (lb): 84.75 %  BMI (Calculated): 18.3    Lab/Procedures/Meds    Pertinent Labs Reviewed: reviewed  Pertinent Labs Comments: BUN: 33, ALP: 50, PRO: 5.5, alb: 3.1, ALT: 73    Pertinent Medications Reviewed: reviewed  Pertinent Medications Comments: Enoxaparin, lactated ringers    Estimated/Assessed Needs    Weight Used For Calorie Calculations: 45.4 kg (100 lb 1.4 oz)  Energy Calorie Requirements (kcal): 1362- 1589 kcal  Energy Need Method: Kcal/kg (30-35 kcal/kg)    Protein Requirements: 45- 55g (1.0- 1.2 g/kg)  Weight Used For Protein Calculations: 45.4 kg (100 lb 1.4 oz)    Fluid Requirements (mL): 1ml/1kcal or per MD  Estimated Fluid Requirement Method: RDA Method  RDA Method (mL): 1362    Nutrition Prescription Ordered    Current Diet Order: " NPO    Evaluation of Received Nutrient/Fluid Intake    I/O: +500ml since admit  Energy Calories Required: not meeting needs  Protein Required: not meeting needs  Fluid Required:  (as per MD)  Comments: LBM 6/24  % Intake of Estimated Energy Needs: 0 - 25 %  % Meal Intake: NPO    Nutrition Risk    Level of Risk/Frequency of Follow-up:  (RD to f/u x 1/week)     Monitor and Evaluation    Food and Nutrient Intake: energy intake  Food and Nutrient Adminstration: diet order  Anthropometric Measurements: weight, weight change, body mass index  Biochemical Data, Medical Tests and Procedures: electrolyte and renal panel, gastrointestinal profile, glucose/endocrine profile, inflammatory profile  Nutrition-Focused Physical Findings: overall appearance, skin     Nutrition Follow-Up    RD Follow-up?: Yes

## 2024-07-02 NOTE — ASSESSMENT & PLAN NOTE
- CT obtained for vague back pain & showed possible SBO  - no abdominal pain or N/V  - CT abd/pelv reviewed  - if develops any nausea or vomiting, place NG tube and consult general surgery  - cIVFs overnight   - General surgery consulted, plan for emergent surgery 7/2

## 2024-07-02 NOTE — CONSULTS
Rogelio brent - Surgery ([facility])  General Surgery  Consult Note    Patient Name: James Dc  MRN: [id number]  Code Status: DNR  Admission Date: 7/1/2024  Hospital Length of Stay: 0 days  Attending Physician: Gulshan Alex MD  Primary Care Provider: Tim Joya MD    Patient information was obtained from patient, spouse/SO, past medical records, and ER records.     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 He and wife report him straining very hard for a bowel movement on Friday and having abdominal pain following this and into Saturday. Sunday he noted new weakness to the point where he had difficulty rising from his chair which is very abnormal for him. This persisted into Monday, ultimately prompting ED presentation.     He was hypertensive on arrival to ED, labs largely unremarkable. CTH was obtained without acute findings. CT L spine was obtained given lower back pain which showed possible SBO and a dedicated CT abdomen pelvis was obtained as follow up. This demonstrated right inguinal hernia containing small knuckle of bowel with proximal obstruction. General surgery was consulted for these findings.     No prior abdominal surgery   Lived at home with wife independently, ambulatory, prior to this admission     No current facility-administered medications on file prior to encounter.     Current Outpatient Medications on File Prior to Encounter    Medication Sig    aspirin (ECOTRIN) 81 MG EC tablet Take 1 tablet (81 mg total) by mouth once daily. (Patient not taking: Reported on 4/24/2024)    B6/FA/B12/co Q10/herb no.225 (HEALTHY HEART COMPLEX ORAL) Take by mouth. (Patient not taking: Reported on 4/24/2024)    ketorolac 0.5% (ACULAR) 0.5 % Drop Place into both eyes. (Patient not taking: Reported on 4/24/2024)    midodrine (PROAMATINE) 5 MG Tab Take 1 tablet (5 mg total) by mouth 3 (three) times daily.    MULTIVITAMIN ORAL Take 1 tablet by mouth once daily.    ofloxacin (OCUFLOX) 0.3 % ophthalmic solution Place 1 drop into the left eye. for ten days (Patient not taking: Reported on 4/24/2024)    UNABLE TO FIND Healthy cholesterol (Patient not taking: Reported on 4/24/2024)       Review of patient's allergies indicates:   Allergen Reactions    Celexa [citalopram] Other (See Comments)     Worsening tremor    Tetracyclines Nausea Only       Past Medical History:   Diagnosis Date    Essential tremor     Hypertensive urgency 6/13/2023    Nuclear sclerosis - Both Eyes 9/19/2012     Past Surgical History:   Procedure Laterality Date    CATARACT EXTRACTION W/  INTRAOCULAR LENS IMPLANT Left 10/10/2023    Procedure: EXTRACTION, CATARACT, WITH IOL INSERTION;  Surgeon: Lon Carr MD;  Location: [facility] OR;  Service: Ophthalmology;  Laterality: Left;    CATARACT EXTRACTION W/  INTRAOCULAR LENS IMPLANT Right 10/24/2023    Procedure: EXTRACTION, CATARACT, WITH IOL INSERTION;  Surgeon: Lon Carr MD;  Location: [facility] OR;  Service: Ophthalmology;  Laterality: Right;    TONSILLECTOMY       Family History       Problem Relation (Age of Onset)    Cataracts Son    Prostate cancer Father          Tobacco Use    Smoking status: Former    Smokeless tobacco: Former   Substance and Sexual Activity    Alcohol use: Yes    Drug use: No    Sexual activity: Yes     Partners: Female     Review of Systems   Constitutional:  Positive for activity change, appetite change  and fatigue. Negative for fever.   Respiratory:  Negative for shortness of breath.    Gastrointestinal:  Positive for abdominal pain. Negative for abdominal distention, nausea and vomiting.   Genitourinary: Negative.    Musculoskeletal: Negative.    Skin: Negative.    Psychiatric/Behavioral: Negative.       Objective:     Vital Signs (Most Recent):  Temp: 98.2 °F (36.8 °C) (07/02/24 1128)  Pulse: 91 (07/02/24 1128)  Resp: 20 (07/02/24 1128)  BP: (!) 144/83 (07/02/24 1128)  SpO2: (!) 94 % (07/02/24 1128) Vital Signs (24h Range):  Temp:  [97.6 °F (36.4 °C)-98.6 °F (37 °C)] 98.2 °F (36.8 °C)  Pulse:  [] 91  Resp:  [13-20] 20  SpO2:  [1 %-100 %] 94 %  BP: (132-216)/(66-93) 144/83     Weight: 45.4 kg (100 lb)  Body mass index is 18.29 kg/m².     Physical Exam  Vitals and nursing note reviewed.   Constitutional:       Appearance: He is ill-appearing.   HENT:      Head: Normocephalic and atraumatic.   Cardiovascular:      Rate and Rhythm: Normal rate and regular rhythm.   Pulmonary:      Effort: Pulmonary effort is normal. No respiratory distress.   Abdominal:      General: Abdomen is flat. There is no distension.      Palpations: Abdomen is soft.      Tenderness: There is no abdominal tenderness. There is no guarding.      Comments: Right inguinal hernia with hard, tender loop of bowel, nonreducible hernia   No overlying skin changes   Neurological:      General: No focal deficit present.      Mental Status: He is oriented to person, place, and time.   Psychiatric:         Mood and Affect: Mood normal.         Behavior: Behavior normal.            I have reviewed all pertinent lab results within the past 24 hours.  CBC:   Recent Labs   Lab 07/02/24  0631   WBC 7.46   RBC 4.37*   HGB 13.9*   HCT 42.2      MCV 97   MCH 31.8*   MCHC 32.9     CMP:   Recent Labs   Lab 07/02/24  0631   GLU 66*   CALCIUM 9.5   ALBUMIN 3.1*   PROT 5.5*      K 3.5   CO2 27      BUN 33*   CREATININE 0.8   ALKPHOS 50*   ALT  73*   AST 40   BILITOT 1.0       Significant Diagnostics:  I have reviewed all pertinent imaging results/findings within the past 24 hours.    Assessment/Plan:     * Small bowel obstruction  89 yo M w/ above history found to have incarcerated inguinal hernia with apparent closed loop small bowel obstruction. No nausea or gastric distention at this point - he does have a firm loop of bowel incarcerated within his groin that is tender to attempts at reduction. This is a difficult situation as he is DNR, quite elderly and was previously independent with wishes to return to that state. We discussed the natural history of incarcerated hernias containing small bowel with associated small bowel obstructions including potential for non-viable bowel and needs for potential laparotomy and small bowel resection. My fear is that untreated, this hernia will likely lead to death of the incarcerated small bowel in addition to worsening obstruction symptoms and ultimately death. He is high risk for surgery but at this point the benefits outweigh the potential risks and both him and his wife would like to proceed with attempts at repair.     - OR for urgent inguinal hernia repair, possible small bowel resection. Will attempt to perform open inguinal under local anesthesia with possibility of conversion to laparotomy and small bowel resection if there is nonviable bowel.   - NPO, mIVF   - Transition to surgical team as primary team following operative repair        VTE Risk Mitigation (From admission, onward)           Ordered     enoxaparin injection 30 mg  Every 24 hours         07/02/24 0111     Reason for No Pharmacological VTE Prophylaxis  Once        Question:  Reasons:  Answer:  Physician Provided (leave comment)  Comment:  recent SDH    07/01/24 2134     IP VTE HIGH RISK PATIENT  Once         07/01/24 2134                    Thank you for your consult. I will follow-up with patient. Please contact us if you have any  additional questions.    Dianna Sandoval MD  General Surgery  [facility] - Surgery (2nd Fl)

## 2024-07-02 NOTE — PT/OT/SLP EVAL
"Occupational Therapy   Evaluation    Name: James Dc  MRN: [id number]  Admitting Diagnosis: Small bowel obstruction  Recent Surgery: Procedure(s) (LRB):  REPAIR, HERNIA, INGUINAL, INCARCERATED, INITIAL, AGE 5 YEARS OR OLDER (Right) Day of Surgery    Recommendations:     Discharge Recommendations: Moderate Intensity Therapy  Discharge Equipment Recommendations:  other (see comments) (TBD)  Barriers to discharge:  Other (Comment) (increasing assistance needed)    Assessment:   CO-TX with PT for pt safety and max participation with both disciplines.    James Dc is a 89 y.o. male with a medical diagnosis of Small bowel obstruction.  He presents with fair-poor truncal control needed for balance. Performance deficits affecting function: weakness, impaired self care skills, impaired functional mobility, gait instability, impaired balance, decreased upper extremity function, decreased lower extremity function, impaired coordination, decreased ROM, decreased coordination.  PTA, pt reports being indp and wife assisting when needed. Upon eval, pt requires increased time to perform tasks, however pt verbal responses decreased when cued as pt would have blank stare during tx and assessment.    Rehab Prognosis: Good; patient would benefit from acute skilled OT services to address these deficits and reach maximum level of function.       Plan:     Patient to be seen 4 x/week to address the above listed problems via self-care/home management, therapeutic activities, therapeutic exercises  Plan of Care Expires: 08/01/24  Plan of Care Reviewed with: patient, spouse    Subjective     Chief Complaint: "I haven't walked since Sunday"  Patient/Family Comments/goals: return home    Occupational Profile:  Living Environment: Lives with wife, in 2SH c/ 1st converting to bedroom, 2 ROLY and no hand rail, Tub shower with grab bars in bathroom  Previous level of function: Indp-mod I (requires increased time)  Roles and Routines: "   Equipment Used at Home: walker, rolling, cane, straight  Assistance upon Discharge: Wife and son    Pain/Comfort:  Pain Rating 1: 0/10    Patients cultural, spiritual, Congregational conflicts given the current situation: no    Objective:     Communicated with: Nsg prior to session.  Patient found supine with PureWick upon OT entry to room.    General Precautions: Standard, fall, aspiration  Orthopedic Precautions: N/A  Braces: N/A  Respiratory Status: Room air    Occupational Performance:    Bed Mobility:    Patient completed Rolling/Turning to Right with stand by assistance  Patient completed Supine to Sit with minimum assistance  Patient completed Sit to Supine with minimum assistance    Functional Mobility/Transfers:  Patient completed Sit <> Stand Transfer with minimum assistance  with  rolling walker   Patient completed Bed <> Chair Transfer using Stand Pivot technique with moderate assistance with rolling walker    Activities of Daily Living:  Grooming: stand by assistance facial hygiene using wash cloth  Upper Body Dressing: minimum assistance don gown for backside  Toileting: assistance of purewick performed in standing    Cognitive/Visual Perceptual:  A&O x4    Physical Exam:  BUE WFL  Balance: fair    AMPAC 6 Click ADL:  AMPAC Total Score: 17    Treatment & Education:  Pt educated on role and purpose of therapy  Pt educated on goal setting  Pt educated on benefits of OOB activity  Pt educated on self advocacy     Patient left up in chair with all lines intact, call button in reach, nsg notified, and wife present    GOALS:   Multidisciplinary Problems       Occupational Therapy Goals          Problem: Occupational Therapy    Goal Priority Disciplines Outcome Interventions   Occupational Therapy Goal     OT, PT/OT Progressing    Description: Goals to be met by: 8/1/24     Patient will increase functional independence with ADLs by performing:    UE Dressing with Stand-by Assistance.  LE Dressing with  Stand-by Assistance.  Grooming while standing at sink with Stand-by Assistance.  Toileting from toilet with Stand-by Assistance for hygiene and clothing management.   All functional transfers performed with SBA                         History:     Past Medical History:   Diagnosis Date    Essential tremor     Hypertensive urgency 6/13/2023    Nuclear sclerosis - Both Eyes 9/19/2012         Past Surgical History:   Procedure Laterality Date    CATARACT EXTRACTION W/  INTRAOCULAR LENS IMPLANT Left 10/10/2023    Procedure: EXTRACTION, CATARACT, WITH IOL INSERTION;  Surgeon: Lon Carr MD;  Location: [facility] OR;  Service: Ophthalmology;  Laterality: Left;    CATARACT EXTRACTION W/  INTRAOCULAR LENS IMPLANT Right 10/24/2023    Procedure: EXTRACTION, CATARACT, WITH IOL INSERTION;  Surgeon: Lon Carr MD;  Location: [facility];  Service: Ophthalmology;  Laterality: Right;    TONSILLECTOMY         Time Tracking:     OT Date of Treatment: 07/02/24  OT Start Time: 1135  OT Stop Time: 1206  OT Total Time (min): 31 min    Billable Minutes:Evaluation 8  Self Care/Home Management 23    7/2/2024

## 2024-07-02 NOTE — ASSESSMENT & PLAN NOTE
89 yo M w/ above history found to have incarcerated inguinal hernia with apparent closed loop small bowel obstruction. No nausea or gastric distention at this point - he does have a firm loop of bowel incarcerated within his groin that is tender to attempts at reduction. This is a difficult situation as he is DNR, quite elderly and was previously independent with wishes to return to that state. We discussed the natural history of incarcerated hernias containing small bowel with associated small bowel obstructions including potential for non-viable bowel and needs for potential laparotomy and small bowel resection. My fear is that untreated, this hernia will likely lead to death of the incarcerated small bowel in addition to worsening obstruction symptoms and ultimately death. He is high risk for surgery but at this point the benefits outweigh the potential risks and both him and his wife would like to proceed with attempts at repair.     - OR for urgent inguinal hernia repair, possible small bowel resection. Will attempt to perform open inguinal under local anesthesia with possibility of conversion to laparotomy and small bowel resection if there is nonviable bowel.   - NPO, mIVF   - Transition to surgical team as primary team following operative repair

## 2024-07-02 NOTE — NURSING
Nurses Note -- 4 Eyes      7/1/2024   10:48 PM      Skin assessed during: Admit      [x] No Altered Skin Integrity Present    []Prevention Measures Documented      [] Yes- Altered Skin Integrity Present or Discovered   [] LDA Added if Not in Epic (Describe Wound)   [] New Altered Skin Integrity was Present on Admit and Documented in LDA   [] Wound Image Taken    Wound Care Consulted? No    Attending Nurse:  Erlin Fuentes RN/Staff Member:  diann

## 2024-07-02 NOTE — PROGRESS NOTES
Pharmacist Renal Dose Adjustment Note    James Dc is a 89 y.o. male being treated with the medication Lovenox    Patient Data:    Vital Signs (Most Recent):  Temp: 98.3 °F (36.8 °C) (07/01/24 2338)  Pulse: 101 (07/01/24 2350)  Resp: 18 (07/01/24 2338)  BP: 132/66 (07/01/24 2338)  SpO2: (!) 94 % (07/02/24 0008) Vital Signs (72h Range):  Temp:  [98 °F (36.7 °C)-98.6 °F (37 °C)]   Pulse:  []   Resp:  [13-20]   BP: (132-216)/(66-93)   SpO2:  [1 %-100 %]      Recent Labs   Lab 07/01/24  1716   CREATININE 1.0     Serum creatinine: 1 mg/dL 07/01/24 1716  Estimated creatinine clearance: 30.9 mL/min    Medication:Lovenox 40 mg SQ every 24 hrs will be changed to Lovenox 30 mg SQ every 24 hrs (weight < 50 kg)    Pharmacist's Name: Adrian Lindo  Pharmacist's Extension: [phone]

## 2024-07-02 NOTE — HPI
Mr James Dc is a 89 y.o. male with HFpEF, history of CVA due to carotid artery stenosis, parkinsonism, Alzheimer's dementia who presented to [facility] on 7/2 for evaluation of generalized weakness. Patient and family reports progressive generalized weakness for quite some time which worsened over the last few weeks leading to functional decline as well as difficulty swallowing and poor intake. Initial workup revealed high grade small bowel obstruction. Palliative care consulted for goals of care.

## 2024-07-02 NOTE — H&P
[facility] - Observation [address] [place] [address] Medicine  History & Physical    Patient Name: James Dc  MRN: [id number]  Patient Class: OP- Observation  Admission Date: 7/1/2024  Attending Physician: Gulshan Alex MD   Primary Care Provider: Tim Joya MD         Patient information was obtained from patient, past medical records, and ER records.     Subjective:     Principal Problem:Generalized weakness    Chief Complaint:   Chief Complaint   Patient presents with    Weakness     Generalized weakness and loss of appetite x4 days. Pt c/o difficulty swallowing. Coming from home.        HPI: James Dc is a 89 y.o. male with a PMHx of hypotension on midodrine, diastolic CHF, hx CVA due to carotid artery stenosis, hx SDH, parkinsonism, early onset Alzheimer's who presents to [facility] for evaluation of generalized weakness. Patient is oriented to person, place, year and president. He does have some memory issues at baseline but is able to provide recent history. Patient reports progressive generalized weakness for quite some time which worsened over the last few weeks. He attributes it to his msucle weakness/atrophy from not moving much/ not working out. He's unable to lift himself out of his chair due to the weakness. Upon ROS questioning, patient admits to nonspecific low back pain for the past 1 months. Reports his feet occasionally feel like they're falling asleep. Additionally, he reports difficulty swallowing, poor appetite and decreasing PO intake. Denies sore throat, globus sensation, chest pain, SOB, or leg swelling. No saddle anesthesia or bowel/ bladder incontinence.     ED: hypertensive to /93, improved to SBP 140s without intervention. No leukocytosis or electrolyte abnormalities. CT head without acute findings. CT L spine with possible SBO, no acute spinal fracture. Given 1L IVFs.     Past Medical History:   Diagnosis Date    Essential tremor     Hypertensive urgency 6/13/2023    Nuclear sclerosis -  Both Eyes 9/19/2012       Past Surgical History:   Procedure Laterality Date    CATARACT EXTRACTION W/  INTRAOCULAR LENS IMPLANT Left 10/10/2023    Procedure: EXTRACTION, CATARACT, WITH IOL INSERTION;  Surgeon: Lon Carr MD;  Location: [facility] OR;  Service: Ophthalmology;  Laterality: Left;    CATARACT EXTRACTION W/  INTRAOCULAR LENS IMPLANT Right 10/24/2023    Procedure: EXTRACTION, CATARACT, WITH IOL INSERTION;  Surgeon: Lon Carr MD;  Location: [facility];  Service: Ophthalmology;  Laterality: Right;    TONSILLECTOMY         Review of patient's allergies indicates:   Allergen Reactions    Celexa [citalopram] Other (See Comments)     Worsening tremor    Tetracyclines Nausea Only       No current facility-administered medications on file prior to encounter.     Current Outpatient Medications on File Prior to Encounter   Medication Sig    aspirin (ECOTRIN) 81 MG EC tablet Take 1 tablet (81 mg total) by mouth once daily. (Patient not taking: Reported on 4/24/2024)    B6/FA/B12/co Q10/herb no.225 (HEALTHY HEART COMPLEX ORAL) Take by mouth. (Patient not taking: Reported on 4/24/2024)    ketorolac 0.5% (ACULAR) 0.5 % Drop Place into both eyes. (Patient not taking: Reported on 4/24/2024)    midodrine (PROAMATINE) 5 MG Tab Take 1 tablet (5 mg total) by mouth 3 (three) times daily.    MULTIVITAMIN ORAL Take 1 tablet by mouth once daily.    ofloxacin (OCUFLOX) 0.3 % ophthalmic solution Place 1 drop into the left eye. for ten days (Patient not taking: Reported on 4/24/2024)    UNABLE TO FIND Healthy cholesterol (Patient not taking: Reported on 4/24/2024)     Family History       Problem Relation (Age of Onset)    Cataracts Son    Prostate cancer Father          Tobacco Use    Smoking status: Former    Smokeless tobacco: Former   Substance and Sexual Activity    Alcohol use: Yes    Drug use: No    Sexual activity: Yes     Partners: Female     Review of Systems   Reason unable to perform ROS: ROS limited  2/2 dementia.   Constitutional:  Positive for activity change, appetite change and fatigue.   HENT:  Positive for trouble swallowing.    Cardiovascular:  Negative for chest pain and leg swelling.   Gastrointestinal:  Negative for abdominal pain, nausea and vomiting.   Musculoskeletal:  Positive for back pain and gait problem.   Neurological:  Positive for weakness (generalized).     Objective:     Vital Signs (Most Recent):  Temp: 98.3 °F (36.8 °C) (07/01/24 2338)  Pulse: 101 (07/01/24 2350)  Resp: 18 (07/01/24 2338)  BP: 132/66 (07/01/24 2338)  SpO2: (!) 94 % (07/02/24 0008) Vital Signs (24h Range):  Temp:  [98 °F (36.7 °C)-98.6 °F (37 °C)] 98.3 °F (36.8 °C)  Pulse:  [] 101  Resp:  [13-20] 18  SpO2:  [1 %-100 %] 94 %  BP: (132-216)/(66-93) 132/66     Weight: 45.4 kg (100 lb)  Body mass index is 18.29 kg/m².     Physical Exam  Vitals and nursing note reviewed.   Constitutional:       General: He is not in acute distress.     Appearance: He is well-developed.      Comments: Frail appearing   HENT:      Head: Normocephalic and atraumatic.      Mouth/Throat:      Mouth: Mucous membranes are dry.      Pharynx: No oropharyngeal exudate.   Eyes:      General: No scleral icterus.     Conjunctiva/sclera: Conjunctivae normal.   Cardiovascular:      Rate and Rhythm: Normal rate and regular rhythm.      Heart sounds: Normal heart sounds.   Pulmonary:      Effort: Pulmonary effort is normal. No respiratory distress.      Breath sounds: Normal breath sounds. No wheezing.   Abdominal:      General: There is no distension.      Palpations: Abdomen is soft.      Tenderness: There is no abdominal tenderness.   Musculoskeletal:         General: No tenderness. Normal range of motion.      Cervical back: Normal range of motion and neck supple.      Comments: Very mild TTP mid/low spine with deep palpation    Lymphadenopathy:      Cervical: No cervical adenopathy.   Skin:     General: Skin is warm and dry.      Capillary Refill:  Capillary refill takes less than 2 seconds.      Findings: No rash.   Neurological:      General: No focal deficit present.      Mental Status: He is alert and oriented to person, place, and time.      Cranial Nerves: No cranial nerve deficit.      Sensory: No sensory deficit.      Motor: Weakness (3-4/5 stregnth BLEs but equal) present.      Coordination: Coordination normal.      Comments: Oriented x4, periods of losing train of thought and forgetful but is consistent in convos regarding symptoms/ hx. 5/5 strength BUEs, no facial droop or slurred speech   Psychiatric:         Behavior: Behavior normal.         Thought Content: Thought content normal.         Judgment: Judgment normal.                Significant Labs: All pertinent labs within the past 24 hours have been reviewed.  CBC:   Recent Labs   Lab 07/01/24  1716   WBC 8.26   HGB 14.8   HCT 43.1        CMP:   Recent Labs   Lab 07/01/24  1716      K 3.8      CO2 27   GLU 79   BUN 32*   CREATININE 1.0   CALCIUM 10.2   PROT 6.7   ALBUMIN 3.6   BILITOT 1.0   ALKPHOS 52*   AST 73*   *   ANIONGAP 14       Significant Imaging: I have reviewed all pertinent imaging results/findings within the past 24 hours.  CT Lumbar Spine Without Contrast  Narrative: EXAMINATION:  CT LUMBAR SPINE WITHOUT CONTRAST    CLINICAL HISTORY:  Low back pain, increased fracture risk;    TECHNIQUE:  Low-dose axial, sagittal and coronal reformations are obtained through the lumbar spine.  Contrast was not administered.    COMPARISON:  None.    FINDINGS:  The osseous structures demonstrate chronic change.  There is diminished mineralization and degenerative change noted.  There is multilevel vacuum phenomena throughout the lumbar spine including T12-L1, L2-3 through L5-S1.  Multilevel chronic endplate change and loss of disc space height.  There is minimal grade 1 retrolisthesis of L1 with respect L2, L2 with respect L3, L3 with respect L4 and L4 with respect L5.   There is no high-grade spondylolisthesis and there is no evidence for high-grade or acute compression fracture deformity.    Facet arthropathy is noted, there is no evidence for facet dislocation or facet fracture deformity.  On coronal reconstruction imaging there is curvature of the lumbar spine convex to the right, and there are prominent osteophytes noted projected to the left.    The T11-12 and T12-L1 levels demonstrate no evidence for high-grade spinal canal or foraminal stenosis.    The L1-2 level demonstrates mild degenerative disc bulge, mild anterior impression upon the dural sac.  There is no high-grade spinal canal or foraminal stenosis.    The L2-3 level demonstrates degenerative disc bulge and marginal osteophyte formation there is anterior impression upon the dural sac, mild spinal canal narrowing there is no high-grade spinal canal stenosis.  There is mild left foraminal stenosis.    The L3-4 level demonstrates diffuse degenerative disc bulge, there is anterior impression upon the dural sac.  There is posterior facet arthropathy with ligamentous thickening, there is mild spinal canal stenosis.  There is left foraminal stenosis and right foraminal narrowing.  Correlation for exiting nerve root symptomatology is needed.    The L4-5 level demonstrates diffuse degenerative disc bulge, there is anterior impression upon the dural sac.  There is posterior ligamentous thickening and facet arthropathy.  There appears to be mild spinal canal stenosis, when combining somewhat prominent appearing posterior ligamentous thickening with mild degenerative disc bulge.  There is bilateral foraminal stenosis.  Correlation for exiting nerve root symptomatology is needed.    The L5-S1 level demonstrates mild diffuse degenerative disc bulge, mild anterior impression upon the dural sac.  There is posterior facet arthropathy with mild ligamentous thickening.  There is marginal osteophyte formation and facet arthropathy,  there is bilateral foraminal stenosis.    There are chronic changes at the sacroiliac joints.    On close evaluation of available imaging, there is no evidence for acute lumbar spine fracture deformity.    There is a hypodense structure of the left kidney measuring approximately 5.4 cm on axial imaging, not optimally evaluated on this examination however measuring 12.1 Hounsfield units and likely represents a cyst.    There are multiple dilated bowel loops appearing to represent dilated small bowel loops and there is appearance suggesting interloop edema and or inflammation, the bowel is not well evaluated on this examination, clinical and historical correlation is needed, bowel obstruction would be in the differential.  If clinically warranted follow-up with CT examination of the abdomen and pelvis may be helpful.  Impression: Multilevel chronic change of the lumbar spine noted, as discussed above, correlation for any specific level of symptomatology is needed.    There is no evidence for acute lumbar spine fracture deformity.    Dilated small bowel loops there appears to be interloop edema and or inflammation, the bowel is not well evaluated on this examination however this may relate to a small bowel obstruction, clinical and historical correlation is needed, if clinically warranted follow-up CT examination of the abdomen and pelvis may be helpful.    This report was flagged in Epic as abnormal.    Electronically signed by: Je Arreola  Date:    07/01/2024  Time:    22:49  CT Head Without Contrast  Narrative: EXAMINATION:  CT HEAD WITHOUT CONTRAST    CLINICAL HISTORY:  Mental status change, unknown cause;progressive generalized weakness and memory loss;    TECHNIQUE:  Low dose axial images were obtained through the head.  Coronal and sagittal reformations were also performed. Contrast was not administered.    COMPARISON:  CT examination of the brain without contrast June 14, 2023    FINDINGS:  The ventricular  system, sulcal pattern and parenchymal attenuation characteristics are consistent with chronic change.  Involutional change and chronic appearing white matter change noted, small focal area of gliosis at the superior posterior right parietal lobe noted consistent with an area of remote ischemia/infarct or other insult/injury.  Focal low-density areas consistent with remote lacunar-type ischemic change are noted.    There is no evidence for intracranial mass, mass effect or midline shift and there is no evidence for acute intracranial hemorrhage.  Appropriate CSF spaces are seen at the skull base.    The mastoid air cells appear well aerated.  The paranasal sinuses appear well aerated.  The visualized orbits appear intact.  The osseous structures appear intact.  There are areas of suspected prominent arachnoid granulations at the inferior occipital level again noted appearing stable.  Impression: Chronic change noted, there is no evidence for acute intracranial process.    Electronically signed by: Je Arreola  Date:    07/01/2024  Time:    22:34  X-Ray Chest AP Portable  Narrative: EXAMINATION:  XR CHEST AP PORTABLE    CLINICAL HISTORY:  Chest Pain;    TECHNIQUE:  Single frontal view of the chest was performed.    COMPARISON:  06/13/2023.    FINDINGS:  The lungs are well expanded and clear. No focal opacities are seen. The pleural spaces are clear. The cardiac silhouette is unremarkable.  There are calcifications of the aortic arch.  The visualized osseous structures demonstrate degenerative changes.  Impression: No acute abnormality.    Electronically signed by: Richie Ling  Date:    07/01/2024  Time:    19:32      Assessment/Plan:     * Generalized weakness  Impairment of balance   - progressive debility over the past year-- suspect multifactorial from alzheimer's/ parkinsonism, chronic gait instability, immobility, and dehydration   - no focal deficits  - CTH negative for acute findings  - CT L spine  negative for acute fracture  - s/p 1L IVFs in the ED  - will give an additional 1L IVFs overnight  - PT/OT eval  - fall precautions     Abnormal CT scan, lumbar spine  - CT obtained for vague back pain & showed possible SBO  - no abdominal pain or N/V  - CT abd/pelv ordered  - if develops any nausea or vomiting, place NG tube and consult general surgery  - cIVFs overnight     Dysphagia  - satting 96% on RA  - CXR without evidence of aspiration  - SLP consulted  - keep NPO  - strict aspiration precautions     Hypotension  - on midodrine at home but unclear how often he takes it  - will hold here given hypertension     DNR (do not resuscitate)  - has been DNR in the past per PCP clinic note  - patient again confirms DNR status-- will place order in chart    Aortic atherosclerosis  - continue ASA    Chronic diastolic heart failure  - appears dry on exam  - monitor volume status closely with IVFs  - strict I/Os, daily weights     Cerebrovascular accident (CVA) due to stenosis of left carotid artery  - hx CVA in 2023  - continue ASA    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  Cognitive communication deficit   Parkinsonian features   tremor  - A&O x4, oriented to situation but occasionally slow to answer questions and loses train of thought, conversation/ answers are consistent on repeat interview  - delirium precautions       VTE Risk Mitigation (From admission, onward)           Ordered     enoxaparin injection 30 mg  Every 24 hours         07/02/24 0111     Reason for No Pharmacological VTE Prophylaxis  Once        Question:  Reasons:  Answer:  Physician Provided (leave comment)  Comment:  recent SDH    07/01/24 2134     IP VTE HIGH RISK PATIENT  Once         07/01/24 2134                         On 07/02/2024, patient should be placed in hospital observation services under my care in collaboration with Dr. Matthew Lambert.      Tonya Gaming PA-C  Department of Hospital Medicine  [facility] -  Observation 11H

## 2024-07-02 NOTE — PLAN OF CARE
[facility] - Observation 11H  Initial Discharge Assessment       Primary Care Provider: Tim Joya MD    Admission Diagnosis: Weakness [R53.1]  Chest pain [R07.9]    Admission Date: 7/1/2024  Expected Discharge Date: 7/3/2024         Payor: HU[employer] MANAGED MEDICARE / Plan: [employer] MEDICARE HMO / Product Type: Capitation /     Extended Emergency Contact Information  Primary Emergency Contact: Brisa Dc  [address]           [place] [address] [facility]  Home Phone: [phone]  Relation: Spouse  Secondary Emergency Contact: Judi Arevalo  Home Phone: [phone]  Relation: Daughter    Discharge Plan A: (P) Home with family  Discharge Plan B: (P) Other (TBD)      [id number] - [place] - [address]  [address]  [place] [address]  Phone: [phone] Fax: [phone]    [facility] ([place]) - [place] - [address] [place] [address]  [address] [place] [address]  [place] [address]  Phone: [phone] Fax: [phone]               SW completed Discharge Planning Assessment with patient's wife, Brisa via bedside. Discharge planning booklet given to patient/family and whiteboard updated with BECKY and phone #. All questions answered.    Patient may need assistance with transportation upon discharge.     Brisa reported that her and patient live together, and prior to hospitalization patient was independent with his ADL's. Brisa reported that patient uses a walker as needed. Brisa reported that patient is not on dialysis and does not go to a Coumadin clinic.     Patient lives in a two story home, and Brisa reported that until recently patient did not have any difficulty climbing the stairs.      Discharge Plan A and Plan B have been determined by review of patient's clinical status, future medical and therapeutic needs, and coverage/benefits for post-acute care in coordination with multidisciplinary team members.      Cate Centeno, LMSW [facility]  - [facility]  Ext. [phone]

## 2024-07-02 NOTE — PLAN OF CARE
Problem: Physical Therapy  Goal: Physical Therapy Goal  Description: Goals to be met by: 2024     Patient will increase functional independence with mobility by performin. Supine to sit with Stand-by Assistance  2. Sit to supine with Stand-by Assistance  3. Sit to stand transfer with Stand-by Assistance  4. Bed to chair transfer with Stand-by Assistance using Rolling Walker  5. Gait  x 50 feet with Stand-by Assistance using Rolling Walker.   6. Ascend/descend 2 stair with no Handrails Minimal Assistance using HHA.   7. Lower extremity exercise program x15 reps per handout, with supervision    Outcome: Progressing

## 2024-07-02 NOTE — ED NOTES
Assumed care of patient. Patient is alert and resting comfortably in bed in NAD. BP, cardiac, and O2 monitoring continued. Family member at bedside. Patient updated on plan of care, pt denies any needs or complaints at this time. Bed locked in lowest position, side rails up x2, call light within reach. VSS. Will continue to monitor.      Patient prompted on need for urine specimen.

## 2024-07-02 NOTE — NURSING
Nurses Note -- 4 Eyes      7/2/2024   11:41 AM      Skin assessed during: Daily Assessment      [x] No Altered Skin Integrity Present    []Prevention Measures Documented      [] Yes- Altered Skin Integrity Present or Discovered   [] LDA Added if Not in Epic (Describe Wound)   [] New Altered Skin Integrity was Present on Admit and Documented in LDA   [] Wound Image Taken    Wound Care Consulted? No    Attending Nurse:  Nixon Fuentes RN/Staff Member:   Dia

## 2024-07-02 NOTE — TRANSFER OF CARE
"Anesthesia Transfer of Care Note    Patient: James Dc    Procedure(s) Performed: Procedure(s) (LRB):  REPAIR, HERNIA, INGUINAL, INCARCERATED, INITIAL, AGE 5 YEARS OR OLDER (Right)    Patient location: PACU    Anesthesia Type: MAC    Transport from OR: Transported from OR on room air with adequate spontaneous ventilation    Post pain: adequate analgesia    Post assessment: no apparent anesthetic complications and tolerated procedure well    Post vital signs: stable    Level of consciousness: awake (Returned to baseline)    Nausea/Vomiting: no nausea/vomiting    Complications: none    Transfer of care protocol was followed    Last vitals: Visit Vitals  BP (!) 182/86   Pulse 84   Temp 37.3 °C (99.1 °F)   Resp 16   Ht 5' 2" (1.575 m)   Wt 45.4 kg (100 lb)   SpO2 99%   BMI 18.29 kg/m²     "

## 2024-07-03 PROBLEM — Z51.5 PALLIATIVE CARE ENCOUNTER: Status: ACTIVE | Noted: 2024-01-01

## 2024-07-03 PROBLEM — R41.82 ALTERED MENTAL STATUS: Status: ACTIVE | Noted: 2024-01-01

## 2024-07-03 NOTE — ASSESSMENT & PLAN NOTE
89m with HFpEF, history of CVA due to carotid artery stenosis, parkinsonism, Alzheimer's dementia who was admitted 7/1 with acute small bowel obstruction s/p incarcerated inguinal hernia repair who has developed altered mental status post-operatively. Labs and CT head unremarkable, wife deferring further testing    -continue best supportive care per primary team

## 2024-07-03 NOTE — SUBJECTIVE & OBJECTIVE
Interval History: Underwent inguinal hernia repair with SBR yesterday for strangulated hernia. He received narcan overnight for hypotension and minimal responsiveness. Pressure improved afterwards. Minimally responsive this morning, wife says he hasn't spoken words all night except grunting. Not following commands. Passing flatus per wife. Incision site appears intact. Labs stable.     Medications:  Continuous Infusions:  Scheduled Meds:   enoxparin  30 mg Subcutaneous Q24H (prophylaxis, 1700)     PRN Meds:  Current Facility-Administered Medications:     acetaminophen, 650 mg, Oral, Q4H PRN    albuterol-ipratropium, 3 mL, Nebulization, Q4H PRN    dextrose 10%, 12.5 g, Intravenous, PRN    dextrose 10%, 25 g, Intravenous, PRN    glucagon (human recombinant), 1 mg, Intramuscular, PRN    glucose, 16 g, Oral, PRN    glucose, 24 g, Oral, PRN    melatonin, 6 mg, Oral, Nightly PRN    naloxone, 0.4 mg, Intravenous, PRN    ondansetron, 8 mg, Oral, Q8H PRN     Review of patient's allergies indicates:   Allergen Reactions    Celexa [citalopram] Other (See Comments)     Worsening tremor    Tetracyclines Nausea Only     Objective:     Vital Signs (Most Recent):  Temp: 97.4 °F (36.3 °C) (07/03/24 0740)  Pulse: 98 (07/03/24 0740)  Resp: 20 (07/03/24 0740)  BP: 119/71 (07/03/24 0740)  SpO2: 100 % (07/03/24 0740) Vital Signs (24h Range):  Temp:  [97.3 °F (36.3 °C)-99.1 °F (37.3 °C)] 97.4 °F (36.3 °C)  Pulse:  [] 98  Resp:  [11-20] 20  SpO2:  [87 %-100 %] 100 %  BP: ()/(50-86) 119/71     Weight: 45.4 kg (100 lb)  Body mass index is 18.29 kg/m².    Intake/Output - Last 3 Shifts         07/01 0700 07/02 0659 07/02 0700 07/03 0659 07/03 0700 07/04 0659    P.O.  0     IV Piggyback 500 800     Total Intake(mL/kg) 500 (11) 800 (17.6)     Urine (mL/kg/hr) 200 300 (0.3)     Blood  20     Total Output 200 320     Net +300 +480            Urine Occurrence 1 x               Physical Exam  Vitals reviewed.   Cardiovascular:       Rate and Rhythm: Normal rate and regular rhythm.      Pulses: Normal pulses.   Pulmonary:      Effort: Pulmonary effort is normal.   Abdominal:      General: Abdomen is flat.      Palpations: Abdomen is soft.      Comments: R hernia incision CDI. No swelling or bleeding noted.    Skin:     General: Skin is warm.      Capillary Refill: Capillary refill takes less than 2 seconds.   Neurological:      Mental Status: He is disoriented and confused.   Psychiatric:         Speech: He is noncommunicative.        Significant Labs:  I have reviewed all pertinent lab results within the past 24 hours.  CBC:   Recent Labs   Lab 07/03/24 0457   WBC 11.47   RBC 3.73*   HGB 11.8*   HCT 36.2*      MCV 97   MCH 31.6*   MCHC 32.6     CMP:   Recent Labs   Lab 07/03/24 0457      CALCIUM 9.1   ALBUMIN 2.8*   PROT 5.1*      K 3.7   CO2 21*   *   BUN 34*   CREATININE 1.1   ALKPHOS 44*   ALT 46*   AST 28   BILITOT 0.7       Significant Diagnostics:  I have reviewed all pertinent imaging results/findings within the past 24 hours.

## 2024-07-03 NOTE — CARE UPDATE
Called to patient bedside due to unresponsiveness post-operatively when arrived to the floor. Patient was unresponsive to sternal rub.  He was hypotensive and 91% on room air. After reviewing the MAR, Narcan was given based off the post-op Oxy given by Rapid. The patient responded well. Rapid Response also gave fluids at bedside.    Tg Rothman MD

## 2024-07-03 NOTE — ASSESSMENT & PLAN NOTE
"See ACP 7/3    Insight/goals of care- wife with very good insight and prognostic awareness, understands that he has likely suffered some kind of neurologic injury post-operatively and could continue to decline and be at end of life. She is very clear that she would not put him through unhelpful or unnecessary testing. Has living will reflecting comfort-focused care preferences at end of life.     Social support- Lives with wife of 60+ years, one daughter. Retired .     Psychological- very appropriate grief behaviors by wife, healthy coping mechanisms    Spiritual- "raised Taoist" but currently nonpracticing    Symptom management- no active needs    Recommendations  -DNR,  best supportive care  -if patient clinically improves, would continue to offer conservative interventions that might meaningfully prolong his life  -if he clinically worsens, family would want comfort-focused care at the end of his life  "

## 2024-07-03 NOTE — CODE/ RAPID DOCUMENTATION
RAPID RESPONSE NURSE NOTE        Admit Date: 2024  LOS: 0  Code Status: DNR   Date of Consult: 2024  : 1932  Age: 89 y.o.  Weight:   Wt Readings from Last 1 Encounters:   24 45.4 kg (100 lb)     Sex: male  Race: White   Bed: [address] [place] [address] A:   MRN: [id number]  Time Rapid Response Team page Received:   Time Rapid Response Team at Bedside:   Time Rapid Response Team left Bedside:   Was the patient discharged from an ICU this admission? No   Was the patient discharged from a PACU within last 24 hours? Yes   Did the patient receive conscious sedation/general anesthesia in last 24 hours? No  Was the patient in the ED within the past 24 hours? No  Was the patient on NIPPV within the past 24 hours? No   Did this progress into an ARC or CPA: No  Attending Physician: Shen Araujo MD  Primary Service: General Surgery       SITUATION    Notified by pager.  Reason for alert: hypotension, decreased LOC  Called to evaluate the patient for Circulatory    BACKGROUND     Why is the patient in the hospital?: Femoral hernia of right side with obstruction    Patient has a past medical history of Essential tremor, Hypertensive urgency, and Nuclear sclerosis - Both Eyes.    Last Vitals:  Temp: 97.6 °F (36.4 °C) (2020)  Pulse: 96 (2146)  Resp: 13 (2020)  BP: 109/72 (2033)  SpO2: 91 % (2033)    24 Hours Vitals Range:  Temp:  [97.6 °F (36.4 °C)-99.1 °F (37.3 °C)]   Pulse:  []   Resp:  [11-20]   BP: ()/(50-86)   SpO2:  [4 %-100 %]     Labs:  Recent Labs     24  0624  210   WBC 8.26 7.46 10.11   HGB 14.8 13.9* 13.5*   HCT 43.1 42.2 40.1    193 201       Recent Labs     24  0631 24  2108    145 145   K 3.8 3.5 4.0    105 109   CO2 27 27 16*   BUN 32* 33* 34*   CREATININE 1.0 0.8 1.1   GLU 79 66* 112*   PHOS  --  4.1 4.6*   MG 2.2 2.1 2.1        ASSESSMENT     Physical Exam  Constitutional:        Appearance: He is underweight. He is ill-appearing.   Cardiovascular:      Rate and Rhythm: Normal rate.   Pulmonary:      Effort: Bradypnea present.      Breath sounds: Decreased air movement present. Decreased breath sounds present.   Skin:     General: Skin is warm and dry.      Coloration: Skin is pale.   Neurological:      GCS: GCS eye subscore is 2. GCS verbal subscore is 2. GCS motor subscore is 4.     HR 87  BP 86/50 (62)  RR 10, SpO2 87% on room air      Upon arrival to room patient lying in bed. Minimally responsive to stimuli, BP soft 80s/50s. Per bedside RN pt just arrived to floor from PACU after surgical hernia repair    INTERVENTIONS    The patient was seen for Cardiac problem. Staff concerns included hypotension. The following interventions were performed: BMP, CBC, Magnesium, continuous cardiac monitoring, and 500cc LR bolus, POCT blood glucose, 0.4mg naloxone IVP.    Dr Rothman with surgery already at bedside upon RRN arrival    MARIA ISABEL Osuna NP with [facility] to bedside with RRN    Pt given 0.4mg IVP naloxone at 2025 and after ~3 minutes began to be more responsive, BP improved. Able to state his name and that he was in the hospital, aware he had a surgical procedure     500cc LR bolus initiated to PIV    STAT labs ordered by primary team, phlebotomy to collect    RECOMMENDATIONS    We recommend: Complete fluid bolus  Follow up with lab results  Continue to monitor VS, neuro status closely  Utilize caution with any additional narcotic administration  Maintain continuous cardiac monitoring    PROVIDER ESCALATION    Orders received and case discussed with Dr. Rothman with surgery, MARIA ISABEL Osuna NP with [facility] .    Primary team arrival time: 2005    Disposition: Remain in room 1026.    FOLLOW UP    Bedside RNTricia, charge DOUGLAS Lee  updated on plan of care. Instructed to call the Rapid Response NurseADARSH RN at [phone] for additional questions or concerns.

## 2024-07-03 NOTE — PLAN OF CARE
"[facility] Plan of Care Note    Dx: Weakness [R53.1]  Chest pain [R07.9]    Shift Events: Admitted to [facility] from PACU, Narcan given and Rapid Response called to beside at admit, 500 ml bolus of LR, safety,telemetry      Goals of Care: Pain Management, Safety, Telemetry     Neuro: AAOx3 forgetful and lethargic     Vital Signs: /64   Pulse 94   Temp 97.3 °F (36.3 °C) (Oral)   Resp 16   Ht 5' 2" (1.575 m)   Wt 45.4 kg (100 lb)   SpO2 (!) 92%   BMI 18.29 kg/m²     Respiratory: 3L/NC     Diet: Diet NPO      Is patient tolerating current diet? N/a    GTTS: n/a    Urine Output/Bowel Movement:     Intake/Output Summary (Last 24 hours) at 7/3/2024 0709  Last data filed at 7/3/2024 0614  Gross per 24 hour   Intake 800 ml   Output 320 ml   Net 480 ml          Drains/Tubes/Tube Feeds (include total output/shift):   Output by Drain (mL) 07/01/24 0701 - 07/01/24 1900 07/01/24 1901 - 07/02/24 0700 07/02/24 0701 - 07/02/24 1900 07/02/24 1901 - 07/03/24 0700 07/03/24 0701 - 07/03/24 0709   Requested LDAs do not have output data documented.          Lines: 20g L FA and 18 g R upper Arm       Accuchecks:n/a    Skin: RLQ surgical incision with derma bond     Fall Risk Score: 21    Activity level? Primary in bed activities     Any scheduled procedures? N/a    Any safety concerns? Fall precaution, Aspiration precaution     Other: n/a   "

## 2024-07-03 NOTE — PROGRESS NOTES
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 He and wife report him straining very hard for a bowel movement on Friday and having abdominal pain following this and into Saturday. Sunday he noted new weakness to the point where he had difficulty rising freom his chair which is very abnormal for him. This persisted into Monday ultimately prompting ED presentation.     He was hypertensive on arrival to ED, labs largely unremarkable. CTH was obtained without acute findings. CT L spine was obtained given lower back pain which showed possible SBO and an dedicated CT abdomen pelvis was obtained as follow up. This demonstrated right inguinal hernia containing small knuckle of bowel with proximal obstruction. General surgery was consulted for these findings.     No prior abdominal surgery   Lived at home with wife independently, ambulatory, prior to this admission     Post-Op Info:  Procedure(s) (LRB):  REPAIR, HERNIA, INGUINAL, INCARCERATED, INITIAL, AGE 5 YEARS OR OLDER (Right)  EXCISION, SMALL INTESTINE   1 Day Post-Op     Interval History: Underwent inguinal hernia repair with SBR yesterday for strangulated hernia. He received narcan overnight for hypotension and minimal responsiveness. Pressure improved afterwards. Minimally responsive this morning, wife says he hasn't spoken words all night except grunting. Not following commands. Passing flatus per wife. Incision site appears intact. Labs stable.     Medications:  Continuous Infusions:  Scheduled Meds:   enoxparin  30 mg  Subcutaneous Q24H (prophylaxis, 1700)     PRN Meds:  Current Facility-Administered Medications:     acetaminophen, 650 mg, Oral, Q4H PRN    albuterol-ipratropium, 3 mL, Nebulization, Q4H PRN    dextrose 10%, 12.5 g, Intravenous, PRN    dextrose 10%, 25 g, Intravenous, PRN    glucagon (human recombinant), 1 mg, Intramuscular, PRN    glucose, 16 g, Oral, PRN    glucose, 24 g, Oral, PRN    melatonin, 6 mg, Oral, Nightly PRN    naloxone, 0.4 mg, Intravenous, PRN    ondansetron, 8 mg, Oral, Q8H PRN     Review of patient's allergies indicates:   Allergen Reactions    Celexa [citalopram] Other (See Comments)     Worsening tremor    Tetracyclines Nausea Only     Objective:     Vital Signs (Most Recent):  Temp: 97.4 °F (36.3 °C) (07/03/24 0740)  Pulse: 98 (07/03/24 0740)  Resp: 20 (07/03/24 0740)  BP: 119/71 (07/03/24 0740)  SpO2: 100 % (07/03/24 0740) Vital Signs (24h Range):  Temp:  [97.3 °F (36.3 °C)-99.1 °F (37.3 °C)] 97.4 °F (36.3 °C)  Pulse:  [] 98  Resp:  [11-20] 20  SpO2:  [87 %-100 %] 100 %  BP: ()/(50-86) 119/71     Weight: 45.4 kg (100 lb)  Body mass index is 18.29 kg/m².    Intake/Output - Last 3 Shifts         07/01 0700 07/02 0659 07/02 0700 07/03 0659 07/03 0700  07/04 0659    P.O.  0     IV Piggyback 500 800     Total Intake(mL/kg) 500 (11) 800 (17.6)     Urine (mL/kg/hr) 200 300 (0.3)     Blood  20     Total Output 200 320     Net +300 +480            Urine Occurrence 1 x               Physical Exam  Vitals reviewed.   Cardiovascular:      Rate and Rhythm: Normal rate and regular rhythm.      Pulses: Normal pulses.   Pulmonary:      Effort: Pulmonary effort is normal.   Abdominal:      General: Abdomen is flat.      Palpations: Abdomen is soft.      Comments: R hernia incision CDI. No swelling or bleeding noted.    Skin:     General: Skin is warm.      Capillary Refill: Capillary refill takes less than 2 seconds.   Neurological:      Mental Status: He is disoriented and confused.   Psychiatric:          Speech: He is noncommunicative.        Significant Labs:  I have reviewed all pertinent lab results within the past 24 hours.  CBC:   Recent Labs   Lab 07/03/24  0457   WBC 11.47   RBC 3.73*   HGB 11.8*   HCT 36.2*      MCV 97   MCH 31.6*   MCHC 32.6     CMP:   Recent Labs   Lab 07/03/24  0457      CALCIUM 9.1   ALBUMIN 2.8*   PROT 5.1*      K 3.7   CO2 21*   *   BUN 34*   CREATININE 1.1   ALKPHOS 44*   ALT 46*   AST 28   BILITOT 0.7       Significant Diagnostics:  I have reviewed all pertinent imaging results/findings within the past 24 hours.  Assessment/Plan:     Small bowel obstruction  89 yo M w/ above history found to have incarcerated inguinal hernia with apparent closed loop small bowel obstruction. No nausea or gastric distention at this point - he does have a firm loop of bowel incarcerated within his groin that is tender to attempts at reduction. This is a difficult situation as he is DNR, quite elderly and was previously independent with wishes to return to that state. We discussed the natural history of incarcerated hernias containing small bowel with associated small bowel obstructions including potential for non-viable bowel and needs for potential laparotomy and small bowel resection. My fear is that untreated, this hernia will likely lead to death of the incarcerated small bowel in addition to worsening obstruction symptoms and ultimately death. He is high risk for surgery but at this point the benefits outweigh the potential risks and both him and his wife would like to proceed with attempts at repair.     - CTH to evaluate for CVA.  - mIVF  - NPO  - DVT ppx  - Daily labs  - PT/OT  - DNR        Artie Noble MD  General Surgery  [facility]

## 2024-07-03 NOTE — PLAN OF CARE
Problem: Adult Inpatient Plan of Care  Goal: Plan of Care Review  Outcome: Progressing  Goal: Patient-Specific Goal (Individualized)  Outcome: Progressing  Goal: Absence of Hospital-Acquired Illness or Injury  Outcome: Progressing  Goal: Optimal Comfort and Wellbeing  Outcome: Progressing  Goal: Readiness for Transition of Care  Outcome: Progressing     Problem: Infection  Goal: Absence of Infection Signs and Symptoms  Outcome: Progressing     Problem: Fall Injury Risk  Goal: Absence of Fall and Fall-Related Injury  Outcome: Progressing     Problem: Skin Injury Risk Increased  Goal: Skin Health and Integrity  Outcome: Progressing     Problem: Heart Failure  Goal: Optimal Coping  Outcome: Progressing  Goal: Optimal Cardiac Output  Outcome: Progressing  Goal: Stable Heart Rate and Rhythm  Outcome: Progressing  Goal: Optimal Functional Ability  Outcome: Progressing  Goal: Fluid and Electrolyte Balance  Outcome: Progressing  Goal: Improved Oral Intake  Outcome: Progressing  Goal: Effective Oxygenation and Ventilation  Outcome: Progressing  Goal: Effective Breathing Pattern During Sleep  Outcome: Progressing     Problem: Depressive Signs/Symptoms  Goal: Optimized Energy Level (Depressive Signs/Symptoms)  Outcome: Progressing  Goal: Optimized Cognitive Function (Depressive Signs/Symptoms)  Outcome: Progressing  Goal: Increased Participation and Engagement (Depressive Signs/Symptoms)  Outcome: Progressing  Goal: Enhanced Self-Esteem and Confidence (Depressive Signs/Symptoms)  Outcome: Progressing  Goal: Improved Mood Symptoms (Depressive Signs/Symptoms)  Outcome: Progressing  Goal: Optimized Nutrition Intake (Depressive Signs/Symptoms)  Outcome: Progressing  Goal: Improved Psychomotor Symptoms (Depressive Signs/Symptoms)  Outcome: Progressing  Goal: Improved Sleep (Depressive Signs/Symptoms)  Outcome: Progressing  Goal: Enhanced Social, Occupational or Functional Skills (Depressive Signs/Symptoms)  Outcome: Progressing      Problem: Fatigue  Goal: Improved Activity Tolerance  Outcome: Progressing     Problem: Sensory Impairment  Goal: Optimal Sensory Management  Outcome: Progressing     Problem: Mobility Impairment  Goal: Optimal Mobility [place] and Safety  Outcome: Progressing     Problem: Coping Ineffective  Goal: Effective Coping  Outcome: Progressing     Problem: Wound  Goal: Optimal Coping  Outcome: Progressing  Goal: Optimal Functional Ability  Outcome: Progressing  Goal: Absence of Infection Signs and Symptoms  Outcome: Progressing  Goal: Improved Oral Intake  Outcome: Progressing  Goal: Optimal Pain Control and Function  Outcome: Progressing  Goal: Skin Health and Integrity  Outcome: Progressing  Goal: Optimal Wound Healing  Outcome: Progressing

## 2024-07-03 NOTE — NURSING
"[facility] Plan of Care Note    Dx:   Weakness [R53.1]  Chest pain [R07.9]    Shift Events: Pt responsiveness steadilty decreasing, team aware    Goals of Care: pain management, fluids, comfort    Neuro: opened eyes to voice at start of shift, as day progress pt became non communicative again with eyes close, responsive to pain    Vital Signs: BP (!) 95/59 (BP Location: Left arm, Patient Position: Lying)   Pulse 89   Temp 97.3 °F (36.3 °C) (Oral)   Resp 16   Ht 5' 2" (1.575 m)   Wt 45.4 kg (100 lb)   SpO2 (!) 90%   BMI 18.29 kg/m²     Respiratory: breaths shallow, LS diminished    Diet: Diet NPO      Is patient tolerating current diet? Pt NPO    GTTS: D5 1/2 NS w/20 of K+ @ 75cc/hr    Urine Output/Bowel Movement:   I/O this shift:  In: -   Out: 200 [Urine:200]  Last Bowel Movement: 06/24/24 (pt unable to state)      Drains/Tubes/Tube Feeds (include total output/shift):   I/O this shift:  In: -   Out: 200 [Urine:200]      Lines: PIV 20g L forearm, 20 G R AC      Accuchecks:none    Skin: small incision to abd, FERNANDO with dermabod    Fall Risk Score: see flowsheets    Activity level? Rolling, no OOB    Any scheduled procedures? none    Any safety concerns? N/a    Other: n/a     "

## 2024-07-03 NOTE — NURSING
Nurses Note -- 4 Eyes      7/3/2024   1:55 AM      Skin assessed during: Admit      [x] No Altered Skin Integrity Present    [x]Prevention Measures Documented      [] Yes- Altered Skin Integrity Present or Discovered   [] LDA Added if Not in Epic (Describe Wound)   [] New Altered Skin Integrity was Present on Admit and Documented in LDA   [] Wound Image Taken    Wound Care Consulted? No    Attending Nurse:  Tricia Fuentes RN/Staff Member:  Jesus COLE

## 2024-07-03 NOTE — PT/OT/SLP PROGRESS
Occupational Therapy      Patient Name:  James Dc   MRN:  [id number]    Patient not seen today secondary to wife declining services due to team decision to halt therapy made prior to therapy arrival. Will follow-up per POC.    7/3/2024

## 2024-07-03 NOTE — ANESTHESIA POSTPROCEDURE EVALUATION
Anesthesia Post Evaluation    Patient: James Dc    Procedure(s) Performed: Procedure(s) (LRB):  REPAIR, HERNIA, INGUINAL, INCARCERATED, INITIAL, AGE 5 YEARS OR OLDER (Right)    Final Anesthesia Type: general      Patient location during evaluation: PACU  Patient participation: Yes- Able to Participate  Level of consciousness: awake and alert  Post-procedure vital signs: reviewed and stable  Pain management: adequate  Airway patency: patent  LISA mitigation strategies: Multimodal analgesia  PONV status at discharge: No PONV  Anesthetic complications: no      Cardiovascular status: blood pressure returned to baseline and hemodynamically stable  Respiratory status: unassisted  Hydration status: euvolemic  Follow-up not needed.              Vitals Value Taken Time   /64 07/03/24 0446   Temp 36.3 °C (97.3 °F) 07/03/24 0446   Pulse 94 07/03/24 0446   Resp 16 07/03/24 0446   SpO2 92 % 07/03/24 0446         Event Time   Out of Recovery 07/02/2024 18:30:00         Pain/Avery Score: Pain Rating Prior to Med Admin: 7 (7/2/2024  6:34 PM)  Avery Score: 9 (7/2/2024  7:15 PM)

## 2024-07-03 NOTE — PT/OT/SLP PROGRESS
Physical Therapy      Patient Name:  James Dc   MRN:  [id number]  Orders received for re-eval s/p hernia surgery 7/3/2024    Patient not seen today secondary to Other (Comment). First attempt, patient sleeping and MD present w/ wife (1028. PT and OT returned for eval at 1124. Wife declines therapy, saying the decision was to not have therapy. (Possible palliative care?) Will follow-up regarding orders/ change to palliative..

## 2024-07-03 NOTE — SUBJECTIVE & OBJECTIVE
Interval History: Chart reviewed including 24h medication use. POD for hernia repair and resolution of SBO. Patient lying in bed, unresponsive with deep breathing and some accessory muscle use. Wife at bedside during visit.     Palliative ROS:  PRNs: oxycodone 2.5mg x1 (~3 OME), fentanyl ~300mcg IV intra-op (~90 OME)  Pain behaviors- none  Respiratory distress- controlled  Agitation- none        Past Medical History:   Diagnosis Date    Essential tremor     Hypertensive urgency 6/13/2023    Nuclear sclerosis - Both Eyes 9/19/2012       Past Surgical History:   Procedure Laterality Date    CATARACT EXTRACTION W/  INTRAOCULAR LENS IMPLANT Left 10/10/2023    Procedure: EXTRACTION, CATARACT, WITH IOL INSERTION;  Surgeon: Lon Carr MD;  Location: [facility] OR;  Service: Ophthalmology;  Laterality: Left;    CATARACT EXTRACTION W/  INTRAOCULAR LENS IMPLANT Right 10/24/2023    Procedure: EXTRACTION, CATARACT, WITH IOL INSERTION;  Surgeon: Lon Carr MD;  Location: [facility] OR;  Service: Ophthalmology;  Laterality: Right;    EXCISION, SMALL INTESTINE  7/2/2024    Procedure: EXCISION, SMALL INTESTINE;  Surgeon: Shen Araujo MD;  Location: [facility] OR [address] [place] [address];  Service: General;;    REPAIR, HERNIA, INGUINAL, INCARCERATED, INITIAL, AGE 5 YEARS OR OLDER Right 7/2/2024    Procedure: REPAIR, HERNIA, INGUINAL, INCARCERATED, INITIAL, AGE 5 YEARS OR OLDER;  Surgeon: Shen Araujo MD;  Location: [facility] OR [address] [place] [address];  Service: General;  Laterality: Right;  Right inguinal hernia under local, possible exploratory laparotomy    TONSILLECTOMY         Review of patient's allergies indicates:   Allergen Reactions    Celexa [citalopram] Other (See Comments)     Worsening tremor    Tetracyclines Nausea Only       Medications:  Continuous Infusions:   dextrose 5 % and 0.45 % NaCl with KCl 20 mEq   Intravenous Continuous 75 mL/hr at 07/03/24 1045 Rate Change at 07/03/24 1045     Scheduled Meds:   acetaminophen  1,000 mg  Intravenous Q8H    enoxparin  30 mg Subcutaneous Q24H (prophylaxis, 1700)     PRN Meds:  Current Facility-Administered Medications:     albuterol-ipratropium, 3 mL, Nebulization, Q4H PRN    dextrose 10%, 12.5 g, Intravenous, PRN    dextrose 10%, 25 g, Intravenous, PRN    glucagon (human recombinant), 1 mg, Intramuscular, PRN    glucose, 16 g, Oral, PRN    glucose, 24 g, Oral, PRN    melatonin, 6 mg, Oral, Nightly PRN    naloxone, 0.4 mg, Intravenous, PRN    ondansetron, 8 mg, Oral, Q6H PRN    Family History       Problem Relation (Age of Onset)    Cataracts Son    Prostate cancer Father          Tobacco Use    Smoking status: Former    Smokeless tobacco: Former   Substance and Sexual Activity    Alcohol use: Yes    Drug use: No    Sexual activity: Yes     Partners: Female         Objective:     Vital Signs (Most Recent):  Temp: 97.3 °F (36.3 °C) (07/03/24 1131)  Pulse: 91 (07/03/24 1131)  Resp: 20 (07/03/24 1131)  BP: 117/74 (07/03/24 1131)  SpO2: (!) 91 % (07/03/24 1131) Vital Signs (24h Range):  Temp:  [97.3 °F (36.3 °C)-99.1 °F (37.3 °C)] 97.3 °F (36.3 °C)  Pulse:  [] 91  Resp:  [11-20] 20  SpO2:  [87 %-100 %] 91 %  BP: ()/(50-86) 117/74     Weight: 45.4 kg (100 lb)  Body mass index is 18.29 kg/m².       Physical Exam  Vitals and nursing note reviewed.   Constitutional:       General: He is not in acute distress.     Appearance: He is ill-appearing.      Comments: Elderly, thin male lying in bed, eyes open but unresponsive to any stimuli, does not follow commands, no observed pain behaviors   HENT:      Mouth/Throat:      Mouth: Mucous membranes are moist.   Eyes:      Extraocular Movements: Extraocular movements intact.   Pulmonary:      Comments: Mild accessory muscle use  Abdominal:      General: Abdomen is flat.      Palpations: Abdomen is soft.   Skin:     General: Skin is warm and dry.   Neurological:      Comments: Unresponsive, some movement in hands on command possibly involuntary  "  Psychiatric:      Comments: Not agitated            Review of Symptoms      Symptom Assessment (ESAS 0-10 Scale)  Unable to complete assessment due to Mental status change         Pain Assessment in Advanced Demential Scale (PAINAD)   Breathing - Independent of vocalization:  0  Negative vocalization:  0  Facial expression:  0  Body language:  0  Consolability:  0  Total:  0    Psychosocial/Cultural:   See Palliative Psychosocial Note: No  **Primary  to Follow**  Palliative Care  Consult: No        Advance Care Planning   Advance Directives:   Living Will: Yes        Copy on chart: Yes    Do Not Resuscitate Status: Yes    Goals of Care: I engaged the patient, his wife, and his daughter at bedside in a voluntary discussion regarding their values and wishes in the setting of severe life-limiting illness. Patient's wife, Brisa, with very good insight, understanding that patient's small bowel obstruction was a life-threatening condition that necessitated an intervention, but, that it appears that patient has not made a great recovery after surgery. She expresses that she worries that he is "dying", reflecting on the past several months of his life, during which, he has declined from a cognitive and clinical standpoint. I shared that given his age and acuity, it is very possible that he could continue to decline and, if so, I would worry that he would be at the end of his life. She agrees. I also explained that it is very possible that he improves from his current condition, in which case we would continue to provide good goal-concordant care with him. I also explained that we are unsure of why patient remains unresponsive, but that we could do further invasive and noninvasive testing to determine a cause. Patient's wife is clear that further testing is unlikely to meaningfully benefit him and therefore she would like to hold off at this time. Later in the day, patient clinically improving, " "but still only somewhat responsive. Discussed with daughter at bedside, and she would like to continue best supportive care, rather than pursue testing and evaluation by specialists which would likely result in an escalation of care. Patient's wife and daughter that if he declines over the coming days, they would want comfort-focused care only, accepting that he has had a wonderful life and would not want  to add unnecessary suffering to the end of his life       CBC:   Recent Labs   Lab 07/03/24 0457   WBC 11.47   HGB 11.8*   HCT 36.2*   MCV 97        BMP:  Recent Labs   Lab 07/03/24 0457         K 3.7   *   CO2 21*   BUN 34*   CREATININE 1.1   CALCIUM 9.1   MG 2.0     LFT:  Lab Results   Component Value Date    AST 28 07/03/2024    ALKPHOS 44 (L) 07/03/2024    BILITOT 0.7 07/03/2024     Albumin:   Albumin   Date Value Ref Range Status   07/03/2024 2.8 (L) 3.5 - 5.2 g/dL Final     Protein:   Total Protein   Date Value Ref Range Status   07/03/2024 5.1 (L) 6.0 - 8.4 g/dL Final     Lactic acid:   No results found for: "LACTATE"      Reviewed CBC with stable blood counts, CMP with stable renal function. CT abd with high grade bowel obstruction prior to surgical repair      "

## 2024-07-03 NOTE — PT/OT/SLP PROGRESS
Speech Language Pathology Treatment    Patient Name:  James Dc   MRN:  [id number]  Admitting Diagnosis: Femoral hernia of right side with obstruction    Recommendations:                 General Recommendations:  Dysphagia therapy  Diet recommendations:  NPO, NPO   Aspiration Precautions:   Strict NPO- pt is not safe for oral intake of any kind outside of skilled speech sessions  Meds via alternative routes    General Precautions: Standard, fall  Communication strategies:  go to room if call light pushed    Assessment:     James Dc is a 89 y.o. male with an SLP diagnosis of Dysphagia.  He presents with significantly worsening swallow function this date as evidenced by inconsistent ability to trigger a swallow for low level PO trials in addition to decreased engagement and visual tracking of SLP this date. Recommend strict NPO with meds via alternative routes. SLP to continue to follow.      Subjective     Spoke with nursing prior to session. Pt found resting in bed with spouse at bedside upon SLP entry into room.     Patient goals: none stated     Pain/Comfort:  Pain Rating 1: 0/10    Respiratory Status: Room air    Objective:     Has the patient been evaluated by SLP for swallowing?   Yes  Keep patient NPO? No   Current Respiratory Status:        Pt seen for ongoing dysphagia therapy. Spouse at bedside reported pt awaiting head CT 2/2 increased somnolence and concern for possible CVA. Pt resting in bed though appearing with worsening altered status compared to initial evaluation. Pt without vocalizations this date in addition to no visual tracking or ability to follow 1 step commands. Dry spoon presented to lips with pt demonstrating spontaneous attempted to strip utensil though decreased labial approximations and slowed oral motor movements exhibited. Ice chip slivers x2 completed with no voluntary oral manipulation with only 1 instance of hyolaryngeal movement noted to palpation >90 seconds following ice  chip presentation,. Additional oral trials deferred 2/2 high risk for aspiration. SLP spoke with pt's spouse regarding overall impressions, decline in swallow function from previous date, strict NPO, and ongoing SLP POC. Wife asking questions regarding swallow function and prognosis which were answered to satisfaction. She ultimately verbalized understanding and had no additional questions or concerns upon SLP exit.      Goals:   Multidisciplinary Problems       SLP Goals          Problem: SLP    Goal Priority Disciplines Outcome   SLP Goal     SLP Progressing   Description: Speech Language Pathology Goals  Goals expected to be met by 7/9:  1. Pt will participate in ongoing assessment of swallow function to determine safest and least restrictive diet.                        Plan:     Patient to be seen:  4 x/week   Plan of Care expires:  07/31/24  Plan of Care reviewed with:  patient, spouse   SLP Follow-Up:  Yes       Discharge recommendations:  Moderate Intensity Therapy   Barriers to Discharge:  Level of Skilled Assistance Needed      Time Tracking:     SLP Treatment Date:   07/03/24  Speech Start Time:  0811  Speech Stop Time:  0824     Speech Total Time (min):  13 min    Billable Minutes: Treatment Swallowing Dysfunction 13      07/03/2024

## 2024-07-03 NOTE — SIGNIFICANT EVENT
"Medical Emergency Team Consult Note  Critical Care Medicine    CC: Femoral hernia of right side with obstruction  Date: 07/02/2024  Admit Date: 7/1/2024  Hospital Length of Stay: 0  MRN: [id number]  The patient location is: Bed [facility]6/1026 A  Dx: Femoral hernia of right side with obstruction  Code Status: DNR   Chart Reviewed: 07/02/2024, 9:44 PM      SUBJECTIVE:     HPI:  James Dc has a past medical history of Essential tremor, Hypertensive urgency, and Nuclear sclerosis - Both Eyes.    Significant Events: Called urgently to the bedside for hypotension, RR 10-12, minimally responsive.       OBJECTIVE:     Physical Exam  Vitals and nursing note reviewed.   Constitutional:       Appearance: He is ill-appearing.   Eyes:      Pupils: Pupils are equal, round, and reactive to light.   Cardiovascular:      Rate and Rhythm: Normal rate and regular rhythm.      Pulses: Normal pulses.      Heart sounds: Normal heart sounds.   Pulmonary:      Effort: Pulmonary effort is normal.      Breath sounds: Normal breath sounds.   Abdominal:      General: Abdomen is flat. Bowel sounds are normal.      Palpations: Abdomen is soft.   Musculoskeletal:         General: Normal range of motion.   Skin:     General: Skin is warm and dry.      Comments: R groin site with dermabond, CDI, soft, no hematoma   Neurological:      Comments: Minimally responsive         Last VS: /72   Pulse 94   Temp 97.6 °F (36.4 °C) (Oral)   Resp 13   Ht 5' 2" (1.575 m)   Wt 45.4 kg (100 lb)   SpO2 (!) 91%   BMI 18.29 kg/m²     24H Vital Sign Range:    Temp:  [97.6 °F (36.4 °C)-99.1 °F (37.3 °C)]   Pulse:  []   Resp:  [11-20]   BP: ()/(50-92)   SpO2:  [1 %-100 %]     Level of Consciousness (AVPU): alert      Intake/Output Summary (Last 24 hours) at 7/2/2024 2144  Last data filed at 7/2/2024 1808  Gross per 24 hour   Intake 800 ml   Output 320 ml   Net 480 ml       Recent Labs     07/01/24  1716 07/02/24  0631 07/02/24  2109   WBC 8.26 7.46 " "10.11   HGB 14.8 13.9* 13.5*   HCT 43.1 42.2 40.1    193 201       Recent Labs     07/01/24  1716 07/02/24  0631    145   K 3.8 3.5    105   CO2 27 27   BUN 32* 33*   CREATININE 1.0 0.8   GLU 79 66*   PHOS  --  4.1   MG 2.2 2.1        No results for input(s): "PH", "PCO2", "PO2", "HCO3", "POCSATURATED", "BE" in the last 72 hours.     No results found for: "LACTATE"      ASSESSMENT AND PLAN :     Acute Encephalopathy  Pt s/p inguinal hernia repair. Presented to the floor from PACU minimally responsive to sternal rub. Pt also slightly hypoxic with a RR 10-12. Surgery at bedside at the time of arrival. Pt received intraoperative anesthesia and oxycodone 2.5 mg post-operatively. R groin site CDI, no hematoma, soft to touch.     --Narcan x 1 now with response. Pt more awake, able to follow simple commands  -- ml bolus x 1.     BP improved. Per wife at bedside pt is sensitive to narcotics, only takes tylenol for pain at home.     Uninterrupted Critical Care/Counseling Time (not including procedures): 35 minutes  Critical care was time spent personally by me on the following activities: development of treatment plan with patient or surrogate and bedside caregivers, discussions with consultants, evaluation of patient's response to treatment, examination of patient, ordering and performing treatments and interventions, ordering and review of laboratory studies, ordering and review of radiographic studies, pulse oximetry, re-evaluation of patient's condition. This critical care time did not overlap with that of any other provider or involve time for any procedures.    Mandie Osuna [facility]  Critical Care Medicine  07/02/2024 9:51 PM          "

## 2024-07-03 NOTE — ASSESSMENT & PLAN NOTE
89 yo M w/ above history found to have incarcerated inguinal hernia with apparent closed loop small bowel obstruction. No nausea or gastric distention at this point - he does have a firm loop of bowel incarcerated within his groin that is tender to attempts at reduction. This is a difficult situation as he is DNR, quite elderly and was previously independent with wishes to return to that state. We discussed the natural history of incarcerated hernias containing small bowel with associated small bowel obstructions including potential for non-viable bowel and needs for potential laparotomy and small bowel resection. My fear is that untreated, this hernia will likely lead to death of the incarcerated small bowel in addition to worsening obstruction symptoms and ultimately death. He is high risk for surgery but at this point the benefits outweigh the potential risks and both him and his wife would like to proceed with attempts at repair.     - CTH to evaluate for CVA.  - mIVF  - NPO  - DVT ppx  - Daily labs  - PT/OT  - DNR

## 2024-07-04 NOTE — PLAN OF CARE
"[facility] Plan of Care Note    Dx: Weakness [R53.1]  Chest pain [R07.9]    Shift Events:wife  refused Vital signs for entire shift,  safety,telemetry      Goals of Care: Pain Management, Safety, Telemetry     Neuro:  eyes open and close but is verbally non responsive     Vital Signs: BP (!) 95/59 (BP Location: Left arm, Patient Position: Lying)   Pulse 83   Temp 97.3 °F (36.3 °C) (Oral)   Resp 15   Ht 5' 2" (1.575 m)   Wt 45.4 kg (100 lb)   SpO2 (!) 90%   BMI 18.29 kg/m²     Respiratory: 3L/NC     Diet: Diet NPO      Is patient tolerating current diet? N/a    GTTS: D5 1/2 NS 20 KCL @75 ml/hr    Urine Output/Bowel Movement:     Intake/Output Summary (Last 24 hours) at 7/4/2024 0604  Last data filed at 7/4/2024 0557  Gross per 24 hour   Intake 0 ml   Output 450 ml   Net -450 ml          Drains/Tubes/Tube Feeds (include total output/shift):   Output by Drain (mL) 07/02/24 0701 - 07/02/24 1900 07/02/24 1901 - 07/03/24 0700 07/03/24 0701 - 07/03/24 1900 07/03/24 1901 - 07/04/24 0604   Requested LDAs do not have output data documented.          Lines: 20g L FA and 18 g R upper Arm       Accuchecks:n/a    Skin: RLQ surgical incision with derma bond     Fall Risk Score: 21    Activity level? Primary in bed activities     Any scheduled procedures? N/a    Any safety concerns? Fall precaution, Aspiration precaution     Other: n/a   "

## 2024-07-04 NOTE — SUBJECTIVE & OBJECTIVE
Interval History: NAEON. Although patient wife refused vitals. Mental status unchanged - will look at you during speech but not responsive otherwise. Wife would like to switch to comfort focused treatment rather than pursue aggressive work up/care    Medications:  Continuous Infusions:   dextrose 5 % and 0.45 % NaCl with KCl 20 mEq   Intravenous Continuous 75 mL/hr at 07/03/24 2136 New Bag at 07/03/24 2136     Scheduled Meds:   enoxparin  30 mg Subcutaneous Q24H (prophylaxis, 1700)     PRN Meds:  Current Facility-Administered Medications:     albuterol-ipratropium, 3 mL, Nebulization, Q4H PRN    dextrose 10%, 12.5 g, Intravenous, PRN    dextrose 10%, 25 g, Intravenous, PRN    glucagon (human recombinant), 1 mg, Intramuscular, PRN    glucose, 16 g, Oral, PRN    glucose, 24 g, Oral, PRN    melatonin, 6 mg, Oral, Nightly PRN    naloxone, 0.4 mg, Intravenous, PRN    ondansetron, 8 mg, Oral, Q6H PRN     Review of patient's allergies indicates:   Allergen Reactions    Celexa [citalopram] Other (See Comments)     Worsening tremor    Tetracyclines Nausea Only     Objective:     Vital Signs (Most Recent):  Temp: 97.4 °F (36.3 °C) (07/04/24 0758)  Pulse: 93 (07/04/24 0758)  Resp: 19 (07/04/24 0758)  BP: (!) 170/91 (07/04/24 0758)  SpO2: 98 % (07/04/24 0758) Vital Signs (24h Range):  Temp:  [97.3 °F (36.3 °C)-98.4 °F (36.9 °C)] 97.4 °F (36.3 °C)  Pulse:  [] 93  Resp:  [15-20] 19  SpO2:  [90 %-99 %] 98 %  BP: ()/(59-91) 170/91     Weight: 45.4 kg (100 lb)  Body mass index is 18.29 kg/m².    Intake/Output - Last 3 Shifts         07/02 0700 07/03 0659 07/03 0700 07/04 0659 07/04 0700 07/05 0659    P.O. 0 0     I.V. (mL/kg)  871.1 (19.2)     IV Piggyback 800      Total Intake(mL/kg) 800 (17.6) 871.1 (19.2)     Urine (mL/kg/hr) 300 (0.3) 450 (0.4)     Stool  0     Blood 20      Total Output 320 450     Net +480 +421.1            Stool Occurrence  0 x              Physical Exam  Vitals reviewed.   Cardiovascular:       Rate and Rhythm: Normal rate and regular rhythm.      Pulses: Normal pulses.   Pulmonary:      Effort: Pulmonary effort is normal.   Abdominal:      General: Abdomen is flat.      Palpations: Abdomen is soft.      Comments: R hernia incision CDI. No swelling or bleeding noted.    Skin:     General: Skin is warm.      Capillary Refill: Capillary refill takes less than 2 seconds.   Neurological:      Mental Status: He is disoriented and confused.   Psychiatric:         Speech: He is noncommunicative.          Significant Labs:  I have reviewed all pertinent lab results within the past 24 hours.  CBC:   Recent Labs   Lab 07/03/24  1653   WBC 9.95   RBC 3.40*   HGB 10.7*   HCT 32.6*      MCV 96   MCH 31.5*   MCHC 32.8     CMP:   Recent Labs   Lab 07/03/24  0457      CALCIUM 9.1   ALBUMIN 2.8*   PROT 5.1*      K 3.7   CO2 21*   *   BUN 34*   CREATININE 1.1   ALKPHOS 44*   ALT 46*   AST 28   BILITOT 0.7       Significant Diagnostics:  I have reviewed all pertinent imaging results/findings within the past 24 hours.

## 2024-07-04 NOTE — PROGRESS NOTES
"[facility] - [facility]  Palliative Medicine  Progress Note    Patient Name: James Dc  MRN: [id number]  Admission Date: 7/1/2024  Hospital Length of Stay: 1 days  Code Status: DNR   Attending Provider: Shen Araujo MD  Consulting Provider: Remberto Rice MD  Primary Care Physician: Tim Joya MD  Principal Problem:Femoral hernia of right side with obstruction    Patient information was obtained from spouse/SO, relative(s), past medical records, and primary team.      Assessment/Plan:     Neuro  Altered mental status  89m with HFpEF, history of CVA due to carotid artery stenosis, parkinsonism, Alzheimer's dementia who was admitted 7/1 with acute small bowel obstruction s/p incarcerated inguinal hernia repair who has developed altered mental status post-operatively. Labs and CT head unremarkable, wife deferring further testing    -continue best supportive care per primary team    Palliative Care  Palliative care encounter  See ACP 7/3    Insight/goals of care- wife with very good insight and prognostic awareness, understands that he has likely suffered some kind of neurologic injury post-operatively and could continue to decline and be at end of life. She is very clear that she would not put him through unhelpful or unnecessary testing. Has living will reflecting comfort-focused care preferences at end of life.     Social support- Lives with wife of 60+ years, one daughter. Retired .     Psychological- very appropriate grief behaviors by wife, healthy coping mechanisms    Spiritual- "raised Episcopal" but currently nonpracticing    Symptom management- no active needs    Recommendations  -DNR,  best supportive care  -if patient clinically improves, would continue to offer conservative interventions that might meaningfully prolong his life  -if he clinically worsens, family would want comfort-focused care at the end of his life        I will follow-up with patient. Please contact us if you have any " additional questions.    Subjective:     Chief Complaint:   Chief Complaint   Patient presents with    Weakness     Generalized weakness and loss of appetite x4 days. Pt c/o difficulty swallowing. Coming from home.       HPI:   Mr James Dc is a 89 y.o. male with HFpEF, history of CVA due to carotid artery stenosis, parkinsonism, Alzheimer's dementia who presented to [facility] on 7/2 for evaluation of generalized weakness. Patient and family reports progressive generalized weakness for quite some time which worsened over the last few weeks leading to functional decline as well as difficulty swallowing and poor intake. Initial workup revealed high grade small bowel obstruction. Palliative care consulted for goals of care.    Hospital Course:  No notes on file    Interval History: Chart reviewed including 24h medication use. POD for hernia repair and resolution of SBO. Patient lying in bed, unresponsive with deep breathing and some accessory muscle use. Wife at bedside during visit.     Palliative ROS:  PRNs: oxycodone 2.5mg x1 (~3 OME), fentanyl ~300mcg IV intra-op (~90 OME)  Pain behaviors- none  Respiratory distress- controlled  Agitation- none        Past Medical History:   Diagnosis Date    Essential tremor     Hypertensive urgency 6/13/2023    Nuclear sclerosis - Both Eyes 9/19/2012       Past Surgical History:   Procedure Laterality Date    CATARACT EXTRACTION W/  INTRAOCULAR LENS IMPLANT Left 10/10/2023    Procedure: EXTRACTION, CATARACT, WITH IOL INSERTION;  Surgeon: Lon Carr MD;  Location: [facility] OR;  Service: Ophthalmology;  Laterality: Left;    CATARACT EXTRACTION W/  INTRAOCULAR LENS IMPLANT Right 10/24/2023    Procedure: EXTRACTION, CATARACT, WITH IOL INSERTION;  Surgeon: Lon Carr MD;  Location: [facility] OR;  Service: Ophthalmology;  Laterality: Right;    EXCISION, SMALL INTESTINE  7/2/2024    Procedure: EXCISION, SMALL INTESTINE;  Surgeon: Shen Araujo MD;  Location: [facility]  OR 2ND FLR;  Service: General;;    REPAIR, HERNIA, INGUINAL, INCARCERATED, INITIAL, AGE 5 YEARS OR OLDER Right 7/2/2024    Procedure: REPAIR, HERNIA, INGUINAL, INCARCERATED, INITIAL, AGE 5 YEARS OR OLDER;  Surgeon: Shen Araujo MD;  Location: [facility] OR 2ND FLR;  Service: General;  Laterality: Right;  Right inguinal hernia under local, possible exploratory laparotomy    TONSILLECTOMY         Review of patient's allergies indicates:   Allergen Reactions    Celexa [citalopram] Other (See Comments)     Worsening tremor    Tetracyclines Nausea Only       Medications:  Continuous Infusions:   dextrose 5 % and 0.45 % NaCl with KCl 20 mEq   Intravenous Continuous 75 mL/hr at 07/03/24 1045 Rate Change at 07/03/24 1045     Scheduled Meds:   acetaminophen  1,000 mg Intravenous Q8H    enoxparin  30 mg Subcutaneous Q24H (prophylaxis, 1700)     PRN Meds:  Current Facility-Administered Medications:     albuterol-ipratropium, 3 mL, Nebulization, Q4H PRN    dextrose 10%, 12.5 g, Intravenous, PRN    dextrose 10%, 25 g, Intravenous, PRN    glucagon (human recombinant), 1 mg, Intramuscular, PRN    glucose, 16 g, Oral, PRN    glucose, 24 g, Oral, PRN    melatonin, 6 mg, Oral, Nightly PRN    naloxone, 0.4 mg, Intravenous, PRN    ondansetron, 8 mg, Oral, Q6H PRN    Family History       Problem Relation (Age of Onset)    Cataracts Son    Prostate cancer Father          Tobacco Use    Smoking status: Former    Smokeless tobacco: Former   Substance and Sexual Activity    Alcohol use: Yes    Drug use: No    Sexual activity: Yes     Partners: Female         Objective:     Vital Signs (Most Recent):  Temp: 97.3 °F (36.3 °C) (07/03/24 1131)  Pulse: 91 (07/03/24 1131)  Resp: 20 (07/03/24 1131)  BP: 117/74 (07/03/24 1131)  SpO2: (!) 91 % (07/03/24 1131) Vital Signs (24h Range):  Temp:  [97.3 °F (36.3 °C)-99.1 °F (37.3 °C)] 97.3 °F (36.3 °C)  Pulse:  [] 91  Resp:  [11-20] 20  SpO2:  [87 %-100 %] 91 %  BP: ()/(50-86) 117/74  "    Weight: 45.4 kg (100 lb)  Body mass index is 18.29 kg/m².       Physical Exam  Vitals and nursing note reviewed.   Constitutional:       General: He is not in acute distress.     Appearance: He is ill-appearing.      Comments: Elderly, thin male lying in bed, eyes open but unresponsive to any stimuli, does not follow commands, no observed pain behaviors   HENT:      Mouth/Throat:      Mouth: Mucous membranes are moist.   Eyes:      Extraocular Movements: Extraocular movements intact.   Pulmonary:      Comments: Mild accessory muscle use  Abdominal:      General: Abdomen is flat.      Palpations: Abdomen is soft.   Skin:     General: Skin is warm and dry.   Neurological:      Comments: Unresponsive, some movement in hands on command possibly involuntary   Psychiatric:      Comments: Not agitated            Review of Symptoms      Symptom Assessment (ESAS 0-10 Scale)  Unable to complete assessment due to Mental status change         Pain Assessment in Advanced Demential Scale (PAINAD)   Breathing - Independent of vocalization:  0  Negative vocalization:  0  Facial expression:  0  Body language:  0  Consolability:  0  Total:  0    Psychosocial/Cultural:   See Palliative Psychosocial Note: No  **Primary  to Follow**  Palliative Care  Consult: No        Advance Care Planning  Advance Directives:   Living Will: Yes        Copy on chart: Yes    Do Not Resuscitate Status: Yes    Goals of Care: I engaged the patient, his wife, and his daughter at bedside in a voluntary discussion regarding their values and wishes in the setting of severe life-limiting illness. Patient's wife, Brisa, with very good insight, understanding that patient's small bowel obstruction was a life-threatening condition that necessitated an intervention, but, that it appears that patient has not made a great recovery after surgery. She expresses that she worries that he is "dying", reflecting on the past several months of " "his life, during which, he has declined from a cognitive and clinical standpoint. I shared that given his age and acuity, it is very possible that he could continue to decline and, if so, I would worry that he would be at the end of his life. She agrees. I also explained that it is very possible that he improves from his current condition, in which case we would continue to provide good goal-concordant care with him. I also explained that we are unsure of why patient remains unresponsive, but that we could do further invasive and noninvasive testing to determine a cause. Patient's wife is clear that further testing is unlikely to meaningfully benefit him and therefore she would like to hold off at this time. Later in the day, patient clinically improving, but still only somewhat responsive. Discussed with daughter at bedside, and she would like to continue best supportive care, rather than pursue testing and evaluation by specialists which would likely result in an escalation of care. Patient's wife and daughter that if he declines over the coming days, they would want comfort-focused care only, accepting that he has had a wonderful life and would not want  to add unnecessary suffering to the end of his life       CBC:   Recent Labs   Lab 07/03/24 0457   WBC 11.47   HGB 11.8*   HCT 36.2*   MCV 97        BMP:  Recent Labs   Lab 07/03/24 0457         K 3.7   *   CO2 21*   BUN 34*   CREATININE 1.1   CALCIUM 9.1   MG 2.0     LFT:  Lab Results   Component Value Date    AST 28 07/03/2024    ALKPHOS 44 (L) 07/03/2024    BILITOT 0.7 07/03/2024     Albumin:   Albumin   Date Value Ref Range Status   07/03/2024 2.8 (L) 3.5 - 5.2 g/dL Final     Protein:   Total Protein   Date Value Ref Range Status   07/03/2024 5.1 (L) 6.0 - 8.4 g/dL Final     Lactic acid:   No results found for: "LACTATE"      Reviewed CBC with stable blood counts, CMP with stable renal function. CT abd with high grade bowel " obstruction prior to surgical repair        In my care of this patient with acute on chronic severe illness with threat to life and/or bodily function, I am recommending goal-concordant care as noted above. I spent a significant amount of time reviewing external records/ recommendations of other providers (gen surg), reviewing recent test results (CBC, CMP, CT abd, CT head), and discussed care with other subspecialists involved (Surgery)    In addition to above, I spent 48 minutes throughout the day specifically discussing advance care planning and goals of care with patient's family at bedside.       The above recommendations communicated directly to primary team on 7/3        Remberto Rice MD  Palliative Medicine  [facility]

## 2024-07-04 NOTE — PLAN OF CARE
[facility]  Discharge Reassessment    Per MD team, patient is not medically ready for d/c at this time.     SW informed during [facility] that patient has now been placed on Comfort Care measures. MD team plans to f/u w/Palliative Care. D/c plan is unclear at this time pending ongoing discussions between patient/family and Palliative Care. Will continue to follow for needs.    Primary Care Provider: Tim Joya MD    Expected Discharge Date: 7/9/2024    Reassessment (most recent)       Discharge Reassessment - 07/04/24 1722          Discharge Reassessment    Assessment Type Discharge Planning Reassessment     Did the patient's condition or plan change since previous assessment? Yes     Discharge Plan discussed with: Patient;Spouse/sig other     Communicated BECKY with patient/caregiver Date not available/Unable to determine     Discharge Plan A Comfort care/withdrawal     Discharge Plan B Hospice/home     DME Needed Upon Discharge  other (see comments)   TBD    Why the patient remains in the hospital Requires continued medical care        Post-Acute Status    Discharge Delays None known at this time                   Discharge Plan A and Plan B have been determined by review of patient's clinical status, future medical and therapeutic needs, and coverage/benefits for post-acute care in coordination with multidisciplinary team members.    Selma Vides, LARRY, LMSW    Case Management Department  [facility]

## 2024-07-04 NOTE — PROGRESS NOTES
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 He and wife report him straining very hard for a bowel movement on Friday and having abdominal pain following this and into Saturday. Sunday he noted new weakness to the point where he had difficulty rising freom his chair which is very abnormal for him. This persisted into Monday ultimately prompting ED presentation.     He was hypertensive on arrival to ED, labs largely unremarkable. CTH was obtained without acute findings. CT L spine was obtained given lower back pain which showed possible SBO and an dedicated CT abdomen pelvis was obtained as follow up. This demonstrated right inguinal hernia containing small knuckle of bowel with proximal obstruction. General surgery was consulted for these findings.     No prior abdominal surgery   Lived at home with wife independently, ambulatory, prior to this admission     Post-Op Info:  Procedure(s) (LRB):  REPAIR, HERNIA, INGUINAL, INCARCERATED, INITIAL, AGE 5 YEARS OR OLDER (Right)  EXCISION, SMALL INTESTINE   2 Days Post-Op     Interval History: NAEON. Although patient wife refused vitals. Mental status unchanged - will look at you during speech but not responsive otherwise. Wife would like to switch to comfort focused treatment rather than pursue aggressive work up/care    Medications:  Continuous Infusions:   dextrose 5 % and 0.45 % NaCl with KCl 20 mEq   Intravenous Continuous 75 mL/hr at 07/03/24 2136 New Bag at 07/03/24 2136     Scheduled Meds:   enoxparin  30 mg Subcutaneous Q24H  (prophylaxis, 1700)     PRN Meds:  Current Facility-Administered Medications:     albuterol-ipratropium, 3 mL, Nebulization, Q4H PRN    dextrose 10%, 12.5 g, Intravenous, PRN    dextrose 10%, 25 g, Intravenous, PRN    glucagon (human recombinant), 1 mg, Intramuscular, PRN    glucose, 16 g, Oral, PRN    glucose, 24 g, Oral, PRN    melatonin, 6 mg, Oral, Nightly PRN    naloxone, 0.4 mg, Intravenous, PRN    ondansetron, 8 mg, Oral, Q6H PRN     Review of patient's allergies indicates:   Allergen Reactions    Celexa [citalopram] Other (See Comments)     Worsening tremor    Tetracyclines Nausea Only     Objective:     Vital Signs (Most Recent):  Temp: 97.4 °F (36.3 °C) (07/04/24 0758)  Pulse: 93 (07/04/24 0758)  Resp: 19 (07/04/24 0758)  BP: (!) 170/91 (07/04/24 0758)  SpO2: 98 % (07/04/24 0758) Vital Signs (24h Range):  Temp:  [97.3 °F (36.3 °C)-98.4 °F (36.9 °C)] 97.4 °F (36.3 °C)  Pulse:  [] 93  Resp:  [15-20] 19  SpO2:  [90 %-99 %] 98 %  BP: ()/(59-91) 170/91     Weight: 45.4 kg (100 lb)  Body mass index is 18.29 kg/m².    Intake/Output - Last 3 Shifts         07/02 0700  07/03 0659 07/03 0700 07/04 0659 07/04 0700  07/05 0659    P.O. 0 0     I.V. (mL/kg)  871.1 (19.2)     IV Piggyback 800      Total Intake(mL/kg) 800 (17.6) 871.1 (19.2)     Urine (mL/kg/hr) 300 (0.3) 450 (0.4)     Stool  0     Blood 20      Total Output 320 450     Net +480 +421.1            Stool Occurrence  0 x              Physical Exam  Vitals reviewed.   Cardiovascular:      Rate and Rhythm: Normal rate and regular rhythm.      Pulses: Normal pulses.   Pulmonary:      Effort: Pulmonary effort is normal.   Abdominal:      General: Abdomen is flat.      Palpations: Abdomen is soft.      Comments: R hernia incision CDI. No swelling or bleeding noted.    Skin:     General: Skin is warm.      Capillary Refill: Capillary refill takes less than 2 seconds.   Neurological:      Mental Status: He is disoriented and confused.   Psychiatric:          Speech: He is noncommunicative.          Significant Labs:  I have reviewed all pertinent lab results within the past 24 hours.  CBC:   Recent Labs   Lab 07/03/24  1653   WBC 9.95   RBC 3.40*   HGB 10.7*   HCT 32.6*      MCV 96   MCH 31.5*   MCHC 32.8     CMP:   Recent Labs   Lab 07/03/24  0457      CALCIUM 9.1   ALBUMIN 2.8*   PROT 5.1*      K 3.7   CO2 21*   *   BUN 34*   CREATININE 1.1   ALKPHOS 44*   ALT 46*   AST 28   BILITOT 0.7       Significant Diagnostics:  I have reviewed all pertinent imaging results/findings within the past 24 hours.  Assessment/Plan:     Small bowel obstruction  89 yo M w/ above history found to have incarcerated inguinal hernia with apparent closed loop small bowel obstruction. No nausea or gastric distention at this point - he does have a firm loop of bowel incarcerated within his groin that is tender to attempts at reduction. This is a difficult situation as he is DNR, quite elderly and was previously independent with wishes to return to that state. We discussed the natural history of incarcerated hernias containing small bowel with associated small bowel obstructions including potential for non-viable bowel and needs for potential laparotomy and small bowel resection. My fear is that untreated, this hernia will likely lead to death of the incarcerated small bowel in addition to worsening obstruction symptoms and ultimately death. He is high risk for surgery but at this point the benefits outweigh the potential risks and both him and his wife would like to proceed with attempts at repair. Post operatively he has remained minimally responsive verbally, wife wishes to pursue comfort focused care as opposed to aggressive treatment/work up.     - Mental status precludes swallowing, speech. NPO, gentle mIVF  - CTH negative  - DC labs, routine vitals, most medications. IV pain and anxiety medication available. Continue gentle IV hydration.   - Will reach out  to palliative care for follow up  - ultimately will pursue hospice when social work returns post holiday   - DNR        Dianna Sandoval MD  General Surgery  [facility]  [place]

## 2024-07-04 NOTE — ASSESSMENT & PLAN NOTE
89 yo M w/ above history found to have incarcerated inguinal hernia with apparent closed loop small bowel obstruction. No nausea or gastric distention at this point - he does have a firm loop of bowel incarcerated within his groin that is tender to attempts at reduction. This is a difficult situation as he is DNR, quite elderly and was previously independent with wishes to return to that state. We discussed the natural history of incarcerated hernias containing small bowel with associated small bowel obstructions including potential for non-viable bowel and needs for potential laparotomy and small bowel resection. My fear is that untreated, this hernia will likely lead to death of the incarcerated small bowel in addition to worsening obstruction symptoms and ultimately death. He is high risk for surgery but at this point the benefits outweigh the potential risks and both him and his wife would like to proceed with attempts at repair. Post operatively he has remained minimally responsive verbally, wife wishes to pursue comfort focused care as opposed to aggressive treatment/work up.     - Mental status precludes swallowing, speech. NPO, gentle mIVF  - CTH negative  - DC labs, routine vitals, most medications. IV pain and anxiety medication available. Continue gentle IV hydration.   - Will reach out to palliative care for follow up  - ultimately will pursue hospice when social work returns post holiday   - DNR

## 2024-07-04 NOTE — CARE UPDATE
"Received calls from RN as pt's wife was refusing overnight vitals and neuro checks. I arrived to bedside and spoke with pt's wife. She expressed that she is uncomfortable with the aggressiveness of the neuro checks and would like to de-escalate care overnight. She states, "He would not like to live like this," and "my children and I agree that it's time." I explained the risks of missing vitals and neuro checks as he is a high risk patient, which she acknowledged and reiterated that she would like him to get a night of rest.  "

## 2024-07-04 NOTE — NURSING
On-Call MD, Dr. Zacarias Sutherland at bedside to assess patient and speak with wife in regards to refusal of all vital signs for 2000, 0000, 0400.  Patient's spouse verbalized understanding of the importance of having scheduled vital signs taken, but continues to refuse. Patient's spouse unable to be redirected at time.

## 2024-07-04 NOTE — OP NOTE
[facility]  General Surgery  Operative Note    DATE: 7/2/2024    PREOPERATIVE DIAGNOSIS: Inguinal hernia of right side with obstruction [K40.30]     POSTOPERATIVE DIAGNOSIS: Inguinal hernia of right side with obstruction [K40.30]   Incarcerated right femoral hernia    Procedure(s):  REPAIR, HERNIA, INGUINAL, INCARCERATED, INITIAL, AGE 5 YEARS OR OLDER  EXCISION, SMALL INTESTINE   Fluorescent angiography    Surgeons and Role:     * Shen Araujo MD - Primary     * Dianna Sandoval MD - Resident - Assisting    ANESTHESIA: General endotracheal anesthesia    FINDINGS: .strangulated kern's hernia in the right femoral canal; Small bowel resection with anastomosis. Primary tissue repair performed. Procedure completed under local anesthetic     INDICATION: James Dc is a 89 y.o.male presents with a symptomatic, -non-reducible inguinal bulge. The history and exam were consistent with incarcerated right inguinal hernia. We recommended an open inguinal hernia repair under local and the patient agreed to proceed.     PROCEDURE IN DETAIL: The patient was taken to the operating room in stable condition and placed supine. A Sanchez catheter was placed and perop antibiotics were given. The right groin was prepped and draped in the standard fashion. Timeout was performed.     After the skin was infiltrated with local anesthetic and an inguinal nerve block was perfromed, a 5cm horizontal incision was made, 2 cm above and parallel to the inguinal ligament, near the pubic tubercle. over the inguinal canal. Subcutaneous tissue was divided with the monopolar electrosurgical device. The superficial epigastric vessel was ligated, tied and divided. The dissection continued  to the level of the external oblique aponeurosis. Small flaps of subcutaneous tissue were made and a small Jerry wound retractor was placed into the wound. The external oblique aponeurosis was incised in line with its fibers, first with a  scalpel and then Metzenbaum scissors, and extended through the external inguinal ring. The ilioinguinal nerve was identified, ligated at its proximal and distal ends, divided and passed off as specimen.  The inguinal canal contents were bluntly encircled and isolated with a Penrose drain. The floor of the inguinal canal was then inspected and no direct hernia was identified. The floor of the inguinal canal was opened and a femoral hernia was found. It was reduced bluntly. He small bowel was then eviscerated and the portion contained in the hernia did not appear viable. ICG was given and using the SPY,  the nonviable knuckle of bowel was confirmed. This section of bowel was divided with a PRAKASH stapler and its mesentery serially ligated with 2-0 Silk. It was then passed off as specimen. A side-to-side, functional end-to-end stapled anastomosis was performed, with the common channel closed with the PRAKASH stapler. Skeletonization of the spermatic cord was performed. No indirect inguinal hernia sac was identified.  Preperitoneal fat (cord lipoma) was also identified, isolated, and ligated and reduced into the preperitoneal space. All cord structures were confirmed intact. Given the bowel anastomosis with some spillage, a tissue repair was performed. A relaxing incision on the conjoined tendon was made and then is was sutured to Malcolm's ligament from the pubic tubercle to the femoral vessel with interrupted 0-Ethibond. The repair was then transitioned with the conjoined tendon being suture to the shelving edge of the inguinal ligament.. The wound was irrigated and hemostasis achieved. The external oblique aponeuosis was closed with a running 2-0 Vicryl suture. The Jerry wound retractor was removed and Alina's fascia was closed with interrupted 3-0 Vicryl sutures. The skin was closed with interrupted deep dermal 3-0 Vicryl suture and a running subcuticular 4-0 Monocryl suture. Dermabond was applied. The patient was then  transferred to the PACU in good condition. All needle and sponge counts were correct at the conclusion of the case.     EBL: 20 mL    COMPLICATIONS: none apparent.    SPECIMEN:     Start     Ordered     07/02/24 1748   Specimen to Pathology, Surgery General Surgery  Once        Comments: Pre-op Diagnosis: Inguinal hernia of right side with obstruction [K40.30]Procedure(s):REPAIR, HERNIA, INGUINAL, INCARCERATED, INITIAL, AGE 5 YEARS OR OLDER Number of specimens: 4Name of specimens: 1) Right inguinal nerve                                  2) Hernia sac                                  3) Small bowel                                  4) Chord lipoma      References:    Click here for ordering Quick Tip   Question Answer Comment   Procedure Type: General Surgery     Specimen Class: Routine/Screening     Release to patient Immediate         07/02/24 1804                DRAINS: none    DISPOSITION: PACU.    ATTESTATION:  I was present and scrubbed for the entire procedure including all critical portions of the procedure.    Shen Araujo MD, MARICEL, FACS  Acute Care Surgery and Surgical Critical Care  [facility]  7/2/2024

## 2024-07-04 NOTE — PLAN OF CARE
Problem: Adult Inpatient Plan of Care  Goal: Plan of Care Review  Outcome: Progressing  Goal: Patient-Specific Goal (Individualized)  Outcome: Progressing  Goal: Absence of Hospital-Acquired Illness or Injury  Outcome: Progressing  Goal: Optimal Comfort and Wellbeing  Outcome: Progressing  Goal: Readiness for Transition of Care  Outcome: Progressing     Problem: Infection  Goal: Absence of Infection Signs and Symptoms  Outcome: Progressing     Problem: Fall Injury Risk  Goal: Absence of Fall and Fall-Related Injury  Outcome: Progressing     Problem: Skin Injury Risk Increased  Goal: Skin Health and Integrity  Outcome: Progressing     Problem: Heart Failure  Goal: Optimal Coping  Outcome: Progressing  Goal: Optimal Cardiac Output  Outcome: Progressing  Goal: Stable Heart Rate and Rhythm  Outcome: Progressing  Goal: Optimal Functional Ability  Outcome: Progressing  Goal: Fluid and Electrolyte Balance  Outcome: Progressing  Goal: Improved Oral Intake  Outcome: Progressing  Goal: Effective Oxygenation and Ventilation  Outcome: Progressing  Goal: Effective Breathing Pattern During Sleep  Outcome: Progressing     Problem: Depressive Signs/Symptoms  Goal: Optimized Energy Level (Depressive Signs/Symptoms)  Outcome: Progressing  Goal: Optimized Cognitive Function (Depressive Signs/Symptoms)  Outcome: Progressing  Goal: Increased Participation and Engagement (Depressive Signs/Symptoms)  Outcome: Progressing  Goal: Enhanced Self-Esteem and Confidence (Depressive Signs/Symptoms)  Outcome: Progressing  Goal: Improved Mood Symptoms (Depressive Signs/Symptoms)  Outcome: Progressing  Goal: Optimized Nutrition Intake (Depressive Signs/Symptoms)  Outcome: Progressing  Goal: Improved Psychomotor Symptoms (Depressive Signs/Symptoms)  Outcome: Progressing  Goal: Improved Sleep (Depressive Signs/Symptoms)  Outcome: Progressing  Goal: Enhanced Social, Occupational or Functional Skills (Depressive Signs/Symptoms)  Outcome: Progressing      Problem: Fatigue  Goal: Improved Activity Tolerance  Outcome: Progressing     Problem: Sensory Impairment  Goal: Optimal Sensory Management  Outcome: Progressing     Problem: Mobility Impairment  Goal: Optimal Mobility [place] and Safety  Outcome: Progressing     Problem: Coping Ineffective  Goal: Effective Coping  Outcome: Progressing     Problem: Wound  Goal: Optimal Coping  Outcome: Progressing  Goal: Optimal Functional Ability  Outcome: Progressing  Goal: Absence of Infection Signs and Symptoms  Outcome: Progressing  Goal: Improved Oral Intake  Outcome: Progressing  Goal: Optimal Pain Control and Function  Outcome: Progressing  Goal: Skin Health and Integrity  Outcome: Progressing  Goal: Optimal Wound Healing  Outcome: Progressing

## 2024-07-05 PROBLEM — Z51.5 COMFORT MEASURES ONLY STATUS: Status: ACTIVE | Noted: 2024-07-05

## 2024-07-05 NOTE — SUBJECTIVE & OBJECTIVE
Interval History: Chart reviewed including 24h medication use. Patient lying in bed, somewhat more awake, attempting to speak short words but still very ill-appearing. Wife at bedside during visit.     Palliative ROS:  PRNs:   Pain behaviors- none  Respiratory distress- controlled  Agitation- none        Past Medical History:   Diagnosis Date    Essential tremor     Hypertensive urgency 6/13/2023    Nuclear sclerosis - Both Eyes 9/19/2012       Past Surgical History:   Procedure Laterality Date    CATARACT EXTRACTION W/  INTRAOCULAR LENS IMPLANT Left 10/10/2023    Procedure: EXTRACTION, CATARACT, WITH IOL INSERTION;  Surgeon: Lon Carr MD;  Location: [facility] OR;  Service: Ophthalmology;  Laterality: Left;    CATARACT EXTRACTION W/  INTRAOCULAR LENS IMPLANT Right 10/24/2023    Procedure: EXTRACTION, CATARACT, WITH IOL INSERTION;  Surgeon: Lon Carr MD;  Location: [facility] OR;  Service: Ophthalmology;  Laterality: Right;    EXCISION, SMALL INTESTINE  7/2/2024    Procedure: EXCISION, SMALL INTESTINE;  Surgeon: Shen Araujo MD;  Location: [facility] OR [address] [place] [address];  Service: General;;    REPAIR, HERNIA, INGUINAL, INCARCERATED, INITIAL, AGE 5 YEARS OR OLDER Right 7/2/2024    Procedure: REPAIR, HERNIA, INGUINAL, INCARCERATED, INITIAL, AGE 5 YEARS OR OLDER;  Surgeon: Sehn Araujo MD;  Location: [facility] OR [address] [place] [address];  Service: General;  Laterality: Right;  Right inguinal hernia under local, possible exploratory laparotomy    TONSILLECTOMY         Review of patient's allergies indicates:   Allergen Reactions    Celexa [citalopram] Other (See Comments)     Worsening tremor    Tetracyclines Nausea Only       Medications:  Continuous Infusions:      Scheduled Meds:      PRN Meds:  Current Facility-Administered Medications:     lorazepam, 0.5 mg, Intravenous, Q2H PRN    morphine, 2 mg, Intravenous, Q1H PRN    Family History       Problem Relation (Age of Onset)    Cataracts Son    Prostate cancer Father           Tobacco Use    Smoking status: Former    Smokeless tobacco: Former   Substance and Sexual Activity    Alcohol use: Yes    Drug use: No    Sexual activity: Yes     Partners: Female         Objective:     Vital Signs (Most Recent):  Temp: 98.7 °F (37.1 °C) (07/05/24 1452)  Pulse: 76 (07/05/24 1452)  Resp: 18 (07/05/24 1452)  BP: (!) 154/70 (07/05/24 1452)  SpO2: 97 % (07/05/24 1452) Vital Signs (24h Range):  Temp:  [96.6 °F (35.9 °C)-99.3 °F (37.4 °C)] 98.7 °F (37.1 °C)  Pulse:  [76-92] 76  Resp:  [16-20] 18  SpO2:  [93 %-97 %] 97 %  BP: (134-163)/(67-73) 154/70     Weight: 45.4 kg (100 lb)  Body mass index is 18.29 kg/m².       Physical Exam  Vitals and nursing note reviewed.   Constitutional:       General: He is not in acute distress.     Appearance: He is ill-appearing.      Comments: Elderly, thin male lying in bed, eyes open but only somewhat responsive, attempts to speak but mostly incoherent, no observed pain behaviors   HENT:      Mouth/Throat:      Mouth: Mucous membranes are moist.   Eyes:      Extraocular Movements: Extraocular movements intact.   Pulmonary:      Comments: Mild accessory muscle use  Abdominal:      General: Abdomen is flat.      Palpations: Abdomen is soft.   Skin:     General: Skin is warm and dry.   Neurological:      Comments: Awake and attempts to speak at times, mostly incoherent   Psychiatric:      Comments: Not agitated            Review of Symptoms      Symptom Assessment (ESAS 0-10 Scale)  Pain:  0  Dyspnea:  0  Anxiety:  0  Nausea:  0  Depression:  0  Anorexia:  0  Fatigue:  0  Insomnia:  0  Restlessness:  0  Agitation:  0  Unable to complete assessment due to Mental status change         Pain Assessment in Advanced Demential Scale (PAINAD)   Breathing - Independent of vocalization:  0  Negative vocalization:  0  Facial expression:  0  Body language:  0  Consolability:  0  Total:  0    Psychosocial/Cultural:   See Palliative Psychosocial Note: No  **Primary  to  "Follow**  Palliative Care  Consult: No        Advance Care Planning   Advance Directives:   Living Will: Yes        Copy on chart: Yes    Do Not Resuscitate Status: Yes      Decision Making:  Family answered questions and Patient unable to communicate due to disease severity/cognitive impairment  Goals of Care: I engaged the patient and his wife at bedside and a continued supportive discussion regarding their wishes and values in the setting of severe life limiting illness. Brisa continues to be very clear and vocal about substituted values and wishes for the patient, electing only for conservative and supportive treatments. She reflects that, while patient is a little bit more alert today, she still worries that his time could be very short, without resorting to more invasive interventions that he would not want. Because of this, she is clear that she would like to pursue only comfort focus treatments for him at this time. She is also clear that patient would not want to be cared for at home, as his biggest concern was that he wouldn't not be a burden or receive care from his family members. I discussed my recommendation to transition to comfort- focused care, move the patient to the palliative care unit, and continue to treat him for the time being. I shared that I expect patient to decline over the coming days, and we could continue to take good care of him at end of life. I also shared the possibility that patient could continue to improve clinically, in which case we would consider other care options such as nursing home placement with hospice. Brisa and her daughter agree with this plan. All questions and concerns addressed at this time.        CBC:   Recent Labs   Lab 07/03/24  1653   WBC 9.95   HGB 10.7*   HCT 32.6*   MCV 96        BMP:  No results for input(s): "GLU", "NA", "K", "CL", "CO2", "BUN", "CREATININE", "CALCIUM", "MG" in the last 24 hours.    LFT:  Lab Results   Component " "Value Date    AST 28 07/03/2024    ALKPHOS 44 (L) 07/03/2024    BILITOT 0.7 07/03/2024     Albumin:   Albumin   Date Value Ref Range Status   07/03/2024 2.8 (L) 3.5 - 5.2 g/dL Final     Protein:   Total Protein   Date Value Ref Range Status   07/03/2024 5.1 (L) 6.0 - 8.4 g/dL Final     Lactic acid:   No results found for: "LACTATE"    "

## 2024-07-05 NOTE — SUBJECTIVE & OBJECTIVE
Interval History: NAEON. HDS. More alert today, was able to speak some words. No BM yet. Will ask speech for swallow eval and palliative team for hospice planning.     Medications:  Continuous Infusions:   dextrose 5 % and 0.45 % NaCl with KCl 20 mEq   Intravenous Continuous 50 mL/hr at 07/04/24 0953 Rate Change at 07/04/24 0953     Scheduled Meds:  PRN Meds:  Current Facility-Administered Medications:     midazolam, 0.5 mg, Intravenous, Q4H PRN    morphine, 1 mg, Intravenous, Q3H PRN     Review of patient's allergies indicates:   Allergen Reactions    Celexa [citalopram] Other (See Comments)     Worsening tremor    Tetracyclines Nausea Only     Objective:     Vital Signs (Most Recent):  Temp: 99.3 °F (37.4 °C) (07/05/24 0745)  Pulse: 92 (07/05/24 0745)  Resp: 20 (07/05/24 0745)  BP: 134/67 (07/05/24 0745)  SpO2: 96 % (07/05/24 0745) Vital Signs (24h Range):  Temp:  [96.6 °F (35.9 °C)-99.3 °F (37.4 °C)] 99.3 °F (37.4 °C)  Pulse:  [92] 92  Resp:  [16-20] 20  SpO2:  [93 %-96 %] 96 %  BP: (134-163)/(67-73) 134/67     Weight: 45.4 kg (100 lb)  Body mass index is 18.29 kg/m².    Intake/Output - Last 3 Shifts         07/03 0700 07/04 0659 07/04 0700 07/05 0659 07/05 0700 07/06 0659    P.O. 0 0     I.V. (mL/kg) 871.1 (19.2) 225 (5)     IV Piggyback       Total Intake(mL/kg) 871.1 (19.2) 225 (5)     Urine (mL/kg/hr) 450 (0.4) 250 (0.2)     Stool 0      Blood       Total Output 450 250     Net +421.1 -25            Stool Occurrence 0 x               Physical Exam  Vitals reviewed.   Cardiovascular:      Rate and Rhythm: Normal rate and regular rhythm.      Pulses: Normal pulses.   Pulmonary:      Effort: Pulmonary effort is normal.   Abdominal:      General: Abdomen is flat.      Palpations: Abdomen is soft.      Comments: R hernia incision CDI. No swelling or bleeding noted.    Skin:     General: Skin is warm.      Capillary Refill: Capillary refill takes less than 2 seconds.   Neurological:      Mental Status: He is  disoriented and confused.          Significant Labs:  I have reviewed all pertinent lab results within the past 24 hours.    Significant Diagnostics:  I have reviewed all pertinent imaging results/findings within the past 24 hours.

## 2024-07-05 NOTE — PROGRESS NOTES
"[facility] - [facility] Medicine  Palliative Medicine  Progress Note    Patient Name: James Dc  MRN: [id number]  Admission Date: 7/1/2024  Hospital Length of Stay: 3 days  Code Status: DNR   Attending Provider: Kd Crow MD  Consulting Provider: Remberto Rice MD  Primary Care Physician: Tim Joya MD  Principal Problem:Femoral hernia of right side with obstruction    Patient information was obtained from spouse/SO, relative(s), past medical records, and primary team.      Assessment/Plan:     Palliative Care  Palliative care encounter  See ACP 7/3-7/5    Patient is a 89m with HFpEF, history of CVA due to carotid artery stenosis, parkinsonism, Alzheimer's dementia who was admitted 7/1 with acute small bowel obstruction s/p incarcerated inguinal hernia repair who has developed altered mental status post-operatively. Labs and CT head unremarkable, wife deferring further testing and wishing to pursue comfort-focused care only.     Interval update 07/05/2024 - admitted to palliative care unit, family updated at bedside, progressing as expected    Hospice diagnosis- Dementia with failure to thrive    Need for inpatient care- Active dying process as evidenced by decreased consciousness, cool extremities, and irregular breathing pattern. Likely prognosis of short days    Dispo: anticipate EOL care on the unit, if patient unexpectedly stabilizes, will approach family regarding care at home or alternative in-facility hospice care    Social support- Lives with wife of 60+ years, one daughter. Retired  and [place] war     Psychological- very appropriate grief behaviors by wife, healthy coping mechanisms    Spiritual- "raised Lutheran" but currently nonpracticing      Symptom Assessment  - Pain/ pain behaviors: controlled  - Dyspnea/tachypnea: controlled  - Agitation: controlled  - Mentation: minimally responsive    Plan of care:  Tachypnea/dyspnea:   -non pharm measures such as " having bedside fan and cooler room temps     Pain behaviors:   -morphine 2mg q 1 hour prn non verbal signs of discomfort     Agitation:   -treat with opioids initially  -followed by lorazepam 0.5mg q2hr PRN    Uncomfortable secretions:  -glycopyrrolate 0.4mg IV q4hr PRN     GI:   -will not prioritize at end of life        Subjective:     Chief Complaint:   Chief Complaint   Patient presents with    Weakness     Generalized weakness and loss of appetite x4 days. Pt c/o difficulty swallowing. Coming from home.       HPI:   Mr James Dc is a 89 y.o. male with HFpEF, history of CVA due to carotid artery stenosis, parkinsonism, Alzheimer's dementia who presented to [facility] on 7/2 for evaluation of generalized weakness. Patient and family reports progressive generalized weakness for quite some time which worsened over the last few weeks leading to functional decline as well as difficulty swallowing and poor intake. Initial workup revealed high grade small bowel obstruction. Palliative care consulted for goals of care.    Hospital Course:  No notes on file    Interval History: Chart reviewed including 24h medication use. Patient lying in bed, somewhat more awake, attempting to speak short words but still very ill-appearing. Wife at bedside during visit.     Palliative ROS:  PRNs:   Pain behaviors- none  Respiratory distress- controlled  Agitation- none        Past Medical History:   Diagnosis Date    Essential tremor     Hypertensive urgency 6/13/2023    Nuclear sclerosis - Both Eyes 9/19/2012       Past Surgical History:   Procedure Laterality Date    CATARACT EXTRACTION W/  INTRAOCULAR LENS IMPLANT Left 10/10/2023    Procedure: EXTRACTION, CATARACT, WITH IOL INSERTION;  Surgeon: Lon Carr MD;  Location: [facility];  Service: Ophthalmology;  Laterality: Left;    CATARACT EXTRACTION W/  INTRAOCULAR LENS IMPLANT Right 10/24/2023    Procedure: EXTRACTION, CATARACT, WITH IOL INSERTION;  Surgeon: Bruno  Lon NICOLAS MD;  Location: [facility];  Service: Ophthalmology;  Laterality: Right;    EXCISION, SMALL INTESTINE  7/2/2024    Procedure: EXCISION, SMALL INTESTINE;  Surgeon: Shen Araujo MD;  Location: [facility] OR [address] [place] [address];  Service: General;;    REPAIR, HERNIA, INGUINAL, INCARCERATED, INITIAL, AGE 5 YEARS OR OLDER Right 7/2/2024    Procedure: REPAIR, HERNIA, INGUINAL, INCARCERATED, INITIAL, AGE 5 YEARS OR OLDER;  Surgeon: Shen Araujo MD;  Location: [facility] OR [address] [place] [address];  Service: General;  Laterality: Right;  Right inguinal hernia under local, possible exploratory laparotomy    TONSILLECTOMY         Review of patient's allergies indicates:   Allergen Reactions    Celexa [citalopram] Other (See Comments)     Worsening tremor    Tetracyclines Nausea Only       Medications:  Continuous Infusions:      Scheduled Meds:      PRN Meds:  Current Facility-Administered Medications:     lorazepam, 0.5 mg, Intravenous, Q2H PRN    morphine, 2 mg, Intravenous, Q1H PRN    Family History       Problem Relation (Age of Onset)    Cataracts Son    Prostate cancer Father          Tobacco Use    Smoking status: Former    Smokeless tobacco: Former   Substance and Sexual Activity    Alcohol use: Yes    Drug use: No    Sexual activity: Yes     Partners: Female         Objective:     Vital Signs (Most Recent):  Temp: 98.7 °F (37.1 °C) (07/05/24 1452)  Pulse: 76 (07/05/24 1452)  Resp: 18 (07/05/24 1452)  BP: (!) 154/70 (07/05/24 1452)  SpO2: 97 % (07/05/24 1452) Vital Signs (24h Range):  Temp:  [96.6 °F (35.9 °C)-99.3 °F (37.4 °C)] 98.7 °F (37.1 °C)  Pulse:  [76-92] 76  Resp:  [16-20] 18  SpO2:  [93 %-97 %] 97 %  BP: (134-163)/(67-73) 154/70     Weight: 45.4 kg (100 lb)  Body mass index is 18.29 kg/m².       Physical Exam  Vitals and nursing note reviewed.   Constitutional:       General: He is not in acute distress.     Appearance: He is ill-appearing.      Comments: Elderly, thin male lying in bed, eyes open but only somewhat responsive,  attempts to speak but mostly incoherent, no observed pain behaviors   HENT:      Mouth/Throat:      Mouth: Mucous membranes are moist.   Eyes:      Extraocular Movements: Extraocular movements intact.   Pulmonary:      Comments: Mild accessory muscle use  Abdominal:      General: Abdomen is flat.      Palpations: Abdomen is soft.   Skin:     General: Skin is warm and dry.   Neurological:      Comments: Awake and attempts to speak at times, mostly incoherent   Psychiatric:      Comments: Not agitated            Review of Symptoms      Symptom Assessment (ESAS 0-10 Scale)  Pain:  0  Dyspnea:  0  Anxiety:  0  Nausea:  0  Depression:  0  Anorexia:  0  Fatigue:  0  Insomnia:  0  Restlessness:  0  Agitation:  0  Unable to complete assessment due to Mental status change         Pain Assessment in Advanced Demential Scale (PAINAD)   Breathing - Independent of vocalization:  0  Negative vocalization:  0  Facial expression:  0  Body language:  0  Consolability:  0  Total:  0    Psychosocial/Cultural:   See Palliative Psychosocial Note: No  **Primary  to Follow**  Palliative Care  Consult: No        Advance Care Planning  Advance Directives:   Living Will: Yes        Copy on chart: Yes    Do Not Resuscitate Status: Yes      Decision Making:  Family answered questions and Patient unable to communicate due to disease severity/cognitive impairment  Goals of Care: I engaged the patient and his wife at bedside and a continued supportive discussion regarding their wishes and values in the setting of severe life limiting illness. Brisa continues to be very clear and vocal about substituted values and wishes for the patient, electing only for conservative and supportive treatments. She reflects that, while patient is a little bit more alert today, she still worries that his time could be very short, without resorting to more invasive interventions that he would not want. Because of this, she is clear that  "she would like to pursue only comfort focus treatments for him at this time. She is also clear that patient would not want to be cared for at home, as his biggest concern was that he wouldn't not be a burden or receive care from his family members. I discussed my recommendation to transition to comfort- focused care, move the patient to the palliative care unit, and continue to treat him for the time being. I shared that I expect patient to decline over the coming days, and we could continue to take good care of him at end of life. I also shared the possibility that patient could continue to improve clinically, in which case we would consider other care options such as nursing home placement with hospice. Brisa and her daughter agree with this plan. All questions and concerns addressed at this time.        CBC:   Recent Labs   Lab 07/03/24  1653   WBC 9.95   HGB 10.7*   HCT 32.6*   MCV 96        BMP:  No results for input(s): "GLU", "NA", "K", "CL", "CO2", "BUN", "CREATININE", "CALCIUM", "MG" in the last 24 hours.    LFT:  Lab Results   Component Value Date    AST 28 07/03/2024    ALKPHOS 44 (L) 07/03/2024    BILITOT 0.7 07/03/2024     Albumin:   Albumin   Date Value Ref Range Status   07/03/2024 2.8 (L) 3.5 - 5.2 g/dL Final     Protein:   Total Protein   Date Value Ref Range Status   07/03/2024 5.1 (L) 6.0 - 8.4 g/dL Final     Lactic acid:   No results found for: "LACTATE"    I reviewed the philosophy and scope of our hospice unit, where care is entirely focused on goal-concordant symptom management and patient/family support. Consequently, only medical interventions and treatment which help with that goal will be continued and other routine medical care such as frequent vitals, lab draws and invasive tests/procedures will not be done. I also explained our unit is not a long-term disposition and that we will continue to work on care coordination efforts in the event that the patient unexpectedly " stabilizes.       In my care of this patient with acute on chronic severe illness with threat to life and/or bodily function, I am providing goal-concordant care as noted above. My care includes the use of goal-concordant, proportionate, parenteral opioids and benzodiazepines for comfort-focused care.     In addition to above, I spent 50 minutes throughout the day specifically discussing advance care planning and goals of care with patient's family at bedside.       Remberto Rice MD  Palliative Medicine  [facility] - Hospice and Palliative Medicine

## 2024-07-05 NOTE — NURSING
"[facility] Plan of Care Note    Dx:   Weakness [R53.1]  Chest pain [R07.9]    Shift Events: NONE    Goals of Care: COMFORT MEASURES    Neuro: AAOX1-PERSON, LETHARGIC    Vital Signs: BP (!) 163/73 (Patient Position: Sitting)   Pulse 92   Temp 96.6 °F (35.9 °C) (Oral)   Resp 16   Ht 5' 2" (1.575 m)   Wt 45.4 kg (100 lb)   SpO2 (!) 93%   BMI 18.29 kg/m²     Respiratory: 02/2LNC    Diet: Diet NPO      Is patient tolerating current diet? NA    GTTS: NA    Urine Output/Bowel Movement:   I/O this shift:  In: 225 [I.V.:225]  Out: 250 [Urine:250]  Last Bowel Movement: 07/02/24      Drains/Tubes/Tube Feeds (include total output/shift):   I/O this shift:  In: 225 [I.V.:225]  Out: 250 [Urine:250]      Lines: D5 1/2NS 20KCL AT KVO      Accuchecks:NONE    Skin: FRAIL, CDI    Fall Risk Score:     Activity level? BED BOUND  TURN Q2    Any scheduled procedures? NONE    Any safety concerns? DNR/COM    Other: FAMILY AT BEDSIDE   "

## 2024-07-05 NOTE — CHAPLAIN
"Palliative Care     Patient: James Dc  MRN: [id number]  : 1932  Age: 89 y.o.  Hospital Length of Stay: 3  Code Status: Code Status Discussion Note  Attending Provider: Shen Araujo MD  Principal Problem: Femoral hernia of right side with obstruction    Non-clinical observations of patient/room: Visited pall med pt who is in process of transitioning to comfort measures only; pt semi-awake, groggy, seemed comfortable; oldest daughter and wife bedside; 15 min visit    Provided compassionate presence and listening ear as family shared a little about what the pt has been through. Introduced myself as part of the palliative IDT and referenced Dr. Rice. Held mostly a 1-way conversation with pt, but he did says "thank you" when I told him that he is loved and being well taken care of. Also, I thanked him for his miliary service and patient said, "[place]".     Pt and family are Sabianism and although they didn't request, I referred Fr. Foreman to them for anointing because I felt they'd appreciate it. Fr. Foreman had attempted in the past, but pt wasn't available.     Family not super talkative, provided plenty of pauses with them sharing much more so did not overstay my welcome. Palliative will continue to follow. Lord, in your mercy.      **Spiritual Care Dept. Chaplains are available evenings, overnight and weekends **    In Peace,    Rev. Belem Betts MDiv, [facility]  Board Certified   Palliative Medicine Department  [phone]  "

## 2024-07-05 NOTE — PROGRESS NOTES
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 He and wife report him straining very hard for a bowel movement on Friday and having abdominal pain following this and into Saturday. Sunday he noted new weakness to the point where he had difficulty rising freom his chair which is very abnormal for him. This persisted into Monday ultimately prompting ED presentation.     He was hypertensive on arrival to ED, labs largely unremarkable. CTH was obtained without acute findings. CT L spine was obtained given lower back pain which showed possible SBO and an dedicated CT abdomen pelvis was obtained as follow up. This demonstrated right inguinal hernia containing small knuckle of bowel with proximal obstruction. General surgery was consulted for these findings.     No prior abdominal surgery   Lived at home with wife independently, ambulatory, prior to this admission     Post-Op Info:  Procedure(s) (LRB):  REPAIR, HERNIA, INGUINAL, INCARCERATED, INITIAL, AGE 5 YEARS OR OLDER (Right)  EXCISION, SMALL INTESTINE   3 Days Post-Op     Interval History: NAEON. HDS. More alert today, was able to speak some words. No BM yet. Will ask speech for swallow eval and palliative team for hospice planning.     Medications:  Continuous Infusions:   dextrose 5 % and 0.45 % NaCl with KCl 20 mEq   Intravenous Continuous 50 mL/hr at 07/04/24 0953 Rate Change at 07/04/24 0953     Scheduled Meds:  PRN Meds:  Current Facility-Administered Medications:     midazolam, 0.5 mg, Intravenous, Q4H PRN    morphine, 1 mg,  Intravenous, Q3H PRN     Review of patient's allergies indicates:   Allergen Reactions    Celexa [citalopram] Other (See Comments)     Worsening tremor    Tetracyclines Nausea Only     Objective:     Vital Signs (Most Recent):  Temp: 99.3 °F (37.4 °C) (07/05/24 0745)  Pulse: 92 (07/05/24 0745)  Resp: 20 (07/05/24 0745)  BP: 134/67 (07/05/24 0745)  SpO2: 96 % (07/05/24 0745) Vital Signs (24h Range):  Temp:  [96.6 °F (35.9 °C)-99.3 °F (37.4 °C)] 99.3 °F (37.4 °C)  Pulse:  [92] 92  Resp:  [16-20] 20  SpO2:  [93 %-96 %] 96 %  BP: (134-163)/(67-73) 134/67     Weight: 45.4 kg (100 lb)  Body mass index is 18.29 kg/m².    Intake/Output - Last 3 Shifts         07/03 0700  07/04 0659 07/04 0700  07/05 0659 07/05 0700  07/06 0659    P.O. 0 0     I.V. (mL/kg) 871.1 (19.2) 225 (5)     IV Piggyback       Total Intake(mL/kg) 871.1 (19.2) 225 (5)     Urine (mL/kg/hr) 450 (0.4) 250 (0.2)     Stool 0      Blood       Total Output 450 250     Net +421.1 -25            Stool Occurrence 0 x               Physical Exam  Vitals reviewed.   Cardiovascular:      Rate and Rhythm: Normal rate and regular rhythm.      Pulses: Normal pulses.   Pulmonary:      Effort: Pulmonary effort is normal.   Abdominal:      General: Abdomen is flat.      Palpations: Abdomen is soft.      Comments: R hernia incision CDI. No swelling or bleeding noted.    Skin:     General: Skin is warm.      Capillary Refill: Capillary refill takes less than 2 seconds.   Neurological:      Mental Status: He is disoriented and confused.          Significant Labs:  I have reviewed all pertinent lab results within the past 24 hours.    Significant Diagnostics:  I have reviewed all pertinent imaging results/findings within the past 24 hours.  Assessment/Plan:     Small bowel obstruction  89 yo M w/ above history found to have incarcerated inguinal hernia with apparent closed loop small bowel obstruction. No nausea or gastric distention at this point - he does have a firm loop  of bowel incarcerated within his groin that is tender to attempts at reduction. This is a difficult situation as he is DNR, quite elderly and was previously independent with wishes to return to that state. We discussed the natural history of incarcerated hernias containing small bowel with associated small bowel obstructions including potential for non-viable bowel and needs for potential laparotomy and small bowel resection. My fear is that untreated, this hernia will likely lead to death of the incarcerated small bowel in addition to worsening obstruction symptoms and ultimately death. He is high risk for surgery but at this point the benefits outweigh the potential risks and both him and his wife would like to proceed with attempts at repair. Post operatively he has remained minimally responsive verbally, wife wishes to pursue comfort focused care as opposed to aggressive treatment/work up.     - Mental status precludes swallowing, speech. NPO, gentle mIVF  - SLP eval   - palliative care team following to facilitate hospice transition postop  - CTH negative  - DC labs, routine vitals, most medications. IV pain and anxiety medication available. Continue gentle IV hydration.   - DNR        Artie Noble MD  General Surgery  [facility]

## 2024-07-05 NOTE — PLAN OF CARE
Problem: Adult Inpatient Plan of Care  Goal: Plan of Care Review  Outcome: Unable to Meet  Goal: Patient-Specific Goal (Individualized)  Outcome: Unable to Meet  Goal: Absence of Hospital-Acquired Illness or Injury  Outcome: Unable to Meet  Goal: Optimal Comfort and Wellbeing  Outcome: Unable to Meet  Goal: Readiness for Transition of Care  Outcome: Unable to Meet     Problem: Infection  Goal: Absence of Infection Signs and Symptoms  Outcome: Unable to Meet     Problem: Fall Injury Risk  Goal: Absence of Fall and Fall-Related Injury  Outcome: Unable to Meet     Problem: Skin Injury Risk Increased  Goal: Skin Health and Integrity  Outcome: Unable to Meet     Problem: Heart Failure  Goal: Optimal Coping  Outcome: Unable to Meet  Goal: Optimal Cardiac Output  Outcome: Unable to Meet  Goal: Stable Heart Rate and Rhythm  Outcome: Unable to Meet  Goal: Optimal Functional Ability  Outcome: Unable to Meet  Goal: Fluid and Electrolyte Balance  Outcome: Unable to Meet  Goal: Improved Oral Intake  Outcome: Unable to Meet  Goal: Effective Oxygenation and Ventilation  Outcome: Unable to Meet  Goal: Effective Breathing Pattern During Sleep  Outcome: Unable to Meet     Problem: Depressive Signs/Symptoms  Goal: Optimized Energy Level (Depressive Signs/Symptoms)  Outcome: Unable to Meet  Goal: Optimized Cognitive Function (Depressive Signs/Symptoms)  Outcome: Unable to Meet  Goal: Increased Participation and Engagement (Depressive Signs/Symptoms)  Outcome: Unable to Meet  Goal: Enhanced Self-Esteem and Confidence (Depressive Signs/Symptoms)  Outcome: Unable to Meet  Goal: Improved Mood Symptoms (Depressive Signs/Symptoms)  Outcome: Unable to Meet  Goal: Optimized Nutrition Intake (Depressive Signs/Symptoms)  Outcome: Unable to Meet  Goal: Improved Psychomotor Symptoms (Depressive Signs/Symptoms)  Outcome: Unable to Meet  Goal: Improved Sleep (Depressive Signs/Symptoms)  Outcome: Unable to Meet  Goal: Enhanced Social, Occupational  or Functional Skills (Depressive Signs/Symptoms)  Outcome: Unable to Meet     Problem: Fatigue  Goal: Improved Activity Tolerance  Outcome: Unable to Meet     Problem: Sensory Impairment  Goal: Optimal Sensory Management  Outcome: Unable to Meet     Problem: Mobility Impairment  Goal: Optimal Mobility [place] and Safety  Outcome: Unable to Meet     Problem: Coping Ineffective  Goal: Effective Coping  Outcome: Unable to Meet     Problem: Wound  Goal: Optimal Coping  Outcome: Unable to Meet  Goal: Optimal Functional Ability  Outcome: Unable to Meet  Goal: Absence of Infection Signs and Symptoms  Outcome: Unable to Meet  Goal: Improved Oral Intake  Outcome: Unable to Meet  Goal: Optimal Pain Control and Function  Outcome: Unable to Meet  Goal: Skin Health and Integrity  Outcome: Unable to Meet  Goal: Optimal Wound Healing  Outcome: Unable to Meet

## 2024-07-05 NOTE — NURSING
Pt prepped for transfer to 3rd floor hospice/palliatve. Report called to DOUGLAS Marquez . Spouse remains at bedside, all belongings sent with patient

## 2024-07-05 NOTE — PLAN OF CARE
Problem: Adult Inpatient Plan of Care  Goal: Plan of Care Review  Outcome: Progressing  Goal: Patient-Specific Goal (Individualized)  Outcome: Progressing  Goal: Absence of Hospital-Acquired Illness or Injury  Outcome: Progressing  Goal: Optimal Comfort and Wellbeing  Outcome: Progressing  Goal: Readiness for Transition of Care  Outcome: Progressing     Problem: Infection  Goal: Absence of Infection Signs and Symptoms  Outcome: Progressing     Problem: Fall Injury Risk  Goal: Absence of Fall and Fall-Related Injury  Outcome: Progressing     Problem: Skin Injury Risk Increased  Goal: Skin Health and Integrity  Outcome: Progressing     Problem: Heart Failure  Goal: Optimal Coping  Outcome: Progressing  Goal: Optimal Cardiac Output  Outcome: Progressing  Goal: Stable Heart Rate and Rhythm  Outcome: Progressing  Goal: Optimal Functional Ability  Outcome: Progressing  Goal: Fluid and Electrolyte Balance  Outcome: Progressing  Goal: Improved Oral Intake  Outcome: Progressing  Goal: Effective Oxygenation and Ventilation  Outcome: Progressing  Goal: Effective Breathing Pattern During Sleep  Outcome: Progressing     Problem: Depressive Signs/Symptoms  Goal: Optimized Energy Level (Depressive Signs/Symptoms)  Outcome: Progressing  Goal: Optimized Cognitive Function (Depressive Signs/Symptoms)  Outcome: Progressing  Goal: Increased Participation and Engagement (Depressive Signs/Symptoms)  Outcome: Progressing  Goal: Enhanced Self-Esteem and Confidence (Depressive Signs/Symptoms)  Outcome: Progressing  Goal: Improved Mood Symptoms (Depressive Signs/Symptoms)  Outcome: Progressing  Goal: Optimized Nutrition Intake (Depressive Signs/Symptoms)  Outcome: Progressing  Goal: Improved Psychomotor Symptoms (Depressive Signs/Symptoms)  Outcome: Progressing  Goal: Improved Sleep (Depressive Signs/Symptoms)  Outcome: Progressing  Goal: Enhanced Social, Occupational or Functional Skills (Depressive Signs/Symptoms)  Outcome: Progressing      Problem: Fatigue  Goal: Improved Activity Tolerance  Outcome: Progressing     Problem: Sensory Impairment  Goal: Optimal Sensory Management  Outcome: Progressing     Problem: Mobility Impairment  Goal: Optimal Mobility [place] and Safety  Outcome: Progressing     Problem: Coping Ineffective  Goal: Effective Coping  Outcome: Progressing     Problem: Wound  Goal: Optimal Coping  Outcome: Progressing  Goal: Optimal Functional Ability  Outcome: Progressing  Goal: Absence of Infection Signs and Symptoms  Outcome: Progressing  Goal: Improved Oral Intake  Outcome: Progressing  Goal: Optimal Pain Control and Function  Outcome: Progressing  Goal: Skin Health and Integrity  Outcome: Progressing  Goal: Optimal Wound Healing  Outcome: Progressing

## 2024-07-05 NOTE — NURSING
1445  Arrived on unit accompanied by wife and sending unit nurses.  Pt. Awake and with zero indication of discomfort.  Tolerates assessment without any signs of discomfort.  Arrives on [place] and 2lpm O2 per NC.  Photos taken of heel and sacral area.

## 2024-07-05 NOTE — PLAN OF CARE
Full liquid diet recommended for comfort.   Problem: SLP  Goal: SLP Goal  Description: Speech Language Pathology Goals  Goals expected to be met by 7/9:  1. Pt will participate in ongoing assessment of swallow function to determine safest and least restrictive diet.   Outcome: Progressing

## 2024-07-05 NOTE — ASSESSMENT & PLAN NOTE
89 yo M w/ above history found to have incarcerated inguinal hernia with apparent closed loop small bowel obstruction. No nausea or gastric distention at this point - he does have a firm loop of bowel incarcerated within his groin that is tender to attempts at reduction. This is a difficult situation as he is DNR, quite elderly and was previously independent with wishes to return to that state. We discussed the natural history of incarcerated hernias containing small bowel with associated small bowel obstructions including potential for non-viable bowel and needs for potential laparotomy and small bowel resection. My fear is that untreated, this hernia will likely lead to death of the incarcerated small bowel in addition to worsening obstruction symptoms and ultimately death. He is high risk for surgery but at this point the benefits outweigh the potential risks and both him and his wife would like to proceed with attempts at repair. Post operatively he has remained minimally responsive verbally, wife wishes to pursue comfort focused care as opposed to aggressive treatment/work up.     - Mental status precludes swallowing, speech. NPO, gentle mIVF  - SLP eval   - Will ask palliative care team to facilitate hospice transition postop  - CTH negative  - DC labs, routine vitals, most medications. IV pain and anxiety medication available. Continue gentle IV hydration.   - DNR

## 2024-07-05 NOTE — PT/OT/SLP EVAL
Speech Language Pathology Evaluation  Bedside Swallow    Patient Name:  James Dc   MRN:  [id number]  Admitting Diagnosis: Femoral hernia of right side with obstruction    Recommendations:                 General Recommendations:  Follow-up not indicated  Diet recommendations:  NPO, Full liquids   Aspiration Precautions: 1 bite/sip at a time, Eliminate distractions, Feed only when awake/alert, Frequent oral care, HOB to 90 degrees, Ice chips sparingly, Meds crushed in puree, and Monitor for s/s of aspiration   General Precautions: Standard, aspiration, fall  Communication strategies:  none    Assessment:     James Dc is a 89 y.o. male with an SLP diagnosis of Dysphagia and Dysarthria.   History:     Past Medical History:   Diagnosis Date    Essential tremor     Hypertensive urgency 6/13/2023    Nuclear sclerosis - Both Eyes 9/19/2012       Past Surgical History:   Procedure Laterality Date    CATARACT EXTRACTION W/  INTRAOCULAR LENS IMPLANT Left 10/10/2023    Procedure: EXTRACTION, CATARACT, WITH IOL INSERTION;  Surgeon: Lon Carr MD;  Location: [facility] OR;  Service: Ophthalmology;  Laterality: Left;    CATARACT EXTRACTION W/  INTRAOCULAR LENS IMPLANT Right 10/24/2023    Procedure: EXTRACTION, CATARACT, WITH IOL INSERTION;  Surgeon: Lon Carr MD;  Location: [facility] OR;  Service: Ophthalmology;  Laterality: Right;    EXCISION, SMALL INTESTINE  7/2/2024    Procedure: EXCISION, SMALL INTESTINE;  Surgeon: Shen Araujo MD;  Location: [facility] OR [address] [place] [address];  Service: General;;    REPAIR, HERNIA, INGUINAL, INCARCERATED, INITIAL, AGE 5 YEARS OR OLDER Right 7/2/2024    Procedure: REPAIR, HERNIA, INGUINAL, INCARCERATED, INITIAL, AGE 5 YEARS OR OLDER;  Surgeon: Shen Araujo MD;  Location: [facility] OR [address] [place] [address];  Service: General;  Laterality: Right;  Right inguinal hernia under local, possible exploratory laparotomy    TONSILLECTOMY         Social History: Patient lives with spouse.      Prior diet:  "soft food.    Occupation/hobbies/homemaking: na.    Subjective     "He would not sip from the straw" per family  Patient goals: did not state     Pain/Comfort:  Pain Rating 1: 0/10  Pain Rating Post-Intervention 1: 0/10    Respiratory Status: nasal cannula    Objective:     Oral Musculature Evaluation  Oral Musculature: general weakness  Dentition: scattered dentition  Secretion Management: adequate  Mucosal Quality: dry  Mandibular Strength and Mobility: WFL  Oral Labial Strength and Mobility: WFL  Lingual Strength and Mobility: impaired strength  Velar Elevation: WFL  Buccal Strength and Mobility: WFL  Volitional Cough: not elicited  Volitional Swallow: not elicited  Voice Prior to PO Intake: slightly wet    Bedside Swallow Eval:   Consistencies Assessed:  Thin liquids teaspoon of water x6 trials  Puree teaspoon of pu dding x2 trials      Oral Phase:   Delayed oral transit of pudding bolus with decreased bolus formation/piecemeal deglutition;  Oral transit of water was timely with no anterior loss of bolus noted on puree or thin liquids.  Pt. Not able to sip from a straw   Pharyngeal Phase:   Delayed initiation of pharyngeal swallow noted with multiple swallows per bolus on puree bolus',.  Delayed cough noted on one of 6 teaspoons of water.      Compensatory Strategies  None    Treatment: education provided to daughter re safe swallow strategies, Aspiration precautions and goals of pleasure/comfort feeding.  Goals:   Multidisciplinary Problems       SLP Goals          Problem: SLP    Goal Priority Disciplines Outcome   SLP Goal     SLP Progressing   Description: Speech Language Pathology Goals  Goals expected to be met by 7/9:  1. Pt will participate in ongoing assessment of swallow function to determine safest and least restrictive diet.                        Plan:     Patient to be seen:  4 x/week   Plan of Care expires:  07/31/24  Plan of Care reviewed with:  patient   SLP Follow-Up:  No       Discharge " recommendations:  No Therapy Indicated   Barriers to Discharge:  None    Time Tracking:     SLP Treatment Date:   07/05/24  Speech Start Time:  0930  Speech Stop Time:  0951     Speech Total Time (min):  21 min    Billable Minutes: Eval Swallow and Oral Function 11 and Self Care/Home Management Training 10    07/05/2024

## 2024-07-05 NOTE — PLAN OF CARE
Problem: Adult Inpatient Plan of Care  Goal: Plan of Care Review  7/5/2024 0327 by Araceli Martinez RN  Outcome: Progressing  7/5/2024 0326 by Araceli Martinez RN  Outcome: Unable to Meet  Goal: Patient-Specific Goal (Individualized)  7/5/2024 0327 by Araceli Martinez RN  Outcome: Progressing  7/5/2024 0326 by Araceli Martinez RN  Outcome: Unable to Meet  Goal: Absence of Hospital-Acquired Illness or Injury  7/5/2024 0327 by Araceli Martinez RN  Outcome: Progressing  7/5/2024 0326 by Araceli Martinez RN  Outcome: Unable to Meet  Goal: Optimal Comfort and Wellbeing  7/5/2024 0327 by Araceli Martinez RN  Outcome: Progressing  7/5/2024 0326 by Araceli Martinez RN  Outcome: Unable to Meet  Goal: Readiness for Transition of Care  7/5/2024 0327 by Araceli Martinez RN  Outcome: Progressing  7/5/2024 0326 by Araceli Martinez RN  Outcome: Unable to Meet     Problem: Infection  Goal: Absence of Infection Signs and Symptoms  7/5/2024 0327 by Araceli Martinez RN  Outcome: Progressing  7/5/2024 0326 by Araceli Martinez RN  Outcome: Unable to Meet     Problem: Fall Injury Risk  Goal: Absence of Fall and Fall-Related Injury  7/5/2024 0327 by Araceli Martinez RN  Outcome: Progressing  7/5/2024 0326 by Araceli Martinez RN  Outcome: Unable to Meet     Problem: Skin Injury Risk Increased  Goal: Skin Health and Integrity  7/5/2024 0327 by Araceli Martinez RN  Outcome: Progressing  7/5/2024 0326 by Araceli Martinez RN  Outcome: Unable to Meet     Problem: Heart Failure  Goal: Optimal Coping  7/5/2024 0327 by Araceli Martinez RN  Outcome: Progressing  7/5/2024 0326 by Araceli Martinez RN  Outcome: Unable to Meet  Goal: Optimal Cardiac Output  7/5/2024 0327 by Araceli Martinez RN  Outcome: Progressing  7/5/2024 0326 by Araceli Martinez RN  Outcome: Unable to  Meet  Goal: Stable Heart Rate and Rhythm  7/5/2024 0327 by Araceli Martinez RN  Outcome: Progressing  7/5/2024 0326 by Araceli Martinez RN  Outcome: Unable to Meet  Goal: Optimal Functional Ability  7/5/2024 0327 by Araceli Martinez RN  Outcome: Progressing  7/5/2024 0326 by Araceli Martinez RN  Outcome: Unable to Meet  Goal: Fluid and Electrolyte Balance  7/5/2024 0327 by Araceli Martinez RN  Outcome: Progressing  7/5/2024 0326 by Araceli Martinez RN  Outcome: Unable to Meet  Goal: Improved Oral Intake  7/5/2024 0327 by Araceli Martinez RN  Outcome: Progressing  7/5/2024 0326 by Araceli Martinez RN  Outcome: Unable to Meet  Goal: Effective Oxygenation and Ventilation  7/5/2024 0327 by Araceli Martinez RN  Outcome: Progressing  7/5/2024 0326 by Araceli Martinez RN  Outcome: Unable to Meet  Goal: Effective Breathing Pattern During Sleep  7/5/2024 0327 by Araceli Martinez RN  Outcome: Progressing  7/5/2024 0326 by Araceli Martinez RN  Outcome: Unable to Meet     Problem: Depressive Signs/Symptoms  Goal: Optimized Energy Level (Depressive Signs/Symptoms)  7/5/2024 0327 by Araceli Martinez RN  Outcome: Progressing  7/5/2024 0326 by Araceli Martinez RN  Outcome: Unable to Meet  Goal: Optimized Cognitive Function (Depressive Signs/Symptoms)  7/5/2024 0327 by Araceli Martinez RN  Outcome: Progressing  7/5/2024 0326 by Araceli Martinez RN  Outcome: Unable to Meet  Goal: Increased Participation and Engagement (Depressive Signs/Symptoms)  7/5/2024 0327 by Araceli Martinez RN  Outcome: Progressing  7/5/2024 0326 by Araceli Martinez RN  Outcome: Unable to Meet  Goal: Enhanced Self-Esteem and Confidence (Depressive Signs/Symptoms)  7/5/2024 0327 by Araceli Martinez RN  Outcome: Progressing  7/5/2024 0326 by Araceli Martinez RN  Outcome: Unable to  Meet  Goal: Improved Mood Symptoms (Depressive Signs/Symptoms)  7/5/2024 0327 by Araceli Martinez RN  Outcome: Progressing  7/5/2024 0326 by Araceli Martinez RN  Outcome: Unable to Meet  Goal: Optimized Nutrition Intake (Depressive Signs/Symptoms)  7/5/2024 0327 by Araceli Martinez RN  Outcome: Progressing  7/5/2024 0326 by Araceli Martinez RN  Outcome: Unable to Meet  Goal: Improved Psychomotor Symptoms (Depressive Signs/Symptoms)  7/5/2024 0327 by Araceli Martinez RN  Outcome: Progressing  7/5/2024 0326 by Araceli Martinez RN  Outcome: Unable to Meet  Goal: Improved Sleep (Depressive Signs/Symptoms)  7/5/2024 0327 by Araceli Martinez RN  Outcome: Progressing  7/5/2024 0326 by Araceli Martinez RN  Outcome: Unable to Meet  Goal: Enhanced Social, Occupational or Functional Skills (Depressive Signs/Symptoms)  7/5/2024 0327 by Araceli Martinez RN  Outcome: Progressing  7/5/2024 0326 by Araceli Martinez RN  Outcome: Unable to Meet     Problem: Fatigue  Goal: Improved Activity Tolerance  7/5/2024 0327 by Araceli Martinez RN  Outcome: Progressing  7/5/2024 0326 by Araceli Martinez RN  Outcome: Unable to Meet     Problem: Sensory Impairment  Goal: Optimal Sensory Management  7/5/2024 0327 by Araceli Martinez RN  Outcome: Progressing  7/5/2024 0326 by Araceli Martinez RN  Outcome: Unable to Meet     Problem: Mobility Impairment  Goal: Optimal Mobility Carson and Safety  7/5/2024 0327 by Araceli Martinez RN  Outcome: Progressing  7/5/2024 0326 by Araceli Martinez RN  Outcome: Unable to Meet     Problem: Coping Ineffective  Goal: Effective Coping  7/5/2024 0327 by Araceli Martinez RN  Outcome: Progressing  7/5/2024 0326 by Araceli Martinez RN  Outcome: Unable to Meet     Problem: Wound  Goal: Optimal Coping  7/5/2024 0327 by Araceli Martinez,  RN  Outcome: Progressing  7/5/2024 0326 by Araceli Martinez RN  Outcome: Unable to Meet  Goal: Optimal Functional Ability  7/5/2024 0327 by Araceli Martinez RN  Outcome: Progressing  7/5/2024 0326 by Araceli Martinez RN  Outcome: Unable to Meet  Goal: Absence of Infection Signs and Symptoms  7/5/2024 0327 by Araceli Martinez RN  Outcome: Progressing  7/5/2024 0326 by Araceli Martinez RN  Outcome: Unable to Meet  Goal: Improved Oral Intake  7/5/2024 0327 by Araceli Martinez RN  Outcome: Progressing  7/5/2024 0326 by Araceli Martinez RN  Outcome: Unable to Meet  Goal: Optimal Pain Control and Function  7/5/2024 0327 by Araceli Martinez RN  Outcome: Progressing  7/5/2024 0326 by Araceli Martinez RN  Outcome: Unable to Meet  Goal: Skin Health and Integrity  7/5/2024 0327 by Araceli Martinez RN  Outcome: Progressing  7/5/2024 0326 by Araceli Martinez RN  Outcome: Unable to Meet  Goal: Optimal Wound Healing  7/5/2024 0327 by Araceli Martinez RN  Outcome: Progressing  7/5/2024 0326 by Araceli Martinez RN  Outcome: Unable to Meet

## 2024-07-05 NOTE — ASSESSMENT & PLAN NOTE
"See ACP 7/3-7/5    Patient is a 89m with HFpEF, history of CVA due to carotid artery stenosis, parkinsonism, Alzheimer's dementia who was admitted 7/1 with acute small bowel obstruction s/p incarcerated inguinal hernia repair who has developed altered mental status post-operatively. Labs and CT head unremarkable, wife deferring further testing and wishing to pursue comfort-focused care only.     Interval update 07/05/2024 - admitted to palliative care unit, family updated at bedside, progressing as expected    Hospice diagnosis- Dementia with failure to thrive    Need for inpatient care- Active dying process as evidenced by decreased consciousness, cool extremities, and irregular breathing pattern. Likely prognosis of short days    Dispo: anticipate EOL care on the unit, if patient unexpectedly stabilizes, will approach family regarding care at home or alternative in-facility hospice care    Social support- Lives with wife of 60+ years, one daughter. Retired  and [place] war     Psychological- very appropriate grief behaviors by wife, healthy coping mechanisms    Spiritual- "raised [employer]" but currently nonpracticing      Symptom Assessment  - Pain/ pain behaviors: controlled  - Dyspnea/tachypnea: controlled  - Agitation: controlled  - Mentation: minimally responsive    Plan of care:  Tachypnea/dyspnea:   -non pharm measures such as having bedside fan and cooler room temps     Pain behaviors:   -morphine 2mg q 1 hour prn non verbal signs of discomfort     Agitation:   -treat with opioids initially  -followed by lorazepam 0.5mg q2hr PRN    Uncomfortable secretions:  -glycopyrrolate 0.4mg IV q4hr PRN     GI:   -will not prioritize at end of life    "

## 2024-07-06 NOTE — PROGRESS NOTES
"She woke up in a pool of sweat, and was having a hard time breathing because she was so congested. She also woke up with the migraine. It is now mild headache and she will just monitor and see if she really needs to be seen. Knows when to call back and come in to be seen.      Aline Ramirez RN/ [facility]        Reason for Disposition    [1] Sinus pain of forehead AND [2] yellow or green nasal discharge    Additional Information    Negative: Difficult to awaken or acting confused (e.g., disoriented, slurred speech)    Negative: [1] Weakness of the face, arm or leg on one side of the body AND [2] new onset    Negative: [1] Numbness of the face, arm or leg on one side of the body AND [2] new onset    Negative: [1] Loss of speech or garbled speech AND [2] new onset    Negative: Passed out (i.e., lost consciousness, collapsed and was not responding)    Negative: Sounds like a life-threatening emergency to the triager    Negative: Unable to walk, or can only walk with assistance (e.g., requires support)    Negative: Stiff neck (can't touch chin to chest)    Negative: Severe pain in one eye    Negative: [1] Other family members (or roommates) with headaches AND [2] possibility of carbon monoxide exposure    Negative: [1] SEVERE headache (e.g., excruciating) AND [2] \"worst headache\" of life    Negative: [1] SEVERE headache AND [2] sudden-onset (i.e., reaching maximum intensity within seconds)    Negative: [1] SEVERE headache AND [2] fever    Negative: Loss of vision or double vision (Exception: same as prior migraines)    Negative: [1] Fever > 100.0 F (37.8 C) AND [2] diabetes mellitus or weak immune system (e.g., HIV positive, cancer chemo, splenectomy, chronic steroids)    Negative: Patient sounds very sick or weak to the triager    Negative: [1] SEVERE headache (e.g., excruciating) AND [2] not improved after 2 hours of pain medicine    Negative: [1] Vomiting AND [2] 2 or more times (Exception: " [facility]  Palliative Medicine  Progress Note    Patient Name: James Dc  MRN: [id number]  Admission Date: 7/1/2024  Hospital Length of Stay: 4 days  Code Status: DNR   Attending Provider: Kd Crow MD  Consulting Provider: Judi Rodney MD  Primary Care Physician: Tim Joya MD  Principal Problem:Femoral hernia of right side with obstruction    Patient information was obtained from spouse/SO, past medical records, primary team, and [facility] nurse. .      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  He is not taking in anything by mouth.  He is no longer awake and alert.  Anticipate that he will continue to decline towards imminent in the next few days.  We will use glycopyrrolate for secretion management.  Certainly concern over increasing the thickness of the secretions.    Palliative Care  Palliative care encounter  UPDATE 7/6/2024  Patient continues to decline.  Wife is pleased with the care he is receiving.  We will add glycopyrrolate for increased secretions.  Discussed at bedside with wife and the nurse.  See ACP 7/3-7/5    Patient is a 89m with HFpEF, history of CVA due to carotid artery stenosis, parkinsonism, Alzheimer's dementia who was admitted 7/1 with acute small bowel obstruction s/p incarcerated inguinal hernia repair who has developed altered mental status post-operatively. Labs and CT head unremarkable, wife deferring further testing and wishing to pursue comfort-focused care only.     Interval update 07/05/2024 - admitted to palliative care unit, family updated at bedside, progressing as expected      Hospice diagnosis- Dementia with failure to thrive    Need for inpatient care- Active dying process as evidenced by decreased consciousness, cool extremities, and irregular breathing pattern. Likely prognosis of short days    Dispo: anticipate EOL care on the unit, if patient  "unexpectedly stabilizes, will approach family regarding care at home or alternative in-facility hospice care    Social support- Lives with wife of 60+ years, one daughter. Retired  and [place] war     Psychological- very appropriate grief behaviors by wife, healthy coping mechanisms    Spiritual- "raised [employer]" but currently nonpracticing      Symptom Assessment  - Pain/ pain behaviors: controlled  - Dyspnea/tachypnea: controlled  - Agitation: controlled  - Mentation: minimally responsive    Plan of care:  Tachypnea/dyspnea:   -non pharm measures such as having bedside fan and cooler room temps     Pain behaviors:   -morphine 2mg q 1 hour prn non verbal signs of discomfort     Agitation:   -treat with opioids initially  -followed by lorazepam 0.5mg q2hr PRN    Uncomfortable secretions:  -glycopyrrolate 0.4mg IV q4hr PRN     GI:   -will not prioritize at end of life          I will follow-up with patient. Please contact us if you have any additional questions.    Subjective:     Chief Complaint:   Chief Complaint   Patient presents with    Weakness     Generalized weakness and loss of appetite x4 days. Pt c/o difficulty swallowing. Coming from home.       HPI:   Mr James Dc is a 89 y.o. male with HFpEF, history of CVA due to carotid artery stenosis, parkinsonism, Alzheimer's dementia who presented to [facility] on 7/2 for evaluation of generalized weakness. Patient and family reports progressive generalized weakness for quite some time which worsened over the last few weeks leading to functional decline as well as difficulty swallowing and poor intake. Initial workup revealed high grade small bowel obstruction. Palliative care consulted for goals of care.    Hospital Course:  No notes on file    Interval History:  Some labored respirations.  Some increased secretions.  Resting comfortably.  Wife and    Medications:  Continuous Infusions:  Scheduled Meds:  PRN Meds:  Current " similar to previous migraines)    Negative: Fever > 104 F (40 C)    Negative: [1] MODERATE headache (e.g., interferes with normal activities) AND [2] present > 24 hours AND [3] unexplained  (Exceptions: analgesics not tried, typical migraine, or headache part of viral illness)    Negative: [1] New headache AND [2] weak immune system (e.g., HIV positive, cancer chemo, splenectomy, organ transplant, chronic steroids)    Negative: [1] New headache AND [2] age > 50    Protocols used: HEADACHE-A-AH       Facility-Administered Medications:     lorazepam, 0.5 mg, Intravenous, Q2H PRN    morphine, 2 mg, Intravenous, Q1H PRN    Objective:     Vital Signs (Most Recent):  Temp: 98.5 °F (36.9 °C) (07/06/24 0704)  Pulse: 89 (07/06/24 0704)  Resp: 18 (07/06/24 0704)  BP: (!) 141/62 (07/06/24 0704)  SpO2: 97 % (07/06/24 0704) Vital Signs (24h Range):  Temp:  [98.5 °F (36.9 °C)-99.4 °F (37.4 °C)] 98.5 °F (36.9 °C)  Pulse:  [76-93] 89  Resp:  [18-22] 18  SpO2:  [88 %-97 %] 97 %  BP: (114-154)/(62-70) 141/62     Weight: 45.4 kg (100 lb)  Body mass index is 18.29 kg/m².       Physical Exam  Vitals reviewed.   Cardiovascular:      Heart sounds: Normal heart sounds.   Pulmonary:      Comments: Upper airway rhonchi with some tachypnea  Neurological:      Comments: Unresponsive at this time            Review of Symptoms      Symptom Assessment (ESAS 0-10 Scale)  Unable to complete assessment due to Patient unresponsive     CAM / Delirium:  Negative  Constipation:  Negative  Diarrhea:  Negative      Bowel Management Plan (BMP):  No      Pain Assessment:  OME in 24 hours:  0  Location(s):      Pain Assessment in Advanced Demential Scale (PAINAD)   Breathing - Independent of vocalization:  1  Negative vocalization:  1  Facial expression:  0  Body language:  0  Consolability:  0  Total:  2    Modified Kt Scale:  1    Performance Status:  10    Living Arrangements:  Lives with spouse    Psychosocial/Cultural:   See Palliative Psychosocial Note: Yes  , 4 children  Retired  [place]   **Primary  to Follow**  Palliative Care  Consult: No    Spiritual:  F - Hailey and Belief:  Anabaptism nonpracticing  I - Importance:  No  C - Community:  No   A - Address in Care:  No        Advance Care Planning  Advance Directives:   Living Will: Yes        Copy on chart: Yes    Do Not Resuscitate Status: Yes    Medical Power of : Yes    Agent's Name:  Brisa Dc   Agent's Contact Number:   "[phone]    Decision Making:  Family answered questions and Patient unable to communicate due to disease severity/cognitive impairment  Goals of Care: The family endorses that what is most important right now is to focus on symptom/pain control    Accordingly, we have decided that the best plan to meet the patient's goals includes no further escalation in treatment, pivot to comfort-focused care, and enrollment in hospice if increased symptoms needs.         Significant Labs: None  CBC:   Recent Labs   Lab 07/03/24  1653   WBC 9.95   HGB 10.7*   HCT 32.6*   MCV 96        BMP:  No results for input(s): "GLU", "NA", "K", "CL", "CO2", "BUN", "CREATININE", "CALCIUM", "MG" in the last 24 hours.  LFT:  Lab Results   Component Value Date    AST 28 07/03/2024    ALKPHOS 44 (L) 07/03/2024    BILITOT 0.7 07/03/2024     Albumin:   Albumin   Date Value Ref Range Status   07/03/2024 2.8 (L) 3.5 - 5.2 g/dL Final     Protein:   Total Protein   Date Value Ref Range Status   07/03/2024 5.1 (L) 6.0 - 8.4 g/dL Final     Lactic acid:   No results found for: "LACTATE"    Significant Imaging: None    Judi Rodney MD  Palliative Medicine  [facility] - [facility] Pall[facility]                "

## 2024-07-06 NOTE — NURSING
Patient resting with eyes closed. Breathing Clear Unlabored on room air. No pain indicators or behaviors noted. Wife at bedside. Bed locked and low. Call light in reach. No questions at this time.

## 2024-07-06 NOTE — SUBJECTIVE & OBJECTIVE
Interval History:  Some labored respirations.  Some increased secretions.  Resting comfortably.  Wife and    Medications:  Continuous Infusions:  Scheduled Meds:  PRN Meds:  Current Facility-Administered Medications:     lorazepam, 0.5 mg, Intravenous, Q2H PRN    morphine, 2 mg, Intravenous, Q1H PRN    Objective:     Vital Signs (Most Recent):  Temp: 98.5 °F (36.9 °C) (07/06/24 0704)  Pulse: 89 (07/06/24 0704)  Resp: 18 (07/06/24 0704)  BP: (!) 141/62 (07/06/24 0704)  SpO2: 97 % (07/06/24 0704) Vital Signs (24h Range):  Temp:  [98.5 °F (36.9 °C)-99.4 °F (37.4 °C)] 98.5 °F (36.9 °C)  Pulse:  [76-93] 89  Resp:  [18-22] 18  SpO2:  [88 %-97 %] 97 %  BP: (114-154)/(62-70) 141/62     Weight: 45.4 kg (100 lb)  Body mass index is 18.29 kg/m².       Physical Exam  Vitals reviewed.   Cardiovascular:      Heart sounds: Normal heart sounds.   Pulmonary:      Comments: Upper airway rhonchi with some tachypnea  Neurological:      Comments: Unresponsive at this time            Review of Symptoms      Symptom Assessment (ESAS 0-10 Scale)  Unable to complete assessment due to Patient unresponsive     CAM / Delirium:  Negative  Constipation:  Negative  Diarrhea:  Negative      Bowel Management Plan (BMP):  No      Pain Assessment:  OME in 24 hours:  0  Location(s):      Pain Assessment in Advanced Demential Scale (PAINAD)   Breathing - Independent of vocalization:  1  Negative vocalization:  1  Facial expression:  0  Body language:  0  Consolability:  0  Total:  2    Modified Kt Scale:  1    Performance Status:  10    Living Arrangements:  Lives with spouse    Psychosocial/Cultural:   See Palliative Psychosocial Note: Yes  , 4 children  Retired  [place]   **Primary  to Follow**  Palliative Care  Consult: No    Spiritual:  F - Hailey and Belief:  Hindu nonpracticing  I - Importance:  No  C - Community:  No   A - Address in Care:  No        Advance Care Planning   Advance  "Directives:   Living Will: Yes        Copy on chart: Yes    Do Not Resuscitate Status: Yes    Medical Power of : Yes    Agent's Name:  Brisa Dc   Agent's Contact Number:  [phone]    Decision Making:  Family answered questions and Patient unable to communicate due to disease severity/cognitive impairment  Goals of Care: The family endorses that what is most important right now is to focus on symptom/pain control    Accordingly, we have decided that the best plan to meet the patient's goals includes no further escalation in treatment, pivot to comfort-focused care, and enrollment in hospice if increased symptoms needs.         Significant Labs: None  CBC:   Recent Labs   Lab 07/03/24  1653   WBC 9.95   HGB 10.7*   HCT 32.6*   MCV 96        BMP:  No results for input(s): "GLU", "NA", "K", "CL", "CO2", "BUN", "CREATININE", "CALCIUM", "MG" in the last 24 hours.  LFT:  Lab Results   Component Value Date    AST 28 07/03/2024    ALKPHOS 44 (L) 07/03/2024    BILITOT 0.7 07/03/2024     Albumin:   Albumin   Date Value Ref Range Status   07/03/2024 2.8 (L) 3.5 - 5.2 g/dL Final     Protein:   Total Protein   Date Value Ref Range Status   07/03/2024 5.1 (L) 6.0 - 8.4 g/dL Final     Lactic acid:   No results found for: "LACTATE"    Significant Imaging: None  "

## 2024-07-06 NOTE — ASSESSMENT & PLAN NOTE
Patient continues to decline.  He is not taking in anything by mouth.  He is no longer awake and alert.  Anticipate that he will continue to decline towards imminent in the next few days.  We will use glycopyrrolate for secretion management.  Certainly concern over increasing the thickness of the secretions.

## 2024-07-06 NOTE — ASSESSMENT & PLAN NOTE
"UPDATE 7/6/2024  Patient continues to decline.  Wife is pleased with the care he is receiving.  We will add glycopyrrolate for increased secretions.  Discussed at bedside with wife and the nurse.  See ACP 7/3-7/5    Patient is a 89m with HFpEF, history of CVA due to carotid artery stenosis, parkinsonism, Alzheimer's dementia who was admitted 7/1 with acute small bowel obstruction s/p incarcerated inguinal hernia repair who has developed altered mental status post-operatively. Labs and CT head unremarkable, wife deferring further testing and wishing to pursue comfort-focused care only.     Interval update 07/05/2024 - admitted to palliative care unit, family updated at bedside, progressing as expected      Hospice diagnosis- Dementia with failure to thrive    Need for inpatient care- Active dying process as evidenced by decreased consciousness, cool extremities, and irregular breathing pattern. Likely prognosis of short days    Dispo: anticipate EOL care on the unit, if patient unexpectedly stabilizes, will approach family regarding care at home or alternative in-facility hospice care    Social support- Lives with wife of 60+ years, one daughter. Retired  and [place] war     Psychological- very appropriate grief behaviors by wife, healthy coping mechanisms    Spiritual- "raised Restorationist" but currently nonpracticing      Symptom Assessment  - Pain/ pain behaviors: controlled  - Dyspnea/tachypnea: controlled  - Agitation: controlled  - Mentation: minimally responsive    Plan of care:  Tachypnea/dyspnea:   -non pharm measures such as having bedside fan and cooler room temps     Pain behaviors:   -morphine 2mg q 1 hour prn non verbal signs of discomfort     Agitation:   -treat with opioids initially  -followed by lorazepam 0.5mg q2hr PRN    Uncomfortable secretions:  -glycopyrrolate 0.4mg IV q4hr PRN     GI:   -will not prioritize at end of life    "

## 2024-07-06 NOTE — PLAN OF CARE
Problem: Adult Inpatient Plan of Care  Goal: Plan of Care Review  Outcome: Not Progressing  Goal: Patient-Specific Goal (Individualized)  Outcome: Not Progressing  Goal: Absence of Hospital-Acquired Illness or Injury  Outcome: Not Progressing  Goal: Optimal Comfort and Wellbeing  Outcome: Not Progressing  Goal: Readiness for Transition of Care  Outcome: Not Progressing

## 2024-07-06 NOTE — PLAN OF CARE
Problem: Adult Inpatient Plan of Care  Goal: Plan of Care Review  Outcome: Progressing  Goal: Patient-Specific Goal (Individualized)  Outcome: Progressing  Goal: Absence of Hospital-Acquired Illness or Injury  Outcome: Progressing  Goal: Optimal Comfort and Wellbeing  Outcome: Progressing  Goal: Readiness for Transition of Care  Outcome: Progressing     Problem: Infection  Goal: Absence of Infection Signs and Symptoms  Outcome: Progressing     Problem: Fall Injury Risk  Goal: Absence of Fall and Fall-Related Injury  Outcome: Progressing     Problem: Skin Injury Risk Increased  Goal: Skin Health and Integrity  Outcome: Progressing     Problem: Heart Failure  Goal: Optimal Coping  Outcome: Progressing  Goal: Optimal Cardiac Output  Outcome: Progressing  Goal: Stable Heart Rate and Rhythm  Outcome: Progressing  Goal: Optimal Functional Ability  Outcome: Progressing  Goal: Fluid and Electrolyte Balance  Outcome: Progressing  Goal: Improved Oral Intake  Outcome: Progressing  Goal: Effective Oxygenation and Ventilation  Outcome: Progressing  Goal: Effective Breathing Pattern During Sleep  Outcome: Progressing     Problem: Depressive Signs/Symptoms  Goal: Optimized Energy Level (Depressive Signs/Symptoms)  Outcome: Progressing  Goal: Optimized Cognitive Function (Depressive Signs/Symptoms)  Outcome: Progressing  Goal: Increased Participation and Engagement (Depressive Signs/Symptoms)  Outcome: Progressing  Goal: Enhanced Self-Esteem and Confidence (Depressive Signs/Symptoms)  Outcome: Progressing  Goal: Improved Mood Symptoms (Depressive Signs/Symptoms)  Outcome: Progressing  Goal: Optimized Nutrition Intake (Depressive Signs/Symptoms)  Outcome: Progressing  Goal: Improved Psychomotor Symptoms (Depressive Signs/Symptoms)  Outcome: Progressing  Goal: Improved Sleep (Depressive Signs/Symptoms)  Outcome: Progressing  Goal: Enhanced Social, Occupational or Functional Skills (Depressive Signs/Symptoms)  Outcome: Progressing      Problem: Fatigue  Goal: Improved Activity Tolerance  Outcome: Progressing     Problem: Sensory Impairment  Goal: Optimal Sensory Management  Outcome: Progressing     Problem: Mobility Impairment  Goal: Optimal Mobility Mason and Safety  Outcome: Progressing     Problem: Coping Ineffective  Goal: Effective Coping  Outcome: Progressing     Problem: Wound  Goal: Optimal Coping  Outcome: Progressing  Goal: Optimal Functional Ability  Outcome: Progressing  Goal: Absence of Infection Signs and Symptoms  Outcome: Progressing  Goal: Improved Oral Intake  Outcome: Progressing  Goal: Optimal Pain Control and Function  Outcome: Progressing  Goal: Skin Health and Integrity  Outcome: Progressing  Goal: Optimal Wound Healing  Outcome: Progressing

## 2024-07-07 PROBLEM — Z51.5 ENCOUNTER FOR HOSPICE CARE: Status: RESOLVED | Noted: 2024-01-01 | Resolved: 2024-01-01

## 2024-07-07 NOTE — PROGRESS NOTES
[facility]  Palliative Medicine  Progress Note    Patient Name: James Dc  MRN: [id number]  Admission Date: 7/1/2024  Hospital Length of Stay: 5 days  Code Status: DNR   Attending Provider: Kd Crow MD  Consulting Provider: Judi Rodney MD  Primary Care Physician: Tim Joya MD  Principal Problem:Femoral hernia of right side with obstruction    Patient information was obtained from spouse/SO, past medical records, and primary team.      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  He is not taking in anything by mouth.  He is no longer awake and alert.  Anticipate that he will continue to decline towards imminent in the next few days.  We will use glycopyrrolate for secretion management.  Certainly concern over increasing the thickness of the secretions.  Symptoms/treatments: Pain and agitation under control Last dose of MS before midnight.    Psychological/Psychiatric concerns: Unresponsive    Social concerns: consider admission to hospice    Spiritual concerns: Roman Catholic    Cultural concerns: none    End of life planning: Family has gathered. Large amount of visitors last night and he was able to squeeze hands.           I will follow-up with patient. Please contact us if you have any additional questions.    Subjective:     Chief Complaint:   Chief Complaint   Patient presents with    Weakness     Generalized weakness and loss of appetite x4 days. Pt c/o difficulty swallowing. Coming from home.       HPI:   Mr James Dc is a 89 y.o. male with HFpEF, history of CVA due to carotid artery stenosis, parkinsonism, Alzheimer's dementia who presented to [facility] on 7/2 for evaluation of generalized weakness. Patient and family reports progressive generalized weakness for quite some time which worsened over the last few weeks leading to functional decline as well as difficulty swallowing and poor  intake. Initial workup revealed high grade small bowel obstruction. Palliative care consulted for goals of care.    Hospital Course:  No notes on file    Interval History: Breathing changes noted. Slower/shallow. Unresponsive since last night. Had good visit with family.    Medications:  Continuous Infusions:  Scheduled Meds:  PRN Meds:  Current Facility-Administered Medications:     glycopyrrolate, 0.2 mg, Intravenous, Q8H PRN    lorazepam, 0.5 mg, Intravenous, Q2H PRN    morphine, 2 mg, Intravenous, Q1H PRN    Objective:     Vital Signs (Most Recent):  Temp: 98.2 °F (36.8 °C) (07/07/24 0721)  Pulse: 102 (07/07/24 0721)  Resp: 18 (07/07/24 0721)  BP: 119/61 (07/07/24 0721)  SpO2: (!) 75 % (07/07/24 0721) Vital Signs (24h Range):  Temp:  [98.2 °F (36.8 °C)-99.4 °F (37.4 °C)] 98.2 °F (36.8 °C)  Pulse:  [102-103] 102  Resp:  [16-26] 18  SpO2:  [75 %] 75 %  BP: (119-131)/(61-67) 119/61     Weight: 45.4 kg (100 lb)  Body mass index is 18.29 kg/m².       Physical Exam  Vitals reviewed.   Constitutional:       Appearance: He is ill-appearing.   Cardiovascular:      Rate and Rhythm: Tachycardia present.   Pulmonary:      Comments: Sonorous breathing.  Skin:     General: Skin is warm.            Review of Symptoms      Symptom Assessment (ESAS 0-10 Scale)  Unable to complete assessment due to Patient unresponsive     CAM / Delirium:  Negative  Constipation:  Negative  Diarrhea:  Negative      Bowel Management Plan (BMP):  Yes      Pain Assessment:  OME in 24 hours:  6 mg IV morphine in last 24 hours  Location(s):      Pain Assessment in Advanced Demential Scale (PAINAD)   Breathing - Independent of vocalization:  1  Negative vocalization:  0  Facial expression:  0  Body language:  0  Consolability:  0  Total:  1    Modified Kt Scale:  1    Performance Status:  10    Living Arrangements:  Lives with spouse and Lives in home    Psychosocial/Cultural:   See Palliative Psychosocial Note: No  ; retired vet.  Went to  "[employer].  Family comfortable.  **Primary  to Follow**  Palliative Care  Consult: No    Spiritual:  F - Hailey and Belief:  Zoroastrianism  I - Importance:  Yes  C - Community:  Yes  A - Address in Care:  Yes        Advance Care Planning  Advance Directives:   Living Will: Yes        Copy on chart: Yes    LaPOST: No    Do Not Resuscitate Status: Yes    Medical Power of : Yes    Agent's Name:  Brisa Dc   Agent's Contact Number:  [phone]    Decision Making:  Patient unable to communicate due to disease severity/cognitive impairment  Goals of Care: The family endorses that what is most important right now is to focus on symptom/pain control and comfort and QOL     Accordingly, we have decided that the best plan to meet the patient's goals includes enrolling in hospice care and pivot to comfort-focused care.           Significant Labs: None  CBC:   Recent Labs   Lab 07/03/24  1653   WBC 9.95   HGB 10.7*   HCT 32.6*   MCV 96        BMP:  No results for input(s): "GLU", "NA", "K", "CL", "CO2", "BUN", "CREATININE", "CALCIUM", "MG" in the last 24 hours.  LFT:  Lab Results   Component Value Date    AST 28 07/03/2024    ALKPHOS 44 (L) 07/03/2024    BILITOT 0.7 07/03/2024     Albumin:   Albumin   Date Value Ref Range Status   07/03/2024 2.8 (L) 3.5 - 5.2 g/dL Final     Protein:   Total Protein   Date Value Ref Range Status   07/03/2024 5.1 (L) 6.0 - 8.4 g/dL Final     Lactic acid:   No results found for: "LACTATE"    Significant Imaging: None    Judi Rodney MD  Palliative Medicine  [facility] - Hospice and Palliative Medicine                "

## 2024-07-07 NOTE — SUBJECTIVE & OBJECTIVE
Interval History: Breathing changes noted. Slower/shallow. Unresponsive since last night. Had good visit with family.    Medications:  Continuous Infusions:  Scheduled Meds:  PRN Meds:  Current Facility-Administered Medications:     glycopyrrolate, 0.2 mg, Intravenous, Q8H PRN    lorazepam, 0.5 mg, Intravenous, Q2H PRN    morphine, 2 mg, Intravenous, Q1H PRN    Objective:     Vital Signs (Most Recent):  Temp: 98.2 °F (36.8 °C) (07/07/24 0721)  Pulse: 102 (07/07/24 0721)  Resp: 18 (07/07/24 0721)  BP: 119/61 (07/07/24 0721)  SpO2: (!) 75 % (07/07/24 0721) Vital Signs (24h Range):  Temp:  [98.2 °F (36.8 °C)-99.4 °F (37.4 °C)] 98.2 °F (36.8 °C)  Pulse:  [102-103] 102  Resp:  [16-26] 18  SpO2:  [75 %] 75 %  BP: (119-131)/(61-67) 119/61     Weight: 45.4 kg (100 lb)  Body mass index is 18.29 kg/m².       Physical Exam  Vitals reviewed.   Constitutional:       Appearance: He is ill-appearing.   Cardiovascular:      Rate and Rhythm: Tachycardia present.   Pulmonary:      Comments: Sonorous breathing.  Skin:     General: Skin is warm.            Review of Symptoms      Symptom Assessment (ESAS 0-10 Scale)  Unable to complete assessment due to Patient unresponsive     CAM / Delirium:  Negative  Constipation:  Negative  Diarrhea:  Negative      Bowel Management Plan (BMP):  Yes      Pain Assessment:  OME in 24 hours:  6 mg IV morphine in last 24 hours  Location(s):      Pain Assessment in Advanced Demential Scale (PAINAD)   Breathing - Independent of vocalization:  1  Negative vocalization:  0  Facial expression:  0  Body language:  0  Consolability:  0  Total:  1    Modified Kt Scale:  1    Performance Status:  10    Living Arrangements:  Lives with spouse and Lives in home    Psychosocial/Cultural:   See Palliative Psychosocial Note: No  ; 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  Family comfortable.  **Primary  to Follow**  Palliative Care  Consult: No    Spiritual:  F - Hailey and Belief:  Alevism  I  "- Importance:  Yes  C - Community:  Yes  A - Address in Care:  Yes        Advance Care Planning   Advance Directives:   Living Will: Yes        Copy on chart: Yes    LaPOST: No    Do Not Resuscitate Status: Yes    Medical Power of : Yes    Agent's Name:  Brisa Dc   Agent's Contact Number:  [phone]    Decision Making:  Patient unable to communicate due to disease severity/cognitive impairment  Goals of Care: The family endorses that what is most important right now is to focus on symptom/pain control and comfort and QOL     Accordingly, we have decided that the best plan to meet the patient's goals includes enrolling in hospice care and pivot to comfort-focused care.           Significant Labs: None  CBC:   Recent Labs   Lab 07/03/24  1653   WBC 9.95   HGB 10.7*   HCT 32.6*   MCV 96        BMP:  No results for input(s): "GLU", "NA", "K", "CL", "CO2", "BUN", "CREATININE", "CALCIUM", "MG" in the last 24 hours.  LFT:  Lab Results   Component Value Date    AST 28 07/03/2024    ALKPHOS 44 (L) 07/03/2024    BILITOT 0.7 07/03/2024     Albumin:   Albumin   Date Value Ref Range Status   07/03/2024 2.8 (L) 3.5 - 5.2 g/dL Final     Protein:   Total Protein   Date Value Ref Range Status   07/03/2024 5.1 (L) 6.0 - 8.4 g/dL Final     Lactic acid:   No results found for: "LACTATE"    Significant Imaging: None  "

## 2024-07-07 NOTE — ASSESSMENT & PLAN NOTE
Patient continues to decline.  He is not taking in anything by mouth.  He is no longer awake and alert.  Anticipate that he will continue to decline towards imminent in the next few days.  We will use glycopyrrolate for secretion management.  Certainly concern over increasing the thickness of the secretions.  Symptoms/treatments: Pain and agitation under control Last dose of MS before midnight.    Psychological/Psychiatric concerns: Unresponsive    Social concerns: consider admission to hospice    Spiritual concerns: Buddhist    Cultural concerns: none    End of life planning: Family has gathered. Large amount of visitors last night and he was able to squeeze hands.

## 2024-07-07 NOTE — CHAPLAIN
Spiritual Care visit note      Patient:  James Dc  Patient's Judaism (if applicable):  Religious  Visit type:  Initial   Visit category:  Hospice, pall med, general rounding  Visited with:  pt and pt's spouse  Number of loved ones visited:  1  Length of visit:  5 mins      Purpose of visit:  General rounding on hospice unit/pall med pt     Chp checked in briefly with pt's spouse, she thanked chp for visited but stated no further needs at this time. Chp remains available.      support is available and on-site 24/7. Please call the on-call  for any spiritual care needs, [phone].          Diana Chandra (she/her), [phone]   Staff    24/7 on-call  [phone]   Email: [email or web address]   Work hours: Sunday- Wednesday, 7:30am- 5:30pm

## 2024-07-07 NOTE — PLAN OF CARE
Problem: Depressive Signs/Symptoms  Goal: Optimized Energy Level (Depressive Signs/Symptoms)  Outcome: Progressing  Goal: Optimized Cognitive Function (Depressive Signs/Symptoms)  Outcome: Progressing  Goal: Increased Participation and Engagement (Depressive Signs/Symptoms)  Outcome: Progressing  Goal: Enhanced Self-Esteem and Confidence (Depressive Signs/Symptoms)  Outcome: Progressing  Goal: Improved Mood Symptoms (Depressive Signs/Symptoms)  Outcome: Progressing  Goal: Optimized Nutrition Intake (Depressive Signs/Symptoms)  Outcome: Progressing  Goal: Improved Psychomotor Symptoms (Depressive Signs/Symptoms)  Outcome: Progressing  Goal: Improved Sleep (Depressive Signs/Symptoms)  Outcome: Progressing  Goal: Enhanced Social, Occupational or Functional Skills (Depressive Signs/Symptoms)  Outcome: Progressing

## 2024-07-08 PROBLEM — Z51.5 ENCOUNTER FOR PALLIATIVE CARE: Status: ACTIVE | Noted: 2024-01-01

## 2024-07-08 NOTE — NURSING
Pt resting at this time. RN TRAM orientation. Lying in bed-semi fowlers position. No agitation currently observed. However pt is tachypneic (40 RR) - Morphine administration to follow. Patients wife is at the bedside during this time. Bed is locked and in lowest position. All safety measures are in place. Mr. Dc's plan of care will continue.

## 2024-07-08 NOTE — HPI
Mr James Dc is a 89 y.o. male with HFpEF, history of CVA due to carotid artery stenosis, parkinsonism, Alzheimer's dementia who presented to [facility] on 7/2 for evaluation of generalized weakness. Patient and family reports progressive generalized weakness for quite some time which worsened over the last few weeks leading to functional decline as well as difficulty swallowing and poor intake. Initial workup revealed high grade small bowel obstruction. Palliative care consulted for goals of care.

## 2024-07-08 NOTE — PROGRESS NOTES
[facility]  Palliative Medicine  Progress Note    Patient Name: James Dc  MRN: [id number]  Admission Date: 7/7/2024  Hospital Length of Stay: 1 days  Code Status: DNR   Attending Provider: Precious Dodd MD  Consulting Provider: Precious Dodd MD  Primary Care Physician: Tim Joya MD  Principal Problem:<principal problem not specified>    Patient information was obtained from patient, spouse/SO, and primary team.      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 Labs and CT head unremarkable, wife deferring further testing and wishing to pursue comfort-focused care only. He was transferred to the hospice/palliative care unit for aggressive symptom management at the end of life. He is currently under GIP for acute distress requiring aggressive symptom management.    7/8/24: Febrile and tachypneic despite appropriate dosing of opioids. Had received dose of morphine 2 mg IV prior to my encounter and still breathing about 30-40 times per minute. Will administer another dose and control fevers. Anticipate increase in dose of opioids should he remain in distress. Wife updated at bedside, appropriately holding burton. Emotional support provided.    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  He is not taking in anything by mouth.  He is no longer awake and alert.  Anticipate that he will continue to decline towards imminent in the next few days.  We will use glycopyrrolate for secretion management.  Certainly concern over increasing the thickness of the secretions.     Terminal diagnosis: Dementia with failure to thrive     Need for  "inpatient care: Active dying process as evidenced by decreased consciousness, cool extremities, and irregular breathing pattern. Likely prognosis of hours to short days     Dispo: anticipate EOL care on the unit, if patient unexpectedly stabilizes, will approach family regarding care at home or alternative in-facility hospice care     Social support: Lives with wife of 60+ years, one daughter. Retired  and [place] war      Psychological: very appropriate grief behaviors by wife, healthy coping mechanisms     Spiritual: "raised Presybeterian" but currently nonpracticing    Symptom-Oriented Management Plans:  - Pain/ pain behaviors: controlled  - Dyspnea/tachypnea: uncontrolled  - Agitation: controlled  - Mentation: minimally responsive     Plan of care:  Tachypnea/dyspnea:   - non pharm measures such as having bedside fan and cooler room temps  - Morphine 2 mg IV q1h PRN pain behavior, labored breathing   - Anticipate possible increase to 3 mg if reassessment demonstrates persistent labored breathing resistant to current low dose of morphine  - Lorazepam 0.5 mg IV q2h PRN anxiety, labored breathing refractory to opioids    Terminal Fever:  - Initiate ketorolac 15 mg IV q6h PRN fever, temp >100 F  - If 15 mg is not helpful, will increase to 30 mg    Pain behaviors:   - Opioids as above     Agitation:   - Treat for pain/dyspnea as above  - Followed by lorazepam 0.5 mg IV q2h PRN anxiety, agitation, labored breathing refractory to opioids     Uncomfortable secretions, Improved:  - Turn head side to side to help shift secretions out of airway  - Gentle oral care, encourage loved ones to participate throughout the day  - Yankauer suction at bedside  - Glycopyrrolate 0.4mg IV q4hr PRN profuse oropharyngeal secretions     Bowel Regimen:  - In active stages of dying, will not prioritize at the end of life             Subjective:     Chief Complaint: Tachypnea, terminal fever    HPI:   Mr James Dc is a 89 " y.o. male with HFpEF, history of CVA due to carotid artery stenosis, parkinsonism, Alzheimer's dementia who presented to [facility] on 7/2 for evaluation of generalized weakness. Patient and family reports progressive generalized weakness for quite some time which worsened over the last few weeks leading to functional decline as well as difficulty swallowing and poor intake. Initial workup revealed high grade small bowel obstruction. Palliative care consulted for goals of care.     Hospital Course:  No notes on file    Interval History: Tachypneic this morning and febrile, received appropriate doses of opioids and antipyretic. Wife holding burton at bedside. Emotional support provided.    Medications:  Continuous Infusions:  Scheduled Meds:  PRN Meds:  Current Facility-Administered Medications:     glycopyrrolate, 0.2 mg, Intravenous, Q8H PRN    ketorolac, 15 mg, Intravenous, Q6H PRN    lorazepam, 0.5 mg, Intravenous, Q2H PRN    morphine, 2 mg, Intravenous, Q1H PRN    Objective:     Vital Signs (Most Recent):  Temp: (!) 103.3 °F (39.6 °C) (07/08/24 0705)  Pulse: (!) 125 (07/08/24 0705)  Resp: (!) 40 (07/08/24 0723)  BP: (!) 115/59 (07/08/24 0705)  SpO2: (!) 84 % (07/08/24 0705) Vital Signs (24h Range):  Temp:  [98 °F (36.7 °C)-103.3 °F (39.6 °C)] 103.3 °F (39.6 °C)  Pulse:  [102-125] 125  Resp:  [22-40] 40  SpO2:  [78 %-84 %] 84 %  BP: (115-119)/(59-61) 115/59     Weight: 45.4 kg (100 lb 1.4 oz)  Body mass index is 18.31 kg/m².       Physical Exam  Constitutional:       General: He is in acute distress.      Comments: Very hot to touch   HENT:      Head: Normocephalic and atraumatic.      Right Ear: External ear normal.      Left Ear: External ear normal.      Nose: Nose normal.      Mouth/Throat:      Mouth: Mucous membranes are dry.   Eyes:      Conjunctiva/sclera: Conjunctivae normal.   Cardiovascular:      Rate and Rhythm: Normal rate.   Pulmonary:      Comments: Tachypneic, Cheyne Boyer breathing  "pattern  Abdominal:      General: Bowel sounds are normal.      Palpations: Abdomen is soft.   Musculoskeletal:         General: Swelling present.   Skin:     General: Skin is dry.      Coloration: Skin is pale.      Findings: Bruising present.   Neurological:      Comments: Encephalopathic, no response to voice or touch            Review of Symptoms      Symptom Assessment (ESAS 0-10 Scale)  Unable to complete assessment due to Acuity of condition         Pain Assessment in Advanced Demential Scale (PAINAD)   Breathing - Independent of vocalization:  2  Negative vocalization:  0  Facial expression:  0  Body language:  0  Consolability:  0  Total:  2    Living Arrangements:  Lives with spouse    Psychosocial/Cultural:   See Palliative Psychosocial Note: No  Retired  and  from [place].  to wife (90-years-old) with daughter  **Primary  to Follow**  Palliative Care  Consult: No    Spiritual:  F - Hailey and Belief:  Jew  I - Importance:  Yes  C - Community:  Involved  A - Address in Care:   support engaged        Advance Care Planning  Advance Directives:   Living Will: Yes        Copy on chart: Yes    LaPOST: Yes    Do Not Resuscitate Status: Yes    Medical Power of : Yes    Agent's Name:  Brisa Dc (wife)   Agent's Contact Number:  [phone]    Decision Making:  Family answered questions  Goals of Care: What is most important right now is to focus on symptom/pain control, comfort and QOL . Accordingly, we have decided that the best plan to meet the patient's goals includes enrolling in hospice care.         Significant Labs: None  CBC:   Recent Labs   Lab 07/03/24  1653   WBC 9.95   HGB 10.7*   HCT 32.6*   MCV 96        BMP:  No results for input(s): "GLU", "NA", "K", "CL", "CO2", "BUN", "CREATININE", "CALCIUM", "MG" in the last 24 hours.  LFT:  Lab Results   Component Value Date    AST 28 07/03/2024    ALKPHOS 44 (L) 07/03/2024 " "   BILITOT 0.7 07/03/2024     Albumin:   Albumin   Date Value Ref Range Status   07/03/2024 2.8 (L) 3.5 - 5.2 g/dL Final     Protein:   Total Protein   Date Value Ref Range Status   07/03/2024 5.1 (L) 6.0 - 8.4 g/dL Final     Lactic acid:   No results found for: "LACTATE"    Significant Imaging: None    Precious Dodd MD  Palliative Medicine  [facility] - Hospice and Palliative Medicine                "

## 2024-07-08 NOTE — NURSING
Patient resting with eyes closed.Breathing unlabored. No distressed or pain behaviors noted. Safety measures in place. Bed locked and low. Call light in reach. Family at bedside.   NO questions or needs at this time. Care on going.

## 2024-07-08 NOTE — DISCHARGE SUMMARY
[facility] - Hospice and Palliative Medicine  Palliative Medicine  Discharge Summary      Patient Name: James Dc  MRN: [id number]  Admission Date: 7/1/2024  Hospital Length of Stay: 5 days  Discharge Date and Time: 7/7/2024 12:59 PM  Attending Physician: No att. providers found   Discharging Provider: Judi Rodney MD  Primary Care Provider: Tim Joya MD    HPI:   Mr James Dc is a 89 y.o. male with HFpEF, history of CVA due to carotid artery stenosis, parkinsonism, Alzheimer's dementia who presented to [facility] on 7/2 for evaluation of generalized weakness. Patient and family reports progressive generalized weakness for quite some time which worsened over the last few weeks leading to functional decline as well as difficulty swallowing and poor intake. Initial workup revealed high grade small bowel obstruction. Palliative care consulted for goals of care.    Procedure(s) (LRB):  REPAIR, HERNIA, INGUINAL, INCARCERATED, INITIAL, AGE 5 YEARS OR OLDER (Right)  EXCISION, SMALL INTESTINE      Hospital Course:   No notes on file     Goals of Care Treatment Preferences:  Code Status: DNR    Health care agent: Brisa Dc (wife)  Health care agent number: [phone]    Living Will: Yes     What is most important right now is to focus on symptom/pain control, comfort and QOL .  Accordingly, we have decided that the best plan to meet the patient's goals includes enrolling in hospice care, pivot to comfort-focused care.      Consults:   Consults (From admission, onward)          Status Ordering Provider     Inpatient consult to General Surgery  Once        Provider:  (Not yet assigned)    Completed TRAY VELAZQUEZ     Inpatient consult to Palliative Care  Once        Provider:  (Not yet assigned)    Completed TRAY VELAZQUEZ     Inpatient consult to Registered Dietitian/Nutritionist  Once        Provider:  (Not yet assigned)    Completed DEBRA SHETH            No new Assessment & Plan notes have  been filed under this hospital service since the last note was generated.  Service: Palliative Medicine    Final Active Diagnoses:    Diagnosis Date Noted POA    PRINCIPAL PROBLEM:  Femoral hernia of right side with obstruction [K41.30] 07/02/2024 Yes    Comfort measures only status [Z51.5] 07/05/2024 Not Applicable    Altered mental status [R41.82] 07/03/2024 Yes    Small bowel obstruction [K56.609] 07/02/2024 Yes    Dysphagia [R13.10] 07/02/2024 Yes    Severe malnutrition [E43] 07/02/2024 Yes    Generalized weakness [R53.1] 07/01/2024 Yes    DNR (do not resuscitate) [Z66] 03/14/2024 Yes    Current moderate episode of major depressive disorder without prior episode [F32.1] 02/16/2024 Yes    Cognitive communication deficit [R41.841] 08/24/2023 Yes    Impairment of balance [R26.89] 08/11/2023 Yes    Aortic atherosclerosis [I70.0] 06/16/2023 Yes    Chronic diastolic heart failure [I50.32] 06/14/2023 Yes    Cerebrovascular accident (CVA) due to stenosis of left carotid artery [I63.232] 06/13/2023 Yes    Parkinsonian features [R29.818] 03/09/2023 Yes    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    Significant Diagnostic Studies: N/A    Pending Diagnostic Studies:       Procedure Component Value Units Date/Time    Specimen to Pathology, Surgery General Surgery [[id number]] Collected: 07/02/24 1809    Order Status: Sent Lab Status: In process Updated: 07/03/24 3833    Specimen: Tissue            Medications:  Transfer Medications (for Discharge Readmit only):   No current facility-administered  medications for this encounter.     No current outpatient medications on file.     Facility-Administered Medications Ordered in Other Encounters   Medication Dose Route Frequency Provider Last Rate Last Admin    glycopyrrolate injection 0.2 mg  0.2 mg Intravenous Q8H PRN Judi Rodney MD        ketorolac injection 15 mg  15 mg Intravenous Q6H PRN Precious Dodd MD        LORazepam injection 1 mg  1 mg Intravenous Q30 Min PRN Precious Dodd MD        morphine injection 4 mg  4 mg Intravenous Q1H PRN Precious Dodd MD   4 mg at 07/08/24 1017       Indwelling Lines/Drains at time of discharge:   Lines/Drains/Airways       Drain  Duration             Male External Urinary Catheter 07/01/24 1732 6 days                    Time spent on the discharge of patient: 20 minutes  Patient was seen and examined on the date of discharge and determined to be suitable for discharge.    Judi Rodney MD  Department of Palliative Medicine  [facility] - Hospice and Palliative Medicine

## 2024-07-08 NOTE — SIGNIFICANT EVENT
Death Note    Informed of the patient's expected death on the palliative and hospice unit. The patient was seen and examined. After one minute auscultation, no spontaneous heartbeat or respiration noted. No withdrawal to stimulation. Patient pronounced dead on July 8, 2024 at 12:44 PM. Wife and daughter at bedside and condolences expressed.     Cause of death: Respiratory failure J96.90  Secondary causes: Severe protein calorie malnutrition    Please send LEERS to hospice medical director.

## 2024-07-08 NOTE — DISCHARGE SUMMARY
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 Labs and CT head unremarkable, wife deferring further testing and wishing to pursue comfort-focused care only. He was transferred to the hospice/palliative care unit for aggressive symptom management at the end of life.    Following admission to the hospice unit, patient's symptoms were managed with proportionate opioids and benzodiazepines to ensure comfort during end of life care. Family and patient were supported by our interdisciplinary team of doctors, nurses, social workers, and other team members. Patient peacefully passed away on 2024 at 12:44 PM.      Goals of Care Treatment Preferences:  Code Status: DNR    Health care agent: Brisa Dc (wife)  Health care agent number: [phone]    Living Will: Yes     What is most important right now is to focus on symptom/pain control, comfort and QOL .  Accordingly, we have decided that the best plan to meet the patient's goals includes enrolling in hospice care, pivot to comfort-focused care.      Consults: Palliative medicine    Final Active Diagnoses:    Diagnosis Date Noted POA    Encounter for palliative care [Z51.5] 2024 Not Applicable      Problems Resolved During this Admission:       Discharged Condition:     Disposition:     Follow Up: None applicable    Patient  Instructions: None applicable    Pending Diagnostic Studies: None      Medications:  None    Indwelling Lines/Drains at time of discharge:   Lines/Drains/Airways       Drain  Duration             Male External Urinary Catheter 07/01/24 1732 6 days                    Time spent on the discharge of patient: 30 minutes  Patient was seen and examined on the date of discharge and determined to be suitable for discharge.    Precious Dodd MD  Department of Palliative Medicine  [facility] - Hospice and Palliative Medicine

## 2024-07-08 NOTE — SUBJECTIVE & OBJECTIVE
Interval History: Tachypneic this morning and febrile, received appropriate doses of opioids and antipyretic. Wife holding burton at bedside. Emotional support provided.    Medications:  Continuous Infusions:  Scheduled Meds:  PRN Meds:  Current Facility-Administered Medications:     glycopyrrolate, 0.2 mg, Intravenous, Q8H PRN    ketorolac, 15 mg, Intravenous, Q6H PRN    lorazepam, 0.5 mg, Intravenous, Q2H PRN    morphine, 2 mg, Intravenous, Q1H PRN    Objective:     Vital Signs (Most Recent):  Temp: (!) 103.3 °F (39.6 °C) (07/08/24 0705)  Pulse: (!) 125 (07/08/24 0705)  Resp: (!) 40 (07/08/24 0723)  BP: (!) 115/59 (07/08/24 0705)  SpO2: (!) 84 % (07/08/24 0705) Vital Signs (24h Range):  Temp:  [98 °F (36.7 °C)-103.3 °F (39.6 °C)] 103.3 °F (39.6 °C)  Pulse:  [102-125] 125  Resp:  [22-40] 40  SpO2:  [78 %-84 %] 84 %  BP: (115-119)/(59-61) 115/59     Weight: 45.4 kg (100 lb 1.4 oz)  Body mass index is 18.31 kg/m².       Physical Exam  Constitutional:       General: He is in acute distress.      Comments: Very hot to touch   HENT:      Head: Normocephalic and atraumatic.      Right Ear: External ear normal.      Left Ear: External ear normal.      Nose: Nose normal.      Mouth/Throat:      Mouth: Mucous membranes are dry.   Eyes:      Conjunctiva/sclera: Conjunctivae normal.   Cardiovascular:      Rate and Rhythm: Normal rate.   Pulmonary:      Comments: Tachypneic, Cheyne Boyer breathing pattern  Abdominal:      General: Bowel sounds are normal.      Palpations: Abdomen is soft.   Musculoskeletal:         General: Swelling present.   Skin:     General: Skin is dry.      Coloration: Skin is pale.      Findings: Bruising present.   Neurological:      Comments: Encephalopathic, no response to voice or touch            Review of Symptoms      Symptom Assessment (ESAS 0-10 Scale)  Unable to complete assessment due to Acuity of condition         Pain Assessment in Advanced Demential Scale (PAINAD)   Breathing - Independent  "of vocalization:  2  Negative vocalization:  0  Facial expression:  0  Body language:  0  Consolability:  0  Total:  2    Living Arrangements:  Lives with spouse    Psychosocial/Cultural:   See Palliative Psychosocial Note: No  Retired  and  from [place].  to wife (90-years-old) with daughter  **Primary  to Follow**  Palliative Care  Consult: No    Spiritual:  F - Hailey and Belief:  Confucianism  I - Importance:  Yes  C - Community:  Involved  A - Address in Care:   support engaged        Advance Care Planning   Advance Directives:   Living Will: Yes        Copy on chart: Yes    LaPOST: Yes    Do Not Resuscitate Status: Yes    Medical Power of : Yes    Agent's Name:  Brisa Dc (wife)   Agent's Contact Number:  [phone]    Decision Making:  Family answered questions  Goals of Care: What is most important right now is to focus on symptom/pain control, comfort and QOL . Accordingly, we have decided that the best plan to meet the patient's goals includes enrolling in hospice care.         Significant Labs: None  CBC:   Recent Labs   Lab 07/03/24  1653   WBC 9.95   HGB 10.7*   HCT 32.6*   MCV 96        BMP:  No results for input(s): "GLU", "NA", "K", "CL", "CO2", "BUN", "CREATININE", "CALCIUM", "MG" in the last 24 hours.  LFT:  Lab Results   Component Value Date    AST 28 07/03/2024    ALKPHOS 44 (L) 07/03/2024    BILITOT 0.7 07/03/2024     Albumin:   Albumin   Date Value Ref Range Status   07/03/2024 2.8 (L) 3.5 - 5.2 g/dL Final     Protein:   Total Protein   Date Value Ref Range Status   07/03/2024 5.1 (L) 6.0 - 8.4 g/dL Final     Lactic acid:   No results found for: "LACTATE"    Significant Imaging: None  "

## 2024-07-08 NOTE — NURSING
1220- Dr. Dodd informed (pt without a palpable pulse/audible heart rate and RR). Charge RN (CHERRIE Mccullough) also notified.      1220: [facility] notified ([facility] Nurse at bedside).      1221:  (Anuradha ) notified.     1255: Whitney notified. Whitney Screener-Sunita Sarmiento . Referral number:[id number] .      1300: Post Mortem flowsheet completed.      1304: Release of  form printed/completed.

## 2024-07-08 NOTE — ASSESSMENT & PLAN NOTE
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 Labs and CT head unremarkable, wife deferring further testing and wishing to pursue comfort-focused care only. He was transferred to the hospice/palliative care unit for aggressive symptom management at the end of life. He is currently under GIP for acute distress requiring aggressive symptom management.    7/8/24: Febrile and tachypneic despite appropriate dosing of opioids. Had received dose of morphine 2 mg IV prior to my encounter and still breathing about 30-40 times per minute. Will administer another dose and control fevers. Anticipate increase in dose of opioids should he remain in distress. Wife updated at bedside, appropriately holding burton. Emotional support provided.    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  He is not taking in anything by mouth.  He is no longer awake and alert.  Anticipate that he will continue to decline towards imminent in the next few days.  We will use glycopyrrolate for secretion management.  Certainly concern over increasing the thickness of the secretions.     Terminal diagnosis: Dementia with failure to thrive     Need for inpatient care: Active dying process as evidenced by decreased consciousness, cool extremities, and irregular breathing pattern. Likely prognosis of hours to short days     Dispo: anticipate EOL care on the unit, if patient unexpectedly stabilizes, will approach family regarding care at home or alternative in-facility hospice care     Social support: Lives with wife of 60+ years, one daughter. Retired  and [place] war      Psychological: very appropriate grief behaviors by wife, healthy coping mechanisms    "  Spiritual: "raised Sabianism" but currently nonpracticing    Symptom-Oriented Management Plans:  - Pain/ pain behaviors: controlled  - Dyspnea/tachypnea: uncontrolled  - Agitation: controlled  - Mentation: minimally responsive     Plan of care:  Tachypnea/dyspnea:   - non pharm measures such as having bedside fan and cooler room temps  - Morphine 2 mg IV q1h PRN pain behavior, labored breathing   - Anticipate possible increase to 3 mg if reassessment demonstrates persistent labored breathing resistant to current low dose of morphine  - Lorazepam 0.5 mg IV q2h PRN anxiety, labored breathing refractory to opioids    Terminal Fever:  - Initiate ketorolac 15 mg IV q6h PRN fever, temp >100 F  - If 15 mg is not helpful, will increase to 30 mg    Pain behaviors:   - Opioids as above     Agitation:   - Treat for pain/dyspnea as above  - Followed by lorazepam 0.5 mg IV q2h PRN anxiety, agitation, labored breathing refractory to opioids     Uncomfortable secretions, Improved:  - Turn head side to side to help shift secretions out of airway  - Gentle oral care, encourage loved ones to participate throughout the day  - Yankauer suction at bedside  - Glycopyrrolate 0.4mg IV q4hr PRN profuse oropharyngeal secretions     Bowel Regimen:  - In active stages of dying, will not prioritize at the end of life     "

## 2024-07-10 LAB
FINAL PATHOLOGIC DIAGNOSIS: NORMAL
GROSS: NORMAL
Lab: NORMAL
MICROSCOPIC EXAM: NORMAL

## 2024-07-13 NOTE — PHYSICIAN QUERY
Please clarify the nature/etiology of the patient's altered mental status/encephalopathy:  Metabolic Encephalopathy    Observation noted as of 7/2/24 until expected death 7/8/24

## (undated) DEVICE — APPLICATOR CHLORAPREP ORN 26ML

## (undated) DEVICE — Device

## (undated) DEVICE — SYR LUER LOCK 1CC

## (undated) DEVICE — SUT 3-0 VICRYL SH CR/8 18

## (undated) DEVICE — TOWEL OR DISP STRL BLUE 4/PK

## (undated) DEVICE — NDL 22GA X1 1/2 REG BEVEL

## (undated) DEVICE — DRAIN PENROSE XRAY 12 X 1/4 ST

## (undated) DEVICE — SUT SILK 2-0 SH 18IN BLACK

## (undated) DEVICE — SUT 2/0 30IN PROLENE MONO

## (undated) DEVICE — ELECTRODE BLADE INSULATED 1 IN

## (undated) DEVICE — SUT VICRYL 3-0 27 SH

## (undated) DEVICE — SYR SLIP TIP 1CC

## (undated) DEVICE — SUT MONOCRYL 4-0 PS-2

## (undated) DEVICE — GOWN POLY REINF BRTH SLV XL

## (undated) DEVICE — GLOVE SENSICARE PI MICRO 7.5

## (undated) DEVICE — SPONGE LAP 18X18 PREWASHED

## (undated) DEVICE — SOL IRR STRL WATER 500ML

## (undated) DEVICE — SUT 3-0 VICRYL / SH (J416)

## (undated) DEVICE — SOL BETADINE 5%

## (undated) DEVICE — TAPE CURAD PAPER 1INX1.5YD

## (undated) DEVICE — BOVIE SUCTION

## (undated) DEVICE — SUT VICRYL CTD 2-0 GI 27 SH

## (undated) DEVICE — SUT COAT VICRYL 3-0 TIE 18

## (undated) DEVICE — DRESSING TRANS 4X4 TEGADERM

## (undated) DEVICE — BLADE SURG BVL ANG COAX 2.4MM

## (undated) DEVICE — TIP YANKAUERS BULB NO VENT

## (undated) DEVICE — SUT 2-0 VICRYL / SH (J417)

## (undated) DEVICE — ADHESIVE MASTISOL VIAL 48/BX

## (undated) DEVICE — SUT SILK 3-0 STRANDS 30IN

## (undated) DEVICE — DUOVISC

## (undated) DEVICE — STRIP MEDI WND CLSR 1/4X3IN

## (undated) DEVICE — TRAY MINOR GEN SURG OMC

## (undated) DEVICE — ADHESIVE DERMABOND ADVANCED

## (undated) DEVICE — DRAPE LAP T SHT W/ INSTR PAD

## (undated) DEVICE — SHEILD & GARTERS FOX METAL EYE

## (undated) DEVICE — RELOAD PROXIMATE CUT BLUE 55MM

## (undated) DEVICE — CUTTER PROXIMATE BLUE 55MM

## (undated) DEVICE — ELECTRODE REM PLYHSV RETURN 9

## (undated) DEVICE — SHIELD FOX W/GARTER

## (undated) DEVICE — SUTURE 8422H 3-0 PROLENE

## (undated) DEVICE — GOWN SURGICAL X-LARGE

## (undated) DEVICE — PAD CURAD NONADH 3X4IN